# Patient Record
Sex: FEMALE | Race: WHITE
[De-identification: names, ages, dates, MRNs, and addresses within clinical notes are randomized per-mention and may not be internally consistent; named-entity substitution may affect disease eponyms.]

---

## 2017-09-15 ENCOUNTER — HOSPITAL ENCOUNTER (INPATIENT)
Dept: HOSPITAL 47 - EC | Age: 82
LOS: 6 days | Discharge: NURSING, REHAB OR LONG-TERM CARE | DRG: 689 | End: 2017-09-21
Payer: MEDICARE

## 2017-09-15 VITALS — BODY MASS INDEX: 28 KG/M2

## 2017-09-15 DIAGNOSIS — H91.90: ICD-10-CM

## 2017-09-15 DIAGNOSIS — I50.32: ICD-10-CM

## 2017-09-15 DIAGNOSIS — N18.4: ICD-10-CM

## 2017-09-15 DIAGNOSIS — E83.52: ICD-10-CM

## 2017-09-15 DIAGNOSIS — F03.90: ICD-10-CM

## 2017-09-15 DIAGNOSIS — B37.2: ICD-10-CM

## 2017-09-15 DIAGNOSIS — N17.0: ICD-10-CM

## 2017-09-15 DIAGNOSIS — I48.4: ICD-10-CM
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
## 2017-09-15 DIAGNOSIS — J30.9: ICD-10-CM
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
## 2017-09-15 DIAGNOSIS — E86.0: ICD-10-CM

## 2017-09-15 DIAGNOSIS — I13.0: ICD-10-CM

## 2017-09-15 DIAGNOSIS — N39.0: Primary | ICD-10-CM

## 2017-09-15 DIAGNOSIS — E87.6: ICD-10-CM

## 2017-09-15 LAB
ALP SERPL-CCNC: 82 U/L (ref 38–126)
ALT SERPL-CCNC: 27 U/L (ref 9–52)
ANION GAP SERPL CALC-SCNC: 14 MMOL/L
APTT BLD: 21.7 SEC (ref 22–30)
AST SERPL-CCNC: 58 U/L (ref 14–36)
BASOPHILS # BLD AUTO: 0 K/UL (ref 0–0.2)
BASOPHILS NFR BLD AUTO: 0 %
BUN SERPL-SCNC: 91 MG/DL (ref 7–17)
CALCIUM SPEC-MCNC: 10.7 MG/DL (ref 8.4–10.2)
CH: 28.4
CHCM: 32.6
CHLORIDE SERPL-SCNC: 103 MMOL/L (ref 98–107)
CK SERPL-CCNC: 368 U/L (ref 30–135)
CK SERPL-CCNC: 389 U/L (ref 30–135)
CO2 SERPL-SCNC: 29 MMOL/L (ref 22–30)
EOSINOPHIL # BLD AUTO: 0 K/UL (ref 0–0.7)
EOSINOPHIL NFR BLD AUTO: 0 %
ERYTHROCYTE [DISTWIDTH] IN BLOOD BY AUTOMATED COUNT: 5.27 M/UL (ref 3.8–5.4)
ERYTHROCYTE [DISTWIDTH] IN BLOOD: 16.7 % (ref 11.5–15.5)
GLUCOSE SERPL-MCNC: 120 MG/DL (ref 74–99)
HCT VFR BLD AUTO: 46.2 % (ref 34–46)
HDW: 2.64
HGB BLD-MCNC: 14.6 GM/DL (ref 11.4–16)
INR PPP: 1.3 (ref ?–1.2)
LUC NFR BLD AUTO: 2 %
LYMPHOCYTES # SPEC AUTO: 1 K/UL (ref 1–4.8)
LYMPHOCYTES NFR SPEC AUTO: 6 %
MAGNESIUM SPEC-SCNC: 2.3 MG/DL (ref 1.6–2.3)
MCH RBC QN AUTO: 27.6 PG (ref 25–35)
MCHC RBC AUTO-ENTMCNC: 31.5 G/DL (ref 31–37)
MCV RBC AUTO: 87.7 FL (ref 80–100)
MONOCYTES # BLD AUTO: 1 K/UL (ref 0–1)
MONOCYTES NFR BLD AUTO: 6 %
NEUTROPHILS # BLD AUTO: 14.2 K/UL (ref 1.3–7.7)
NEUTROPHILS NFR BLD AUTO: 86 %
NON-AFRICAN AMERICAN GFR(MDRD): 16
PARTICLE COUNT: (no result)
PH UR: 5.5 [PH] (ref 5–8)
POTASSIUM SERPL-SCNC: 3.7 MMOL/L (ref 3.5–5.1)
PROT SERPL-MCNC: 6.6 G/DL (ref 6.3–8.2)
PROT UR QL: (no result)
PT BLD: 12.4 SEC (ref 9–12)
RBC UR QL: 3 /HPF (ref 0–5)
SODIUM SERPL-SCNC: 146 MMOL/L (ref 137–145)
SP GR UR: 1.01 (ref 1–1.03)
SQUAMOUS UR QL AUTO: 2 /HPF (ref 0–4)
TROPONIN I SERPL-MCNC: 0.07 NG/ML (ref 0–0.03)
UA BILLING (MACRO VS. MICRO): (no result)
UROBILINOGEN UR QL STRIP: <2 MG/DL (ref ?–2)
WBC # BLD AUTO: 0.3 10*3/UL
WBC # BLD AUTO: 16.5 K/UL (ref 3.8–10.6)
WBC #/AREA URNS HPF: >182 /HPF (ref 0–5)
WBC (PEROX): 15.99

## 2017-09-15 PROCEDURE — 99285 EMERGENCY DEPT VISIT HI MDM: CPT

## 2017-09-15 PROCEDURE — 83735 ASSAY OF MAGNESIUM: CPT

## 2017-09-15 PROCEDURE — 84484 ASSAY OF TROPONIN QUANT: CPT

## 2017-09-15 PROCEDURE — 82550 ASSAY OF CK (CPK): CPT

## 2017-09-15 PROCEDURE — 87086 URINE CULTURE/COLONY COUNT: CPT

## 2017-09-15 PROCEDURE — 81001 URINALYSIS AUTO W/SCOPE: CPT

## 2017-09-15 PROCEDURE — 71020: CPT

## 2017-09-15 PROCEDURE — 93005 ELECTROCARDIOGRAM TRACING: CPT

## 2017-09-15 PROCEDURE — 82553 CREATINE MB FRACTION: CPT

## 2017-09-15 PROCEDURE — 85610 PROTHROMBIN TIME: CPT

## 2017-09-15 PROCEDURE — 80053 COMPREHEN METABOLIC PANEL: CPT

## 2017-09-15 PROCEDURE — 70450 CT HEAD/BRAIN W/O DYE: CPT

## 2017-09-15 PROCEDURE — 96361 HYDRATE IV INFUSION ADD-ON: CPT

## 2017-09-15 PROCEDURE — 36415 COLL VENOUS BLD VENIPUNCTURE: CPT

## 2017-09-15 PROCEDURE — 72125 CT NECK SPINE W/O DYE: CPT

## 2017-09-15 PROCEDURE — 85027 COMPLETE CBC AUTOMATED: CPT

## 2017-09-15 PROCEDURE — 83605 ASSAY OF LACTIC ACID: CPT

## 2017-09-15 PROCEDURE — 80048 BASIC METABOLIC PNL TOTAL CA: CPT

## 2017-09-15 PROCEDURE — 85025 COMPLETE CBC W/AUTO DIFF WBC: CPT

## 2017-09-15 PROCEDURE — 93306 TTE W/DOPPLER COMPLETE: CPT

## 2017-09-15 PROCEDURE — 87040 BLOOD CULTURE FOR BACTERIA: CPT

## 2017-09-15 PROCEDURE — 80061 LIPID PANEL: CPT

## 2017-09-15 PROCEDURE — 96365 THER/PROPH/DIAG IV INF INIT: CPT

## 2017-09-15 PROCEDURE — 84443 ASSAY THYROID STIM HORMONE: CPT

## 2017-09-15 PROCEDURE — 96367 TX/PROPH/DG ADDL SEQ IV INF: CPT

## 2017-09-15 PROCEDURE — 85730 THROMBOPLASTIN TIME PARTIAL: CPT

## 2017-09-15 PROCEDURE — 72170 X-RAY EXAM OF PELVIS: CPT

## 2017-09-15 NOTE — ED
Fall HPI





- General


Chief Complaint: Fall


Stated Complaint: Fall


Time Seen by Provider: 09/15/17 19:20


Source: EMS, RN notes reviewed, old records reviewed


Mode of arrival: EMS





- History of Present Illness


Initial Comments: 





This is an 82-year-old female presenting to emergency Department after being 

found by her daughter after laying in her living room for the past 2 days.  

Apparently patient fell.  She was found with her head resting on the back of 

her recliner.  Patient started reports that she laid down her and did laying in 

her feces and urine.  Patient's daughter reports she is extremely dehydrated 

and thirsty when they arrived.  Patient denies any pain.  She is extremely hard 

of hearing.  Known history of kidney disease.  She states that she's had no 

cardiac abnormalities.  She denies any chest pain shortness of breath, denies 

any leg or hip pain or headache. 





- Related Data


 Home Medications











 Medication  Instructions  Recorded  Confirmed


 


Loratadine [Claritin] 10 mg PO DAILY 09/15/17 09/15/17


 


ALPRAZolam [Xanax] 0.5 mg PO TID 09/16/17 09/16/17


 


Albuterol Inhaler [Ventolin Hfa 2 puff INHALATION RT-Q4H PRN 09/16/17 09/16/17





Inhaler]   


 


Allopurinol [Zyloprim] 300 mg PO DAILY 09/16/17 09/16/17


 


Dicyclomine HCl 10 mg PO QID 09/16/17 09/16/17


 


Fluticasone Nasal Spray [Flonase 1 spray EA NOSTRIL DAILY 09/16/17 09/16/17





Nasal Spray]   


 


Hydrocodone/Acetaminophen [Norco 1 tab PO Q6HR PRN 09/16/17 09/16/17





5-325]   


 


Levothyroxine Sodium [Synthroid] 25 mcg PO DAILY 09/16/17 09/16/17


 


Potassium Chloride [K-Tab ER] 10 meq PO DAILY 09/16/17 09/16/17


 


Ranitidine HCl [Zantac] 150 mg PO BID 09/16/17 09/16/17











 Allergies











Allergy/AdvReac Type Severity Reaction Status Date / Time


 


No Known Allergies Allergy   Verified 09/15/17 20:23














Review of Systems


ROS Statement: 


Those systems with pertinent positive or pertinent negative responses have been 

documented in the HPI.





ROS Other: All systems not noted in ROS Statement are negative.





Past Medical History


Past Medical History: Dementia


History of Any Multi-Drug Resistant Organisms: None Reported


Past Surgical History: No Surgical Hx Reported


Past Psychological History: No Psychological Hx Reported


Smoking Status: Never smoker


Past Alcohol Use History: None Reported


Past Drug Use History: None Reported





- Past Family History


  ** Father


Family Medical History: No Reported History





  ** Mother


Family Medical History: No Reported History, Osteoarthritis (OA), Rheumatoid 

Arthritis (RA)


Additional Family Medical History / Comment(s): kidney problems, specific type 

unknown





General Exam





- General Exam Comments


Initial Comments: 





Pleasant 82-year-old female.  Patient is extremely hard of hearing.


Limitations: no limitations


General appearance: alert, in no apparent distress


Head exam: Present: atraumatic, normocephalic, normal inspection


Eye exam: Present: normal appearance, PERRL, EOMI.  Absent: scleral icterus, 

conjunctival injection, periorbital swelling


ENT exam: Present: normal exam, mucous membranes moist


Neck exam: Present: normal inspection.  Absent: tenderness, meningismus, 

lymphadenopathy


Respiratory exam: Present: normal lung sounds bilaterally.  Absent: respiratory 

distress, wheezes, rales, rhonchi, stridor





Course


 Vital Signs











  09/15/17 09/15/17





  19:14 23:07


 


Temperature 97.8 F 97.8 F


 


Pulse Rate 78 78


 


Respiratory 18 16





Rate  


 


Blood Pressure 101/58 125/79


 


O2 Sat by Pulse 98 99





Oximetry  














Medical Decision Making





- Medical Decision Making





10-year-old female presents or urgency department on her living room laying on 

the floor for 2 days.  She is lingering her own feces and stool.  Patient has a 

known history of kidney disease.  Patient labwork was reviewed, evidence of 

urinary tract infection, pelvis of leukocytosis.  Patient's chest x-ray KUB 

completed CT brain and C-spine were negative for any acute process.  Patient 

also had elevated lactic acid.  Patient does appear to be very dehydrated 

elevated BUN/creatinine.  Patient was given 2 L fluids, started on Rocephin for 

her urinary tract infection, as well as noted elevated troponin.  We'll repeat 

the troponin care.  Patient be admitted at this time.





- Lab Data


Result diagrams: 


 09/17/17 06:02





 09/17/17 06:02


 Lab Results











  09/15/17 09/15/17 09/15/17 Range/Units





  20:05 20:05 20:05 


 


WBC  16.5 H    (3.8-10.6)  k/uL


 


RBC  5.27    (3.80-5.40)  m/uL


 


Hgb  14.6    (11.4-16.0)  gm/dL


 


Hct  46.2 H    (34.0-46.0)  %


 


MCV  87.7    (80.0-100.0)  fL


 


MCH  27.6    (25.0-35.0)  pg


 


MCHC  31.5    (31.0-37.0)  g/dL


 


RDW  16.7 H    (11.5-15.5)  %


 


Plt Count  359    (150-450)  k/uL


 


Neutrophils %  86    %


 


Lymphocytes %  6    %


 


Monocytes %  6    %


 


Eosinophils %  0    %


 


Basophils %  0    %


 


Neutrophils #  14.2 H    (1.3-7.7)  k/uL


 


Lymphocytes #  1.0    (1.0-4.8)  k/uL


 


Monocytes #  1.0    (0-1.0)  k/uL


 


Eosinophils #  0.0    (0-0.7)  k/uL


 


Basophils #  0.0    (0-0.2)  k/uL


 


Anisocytosis  Slight    


 


PT    12.4 H  (9.0-12.0)  sec


 


INR    1.3 H  (<1.2)  


 


APTT    21.7 L  (22.0-30.0)  sec


 


Sodium   146 H   (137-145)  mmol/L


 


Potassium   3.7   (3.5-5.1)  mmol/L


 


Chloride   103   ()  mmol/L


 


Carbon Dioxide   29   (22-30)  mmol/L


 


Anion Gap   14   mmol/L


 


BUN   91 H*   (7-17)  mg/dL


 


Creatinine   2.80 H   (0.52-1.04)  mg/dL


 


Est GFR (MDRD) Af Amer   20   (>60 ml/min/1.73 sqM)  


 


Est GFR (MDRD) Non-Af   16   (>60 ml/min/1.73 sqM)  


 


Glucose   120 H   (74-99)  mg/dL


 


Lactic Ac Sepsis Rflx     


 


Plasma Lactic Acid Sumit     (0.7-2.0)  mmol/L


 


Calcium   10.7 H   (8.4-10.2)  mg/dL


 


Magnesium   2.3   (1.6-2.3)  mg/dL


 


Total Bilirubin   1.1   (0.2-1.3)  mg/dL


 


AST   58 H   (14-36)  U/L


 


ALT   27   (9-52)  U/L


 


Alkaline Phosphatase   82   ()  U/L


 


Creatine Kinase   389 H   ()  U/L


 


Total Creatine Kinase     ()  U/L


 


CK-MB (CK-2)     (0.0-2.4)  ng/mL


 


CK-MB (CK-2) Rel Index     


 


Troponin I     (0.000-0.034)  ng/mL


 


Total Protein   6.6   (6.3-8.2)  g/dL


 


Albumin   3.6   (3.5-5.0)  g/dL


 


Urine Color     


 


Urine Appearance     (Clear)  


 


Urine pH     (5.0-8.0)  


 


Ur Specific Gravity     (1.001-1.035)  


 


Urine Protein     (Negative)  


 


Urine Glucose (UA)     (Negative)  


 


Urine Ketones     (Negative)  


 


Urine Blood     (Negative)  


 


Urine Nitrite     (Negative)  


 


Urine Bilirubin     (Negative)  


 


Urine Urobilinogen     (<2.0)  mg/dL


 


Ur Leukocyte Esterase     (Negative)  


 


Urine RBC     (0-5)  /hpf


 


Urine WBC     (0-5)  /hpf


 


Urine WBC Clumps     (None)  /hpf


 


Ur Squamous Epith Cells     (0-4)  /hpf


 


Urine Bacteria     (None)  /hpf


 


Hyaline Casts     (0-2)  /lpf


 


Urine Yeast (Budding)     (None)  /hpf














  09/15/17 09/15/17 09/15/17 Range/Units





  20:05 20:05 20:47 


 


WBC     (3.8-10.6)  k/uL


 


RBC     (3.80-5.40)  m/uL


 


Hgb     (11.4-16.0)  gm/dL


 


Hct     (34.0-46.0)  %


 


MCV     (80.0-100.0)  fL


 


MCH     (25.0-35.0)  pg


 


MCHC     (31.0-37.0)  g/dL


 


RDW     (11.5-15.5)  %


 


Plt Count     (150-450)  k/uL


 


Neutrophils %     %


 


Lymphocytes %     %


 


Monocytes %     %


 


Eosinophils %     %


 


Basophils %     %


 


Neutrophils #     (1.3-7.7)  k/uL


 


Lymphocytes #     (1.0-4.8)  k/uL


 


Monocytes #     (0-1.0)  k/uL


 


Eosinophils #     (0-0.7)  k/uL


 


Basophils #     (0-0.2)  k/uL


 


Anisocytosis     


 


PT     (9.0-12.0)  sec


 


INR     (<1.2)  


 


APTT     (22.0-30.0)  sec


 


Sodium     (137-145)  mmol/L


 


Potassium     (3.5-5.1)  mmol/L


 


Chloride     ()  mmol/L


 


Carbon Dioxide     (22-30)  mmol/L


 


Anion Gap     mmol/L


 


BUN     (7-17)  mg/dL


 


Creatinine     (0.52-1.04)  mg/dL


 


Est GFR (MDRD) Af Amer     (>60 ml/min/1.73 sqM)  


 


Est GFR (MDRD) Non-Af     (>60 ml/min/1.73 sqM)  


 


Glucose     (74-99)  mg/dL


 


Lactic Ac Sepsis Rflx    Y  


 


Plasma Lactic Acid Sumit   3.0 H*   (0.7-2.0)  mmol/L


 


Calcium     (8.4-10.2)  mg/dL


 


Magnesium     (1.6-2.3)  mg/dL


 


Total Bilirubin     (0.2-1.3)  mg/dL


 


AST     (14-36)  U/L


 


ALT     (9-52)  U/L


 


Alkaline Phosphatase     ()  U/L


 


Creatine Kinase     ()  U/L


 


Total Creatine Kinase  368 H    ()  U/L


 


CK-MB (CK-2)  2.9 H*    (0.0-2.4)  ng/mL


 


CK-MB (CK-2) Rel Index  0.8    


 


Troponin I  0.071 H*    (0.000-0.034)  ng/mL


 


Total Protein     (6.3-8.2)  g/dL


 


Albumin     (3.5-5.0)  g/dL


 


Urine Color     


 


Urine Appearance     (Clear)  


 


Urine pH     (5.0-8.0)  


 


Ur Specific Gravity     (1.001-1.035)  


 


Urine Protein     (Negative)  


 


Urine Glucose (UA)     (Negative)  


 


Urine Ketones     (Negative)  


 


Urine Blood     (Negative)  


 


Urine Nitrite     (Negative)  


 


Urine Bilirubin     (Negative)  


 


Urine Urobilinogen     (<2.0)  mg/dL


 


Ur Leukocyte Esterase     (Negative)  


 


Urine RBC     (0-5)  /hpf


 


Urine WBC     (0-5)  /hpf


 


Urine WBC Clumps     (None)  /hpf


 


Ur Squamous Epith Cells     (0-4)  /hpf


 


Urine Bacteria     (None)  /hpf


 


Hyaline Casts     (0-2)  /lpf


 


Urine Yeast (Budding)     (None)  /hpf














  09/15/17 Range/Units





  20:50 


 


WBC   (3.8-10.6)  k/uL


 


RBC   (3.80-5.40)  m/uL


 


Hgb   (11.4-16.0)  gm/dL


 


Hct   (34.0-46.0)  %


 


MCV   (80.0-100.0)  fL


 


MCH   (25.0-35.0)  pg


 


MCHC   (31.0-37.0)  g/dL


 


RDW   (11.5-15.5)  %


 


Plt Count   (150-450)  k/uL


 


Neutrophils %   %


 


Lymphocytes %   %


 


Monocytes %   %


 


Eosinophils %   %


 


Basophils %   %


 


Neutrophils #   (1.3-7.7)  k/uL


 


Lymphocytes #   (1.0-4.8)  k/uL


 


Monocytes #   (0-1.0)  k/uL


 


Eosinophils #   (0-0.7)  k/uL


 


Basophils #   (0-0.2)  k/uL


 


Anisocytosis   


 


PT   (9.0-12.0)  sec


 


INR   (<1.2)  


 


APTT   (22.0-30.0)  sec


 


Sodium   (137-145)  mmol/L


 


Potassium   (3.5-5.1)  mmol/L


 


Chloride   ()  mmol/L


 


Carbon Dioxide   (22-30)  mmol/L


 


Anion Gap   mmol/L


 


BUN   (7-17)  mg/dL


 


Creatinine   (0.52-1.04)  mg/dL


 


Est GFR (MDRD) Af Amer   (>60 ml/min/1.73 sqM)  


 


Est GFR (MDRD) Non-Af   (>60 ml/min/1.73 sqM)  


 


Glucose   (74-99)  mg/dL


 


Lactic Ac Sepsis Rflx   


 


Plasma Lactic Acid Sumit   (0.7-2.0)  mmol/L


 


Calcium   (8.4-10.2)  mg/dL


 


Magnesium   (1.6-2.3)  mg/dL


 


Total Bilirubin   (0.2-1.3)  mg/dL


 


AST   (14-36)  U/L


 


ALT   (9-52)  U/L


 


Alkaline Phosphatase   ()  U/L


 


Creatine Kinase   ()  U/L


 


Total Creatine Kinase   ()  U/L


 


CK-MB (CK-2)   (0.0-2.4)  ng/mL


 


CK-MB (CK-2) Rel Index   


 


Troponin I   (0.000-0.034)  ng/mL


 


Total Protein   (6.3-8.2)  g/dL


 


Albumin   (3.5-5.0)  g/dL


 


Urine Color  Yellow  


 


Urine Appearance  Turbid H  (Clear)  


 


Urine pH  5.5  (5.0-8.0)  


 


Ur Specific Gravity  1.012  (1.001-1.035)  


 


Urine Protein  2+ H  (Negative)  


 


Urine Glucose (UA)  Negative  (Negative)  


 


Urine Ketones  Negative  (Negative)  


 


Urine Blood  Small H  (Negative)  


 


Urine Nitrite  Negative  (Negative)  


 


Urine Bilirubin  Negative  (Negative)  


 


Urine Urobilinogen  <2.0  (<2.0)  mg/dL


 


Ur Leukocyte Esterase  Large H  (Negative)  


 


Urine RBC  3  (0-5)  /hpf


 


Urine WBC  >182 H  (0-5)  /hpf


 


Urine WBC Clumps  Many H  (None)  /hpf


 


Ur Squamous Epith Cells  2  (0-4)  /hpf


 


Urine Bacteria  Many H  (None)  /hpf


 


Hyaline Casts  15 H  (0-2)  /lpf


 


Urine Yeast (Budding)  Few H  (None)  /hpf














09/15/17 22:31


EKG performed at 1924 shows normal sinus rhythm.  Evidence of inferior infarct.

  Ventricular rate 78 beats were noted.  WY interval 1:30.  To ascertain 6 

segs.  QT QTc is 41 seconds.





- Radiology Data


Radiology results: report reviewed


Chest x-ray was reviewed and negative for any acute process, pelvis x-ray was 

negative for any fracture.  CT brainand showed no acute abnormality's, evidence 

of spinal changes.





Disposition


Clinical Impression: 


 Fall, Renal insufficiency, Elevated troponin, [place] (hard of hearing), 

Dehydration, UTI (urinary tract infection)





Disposition: ADMITTED AS IP TO THIS [facility]


Time of Disposition: 22:31

## 2017-09-15 NOTE — XR
EXAMINATION TYPE: XR pelvis AP view

 

DATE OF EXAM: 9/15/2017

 

COMPARISON: NONE

 

HISTORY: Pain

 

TECHNIQUE: Single view

 

FINDINGS: Pelvic ring is intact. Proximal femurs are intact. I see no fracture. Sacroiliac joints are
 intact.

 

IMPRESSION: No fracture.

## 2017-09-15 NOTE — CT
EXAMINATION TYPE: CT brain cspine wo con

 

DATE OF EXAM: 9/15/2017

 

COMPARISON: NONE

 

HISTORY: Fall

 

CT DLP: 1633 mGycm

Automated exposure control for dose reduction was used.

 

TECHNIQUE: CT scan of the head and cervical spine are performed without contrast.

 

FINDINGS:   There is cerebral cortical atrophy. There is no mass effect nor midline shift. There is n
o sign of intracranial hemorrhage. The calvarium is intact. There is mucosal thickening in the maxill
riley sinuses. There is deformity of the right lobe.

 

There is a few millimeter anterior subluxation of C3 in relation to C4. There is moderate narrowing o
f C3-4 C4-5 disc spaces with spurring of the endplates. There is mild hypertrophic facet arthropathy.
 The skull base is intact. Posterior elements are intact.

 

IMPRESSION:

Cerebral atrophy. No acute intracranial abnormality. Mild chronic small vessel ischemia. Sinusitis.

 

Spondylotic changes in the cervical spine. No fracture.

## 2017-09-15 NOTE — XR
EXAMINATION TYPE: XR chest 2V

 

DATE OF EXAM: 9/15/2017

 

COMPARISON: NONE

 

HISTORY: Fall. Chest pain.

 

TECHNIQUE:  Frontal and lateral views of the chest are obtained.

 

FINDINGS:  There is hiatal hernia. There is no heart failure. Heart size is normal. Costophrenic angl
es are clear. There is no sign of pleural effusion or pneumothorax. 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
int subacromial joint space narrowing.

 

IMPRESSION:  Hiatal hernia. No active cardiopulmonary disease.

## 2017-09-16 LAB
ALP SERPL-CCNC: 78 U/L (ref 38–126)
ALT SERPL-CCNC: 33 U/L (ref 9–52)
ANION GAP SERPL CALC-SCNC: 11 MMOL/L
AST SERPL-CCNC: 41 U/L (ref 14–36)
BUN SERPL-SCNC: 68 MG/DL (ref 7–17)
CALCIUM SPEC-MCNC: 9.4 MG/DL (ref 8.4–10.2)
CH: 27.3
CHCM: 30.7
CHLORIDE SERPL-SCNC: 106 MMOL/L (ref 98–107)
CHOLEST SERPL-MCNC: 106 MG/DL (ref ?–200)
CK SERPL-CCNC: 337 U/L (ref 30–135)
CK SERPL-CCNC: 342 U/L (ref 30–135)
CO2 SERPL-SCNC: 25 MMOL/L (ref 22–30)
ERYTHROCYTE [DISTWIDTH] IN BLOOD BY AUTOMATED COUNT: 4.59 M/UL (ref 3.8–5.4)
ERYTHROCYTE [DISTWIDTH] IN BLOOD: 15.5 % (ref 11.5–15.5)
GLUCOSE SERPL-MCNC: 90 MG/DL (ref 74–99)
HCT VFR BLD AUTO: 41.1 % (ref 34–46)
HDLC SERPL-MCNC: 52 MG/DL (ref 40–60)
HDW: 2.5
HGB BLD-MCNC: 13 GM/DL (ref 11.4–16)
MAGNESIUM SPEC-SCNC: 1.8 MG/DL (ref 1.6–2.3)
MCH RBC QN AUTO: 28.3 PG (ref 25–35)
MCHC RBC AUTO-ENTMCNC: 31.6 G/DL (ref 31–37)
MCV RBC AUTO: 89.5 FL (ref 80–100)
NON-AFRICAN AMERICAN GFR(MDRD): 20
POTASSIUM SERPL-SCNC: 3 MMOL/L (ref 3.5–5.1)
PROT SERPL-MCNC: 5.5 G/DL (ref 6.3–8.2)
SODIUM SERPL-SCNC: 142 MMOL/L (ref 137–145)
TROPONIN I SERPL-MCNC: 0.05 NG/ML (ref 0–0.03)
TROPONIN I SERPL-MCNC: 0.06 NG/ML (ref 0–0.03)
WBC # BLD AUTO: 12.7 K/UL (ref 3.8–10.6)

## 2017-09-16 RX ADMIN — SODIUM CHLORIDE SCH MLS/HR: 9 INJECTION, SOLUTION INTRAVENOUS at 10:31

## 2017-09-16 RX ADMIN — NYSTATIN SCH APPLIC: 100000 POWDER TOPICAL at 21:04

## 2017-09-16 RX ADMIN — HEPARIN SODIUM SCH UNIT: 5000 INJECTION, SOLUTION INTRAVENOUS; SUBCUTANEOUS at 12:51

## 2017-09-16 RX ADMIN — LORATADINE SCH MG: 10 TABLET ORAL at 17:44

## 2017-09-16 RX ADMIN — DICYCLOMINE HYDROCHLORIDE SCH MG: 10 CAPSULE ORAL at 21:04

## 2017-09-16 RX ADMIN — SODIUM CHLORIDE SCH MLS/HR: 9 INJECTION, SOLUTION INTRAVENOUS at 12:51

## 2017-09-16 RX ADMIN — DICYCLOMINE HYDROCHLORIDE SCH MG: 10 CAPSULE ORAL at 17:44

## 2017-09-16 RX ADMIN — HEPARIN SODIUM SCH UNIT: 5000 INJECTION, SOLUTION INTRAVENOUS; SUBCUTANEOUS at 21:05

## 2017-09-16 NOTE — P.HPIM
History of Present Illness


H&P Date: 09/16/17


Chief Complaint: Fall





This is a 10-year-old female with history of dementia no home medications 

[place] and her home.  Patient is very hard of hearing so is being quite a few 

difficulties obtaining history from her.


Apparently patient was brought in by family will follow patient at her house on 

the floor.  Last time they contacted the patient most 2 days prior to this 

admission and apparently she was in her usual state of health.  Today when they 

come over to her home to take her out she was sitting down on the floor but 

urine all over and not able to get up but nothing apparent distress and awake.  

Patient states that she fell down on the floor may be even slowly lower herself 

and she cannot describe any circumstances or with her happened.  Patient is 

currently in no any apparent distress.  She denies any headache, shortness of 

breath or cough, chest pain, abdominal pain, diarrhea, dysuria, fever or 

chills.  In the emergency department blood work was significant for 

leukocytosis elevated creatinine and BUNs and more than 100 of 80 WBCs in her 

urine with large leukocyte esterase.  Patient vital signs were stable she was 

afebrile on admission but she did have elevated lactic acid up to 3 and mild 

elevation in her troponin.  Patient states that she lives alone in her house 

with her family occasionally visiting.





Review of Systems


Constitutional: Denies chills, Denies fever


Ears: bilateral: decreased hearing


Cardiovascular: Denies chest pain, Denies shortness of breath


Respiratory: Denies cough


Gastrointestinal: Denies abdominal pain, Denies diarrhea, Denies nausea, Denies 

vomiting


Genitourinary: Denies dysuria, Denies flank pain, Denies hematuria, Denies 

urgency, Denies urinary frequency


Integumentary: Denies rash


Neurological: Reports hearing difficulties


Psychiatric: Reports memory loss, Denies anxiety


Endocrine: Denies cold intolerance, Denies heat intolerance, Denies polyuria





Past Medical History


Past Medical History: Dementia


History of Any Multi-Drug Resistant Organisms: None Reported


Past Surgical History: No Surgical Hx Reported


Past Psychological History: No Psychological Hx Reported


Smoking Status: Never smoker


Past Alcohol Use History: None Reported


Past Drug Use History: None Reported





Medications and Allergies


 Home Medications











 Medication  Instructions  Recorded  Confirmed  Type


 


Loratadine [Claritin] 10 mg PO DAILY 09/15/17 09/15/17 History











 Allergies











Allergy/AdvReac Type Severity Reaction Status Date / Time


 


No Known Allergies Allergy   Verified 09/15/17 20:23














Physical Exam


Vitals: 


 Vital Signs











  Temp Pulse Resp BP Pulse Ox


 


 09/15/17 23:07  97.8 F  78  16  125/79  99


 


 09/15/17 19:14  97.8 F  78  18  101/58  98








 Intake and Output











 09/15/17 09/15/17 09/16/17





 14:59 22:59 06:59


 


Other:   


 


  Weight  72.575 kg 








 Patient Weight











 09/16/17





 06:59


 


Weight 72.575 kg














- Constitutional


General appearance: cooperative, no acute distress





- EENT





Right eye cataract


Eyes: no scleral icterus


ENT: hard of hearing, normal oropharynx





- Neck


Neck: no lymphadenopathy, normal ROM, no thyromegaly





- Respiratory


Respiratory: bilateral: CTA





- Cardiovascular


Rhythm: regular


Heart sounds: normal: S1, S2





- Gastrointestinal





Right costal vertebral angle tenderness present


General gastrointestinal: normal bowel sounds, no organomegaly, soft, no 

tenderness





- Integumentary


Integumentary: no cellulitis, no rash





- Neurologic


Neurologic: CNII-XII intact





- Musculoskeletal


Musculoskeletal: no generalized weakness, strength equal bilaterally





Neurological examination cranial nerves seem to be intact with limitation of 

patient level of cooperation


Motor in upper and lower extremities 5 out of 5 proximally and distally


DTRs are 1+ and symmetrical in patellar


No clonus


Plantar is flexor





Results


CBC & Chem 7: 


 09/15/17 20:05





 09/15/17 20:05


Labs: 


 Abnormal Lab Results - Last 24 Hours (Table)











  09/15/17 09/15/17 09/15/17 Range/Units





  20:05 20:05 20:05 


 


WBC  16.5 H    (3.8-10.6)  k/uL


 


Hct  46.2 H    (34.0-46.0)  %


 


RDW  16.7 H    (11.5-15.5)  %


 


Neutrophils #  14.2 H    (1.3-7.7)  k/uL


 


PT    12.4 H  (9.0-12.0)  sec


 


INR    1.3 H  (<1.2)  


 


APTT    21.7 L  (22.0-30.0)  sec


 


Sodium   146 H   (137-145)  mmol/L


 


BUN   91 H*   (7-17)  mg/dL


 


Creatinine   2.80 H   (0.52-1.04)  mg/dL


 


Glucose   120 H   (74-99)  mg/dL


 


Plasma Lactic Acid Sumit     (0.7-2.0)  mmol/L


 


Calcium   10.7 H   (8.4-10.2)  mg/dL


 


AST   58 H   (14-36)  U/L


 


Creatine Kinase   389 H   ()  U/L


 


Total Creatine Kinase     ()  U/L


 


CK-MB (CK-2)     (0.0-2.4)  ng/mL


 


Troponin I     (0.000-0.034)  ng/mL


 


Urine Appearance     (Clear)  


 


Urine Protein     (Negative)  


 


Urine Blood     (Negative)  


 


Ur Leukocyte Esterase     (Negative)  


 


Urine WBC     (0-5)  /hpf


 


Urine WBC Clumps     (None)  /hpf


 


Urine Bacteria     (None)  /hpf


 


Hyaline Casts     (0-2)  /lpf


 


Urine Yeast (Budding)     (None)  /hpf














  09/15/17 09/15/17 09/15/17 Range/Units





  20:05 20:05 20:50 


 


WBC     (3.8-10.6)  k/uL


 


Hct     (34.0-46.0)  %


 


RDW     (11.5-15.5)  %


 


Neutrophils #     (1.3-7.7)  k/uL


 


PT     (9.0-12.0)  sec


 


INR     (<1.2)  


 


APTT     (22.0-30.0)  sec


 


Sodium     (137-145)  mmol/L


 


BUN     (7-17)  mg/dL


 


Creatinine     (0.52-1.04)  mg/dL


 


Glucose     (74-99)  mg/dL


 


Plasma Lactic Acid Sumit   3.0 H*   (0.7-2.0)  mmol/L


 


Calcium     (8.4-10.2)  mg/dL


 


AST     (14-36)  U/L


 


Creatine Kinase     ()  U/L


 


Total Creatine Kinase  368 H    ()  U/L


 


CK-MB (CK-2)  2.9 H*    (0.0-2.4)  ng/mL


 


Troponin I  0.071 H*    (0.000-0.034)  ng/mL


 


Urine Appearance    Turbid H  (Clear)  


 


Urine Protein    2+ H  (Negative)  


 


Urine Blood    Small H  (Negative)  


 


Ur Leukocyte Esterase    Large H  (Negative)  


 


Urine WBC    >182 H  (0-5)  /hpf


 


Urine WBC Clumps    Many H  (None)  /hpf


 


Urine Bacteria    Many H  (None)  /hpf


 


Hyaline Casts    15 H  (0-2)  /lpf


 


Urine Yeast (Budding)    Few H  (None)  /hpf














Thrombosis Risk Factor Assmnt





- DVT/VTE Prophylaxis


DVT/VTE Prophylaxis: Pharmacologic Prophylaxis ordered





Assessment and Plan


(1) UTI (urinary tract infection)


Narrative/Plan: 


Patient exhibits evidence of urinary tract infection with 30 for leukocytosis 

and elevated lactic acid consistent with sepsis


She has a right CVA tenderness and I'm worried about acute pyelonephritis


She was given levofloxacin in the emergency department


Plan he is to start her on ceftriaxone


I will also obtain urine and blood cultures


Repeat lactic acid


Continue IV fluids


Check postvoiding residuals to rule out urinary retention as etiology of 

urinary tract infection


Status: Acute   





(2) JACK (acute kidney injury)


Narrative/Plan: 


Favor pre-renal nonoliguric


C PK  mildly elevated and does not explain


Check post voiding residuals to rule out urinary retention


Monitor urine output and recheck BUNs/creatinine the morning


Status: Acute   





(3) Elevated troponin


Narrative/Plan: 


Suspect due to acute kidney injury


No complaint of chest pain or shortness of breath


Repeat troponin has been ordered and pending


Status: Acute   





(4) Fall


Narrative/Plan: 


Fall with dementia and home situation


Patient definitely needs more support and likely will need nursing home 

placement at the time of discharge


Consults social work


Consult physical and occupational therapy


Status: Acute   


Time with Patient: Greater than 30

## 2017-09-16 NOTE — P.PN
Subjective


Principal diagnosis: 


Fall, weakness





Patient is an 82-year-old  female with history of dementia and 

ALLERGIC rhinitis who was brought in by family after being found covered in 

urine and feces on the floor for house.  Her last known normal had been 2 days 

prior.  In the emergency department she underwent an extensive evaluation.  She 

was found to have a urinary tract infection with elevated lactic acid and was 

started on IV fluids and antibiotics.  Her troponin was found to be slightly 

elevated.  Arrangements were made for her to get admitted to the selective care 

unit.  Upon admission her antibiotics were maintained.  She was found to have a 

candidal infection of the left abdominal fold and nystatin was ordered.








Patient seen and examined at bedside.  She is extremely hard of hearing 

conversation is difficult.  She states she has had diarrhea for a few days.  

She is also recently been treated for an urinary tract infection with 

antibiotics.  She thought her urinary tract infection may not have been 

resolved and called her PCP but he was out of the office for one week.  She 

denies any chest pain, shortness of breath, or nausea.














Objective





- Vital Signs


Vital signs: 


 Vital Signs











Temp  98.6 F   09/16/17 04:00


 


Pulse  82   09/16/17 04:00


 


Resp  18   09/16/17 04:00


 


BP  120/59   09/16/17 04:00


 


Pulse Ox  97   09/16/17 04:00








 Intake & Output











 09/15/17 09/16/17 09/16/17





 18:59 06:59 18:59


 


Intake Total   300


 


Balance   300


 


Weight  71.5 kg 


 


Intake:   


 


  Oral   300


 


Other:   


 


  Voiding Method  Bedpan 














- Exam


General: non toxic, no distress, appears at stated age


Derm: no rashes, no lesions


Head: atraumatic, normocephalic, symmetric


Eyes: EOMI, no lid lag, anicteric sclera


ENT: no post nasal drip, no thrush 


Mouth: no lip lesion, dry membranes moist


Cardiovascular: S1S2 reg, no murmur, positive posterior tibial pulse bilateral, 


Lungs: CTA bilateral, no rhonchi, no rales , no accessory muscle use


Abdominal: soft,  nontender to palpation, no guarding, no appreciable 

organomegaly


Ext: no gross muscle atrophy, no edema, no contractures


Neuro:  CN II-XI grossly intact, no focal neuro deficits


Psych: Alert, oriented, appropriate affect 








- Labs


CBC & Chem 7: 


 09/16/17 07:54





 09/16/17 07:54


Labs: 


 Abnormal Lab Results - Last 24 Hours (Table)











  09/15/17 09/15/17 09/15/17 Range/Units





  20:05 20:05 20:05 


 


WBC  16.5 H    (3.8-10.6)  k/uL


 


Hct  46.2 H    (34.0-46.0)  %


 


RDW  16.7 H    (11.5-15.5)  %


 


Neutrophils #  14.2 H    (1.3-7.7)  k/uL


 


PT    12.4 H  (9.0-12.0)  sec


 


INR    1.3 H  (<1.2)  


 


APTT    21.7 L  (22.0-30.0)  sec


 


Sodium   146 H   (137-145)  mmol/L


 


Potassium     (3.5-5.1)  mmol/L


 


BUN   91 H*   (7-17)  mg/dL


 


Creatinine   2.80 H   (0.52-1.04)  mg/dL


 


Glucose   120 H   (74-99)  mg/dL


 


Plasma Lactic Acid Sumit     (0.7-2.0)  mmol/L


 


Calcium   10.7 H   (8.4-10.2)  mg/dL


 


AST   58 H   (14-36)  U/L


 


Creatine Kinase   389 H   ()  U/L


 


Total Creatine Kinase     ()  U/L


 


CK-MB (CK-2)     (0.0-2.4)  ng/mL


 


Troponin I     (0.000-0.034)  ng/mL


 


Total Protein     (6.3-8.2)  g/dL


 


Albumin     (3.5-5.0)  g/dL


 


Urine Appearance     (Clear)  


 


Urine Protein     (Negative)  


 


Urine Blood     (Negative)  


 


Ur Leukocyte Esterase     (Negative)  


 


Urine WBC     (0-5)  /hpf


 


Urine WBC Clumps     (None)  /hpf


 


Urine Bacteria     (None)  /hpf


 


Hyaline Casts     (0-2)  /lpf


 


Urine Yeast (Budding)     (None)  /hpf














  09/15/17 09/15/17 09/15/17 Range/Units





  20:05 20:05 20:50 


 


WBC     (3.8-10.6)  k/uL


 


Hct     (34.0-46.0)  %


 


RDW     (11.5-15.5)  %


 


Neutrophils #     (1.3-7.7)  k/uL


 


PT     (9.0-12.0)  sec


 


INR     (<1.2)  


 


APTT     (22.0-30.0)  sec


 


Sodium     (137-145)  mmol/L


 


Potassium     (3.5-5.1)  mmol/L


 


BUN     (7-17)  mg/dL


 


Creatinine     (0.52-1.04)  mg/dL


 


Glucose     (74-99)  mg/dL


 


Plasma Lactic Acid Sumit   3.0 H*   (0.7-2.0)  mmol/L


 


Calcium     (8.4-10.2)  mg/dL


 


AST     (14-36)  U/L


 


Creatine Kinase     ()  U/L


 


Total Creatine Kinase  368 H    ()  U/L


 


CK-MB (CK-2)  2.9 H*    (0.0-2.4)  ng/mL


 


Troponin I  0.071 H*    (0.000-0.034)  ng/mL


 


Total Protein     (6.3-8.2)  g/dL


 


Albumin     (3.5-5.0)  g/dL


 


Urine Appearance    Turbid H  (Clear)  


 


Urine Protein    2+ H  (Negative)  


 


Urine Blood    Small H  (Negative)  


 


Ur Leukocyte Esterase    Large H  (Negative)  


 


Urine WBC    >182 H  (0-5)  /hpf


 


Urine WBC Clumps    Many H  (None)  /hpf


 


Urine Bacteria    Many H  (None)  /hpf


 


Hyaline Casts    15 H  (0-2)  /lpf


 


Urine Yeast (Budding)    Few H  (None)  /hpf














  09/16/17 09/16/17 09/16/17 Range/Units





  01:09 07:33 07:54 


 


WBC     (3.8-10.6)  k/uL


 


Hct     (34.0-46.0)  %


 


RDW     (11.5-15.5)  %


 


Neutrophils #     (1.3-7.7)  k/uL


 


PT     (9.0-12.0)  sec


 


INR     (<1.2)  


 


APTT     (22.0-30.0)  sec


 


Sodium     (137-145)  mmol/L


 


Potassium    3.0 L*  (3.5-5.1)  mmol/L


 


BUN    68 H  (7-17)  mg/dL


 


Creatinine    2.30 H  (0.52-1.04)  mg/dL


 


Glucose     (74-99)  mg/dL


 


Plasma Lactic Acid Sumit     (0.7-2.0)  mmol/L


 


Calcium     (8.4-10.2)  mg/dL


 


AST    41 H  (14-36)  U/L


 


Creatine Kinase     ()  U/L


 


Total Creatine Kinase  342 H  337 H   ()  U/L


 


CK-MB (CK-2)  3.2 H*  4.5 H*   (0.0-2.4)  ng/mL


 


Troponin I  0.054 H*  0.064 H*   (0.000-0.034)  ng/mL


 


Total Protein    5.5 L  (6.3-8.2)  g/dL


 


Albumin    2.9 L  (3.5-5.0)  g/dL


 


Urine Appearance     (Clear)  


 


Urine Protein     (Negative)  


 


Urine Blood     (Negative)  


 


Ur Leukocyte Esterase     (Negative)  


 


Urine WBC     (0-5)  /hpf


 


Urine WBC Clumps     (None)  /hpf


 


Urine Bacteria     (None)  /hpf


 


Hyaline Casts     (0-2)  /lpf


 


Urine Yeast (Budding)     (None)  /hpf














  09/16/17 Range/Units





  07:54 


 


WBC  12.7 H  (3.8-10.6)  k/uL


 


Hct   (34.0-46.0)  %


 


RDW   (11.5-15.5)  %


 


Neutrophils #   (1.3-7.7)  k/uL


 


PT   (9.0-12.0)  sec


 


INR   (<1.2)  


 


APTT   (22.0-30.0)  sec


 


Sodium   (137-145)  mmol/L


 


Potassium   (3.5-5.1)  mmol/L


 


BUN   (7-17)  mg/dL


 


Creatinine   (0.52-1.04)  mg/dL


 


Glucose   (74-99)  mg/dL


 


Plasma Lactic Acid Sumit   (0.7-2.0)  mmol/L


 


Calcium   (8.4-10.2)  mg/dL


 


AST   (14-36)  U/L


 


Creatine Kinase   ()  U/L


 


Total Creatine Kinase   ()  U/L


 


CK-MB (CK-2)   (0.0-2.4)  ng/mL


 


Troponin I   (0.000-0.034)  ng/mL


 


Total Protein   (6.3-8.2)  g/dL


 


Albumin   (3.5-5.0)  g/dL


 


Urine Appearance   (Clear)  


 


Urine Protein   (Negative)  


 


Urine Blood   (Negative)  


 


Ur Leukocyte Esterase   (Negative)  


 


Urine WBC   (0-5)  /hpf


 


Urine WBC Clumps   (None)  /hpf


 


Urine Bacteria   (None)  /hpf


 


Hyaline Casts   (0-2)  /lpf


 


Urine Yeast (Budding)   (None)  /hpf














Assessment and Plan


Plan: 


#UTI, present on admission, not related to Black catheter


-Continue with Rocephin


-Await blood and urine cultures


-IV fluids





#Elevated lactic acid, improved


-Continue with IV fluids





#Acute kidney injury with probable baseline chronic kidney disease


-Avoid nephrotoxic agents


-Related to dehydration


-IV fluids


-Follow basic metabolic profile





#Elevated troponin


-Secondary to acute kidney injury, flat, no EKG changes


-Check echocardiogram, if normal no additional investigation needed





#Hypercalcemia, likely secondary to dehydration


-Improved after IV fluid resuscitation





#Hypokalemia


-Replace and recheck





#Fall


-PT/OT evaluation, may need placement on discharge





#Possible dementia


-Safe and supportive environment





DVT prophylaxis: Heparin


Discussed with: Patient, nursing, awaiting for family to arrive


Anticipated discharge: 48-72 hours


Anticipated discharge place: Skilled nursing facility


A total of 45 minutes was spent on the care of this complex patient more than 50

% of the time was spent in counseling and care coordination.

## 2017-09-17 LAB
ALP SERPL-CCNC: 70 U/L (ref 38–126)
ALT SERPL-CCNC: 32 U/L (ref 9–52)
ANION GAP SERPL CALC-SCNC: 7 MMOL/L
AST SERPL-CCNC: 29 U/L (ref 14–36)
BUN SERPL-SCNC: 52 MG/DL (ref 7–17)
CALCIUM SPEC-MCNC: 9.4 MG/DL (ref 8.4–10.2)
CH: 28.3
CHCM: 32.1
CHLORIDE SERPL-SCNC: 108 MMOL/L (ref 98–107)
CO2 SERPL-SCNC: 25 MMOL/L (ref 22–30)
ERYTHROCYTE [DISTWIDTH] IN BLOOD BY AUTOMATED COUNT: 4.18 M/UL (ref 3.8–5.4)
ERYTHROCYTE [DISTWIDTH] IN BLOOD: 16.6 % (ref 11.5–15.5)
GLUCOSE SERPL-MCNC: 88 MG/DL (ref 74–99)
HCT VFR BLD AUTO: 37 % (ref 34–46)
HDW: 2.61
HGB BLD-MCNC: 11.4 GM/DL (ref 11.4–16)
MAGNESIUM SPEC-SCNC: 1.7 MG/DL (ref 1.6–2.3)
MCH RBC QN AUTO: 27.2 PG (ref 25–35)
MCHC RBC AUTO-ENTMCNC: 30.7 G/DL (ref 31–37)
MCV RBC AUTO: 88.7 FL (ref 80–100)
NON-AFRICAN AMERICAN GFR(MDRD): 24
POTASSIUM SERPL-SCNC: 3.3 MMOL/L (ref 3.5–5.1)
PROT SERPL-MCNC: 4.9 G/DL (ref 6.3–8.2)
SODIUM SERPL-SCNC: 140 MMOL/L (ref 137–145)
WBC # BLD AUTO: 9.2 K/UL (ref 3.8–10.6)

## 2017-09-17 RX ADMIN — ALLOPURINOL SCH MG: 300 TABLET ORAL at 10:42

## 2017-09-17 RX ADMIN — DICYCLOMINE HYDROCHLORIDE SCH MG: 10 CAPSULE ORAL at 17:42

## 2017-09-17 RX ADMIN — POTASSIUM CHLORIDE SCH MEQ: 750 TABLET, EXTENDED RELEASE ORAL at 10:43

## 2017-09-17 RX ADMIN — LEVOTHYROXINE SODIUM SCH MCG: 25 TABLET ORAL at 06:17

## 2017-09-17 RX ADMIN — NYSTATIN SCH APPLIC: 100000 POWDER TOPICAL at 21:56

## 2017-09-17 RX ADMIN — HEPARIN SODIUM SCH UNIT: 5000 INJECTION, SOLUTION INTRAVENOUS; SUBCUTANEOUS at 21:56

## 2017-09-17 RX ADMIN — DICYCLOMINE HYDROCHLORIDE SCH MG: 10 CAPSULE ORAL at 21:56

## 2017-09-17 RX ADMIN — LORATADINE SCH MG: 10 TABLET ORAL at 10:42

## 2017-09-17 RX ADMIN — FLUTICASONE PROPIONATE SCH SPRAY: 50 SPRAY, METERED NASAL at 10:44

## 2017-09-17 RX ADMIN — HEPARIN SODIUM SCH UNIT: 5000 INJECTION, SOLUTION INTRAVENOUS; SUBCUTANEOUS at 10:42

## 2017-09-17 RX ADMIN — DICYCLOMINE HYDROCHLORIDE SCH MG: 10 CAPSULE ORAL at 14:35

## 2017-09-17 RX ADMIN — FAMOTIDINE SCH MG: 20 TABLET, FILM COATED ORAL at 10:42

## 2017-09-17 RX ADMIN — DICYCLOMINE HYDROCHLORIDE SCH MG: 10 CAPSULE ORAL at 10:42

## 2017-09-17 RX ADMIN — NYSTATIN SCH APPLIC: 100000 POWDER TOPICAL at 10:45

## 2017-09-17 NOTE — P.PN
Subjective


Principal diagnosis: 


Fall, weakness





Patient is an 82-year-old  female with history of dementia, HTN, CHF, 

and allergic rhinitis who was brought in by family after being found covered in 

urine and feces on the floor of her house.  Her last known normal had been 2 

days prior.  In the emergency department she underwent an extensive evaluation.

  She was found to have a urinary tract infection with elevated lactic acid and 

was started on IV fluids and antibiotics.  Her troponin was found to be 

slightly elevated.  She was admitted to the selective care unit.  Upon 

admission her antibiotics were maintained.  She was found to have a candidal 

infection of the left abdominal fold and nystatin was ordered. She had a rapid 

improvement in her WBC count. She only wanted to be seen for 1 thing and did 

not want a more thorough investigation, she did not want to discuss code 

status. 








Patient seen and examined at bedside.  She is extremely hard of hearing 

conversation is difficult, everything is communicated in writing. No nausea, no 

vomiting, diarrhea is getting better, no sob. 














Objective





- Vital Signs


Vital signs: 


 Vital Signs











Temp  98.3 F   09/17/17 04:00


 


Pulse  103 H  09/17/17 08:00


 


Resp  18   09/17/17 08:00


 


BP  137/78   09/17/17 08:00


 


Pulse Ox  99   09/17/17 08:00








 Intake & Output











 09/16/17 09/17/17 09/17/17





 18:59 06:59 18:59


 


Intake Total 772 1200 


 


Output Total  500 


 


Balance 772 700 


 


Weight 71.5 kg 72.5 kg 72.5 kg


 


Intake:   


 


  IV  1200 


 


    Sodium Chloride 0.45% 1,  1200 





    000 ml @ 75 mls/hr IV .   





    [id number] [facility] Rx#:[id number]   


 


  Oral 772  


 


Output:   


 


  Urine  500 


 


Other:   


 


  Voiding Method Bedpan Bedpan Bedpan





  Incontinent Incontinent


 


  # Voids 3 1 














- Exam


General: non toxic, no distress, appears at stated age


Derm: rash left groin with erythema, excoriation and cream discharge. 


Head: atraumatic, normocephalic, symmetric


Eyes: EOMI, no lid lag, anicteric sclera


ENT: Cheesh-Na, no post nasal drip, no thrush 


Mouth: no lip lesion, dry membranes moist


Cardiovascular: S1S2 reg, no murmur, positive posterior tibial pulse bilateral, 


Lungs: CTA bilateral, no rhonchi, no rales , no accessory muscle use


Abdominal: soft,  nontender to palpation, no guarding, no appreciable 

organomegaly


Ext: no gross muscle atrophy, no edema, no contractures


Neuro:  CN II-XI grossly intact, no focal neuro deficits


Psych: Alert, oriented, appropriate affect 








- Labs


CBC & Chem 7: 


 09/17/17 06:02





 09/17/17 06:02


Labs: 


 Abnormal Lab Results - Last 24 Hours (Table)











  09/17/17 09/17/17 Range/Units





  06:02 06:02 


 


MCHC  30.7 L   (31.0-37.0)  g/dL


 


RDW  16.6 H   (11.5-15.5)  %


 


Potassium   3.3 L  (3.5-5.1)  mmol/L


 


Chloride   108 H  ()  mmol/L


 


BUN   52 H  (7-17)  mg/dL


 


Creatinine   2.00 H  (0.52-1.04)  mg/dL


 


Total Protein   4.9 L  (6.3-8.2)  g/dL


 


Albumin   2.5 L  (3.5-5.0)  g/dL








 Microbiology - Last 24 Hours (Table)











 09/16/17 13:00 Urine Culture - Preliminary





 Urine,Voided 














Assessment and Plan


Plan: 


#UTI, present on admission, not related to Black catheter


-Continue with Rocephin


-Await blood and urine cultures


-IV fluids





#Candida intertrigo 


- Nystatin powder 





#Acute kidney injury with probable baseline chronic kidney disease


-Avoid nephrotoxic agents


-Related to dehydration


-IV fluids


-Follow basic metabolic profile, following with Dr. Segura has CKD Stage IV per 

daughter 





#Elevated troponin


-Secondary to acute kidney injury, flat, no EKG changes


-Check echocardiogram 9/18, if normal no additional investigation needed





#Hypokalemia


-Replace and recheck





#Fall


-PT/OT evaluation, may need placement on discharge and social work is consulted 





#Possible dementia


-Safe and supportive environment





Resolved: 


Elevated lactic acid


Hypercalcemia





Transfer to medical floor





DVT prophylaxis: Heparin


Discussed with: Patient, nursing, 


Anticipated discharge: 48-72 hours


Anticipated discharge place: Skilled nursing facility


A total of 45 minutes was spent on the care of this complex patient more than 50

% of the time was spent in counseling and care coordination.

## 2017-09-18 LAB
ALP SERPL-CCNC: 73 U/L (ref 38–126)
ALT SERPL-CCNC: 25 U/L (ref 9–52)
ANION GAP SERPL CALC-SCNC: 8 MMOL/L
AST SERPL-CCNC: 25 U/L (ref 14–36)
BUN SERPL-SCNC: 42 MG/DL (ref 7–17)
CALCIUM SPEC-MCNC: 9.8 MG/DL (ref 8.4–10.2)
CH: 27
CHCM: 30.3
CHLORIDE SERPL-SCNC: 111 MMOL/L (ref 98–107)
CO2 SERPL-SCNC: 22 MMOL/L (ref 22–30)
ERYTHROCYTE [DISTWIDTH] IN BLOOD BY AUTOMATED COUNT: 3.94 M/UL (ref 3.8–5.4)
ERYTHROCYTE [DISTWIDTH] IN BLOOD: 15.8 % (ref 11.5–15.5)
GLUCOSE SERPL-MCNC: 89 MG/DL (ref 74–99)
HCT VFR BLD AUTO: 35.4 % (ref 34–46)
HDW: 2.52
HGB BLD-MCNC: 11.3 GM/DL (ref 11.4–16)
MAGNESIUM SPEC-SCNC: 1.6 MG/DL (ref 1.6–2.3)
MCH RBC QN AUTO: 28.6 PG (ref 25–35)
MCHC RBC AUTO-ENTMCNC: 31.8 G/DL (ref 31–37)
MCV RBC AUTO: 89.8 FL (ref 80–100)
NON-AFRICAN AMERICAN GFR(MDRD): 24
POTASSIUM SERPL-SCNC: 4.6 MMOL/L (ref 3.5–5.1)
PROT SERPL-MCNC: 5.2 G/DL (ref 6.3–8.2)
SODIUM SERPL-SCNC: 141 MMOL/L (ref 137–145)
WBC # BLD AUTO: 8.2 K/UL (ref 3.8–10.6)

## 2017-09-18 RX ADMIN — NYSTATIN SCH APPLIC: 100000 POWDER TOPICAL at 08:49

## 2017-09-18 RX ADMIN — ALLOPURINOL SCH MG: 300 TABLET ORAL at 08:49

## 2017-09-18 RX ADMIN — DICYCLOMINE HYDROCHLORIDE SCH MG: 10 CAPSULE ORAL at 17:53

## 2017-09-18 RX ADMIN — LEVOTHYROXINE SODIUM SCH MCG: 25 TABLET ORAL at 06:58

## 2017-09-18 RX ADMIN — HEPARIN SODIUM SCH UNIT: 5000 INJECTION, SOLUTION INTRAVENOUS; SUBCUTANEOUS at 21:05

## 2017-09-18 RX ADMIN — DICYCLOMINE HYDROCHLORIDE SCH MG: 10 CAPSULE ORAL at 08:49

## 2017-09-18 RX ADMIN — NYSTATIN SCH APPLIC: 100000 POWDER TOPICAL at 21:06

## 2017-09-18 RX ADMIN — FLUTICASONE PROPIONATE SCH SPRAY: 50 SPRAY, METERED NASAL at 08:49

## 2017-09-18 RX ADMIN — POTASSIUM CHLORIDE SCH MEQ: 750 TABLET, EXTENDED RELEASE ORAL at 08:49

## 2017-09-18 RX ADMIN — DICYCLOMINE HYDROCHLORIDE SCH MG: 10 CAPSULE ORAL at 13:36

## 2017-09-18 RX ADMIN — DICYCLOMINE HYDROCHLORIDE SCH MG: 10 CAPSULE ORAL at 21:06

## 2017-09-18 RX ADMIN — HEPARIN SODIUM SCH UNIT: 5000 INJECTION, SOLUTION INTRAVENOUS; SUBCUTANEOUS at 08:49

## 2017-09-18 RX ADMIN — FAMOTIDINE SCH MG: 20 TABLET, FILM COATED ORAL at 08:49

## 2017-09-18 RX ADMIN — LORATADINE SCH MG: 10 TABLET ORAL at 08:49

## 2017-09-18 NOTE — P.PN
Subjective


Principal diagnosis: 





patient is seen and examined , in follow up for UTI





82 year old female with history of dementia, CHF, HTN presented after being 

found at home covered in urine and feces on the floor. She was found to have 

urinary tract infection and started on empiric antibiotics. 





patient is hard of hearing and communication carried on with writing. she 

denies any new complaint, doing well, denies any chest pain or trouble 

breathing. patient requests to be sent home. .





Objective





- Vital Signs


Vital signs: 


 Vital Signs











Temp  97.4 F L  09/18/17 15:00


 


Pulse  101 H  09/18/17 16:00


 


Resp  16   09/18/17 16:00


 


BP  154/94   09/18/17 15:00


 


Pulse Ox  97   09/18/17 15:00








 Intake & Output











 09/17/17 09/18/17 09/18/17





 18:59 06:59 18:59


 


Intake Total 240 840 710


 


Output Total   500


 


Balance 240 840 210


 


Weight 72.5 kg 74.5 kg 74.5 kg


 


Intake:   


 


  Intake, IV Titration  50 100





  Amount   


 


    cefTRIAXone 1,000 mg In  50 100





    Sodium Chloride 0.9% 50   





    ml @ 100 mls/hr IVPB Q24H   





    [facility] Rx#:[id number]   


 


  Oral 240 790 610


 


Output:   


 


  Urine   500


 


Other:   


 


  Voiding Method Bedpan Bedpan Incontinent





 Incontinent Incontinent 


 


  # Voids 1 2 3














- Exam


Constitutional:   vital signs stable, Not in acute distress, pleasant, 

conversant 


Lungs: Clear to auscultation bilaterally, clear to percussion, normal 

respiratory effort no use of accessory muscles


Cardiovascular: Regular rate and rhythm, no murmurs, no gallops, no rubs, no 

peripheral edema


Gastrointestinal: Soft, no tenderness to palpation, no palpable 

hepatosplenomegally, bowel sounds positive  


Extremities: No digital cyanosis or clubbing, peripheral pulses palpable and 

equal over bilateral radial arteries and dorsalis pedis artery,  no calf muscle 

tenderness


Psych: Alert , deaf











- Labs


CBC & Chem 7: 


 09/18/17 06:28





 09/18/17 06:28


Labs: 


 Abnormal Lab Results - Last 24 Hours (Table)











  09/18/17 09/18/17 Range/Units





  06:28 06:28 


 


Hgb  11.3 L   (11.4-16.0)  gm/dL


 


RDW  15.8 H   (11.5-15.5)  %


 


Chloride   111 H  ()  mmol/L


 


BUN   42 H  (7-17)  mg/dL


 


Creatinine   2.01 H  (0.52-1.04)  mg/dL


 


Total Protein   5.2 L  (6.3-8.2)  g/dL


 


Albumin   2.7 L  (3.5-5.0)  g/dL








 Microbiology - Last 24 Hours (Table)











 09/16/17 12:34 Blood Culture - Preliminary





 Blood    No Growth after 48 hours


 


 09/16/17 13:00 Urine Culture - Final





 Urine,Voided 














Assessment and Plan


(1) UTI (urinary tract infection)


Status: Acute   





(2) Dementia


Status: Acute   





(3) JACK (acute kidney injury)


Status: Acute   





(4) Elevated troponin


Status: Acute   





(5) Fall


Status: Acute   


Plan: 





patient with UTI present with admission , no bingham catheter


cultures negative to date


on rocephine , will discontinue, patient received 3 days 








elevated trops most likely 2/2 CKD and JACK 


denies any chest pain or trouble breathing





JACK on CKD


avoid nephrotoxic meds





patient lives alone and was found on the floor covered with feces and urine, 

dehydrated 


dementia, frailty and advanced age


patient evaluated by PT/OT


I recommend placement,  assisting in that matter, await placement 

for discharge





DVT PPX on heparin sc





continue with supportive care

## 2017-09-19 LAB
ALP SERPL-CCNC: 68 U/L (ref 38–126)
ALT SERPL-CCNC: 24 U/L (ref 9–52)
ANION GAP SERPL CALC-SCNC: 11 MMOL/L
AST SERPL-CCNC: 29 U/L (ref 14–36)
BASOPHILS # BLD AUTO: 0 K/UL (ref 0–0.2)
BASOPHILS NFR BLD AUTO: 0 %
BUN SERPL-SCNC: 34 MG/DL (ref 7–17)
CALCIUM SPEC-MCNC: 9.6 MG/DL (ref 8.4–10.2)
CH: 26.8
CHCM: 30.6
CHLORIDE SERPL-SCNC: 107 MMOL/L (ref 98–107)
CO2 SERPL-SCNC: 19 MMOL/L (ref 22–30)
EOSINOPHIL # BLD AUTO: 0.3 K/UL (ref 0–0.7)
EOSINOPHIL NFR BLD AUTO: 4 %
ERYTHROCYTE [DISTWIDTH] IN BLOOD BY AUTOMATED COUNT: 4.32 M/UL (ref 3.8–5.4)
ERYTHROCYTE [DISTWIDTH] IN BLOOD: 15.9 % (ref 11.5–15.5)
GLUCOSE SERPL-MCNC: 79 MG/DL (ref 74–99)
HCT VFR BLD AUTO: 38.1 % (ref 34–46)
HDW: 2.58
HGB BLD-MCNC: 12.1 GM/DL (ref 11.4–16)
LUC NFR BLD AUTO: 2 %
LYMPHOCYTES # SPEC AUTO: 1 K/UL (ref 1–4.8)
LYMPHOCYTES NFR SPEC AUTO: 12 %
MAGNESIUM SPEC-SCNC: 1.8 MG/DL (ref 1.6–2.3)
MCH RBC QN AUTO: 28.1 PG (ref 25–35)
MCHC RBC AUTO-ENTMCNC: 31.9 G/DL (ref 31–37)
MCV RBC AUTO: 88.2 FL (ref 80–100)
MONOCYTES # BLD AUTO: 0.4 K/UL (ref 0–1)
MONOCYTES NFR BLD AUTO: 4 %
NEUTROPHILS # BLD AUTO: 6.3 K/UL (ref 1.3–7.7)
NEUTROPHILS NFR BLD AUTO: 78 %
NON-AFRICAN AMERICAN GFR(MDRD): 28
POTASSIUM SERPL-SCNC: 4.9 MMOL/L (ref 3.5–5.1)
PROT SERPL-MCNC: 5.7 G/DL (ref 6.3–8.2)
SODIUM SERPL-SCNC: 137 MMOL/L (ref 137–145)
WBC # BLD AUTO: 0.15 10*3/UL
WBC # BLD AUTO: 8.1 K/UL (ref 3.8–10.6)
WBC (PEROX): 8.58

## 2017-09-19 RX ADMIN — DICYCLOMINE HYDROCHLORIDE SCH MG: 10 CAPSULE ORAL at 23:32

## 2017-09-19 RX ADMIN — HEPARIN SODIUM SCH UNIT: 5000 INJECTION, SOLUTION INTRAVENOUS; SUBCUTANEOUS at 23:32

## 2017-09-19 RX ADMIN — FLUTICASONE PROPIONATE SCH SPRAY: 50 SPRAY, METERED NASAL at 08:29

## 2017-09-19 RX ADMIN — FAMOTIDINE SCH MG: 20 TABLET, FILM COATED ORAL at 08:27

## 2017-09-19 RX ADMIN — DICYCLOMINE HYDROCHLORIDE SCH MG: 10 CAPSULE ORAL at 14:03

## 2017-09-19 RX ADMIN — NYSTATIN SCH APPLIC: 100000 POWDER TOPICAL at 08:31

## 2017-09-19 RX ADMIN — METOPROLOL TARTRATE SCH MG: 50 TABLET, FILM COATED ORAL at 23:35

## 2017-09-19 RX ADMIN — POTASSIUM CHLORIDE SCH MEQ: 750 TABLET, EXTENDED RELEASE ORAL at 08:27

## 2017-09-19 RX ADMIN — HEPARIN SODIUM SCH UNIT: 5000 INJECTION, SOLUTION INTRAVENOUS; SUBCUTANEOUS at 08:32

## 2017-09-19 RX ADMIN — NYSTATIN SCH APPLIC: 100000 POWDER TOPICAL at 23:34

## 2017-09-19 RX ADMIN — DICYCLOMINE HYDROCHLORIDE SCH MG: 10 CAPSULE ORAL at 08:27

## 2017-09-19 RX ADMIN — ALLOPURINOL SCH MG: 300 TABLET ORAL at 08:27

## 2017-09-19 RX ADMIN — METOPROLOL TARTRATE SCH MG: 50 TABLET, FILM COATED ORAL at 10:49

## 2017-09-19 RX ADMIN — LORATADINE SCH MG: 10 TABLET ORAL at 08:29

## 2017-09-19 RX ADMIN — LEVOTHYROXINE SODIUM SCH MCG: 25 TABLET ORAL at 07:04

## 2017-09-19 NOTE — P.PN
Subjective


Principal diagnosis: 





patient is seen and examined in follow up for UTI, new onset atrial flutter





82 year old female with history of dementia, CHF, HTN presented after being 

found at home covered in urine and feces on the floor. Patient lives alone in 

her small apartment and has two daughters that visit couple times a week. She 

was last seen normal about 2 days prior to being found down at home soiled with 

her own urine and feces. Patient does remember the event, and reports that she 

remained down as no one was visiting her. She was found to have urinary tract 

infection and started on empiric antibiotics. 





This morning she was found to be in atrial flutter with rapid ventricular 

response, she reported some associated chest heaviness but no trouble breathing 

at that time, at the time of my interview , she denies any chest pain or 

trouble breathing at this time. 





patient is hard of hearing and communication carried on with writing. 





Despite my counseling,  patient requests to be sent home and reports that she 

will hire aids to help her out.  discussed with the daughter our 

recommendations to be placed at rehab and that possibly patient would benefit 

from permanent placement, she seems to be in agreement  but will discuss with 

the rest of the family 





Objective





- Vital Signs


Vital signs: 


 Vital Signs











Temp  97.8 F   09/19/17 07:00


 


Pulse  114 H  09/19/17 08:00


 


Resp  20   09/19/17 07:00


 


BP  146/72   09/19/17 07:00


 


Pulse Ox  92 L  09/19/17 07:00








 Intake & Output











 09/18/17 09/19/17 09/19/17





 18:59 06:59 18:59


 


Intake Total 710 50 


 


Output Total 500  


 


Balance 210 50 


 


Weight 74.5 kg 76 kg 


 


Intake:   


 


  Intake, IV Titration 100 50 





  Amount   


 


    cefTRIAXone 1,000 mg In 100 50 





    Sodium Chloride 0.9% 50   





    ml @ 100 mls/hr IVPB Q24H   





    [facility] Rx#:[id number]   


 


  Oral 610  


 


Output:   


 


  Urine 500  


 


Other:   


 


  Voiding Method Incontinent Incontinent Diaper





   Incontinent


 


  # Voids 3 1 














- Exam


Constitutional:    Not in acute distress, pleasant, conversant 


Lungs: Clear to auscultation bilaterally, clear to percussion, normal 

respiratory effort no use of accessory muscles


Cardiovascular: Regular rate and rhythm, no murmurs, no gallops, no rubs, no 

peripheral edema


Gastrointestinal: Soft, no tenderness to palpation, no palpable 

hepatosplenomegally, bowel sounds positive  


Extremities: No digital cyanosis or clubbing, peripheral pulses palpable and 

equal over bilateral radial arteries and dorsalis pedis artery,  no calf muscle 

tenderness


Psych: Alert , deaf











- Labs


CBC & Chem 7: 


 09/19/17 07:35





 09/19/17 07:35


Labs: 


 Abnormal Lab Results - Last 24 Hours (Table)











  09/19/17 09/19/17 Range/Units





  07:35 07:35 


 


RDW  15.9 H   (11.5-15.5)  %


 


Carbon Dioxide   19 L  (22-30)  mmol/L


 


BUN   34 H  (7-17)  mg/dL


 


Creatinine   1.75 H  (0.52-1.04)  mg/dL


 


Total Protein   5.7 L  (6.3-8.2)  g/dL


 


Albumin   2.8 L  (3.5-5.0)  g/dL








 Microbiology - Last 24 Hours (Table)











 09/16/17 12:34 Blood Culture - Preliminary





 Blood    No Growth after 48 hours














Assessment and Plan


(1) Atrial flutter with rapid ventricular response


Narrative/Plan: 


new onset


check TSH


check 2 D echo 


denies any chest pain or trouble breathing


cardiology consulted


started on metoprolol 50 mg BID


not a good candidate for oral anticoagulation due to fall risk and advanced age


check electrolytes


Status: Acute   





(2) UTI (urinary tract infection)


Narrative/Plan: 


currently afebrile , urine cultures negative , will discontinue antibiotics





Status: Acute   





(3) Dementia


Status: Chronic   





(4) JACK (acute kidney injury)


Narrative/Plan: 


with history of CKD


prerenal ATN 2/2 dehydration 


improving 


avoid nephrotoxic meds


monitor urine output


Status: Acute   





(5) Elevated troponin


Narrative/Plan: 


stable , no chest pain , no EKG changes


cardiology following 


this was thougth to be most likely 2/2 CKD


Status: Acute   





(6) Fall


Narrative/Plan: 


PT/OT


recommendation for placement at rehab , and possibly to consider permanent 

placement 


Status: Acute   


Plan: 





 


 ocial worker assisting with  placement for discharge





DVT PPX on heparin sc





continue with supportive care





patient transferred to [facility] care, for close cardiac monitoring 





I anticipate patient to remain hospitalized for another 48 hours to get all 

workup done and heart rate under control

## 2017-09-19 NOTE — P.CRDCN
History of Present Illness


History of present illness: 





Patient interviewed.  She is deaf.  I had to write out my questions.  She 

denied chest discomfort shortness of breath dizziness.  She remains in atrial 

flutter with RVR vitals stable high risk for anticoagulation she had recent 

falls did discussed with the hospitalist.  Suggest oral beta blockers, 

transferred to 6 E. for rate control final decision regarding anticoagulation 

per admitting physicians, TSH level check.  Please see full dictation by nurse 

practitioner





Past Medical History


Past Medical History: Dementia


History of Any Multi-Drug Resistant Organisms: None Reported


Past Surgical History: No Surgical Hx Reported


Additional Past Surgical History / Comment(s): pt states hse has a lot of 

problems with her ears, nose and throat.


Past Anesthesia/Blood Transfusion Reactions: No Reported Reaction


Past Psychological History: No Psychological Hx Reported


Smoking Status: Never smoker


Past Alcohol Use History: None Reported


Past Drug Use History: None Reported





- Past Family History


  ** Father


Family Medical History: No Reported History





  ** Mother


Family Medical History: No Reported History, Osteoarthritis (OA), Rheumatoid 

Arthritis (RA)


Additional Family Medical History / Comment(s): kidney problems, specific type 

unknown





Medications and Allergies


 Home Medications











 Medication  Instructions  Recorded  Confirmed  Type


 


Loratadine [Claritin] 10 mg PO DAILY 09/15/17 09/15/17 History


 


ALPRAZolam [Xanax] 0.5 mg PO TID 09/16/17 09/16/17 History


 


Albuterol Inhaler [Ventolin Hfa 2 puff INHALATION RT-Q4H PRN 09/16/17 09/16/17 

History





Inhaler]    


 


Allopurinol [Zyloprim] 300 mg PO DAILY 09/16/17 09/16/17 History


 


Dicyclomine HCl 10 mg PO QID 09/16/17 09/16/17 History


 


Fluticasone Nasal Spray [Flonase 1 spray EA NOSTRIL DAILY 09/16/17 09/16/17 

History





Nasal Spray]    


 


Hydrocodone/Acetaminophen [Norco 1 tab PO Q6HR PRN 09/16/17 09/16/17 History





5-325]    


 


Levothyroxine Sodium [Synthroid] 25 mcg PO DAILY 09/16/17 09/16/17 History


 


Potassium Chloride [K-Tab ER] 10 meq PO DAILY 09/16/17 09/16/17 History


 


Ranitidine HCl [Zantac] 150 mg PO BID 09/16/17 09/16/17 History











 Allergies











Allergy/AdvReac Type Severity Reaction Status Date / Time


 


No Known Allergies Allergy   Verified 09/15/17 20:23














Physical Exam


Vitals: 


 Vital Signs











  Temp Pulse Resp BP Pulse Ox


 


 09/19/17 08:00   114 H   


 


 09/19/17 07:00  97.8 F  114 H  20  146/72  92 L


 


 09/18/17 21:52  97.2 F L  109 H  20  156/76  96


 


 09/18/17 16:00   101 H  16  


 


 09/18/17 15:00  97.4 F L  101 H  16  154/94  97








 Intake and Output











 09/18/17 09/19/17 09/19/17





 22:59 06:59 14:59


 


Intake Total  50 


 


Output Total 500  


 


Balance -500 50 


 


Intake:   


 


  Intake, IV Titration  50 





  Amount   


 


    cefTRIAXone 1,000 mg In  50 





    Sodium Chloride 0.9% 50   





    ml @ 100 mls/hr IVPB Q24H   





    [facility] Rx#:[id number]   


 


Output:   


 


  Urine 500  


 


Other:   


 


  Voiding Method Incontinent Incontinent Diaper





   Incontinent


 


  # Voids 1 1 


 


  Weight 74.5 kg 76 kg 














Results





 09/19/17 07:35





 09/19/17 07:35


 Cardiac Enzymes











  09/19/17 Range/Units





  07:35 


 


AST  29  (14-36)  U/L








 CBC











  09/19/17 Range/Units





  07:35 


 


WBC  8.1  (3.8-10.6)  k/uL


 


RBC  4.32  (3.80-5.40)  m/uL


 


Hgb  12.1  (11.4-16.0)  gm/dL


 


Hct  38.1  (34.0-46.0)  %


 


Plt Count  209  (150-450)  k/uL








 Comprehensive Metabolic Panel











  09/19/17 Range/Units





  07:35 


 


Sodium  137  (137-145)  mmol/L


 


Potassium  4.9  (3.5-5.1)  mmol/L


 


Chloride  107  ()  mmol/L


 


Carbon Dioxide  19 L  (22-30)  mmol/L


 


BUN  34 H  (7-17)  mg/dL


 


Creatinine  1.75 H  (0.52-1.04)  mg/dL


 


Glucose  79  (74-99)  mg/dL


 


Calcium  9.6  (8.4-10.2)  mg/dL


 


AST  29  (14-36)  U/L


 


ALT  24  (9-52)  U/L


 


Alkaline Phosphatase  68  ()  U/L


 


Total Protein  5.7 L  (6.3-8.2)  g/dL


 


Albumin  2.8 L  (3.5-5.0)  g/dL








 Current Medications











Generic Name Dose Route Start Last Admin





  Trade Name Freq  PRN Reason Stop Dose Admin


 


Acetaminophen  650 mg  09/16/17 00:02  09/17/17 17:45





  Tylenol Tab  PO   650 mg





  Q6HR PRN   Administration





  Mild Pain or Fever > 100.5   


 


Hydrocodone Bitart/Acetaminophen  1 each  09/16/17 13:31  09/17/17 14:36





  [place] 5-325  PO   1 each





  Q6HR PRN   Administration





  Moderate Pain   


 


Albuterol Sulfate  2.5 mg  09/16/17 13:31  





  Ventolin Nebulized  INHALATION   





  RT-Q4H PRN   





  Shortness Of Breath   


 


Allopurinol  300 mg  09/17/17 09:00  09/19/17 08:27





  Zyloprim  PO   300 mg





  DAILY PATRICK   Administration


 


Alprazolam  0.5 mg  09/16/17 16:00  09/19/17 08:26





  Xanax  PO   0.5 mg





  TID PATRICK   Administration


 


Dicyclomine HCl  10 mg  09/16/17 18:00  09/19/17 08:27





  Bentyl  PO   10 mg





  QID PATRICK   Administration


 


Famotidine  20 mg  09/17/17 09:00  09/19/17 08:27





  Pepcid  PO   20 mg





  DAILY PATRICK   Administration


 


Fluticasone Propionate  1 spray  09/17/17 09:00  09/19/17 08:29





  Flonase Nasal [place]  EA NOSTRIL   1 spray





  DAILY PATRICK   Administration


 


Heparin Sodium (Porcine)  5,000 unit  09/16/17 09:00  09/19/17 08:32





  Heparin  SQ   5,000 unit





  Q12HR PATRICK   Administration


 


Ceftriaxone Sodium 1,000 mg/  50 mls @ 100 mls/hr  09/16/17 22:00  09/18/17 21:

06





  Sodium Chloride  IVPB   100 mls/hr





  Q24H PATRICK   Administration


 


Levothyroxine Sodium  25 mcg  09/17/17 06:30  09/19/17 07:04





  Synthroid  PO   25 mcg





  DAILY@0630 PATRICK   Administration


 


Loratadine  10 mg  09/16/17 13:45  09/19/17 08:29





  Claritin  PO   10 mg





  DAILY PATRICK   Administration


 


Melatonin  3 mg  09/16/17 00:02  





  Melatonin  PO   





  HS PRN   





  Insomnia   


 


Metoprolol Tartrate  50 mg  09/19/17 10:15  





  Lopressor  PO   





  BID PATRICK   


 


Naloxone HCl  0.2 mg  09/15/17 22:34  





  Narcan  IV   





  Q2M PRN   





  Opioid Reversal   


 


Nystatin  1 applic  09/16/17 21:00  09/19/17 08:31





  Mycostatin Powder  TOPICAL   1 applic





  BID PATRICK   Administration


 


Ondansetron HCl  4 mg  09/16/17 11:34  





  Zofran  IVP   





  Q6HR PRN   





  Nausea And Vomiting   


 


Potassium Chloride  10 meq  09/17/17 09:00  09/19/17 08:27





  K-Dur 10  PO   10 meq





  DAILY PATRICK   Administration








 Intake and Output











 09/18/17 09/19/17 09/19/17





 22:59 06:59 14:59


 


Intake Total  50 


 


Output Total 500  


 


Balance -500 50 


 


Intake:   


 


  Intake, IV Titration  50 





  Amount   


 


    cefTRIAXone 1,000 mg In  50 





    Sodium Chloride 0.9% 50   





    ml @ 100 mls/hr IVPB Q24H   





    [facility] Rx#:[id number]   


 


Output:   


 


  Urine 500  


 


Other:   


 


  Voiding Method Incontinent Incontinent Diaper





   Incontinent


 


  # Voids 1 1 


 


  Weight 74.5 kg 76 kg 








 





 09/19/17 07:35 





 09/19/17 07:35

## 2017-09-19 NOTE — ECHOF
Referral Reason:new arrhythmia



MEASUREMENTS

--------

HEIGHT: 160.0 cm

WEIGHT: 75.8 kg

BP: 136/74

RVIDd:   2.7 cm     (< 3.3)

IVSd:   1.2 cm     (0.6 - 1.1)

LVIDd:   3.4 cm     (3.9 - 5.3)

LVPWd:   1.3 cm     (0.6 - 1.1)

IVSs:   1.6 cm

LVIDs:   2.2 cm

LVPWs:   1.4 cm

LA Diam:   2.9 cm     (2.7 - 3.8)

LAESV Index (A-L):   33.69 ml/m

Ao Diam:   3.0 cm     (2.0 - 3.7)

AV Cusp:   1.8 cm     (1.5 - 2.6)

MV EXCURSION:   13.970 mm     (> 18.000)

MV EF SLOPE:   25 mm/s     (70 - 150)

EPSS:   0.4 cm

MV E Gilbert:   1.00 m/s

MV DecT:   246 ms

MV A Gilbert:   1.20 m/s

MV E/A Ratio:   0.84 

RAP:   5.00 mmHg

RVSP:   21.56 mmHg







FINDINGS

--------

Sinus rhythm.

This was a technically adequate study.

The left ventricular size is normal.   There is mild 

concentric left ventricular hypertrophy.   Overall left 

ventricular systolic function is normal with, an EF 

between 60 - 65 %.

The right ventricle is normal in size.

LA is midly dilated 29-33ml/m2.

The right atrium is normal in size.

There is mild aortic valve sclerosis.

Moderate mitral annular calcification present.

Mild tricuspid regurgitation present.   Right 

ventricular systolic pressure is normal at < 35 mmHg.

Trace/mild (physiologic)  pulmonic regurgitation.

The aortic root size is normal.

The inferior vena cava is mildly dilated.

There is no pericardial effusion.



CONCLUSIONS

--------

1. Sinus rhythm.

2. Moderate mitral annular calcification present.

3. Mild tricuspid regurgitation present.

4. Right ventricular systolic pressure is normal at < 35 

mmHg.

5. Trace/mild (physiologic)  pulmonic regurgitation.

6. The aortic root size is normal.

7. The inferior vena cava is mildly dilated.

8. There is no pericardial effusion.

9. This was a technically adequate study.

10. The left ventricular size is normal.

11. There is mild concentric left ventricular hypertrophy.

12. Overall left ventricular systolic function is normal 

with, an EF between 60 - 65 %.

13. The right ventricle is normal in size.

14. LA is midly dilated 29-33ml/m2.

15. The right atrium is normal in size.

16. There is mild aortic valve sclerosis.





SONOGRAPHER: Khushbu Cruz RDCS

## 2017-09-19 NOTE — P.CRDCN
History of Present Illness


Consult date: 09/19/17


History of present illness: 





This is a 82-year-old pleasant  female. Patient presented to the 

hospital on the 15th after her daughter found her laying in the living room.  

They last saw her 2 days prior.  She was found saturated in urine and feces and 

unable to get up.  We have been asked to see this patient in consultation 

because of atrial flutter with a rapid ventricular response noticed this 

morning on the telemetry monitor.  Patient is an extremely poor historian and 

deaf.  Communication is done through writing.  Review of the previous notes 

indicate she has a past medical history significant for kidney disease and 

dementia.  She is currently being treated for urinary tract infection leading 

to sepsis and was found to have mildly elevated troponins.  His troponin values 

have remained flat with no acute ST or T wave abnormalities noted on admission 

EKG.  This is most likely secondary to chronic kidney disease not indicative of 

an acute coronary event.  There are no previous records within the last 

Hospital system to evaluate nor is this patient ever been seen at our office. 

She denies chest pain, shortness of breath, palpitations or dizziness. She is 

seen sitting up in bed reading a magazine in no acute distress. 


EKG done this morning shows atrial flutter with rapid ventricular rate.


 





Review of Systems





Extensive review of systems performed, negative except mentioned in HPI. 








Past Medical History


Past Medical History: Dementia


History of Any Multi-Drug Resistant Organisms: None Reported


Past Surgical History: No Surgical Hx Reported


Additional Past Surgical History / Comment(s): pt states hse has a lot of 

problems with her ears, nose and throat.


Past Anesthesia/Blood Transfusion Reactions: No Reported Reaction


Past Psychological History: No Psychological Hx Reported


Smoking Status: Never smoker


Past Alcohol Use History: None Reported


Past Drug Use History: None Reported





- Past Family History


  ** Father


Family Medical History: No Reported History





  ** Mother


Family Medical History: No Reported History, Osteoarthritis (OA), Rheumatoid 

Arthritis (RA)


Additional Family Medical History / Comment(s): kidney problems, specific type 

unknown





Medications and Allergies


 Home Medications











 Medication  Instructions  Recorded  Confirmed  Type


 


Loratadine [Claritin] 10 mg PO DAILY 09/15/17 09/15/17 History


 


ALPRAZolam [Xanax] 0.5 mg PO TID 09/16/17 09/16/17 History


 


Albuterol Inhaler [Ventolin Hfa 2 puff INHALATION RT-Q4H PRN 09/16/17 09/16/17 

History





Inhaler]    


 


Allopurinol [Zyloprim] 300 mg PO DAILY 09/16/17 09/16/17 History


 


Dicyclomine HCl 10 mg PO QID 09/16/17 09/16/17 History


 


Fluticasone Nasal Spray [Flonase 1 spray EA NOSTRIL DAILY 09/16/17 09/16/17 

History





Nasal Spray]    


 


Hydrocodone/Acetaminophen [Norco 1 tab PO Q6HR PRN 09/16/17 09/16/17 History





5-325]    


 


Levothyroxine Sodium [Synthroid] 25 mcg PO DAILY 09/16/17 09/16/17 History


 


Potassium Chloride [K-Tab ER] 10 meq PO DAILY 09/16/17 09/16/17 History


 


Ranitidine HCl [Zantac] 150 mg PO BID 09/16/17 09/16/17 History











 Allergies











Allergy/AdvReac Type Severity Reaction Status Date / Time


 


No Known Allergies Allergy   Verified 09/15/17 20:23














Physical Exam


Vitals: 


 Vital Signs











  Temp Pulse Resp BP Pulse Ox


 


 09/19/17 08:00   114 H   


 


 09/19/17 07:00  97.8 F  114 H  20  146/72  92 L


 


 09/18/17 21:52  97.2 F L  109 H  20  156/76  96


 


 09/18/17 16:00   101 H  16  


 


 09/18/17 15:00  97.4 F L  101 H  16  154/94  97








 Intake and Output











 09/18/17 09/19/17 09/19/17





 22:59 06:59 14:59


 


Intake Total  50 


 


Output Total 500  


 


Balance -500 50 


 


Intake:   


 


  Intake, IV Titration  50 





  Amount   


 


    cefTRIAXone 1,000 mg In  50 





    Sodium Chloride 0.9% 50   





    ml @ 100 mls/hr IVPB Q24H   





    [facility] Rx#:[id number]   


 


Output:   


 


  Urine 500  


 


Other:   


 


  Voiding Method Incontinent Incontinent Diaper





   Incontinent


 


  # Voids 1 1 


 


  Weight 74.5 kg 76 kg 














GENERAL: This is a 82-year-old  female in no apparent distress at the 

time of my examination.


HEENT: Head is atraumatic, normocephalic.  Right eye cloudy. Sclerae anicteric.

  Mucous membranes of the mouth are moist. Neck is supple. There is no jugular 

venous distention. No carotid bruit is heard.


LUNGS: Clear to auscultation no wheezes, rales or rhonchi. No chest wall 

tenderness is noted on palpation or with deep breathing.


HEART: Regular rate.  Tachy rhythm without murmurs, rubs or gallops. S1 and S2 

heard.


ABDOMEN: Soft, nontender. Bowel sounds are heard. No organomegaly noted.


EXTREMITIES: 2+ peripheral pulses with no evidence of peripheral edema and no 

calf tenderness noted.


NEUROLOGIC: Patient is awake, alert and oriented x3.


 








Results





 09/19/17 07:35





 09/19/17 07:35


 Cardiac Enzymes











  09/19/17 Range/Units





  07:35 


 


AST  29  (14-36)  U/L








 CBC











  09/19/17 Range/Units





  07:35 


 


WBC  8.1  (3.8-10.6)  k/uL


 


RBC  4.32  (3.80-5.40)  m/uL


 


Hgb  12.1  (11.4-16.0)  gm/dL


 


Hct  38.1  (34.0-46.0)  %


 


Plt Count  209  (150-450)  k/uL








 Comprehensive Metabolic Panel











  09/19/17 Range/Units





  07:35 


 


Sodium  137  (137-145)  mmol/L


 


Potassium  4.9  (3.5-5.1)  mmol/L


 


Chloride  107  ()  mmol/L


 


Carbon Dioxide  19 L  (22-30)  mmol/L


 


BUN  34 H  (7-17)  mg/dL


 


Creatinine  1.75 H  (0.52-1.04)  mg/dL


 


Glucose  79  (74-99)  mg/dL


 


Calcium  9.6  (8.4-10.2)  mg/dL


 


AST  29  (14-36)  U/L


 


ALT  24  (9-52)  U/L


 


Alkaline Phosphatase  68  ()  U/L


 


Total Protein  5.7 L  (6.3-8.2)  g/dL


 


Albumin  2.8 L  (3.5-5.0)  g/dL








 Current Medications











Generic Name Dose Route Start Last Admin





  Trade Name Freq  PRN Reason Stop Dose Admin


 


Acetaminophen  650 mg  09/16/17 00:02  09/17/17 17:45





  Tylenol Tab  PO   650 mg





  Q6HR PRN   Administration





  Mild Pain or Fever > 100.5   


 


Hydrocodone Bitart/Acetaminophen  1 each  09/16/17 13:31  09/17/17 14:36





  [place] 5-325  PO   1 each





  Q6HR PRN   Administration





  Moderate Pain   


 


Albuterol Sulfate  2.5 mg  09/16/17 13:31  





  Ventolin Nebulized  INHALATION   





  RT-Q4H PRN   





  Shortness Of Breath   


 


Allopurinol  300 mg  09/17/17 09:00  09/19/17 08:27





  Zyloprim  PO   300 mg





  DAILY PATRICK   Administration


 


Alprazolam  0.5 mg  09/16/17 16:00  09/19/17 08:26





  Xanax  PO   0.5 mg





  TID PATRICK   Administration


 


Dicyclomine HCl  10 mg  09/16/17 18:00  09/19/17 08:27





  Bentyl  PO   10 mg





  QID PATRICK   Administration


 


Famotidine  20 mg  09/17/17 09:00  09/19/17 08:27





  Pepcid  PO   20 mg





  DAILY PATRICK   Administration


 


Fluticasone Propionate  1 spray  09/17/17 09:00  09/19/17 08:29





  Flonase Nasal [place]  EA NOSTRIL   1 spray





  DAILY PATRICK   Administration


 


Heparin Sodium (Porcine)  5,000 unit  09/16/17 09:00  09/19/17 08:32





  Heparin  SQ   5,000 unit





  Q12HR PATRICK   Administration


 


Ceftriaxone Sodium 1,000 mg/  50 mls @ 100 mls/hr  09/16/17 22:00  09/18/17 21:

06





  Sodium Chloride  IVPB   100 mls/hr





  Q24H PATRICK   Administration


 


Levothyroxine Sodium  25 mcg  09/17/17 06:30  09/19/17 07:04





  Synthroid  PO   25 mcg





  DAILY@0630 PATRICK   Administration


 


Loratadine  10 mg  09/16/17 13:45  09/19/17 08:29





  Claritin  PO   10 mg





  DAILY PATRICK   Administration


 


Melatonin  3 mg  09/16/17 00:02  





  Melatonin  PO   





  HS PRN   





  Insomnia   


 


Metoprolol Tartrate  50 mg  09/19/17 10:15  09/19/17 10:49





  Lopressor  PO   50 mg





  BID PATRICK   Administration


 


Naloxone HCl  0.2 mg  09/15/17 22:34  





  Narcan  IV   





  Q2M PRN   





  Opioid Reversal   


 


Nystatin  1 applic  09/16/17 21:00  09/19/17 08:31





  Mycostatin Powder  TOPICAL   1 applic





  BID PATRICK   Administration


 


Ondansetron HCl  4 mg  09/16/17 11:34  





  Zofran  IVP   





  Q6HR PRN   





  Nausea And Vomiting   


 


Potassium Chloride  10 meq  09/17/17 09:00  09/19/17 08:27





  K-Dur 10  PO   10 meq





  DAILY PATRICK   Administration








 Intake and Output











 09/18/17 09/19/17 09/19/17





 22:59 06:59 14:59


 


Intake Total  50 


 


Output Total 500  


 


Balance -500 50 


 


Intake:   


 


  Intake, IV Titration  50 





  Amount   


 


    cefTRIAXone 1,000 mg In  50 





    Sodium Chloride 0.9% 50   





    ml @ 100 mls/hr IVPB Q24H   





    [facility] Rx#:[id number]   


 


Output:   


 


  Urine 500  


 


Other:   


 


  Voiding Method Incontinent Incontinent Diaper





   Incontinent


 


  # Voids 1 1 


 


  Weight 74.5 kg 76 kg 








 





 09/19/17 07:35 





 09/19/17 07:35 











EKG Interpretations (text)





EKG indicates atrial flutter with a rapid ventricular response.





Assessment and Plan


Plan: 





ASSESSMENT


1.  New-onset atrial flutter with a rapid ventricular response


2.  Sepsis secondary to urinary tract infection


3.  Elevated troponin


4.  Chronic kidney disease





PLAN


Obtain 2-D echocardiogram to assess left ventricular function.  TSH level.  

Begin metoprolol 50 mg by mouth twice a day.  Long-term anticoagulation will 

not be initiated at this time due to patient's history of falls.  She should be 

transferred to [facility] care for closer cardiac monitoring. Thank you kindly 

for this consultation. We will follow closely with this patient and further 

recommendations will be based upon clinical course. 





Nurse Practitioner note has been reviewed, I agree with a documented findings 

and plan of care.  Patient was seen and examined.

## 2017-09-20 LAB
ANION GAP SERPL CALC-SCNC: 6 MMOL/L
BUN SERPL-SCNC: 35 MG/DL (ref 7–17)
CALCIUM SPEC-MCNC: 9.9 MG/DL (ref 8.4–10.2)
CHLORIDE SERPL-SCNC: 111 MMOL/L (ref 98–107)
CO2 SERPL-SCNC: 23 MMOL/L (ref 22–30)
GLUCOSE SERPL-MCNC: 82 MG/DL (ref 74–99)
MAGNESIUM SPEC-SCNC: 1.9 MG/DL (ref 1.6–2.3)
NON-AFRICAN AMERICAN GFR(MDRD): 25
POTASSIUM SERPL-SCNC: 4.9 MMOL/L (ref 3.5–5.1)
SODIUM SERPL-SCNC: 140 MMOL/L (ref 137–145)

## 2017-09-20 RX ADMIN — NYSTATIN SCH APPLIC: 100000 POWDER TOPICAL at 21:01

## 2017-09-20 RX ADMIN — DICYCLOMINE HYDROCHLORIDE SCH MG: 10 CAPSULE ORAL at 12:27

## 2017-09-20 RX ADMIN — FAMOTIDINE SCH MG: 20 TABLET, FILM COATED ORAL at 08:38

## 2017-09-20 RX ADMIN — DICYCLOMINE HYDROCHLORIDE SCH: 10 CAPSULE ORAL at 06:40

## 2017-09-20 RX ADMIN — HEPARIN SODIUM SCH UNIT: 5000 INJECTION, SOLUTION INTRAVENOUS; SUBCUTANEOUS at 08:38

## 2017-09-20 RX ADMIN — NYSTATIN SCH APPLIC: 100000 POWDER TOPICAL at 08:41

## 2017-09-20 RX ADMIN — DICYCLOMINE HYDROCHLORIDE SCH MG: 10 CAPSULE ORAL at 21:00

## 2017-09-20 RX ADMIN — POTASSIUM CHLORIDE SCH MEQ: 750 TABLET, EXTENDED RELEASE ORAL at 08:38

## 2017-09-20 RX ADMIN — ALLOPURINOL SCH MG: 300 TABLET ORAL at 08:38

## 2017-09-20 RX ADMIN — FLUTICASONE PROPIONATE SCH SPRAY: 50 SPRAY, METERED NASAL at 08:38

## 2017-09-20 RX ADMIN — LEVOTHYROXINE SODIUM SCH MCG: 25 TABLET ORAL at 06:41

## 2017-09-20 RX ADMIN — DICYCLOMINE HYDROCHLORIDE SCH MG: 10 CAPSULE ORAL at 08:38

## 2017-09-20 RX ADMIN — DICYCLOMINE HYDROCHLORIDE SCH MG: 10 CAPSULE ORAL at 17:17

## 2017-09-20 RX ADMIN — LORATADINE SCH MG: 10 TABLET ORAL at 08:38

## 2017-09-20 RX ADMIN — HEPARIN SODIUM SCH UNIT: 5000 INJECTION, SOLUTION INTRAVENOUS; SUBCUTANEOUS at 21:01

## 2017-09-20 RX ADMIN — METOPROLOL TARTRATE SCH MG: 50 TABLET, FILM COATED ORAL at 08:38

## 2017-09-20 RX ADMIN — METOPROLOL TARTRATE SCH MG: 50 TABLET, FILM COATED ORAL at 21:01

## 2017-09-20 NOTE — P.DS
Providers


Date of admission: 


09/15/17 23:07





Expected date of discharge: 09/20/17


Attending physician: 


Diony Beard MD





Consults: 





 





09/19/17 07:44


Consult Physician Urgent 


   Consulting Provider: Al Fontaine


   Consult Reason/Comments: new onset AFIB


   Do you want consulting provider notified?: Yes











Primary care physician: 


Antwan Edmondson








- Discharge Diagnosis(es)


(1) Atrial flutter with rapid ventricular response


Current Visit: Yes   Status: Acute   





(2) UTI (urinary tract infection)


Current Visit: Yes   Status: Suspected   Priority: High   





(3) Dementia


Current Visit: Yes   Status: Chronic   





(4) JACK (acute kidney injury)


Current Visit: Yes   Status: Acute   Priority: High   





(5) Elevated troponin


Current Visit: Yes   Status: Acute   Priority: High   





(6) Fall


Current Visit: Yes   Status: Acute   Priority: High   


Hospital Course: 





82 year old female with PMHx of dementia, Hypertension and diastolic CHF. 


patient presented to the hospital after being found down at home covered with 

urine and stool. She lives alone and her family checks on her every 2-3 days. 

she was last seen normal 2 days prior to this incident. Upon presentation to 

the hospital she was suspected to have a urinary tract infection and was 

started on ABx however, urine cultures turned out to be negative. She was also 

found to have acute kidney injury due to dehydration which has improved with 

rehydration. Elevated troponins which thought to be due to CKD, and was 

trending down, without any chest pain or trouble breathing. patient also 

developed new onset atrial flutter with rapid ventricular response cardiology 

evaluated the patient and she was transferred to critical care unit for close 

monitoring , electrolytes and TSH were unremarkable. 2 D echocardiogram was 

unremarkable with LVEF 60-65%. patient heart rate was controlled with 

metoprolol. Patient is not a good candidate for blood thinners and 

anticoagulation for stroke prophylaxis due to fall risk. 





patient was seen and examined, she is doing well, no new complaints, denies any 

dizziness, ,chest pain, or trouble breathing





Constitutional:   vital signs stable, Not in acute distress, pleasant, 

conversant


Lungs: Clear to auscultation bilaterally, clear to percussion, normal 

respiratory effort  


Cardiovascular: Regular rate and rhythm, no murmurs, no gallops, no rubs, no 

peripheral edema 


Extremities: No digital cyanosis or clubbing, peripheral pulses palpable and 

equal over bilateral radial arteries  ,  no calf muscle tenderness


Psych: Alert, oriented to place, person and time 








patient would benefit from short term rehab, our recommendations is to consider 

24/7 supervision with permanent placement 





More than 35 minutes were spent discharging this patient, and more than 50% of 

the time was spent in counseling the patient and family and in coordinating 

care.


The














Plan - Discharge Summary


New Discharge Prescriptions: 


New


   Acetaminophen Tab [Tylenol] 650 mg PO Q6HR PRN  tab


     PRN Reason: Mild Pain Or Fever > 100.5


   Famotidine [Pepcid] 20 mg PO DAILY  tab


   Heparin Sodium,Porcine [Heparin Sodium] 5,000 unit SQ Q12HR  vial


   Metoprolol Tartrate [Lopressor] 50 mg PO BID  tab


   Nystatin 100,000 Unit/gm Powd [Mycostatin Powder] 1 applic TOPICAL BID  dose





Continue


   Potassium Chloride [K-Tab ER] 10 meq PO DAILY


   Levothyroxine Sodium [Synthroid] 25 mcg PO DAILY


   ALPRAZolam [Xanax] 0.5 mg PO TID


   Dicyclomine HCl 10 mg PO QID


   Allopurinol [Zyloprim] 300 mg PO DAILY


   Fluticasone Nasal Spray [Flonase Nasal Spray] 1 spray EA NOSTRIL DAILY


   Albuterol Inhaler [Ventolin Hfa Inhaler] 2 puff INHALATION RT-Q4H PRN


     PRN Reason: Shortness Of Breath





Discontinued


   Loratadine [Claritin] 10 mg PO DAILY


   Hydrocodone/Acetaminophen [Norco 5-325] 1 tab PO Q6HR PRN


     PRN Reason: Pain


   Ranitidine HCl [Zantac] 150 mg PO BID


Discharge Medication List





ALPRAZolam [Xanax] 0.5 mg PO TID 09/16/17 [History]


Albuterol Inhaler [Ventolin Hfa Inhaler] 2 puff INHALATION RT-Q4H PRN 09/16/17 [

History]


Allopurinol [Zyloprim] 300 mg PO DAILY 09/16/17 [History]


Dicyclomine HCl 10 mg PO QID 09/16/17 [History]


Fluticasone Nasal Spray [Flonase Nasal Spray] 1 spray EA NOSTRIL DAILY 09/16/17 

[History]


Levothyroxine Sodium [Synthroid] 25 mcg PO DAILY 09/16/17 [History]


Potassium Chloride [K-Tab ER] 10 meq PO DAILY 09/16/17 [History]


Acetaminophen Tab [Tylenol] 650 mg PO Q6HR PRN  tab 09/20/17 [Rx]


Famotidine [Pepcid] 20 mg PO DAILY  tab 09/20/17 [Rx]


Heparin Sodium,Porcine [Heparin Sodium] 5,000 unit SQ Q12HR  vial 09/20/17 [Rx]


Metoprolol Tartrate [Lopressor] 50 mg PO BID  tab 09/20/17 [Rx]


Nystatin 100,000 Unit/gm Powd [Mycostatin Powder] 1 applic TOPICAL BID  dose 09/ 20/17 [Rx]








Follow up Appointment(s)/Referral(s): 


Al Fontaine MD [STAFF PHYSICIAN] - 1 Week


Antwan Edmondson DO [Primary Care Provider] - 1-2 days


Patient Instructions/Handouts:  Atrial Flutter (GEN), Urinary Tract Infection 

in Women (GEN), Fall Prevention for Older Adults (GEN), Fall Prevention (GEN)


Activity/Diet/Wound Care/Special Instructions: 


resume pre admission diet


activity as tolerated


patient would benefit from permanent placement, as she is a fall risk and would 

be unsafe for her to live alone, she requires 24/7 supervision


Discharge Disposition: TRANSFER TO SNF/ECF

## 2017-09-20 NOTE — P.PN
Subjective


Principal diagnosis: 





Atrial flutter


This is an 82-year-old  female who initially was brought to the 

hospital after being found on the floor by her daughter.  Cardiology 

consultation was requested because of atrial flutter with rapid ventricular 

response.  She was transferred to the telemetry unit, currently in normal sinus 

rhythm this morning.  Patient does have history of dementia as well as the fact 

that she is deaf.  We are currently treating her for urinary tract infection, 

she was also noted to have mildly abnormal troponins not consistent with acute 

coronary syndrome.  Patient was recommended by the primary care doctor to go to 

extended care facility for a period of time however she requested to be sent 

home.  Plan is for her to be discharged home today.  Echocardiogram with 

Doppler study was performed which revealed normal left ventricular systolic 

function.








Objective





- Vital Signs


Vital signs: 


 Vital Signs











Temp  96.7 F L  09/20/17 08:38


 


Pulse  102 H  09/20/17 08:38


 


Resp  18   09/20/17 08:38


 


BP  142/72   09/20/17 09:52


 


Pulse Ox  97   09/20/17 08:38








 Intake & Output











 09/19/17 09/20/17 09/20/17





 18:59 06:59 18:59


 


Intake Total 354 200 118


 


Balance 354 200 118


 


Intake:   


 


  Oral 354 200 118


 


Other:   


 


  Voiding Method Diaper Diaper Diaper





 Incontinent Incontinent Incontinent


 


  # Voids 1 4 


 


  # Bowel Movements  1 














- Exam





PHYSICAL EXAMINATION: 





HEENT: Head is atraumatic, normocephalic.  Right eye cloudy.  Sclerae intact.  

  Neck is supple.  There is no elevated jugular venous pressure.





HEART EXAMINATION: Heart S1, S2 normal.  No murmur or gallop heard.





CHEST EXAMINATION: Lungs are clear to auscultation and precussion. No chest 

wall tenderness is noted on palpation or with deep breathing.





ABDOMEN:  Soft, nontender. Bowel sounds are heard. No organomegaly noted.


 


EXTREMITIES: 2+ peripheral pulses with no evidence of peripheral edema and no 

calf tenderness noted.





NEUROLOGIC patient is awake, alert and oriented -3.


 


.


 








- Labs


CBC & Chem 7: 


 09/19/17 07:35





 09/20/17 05:49


Labs: 


 Abnormal Lab Results - Last 24 Hours (Table)











  09/20/17 Range/Units





  05:49 


 


Chloride  111 H  ()  mmol/L


 


BUN  35 H  (7-17)  mg/dL


 


Creatinine  1.90 H  (0.52-1.04)  mg/dL








 Microbiology - Last 24 Hours (Table)











 09/16/17 12:34 Blood Culture - Preliminary





 Blood    No Growth after 72 hours














Assessment and Plan


(1) Atrial flutter, paroxysmal


Status: Acute   





(2) Atypical atrial flutter


Status: Acute   





(3) Chronic kidney disease


Status: Acute   





(4) Elevated troponin


Status: Acute   





(5) Fall


Status: Acute   





(6) Renal insufficiency


Status: Acute   





(7) Dementia


Status: Chronic   





(8) UTI (urinary tract infection)


Status: Suspected   


Plan: 


Arrangements are being made for the patient to be discharged home today.  SHe'

ll follow-up with her primary care doctor post discharge.  We will make her a 

follow-up appointment in the office post discharge as well.  Is not a candidate 

for anticoagulation because of frequent falls.





DNP note has been reviewed, I agree with a documented findings and plan of 

care.  Patient was seen and examined.

## 2017-09-21 VITALS
HEART RATE: 74 BPM | SYSTOLIC BLOOD PRESSURE: 128 MMHG | RESPIRATION RATE: 16 BRPM | DIASTOLIC BLOOD PRESSURE: 67 MMHG | TEMPERATURE: 97.6 F

## 2017-09-21 RX ADMIN — DICYCLOMINE HYDROCHLORIDE SCH MG: 10 CAPSULE ORAL at 08:06

## 2017-09-21 RX ADMIN — FAMOTIDINE SCH MG: 20 TABLET, FILM COATED ORAL at 08:06

## 2017-09-21 RX ADMIN — HEPARIN SODIUM SCH UNIT: 5000 INJECTION, SOLUTION INTRAVENOUS; SUBCUTANEOUS at 08:06

## 2017-09-21 RX ADMIN — LEVOTHYROXINE SODIUM SCH MCG: 25 TABLET ORAL at 06:36

## 2017-09-21 RX ADMIN — NYSTATIN SCH APPLIC: 100000 POWDER TOPICAL at 08:07

## 2017-09-21 RX ADMIN — FLUTICASONE PROPIONATE SCH SPRAY: 50 SPRAY, METERED NASAL at 08:06

## 2017-09-21 RX ADMIN — ALLOPURINOL SCH MG: 300 TABLET ORAL at 08:06

## 2017-09-21 RX ADMIN — METOPROLOL TARTRATE SCH MG: 50 TABLET, FILM COATED ORAL at 08:06

## 2017-09-21 RX ADMIN — LORATADINE SCH MG: 10 TABLET ORAL at 08:06

## 2017-09-21 RX ADMIN — DICYCLOMINE HYDROCHLORIDE SCH MG: 10 CAPSULE ORAL at 12:34

## 2017-09-21 RX ADMIN — POTASSIUM CHLORIDE SCH MEQ: 750 TABLET, EXTENDED RELEASE ORAL at 08:06

## 2017-09-25 NOTE — CDI
In responding to this query, please exercise your independent professional 
judgment. The [facility] Coding Staff and Clinical Documentation Specialists 
appreciate your assistance in clarifying documentation, maintaining compliance 
with coding guidelines, accurately documenting patients condition and 
capturing severity of illness. The fact that a question is asked does not imply 
that any particular answer is desired or expected. Communication forms are a 
method of clarifying documentation and are not made part of the Legal Health 
Record. Thank you in advance for your clarification.

 Last Revision, November 2015



[facility] [place]

[address]

[place] [address]



Documentation Clarification Form



Date: 9/25/2017 8:36:00 AM

From: Kat Urban

Phone: [phone]

MRN: [id number]

Admit Date: 9/15/2017 11:07:00 PM

Patient Name: Rony Scanlon 

Visit Number: [id number]

Discharge Date: 9/25/17





Dr. Ann Marie Phipps,



Conflicting documentation has been found in the medical record.



In H&P by Dr Morelos and 9/19 consult by SAMANTHA Lopes  - all document that patient 
had sepsis. 



Clinical Indicators:  lactic acid Rflx Y - 3.0 (H), WBC 16.2 (H), neutrophils 
14.2 (H), /58 on 9/15



Treatment:  IV Rocephin, IV Levaquin



Please clarify if the patient had sepsis?  

      Sepsis on admission

      Sepsis ruled out

      OTHER explanation of clinical findings 

      Unable to determine (no explanation for clinical findings)



Please document addendum in your discharge summary in order to capture severity 
of illness and risk of mortality. Include clinical findings that support your 
diagnosis.



FYI: Press F11 to launch patient chart.



If you have a question about this query, please contact Jennifer Chapa, Coding 
Manager, [facility] [place] at [phone] between 8am and 5pm.



JEM

## 2017-10-05 NOTE — CDI
In responding to this query, please exercise your independent professional 
judgment. The [facility] Coding Staff and Clinical Documentation Specialists 
appreciate your assistance in clarifying documentation, maintaining compliance 
with coding guidelines, accurately documenting patients condition and 
capturing severity of illness. The fact that a question is asked does not imply 
that any particular answer is desired or expected. Communication forms are a 
method of clarifying documentation and are not made part of the Legal Health 
Record. Thank you in advance for your clarification.

 Last Revision, November 2015



[facility] [place]

[address]

[place] [address]



Documentation Clarification Form



Date: 9/25/2017 8:36:00 AM

From: Kat Urban

Phone: [phone]

MRN: [id number]

Admit Date: 9/15/2017 11:07:00 PM

Patient Name: Rony Scanlon 

Visit Number: [id number]

Discharge Date: 9/21/17



Dr. Ann Marie Phipps



Conflicting documentation has been found in the medical record.



In H&P by Dr Morelos and 9/19 consult by SAMANTHA Lopes  - all document that patient 
had sepsis. 



Clinical Indicators:  lactic acid Rflx Y - 3.0 (H), WBC 16.2 (H), neutrophils 
14.2 (H), /58 on 9/15



Treatment:  IV Rocephin, IV Levaquin



Please clarify if the patient had sepsis?  

      Sepsis on admission

      Sepsis ruled out

      OTHER explanation of clinical findings 

      Unable to determine (no explanation for clinical findings)



Please document addendum in your discharge summary in order to capture severity 
of illness and risk of mortality. Include clinical findings that support your 
diagnosis.



FYI: Press F11 to launch patient chart.



If you have a question about this query, please contact Jennifer Chapa, Coding 
Manager, [facility] at [phone] between 8am and 5pm.



JEM

## 2017-11-10 ENCOUNTER — HOSPITAL ENCOUNTER (INPATIENT)
Dept: HOSPITAL 47 - EC | Age: 82
LOS: 4 days | Discharge: NURSING, REHAB OR LONG-TERM CARE | DRG: 470 | End: 2017-11-14
Payer: MEDICARE

## 2017-11-10 VITALS — BODY MASS INDEX: 32.4 KG/M2

## 2017-11-10 DIAGNOSIS — Z79.899: ICD-10-CM

## 2017-11-10 DIAGNOSIS — K21.9: ICD-10-CM

## 2017-11-10 DIAGNOSIS — M25.00: ICD-10-CM

## 2017-11-10 DIAGNOSIS — I48.91: ICD-10-CM

## 2017-11-10 DIAGNOSIS — G30.1: ICD-10-CM

## 2017-11-10 DIAGNOSIS — I50.9: ICD-10-CM

## 2017-11-10 DIAGNOSIS — S00.83XA: ICD-10-CM

## 2017-11-10 DIAGNOSIS — E83.42: ICD-10-CM

## 2017-11-10 DIAGNOSIS — Z66: ICD-10-CM

## 2017-11-10 DIAGNOSIS — Z80.0: ICD-10-CM

## 2017-11-10 DIAGNOSIS — J96.11: ICD-10-CM

## 2017-11-10 DIAGNOSIS — E03.9: ICD-10-CM

## 2017-11-10 DIAGNOSIS — N39.0: ICD-10-CM

## 2017-11-10 DIAGNOSIS — F06.30: ICD-10-CM

## 2017-11-10 DIAGNOSIS — Z87.730: ICD-10-CM

## 2017-11-10 DIAGNOSIS — I25.2: ICD-10-CM

## 2017-11-10 DIAGNOSIS — I13.0: ICD-10-CM

## 2017-11-10 DIAGNOSIS — H54.61: ICD-10-CM

## 2017-11-10 DIAGNOSIS — F02.80: ICD-10-CM

## 2017-11-10 DIAGNOSIS — Z16.12: ICD-10-CM

## 2017-11-10 DIAGNOSIS — H40.9: ICD-10-CM
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
## 2017-11-10 DIAGNOSIS — Z87.891: ICD-10-CM

## 2017-11-10 DIAGNOSIS — S72.002A: Primary | ICD-10-CM

## 2017-11-10 DIAGNOSIS — J44.9: ICD-10-CM

## 2017-11-10 DIAGNOSIS — H91.91: ICD-10-CM

## 2017-11-10 DIAGNOSIS — B96.20: ICD-10-CM

## 2017-11-10 DIAGNOSIS — Z91.81: ICD-10-CM

## 2017-11-10 DIAGNOSIS — I48.92: ICD-10-CM

## 2017-11-10 DIAGNOSIS — N18.4: ICD-10-CM

## 2017-11-10 DIAGNOSIS — Z99.81: ICD-10-CM

## 2017-11-10 DIAGNOSIS — E78.5: ICD-10-CM

## 2017-11-10 LAB
ALP SERPL-CCNC: 107 U/L (ref 38–126)
ALT SERPL-CCNC: 33 U/L (ref 9–52)
ANION GAP SERPL CALC-SCNC: 11 MMOL/L
APTT BLD: 20.8 SEC (ref 22–30)
AST SERPL-CCNC: 26 U/L (ref 14–36)
BASOPHILS # BLD AUTO: 0 K/UL (ref 0–0.2)
BASOPHILS NFR BLD AUTO: 0 %
BUN SERPL-SCNC: 60 MG/DL (ref 7–17)
CALCIUM SPEC-MCNC: 11 MG/DL (ref 8.4–10.2)
CH: 27
CHCM: 30.8
CHLORIDE SERPL-SCNC: 89 MMOL/L (ref 98–107)
CK SERPL-CCNC: 22 U/L (ref 30–135)
CO2 SERPL-SCNC: 35 MMOL/L (ref 22–30)
EOSINOPHIL # BLD AUTO: 0.2 K/UL (ref 0–0.7)
EOSINOPHIL NFR BLD AUTO: 3 %
ERYTHROCYTE [DISTWIDTH] IN BLOOD BY AUTOMATED COUNT: 4.25 M/UL (ref 3.8–5.4)
ERYTHROCYTE [DISTWIDTH] IN BLOOD: 15.5 % (ref 11.5–15.5)
GLUCOSE SERPL-MCNC: 97 MG/DL (ref 74–99)
HCT VFR BLD AUTO: 37.2 % (ref 34–46)
HDW: 2.64
HGB BLD-MCNC: 11.4 GM/DL (ref 11.4–16)
INR PPP: 1 (ref ?–1.2)
LUC NFR BLD AUTO: 2 %
LYMPHOCYTES # SPEC AUTO: 0.7 K/UL (ref 1–4.8)
LYMPHOCYTES NFR SPEC AUTO: 9 %
MAGNESIUM SPEC-SCNC: 1.4 MG/DL (ref 1.6–2.3)
MCH RBC QN AUTO: 26.8 PG (ref 25–35)
MCHC RBC AUTO-ENTMCNC: 30.6 G/DL (ref 31–37)
MCV RBC AUTO: 87.7 FL (ref 80–100)
MONOCYTES # BLD AUTO: 0.5 K/UL (ref 0–1)
MONOCYTES NFR BLD AUTO: 7 %
NEUTROPHILS # BLD AUTO: 5.7 K/UL (ref 1.3–7.7)
NEUTROPHILS NFR BLD AUTO: 78 %
NON-AFRICAN AMERICAN GFR(MDRD): 22
PARTICLE COUNT: (no result)
PARTICLE COUNT: 4161
PH UR: 7 [PH] (ref 5–8)
PH UR: 7 [PH] (ref 5–8)
POTASSIUM SERPL-SCNC: 3.7 MMOL/L (ref 3.5–5.1)
PROT SERPL-MCNC: 6.5 G/DL (ref 6.3–8.2)
PROT UR QL: (no result)
PT BLD: 9.8 SEC (ref 9–12)
RBC UR QL: 23 /HPF (ref 0–5)
SODIUM SERPL-SCNC: 135 MMOL/L (ref 137–145)
SP GR UR: 1.01 (ref 1–1.03)
SP GR UR: 1.01 (ref 1–1.03)
UA BILLING (MACRO VS. MICRO): (no result)
UA BILLING (MACRO VS. MICRO): (no result)
UROBILINOGEN UR QL STRIP: <2 MG/DL (ref ?–2)
UROBILINOGEN UR QL STRIP: <2 MG/DL (ref ?–2)
WBC # BLD AUTO: 0.18 10*3/UL
WBC # BLD AUTO: 7.3 K/UL (ref 3.8–10.6)
WBC #/AREA URNS HPF: >182 /HPF (ref 0–5)
WBC #/AREA URNS HPF: >182 /HPF (ref 0–5)
WBC (PEROX): 7.54

## 2017-11-10 PROCEDURE — 93005 ELECTROCARDIOGRAM TRACING: CPT

## 2017-11-10 PROCEDURE — 87077 CULTURE AEROBIC IDENTIFY: CPT

## 2017-11-10 PROCEDURE — 85730 THROMBOPLASTIN TIME PARTIAL: CPT

## 2017-11-10 PROCEDURE — 73501 X-RAY EXAM HIP UNI 1 VIEW: CPT

## 2017-11-10 PROCEDURE — 88311 DECALCIFY TISSUE: CPT

## 2017-11-10 PROCEDURE — 0SRS019 REPLACEMENT OF LEFT HIP JOINT, FEMORAL SURFACE WITH METAL SYNTHETIC SUBSTITUTE, CEMENTED, OPEN APPROACH: ICD-10-PCS

## 2017-11-10 PROCEDURE — 99285 EMERGENCY DEPT VISIT HI MDM: CPT

## 2017-11-10 PROCEDURE — 83735 ASSAY OF MAGNESIUM: CPT

## 2017-11-10 PROCEDURE — 82553 CREATINE MB FRACTION: CPT

## 2017-11-10 PROCEDURE — 96375 TX/PRO/DX INJ NEW DRUG ADDON: CPT

## 2017-11-10 PROCEDURE — 80048 BASIC METABOLIC PNL TOTAL CA: CPT

## 2017-11-10 PROCEDURE — 80053 COMPREHEN METABOLIC PANEL: CPT

## 2017-11-10 PROCEDURE — 85610 PROTHROMBIN TIME: CPT

## 2017-11-10 PROCEDURE — 87186 SC STD MICRODIL/AGAR DIL: CPT

## 2017-11-10 PROCEDURE — 84132 ASSAY OF SERUM POTASSIUM: CPT

## 2017-11-10 PROCEDURE — 88305 TISSUE EXAM BY PATHOLOGIST: CPT

## 2017-11-10 PROCEDURE — 96365 THER/PROPH/DIAG IV INF INIT: CPT

## 2017-11-10 PROCEDURE — 71010: CPT

## 2017-11-10 PROCEDURE — 72192 CT PELVIS W/O DYE: CPT

## 2017-11-10 PROCEDURE — 82550 ASSAY OF CK (CPK): CPT

## 2017-11-10 PROCEDURE — 96360 HYDRATION IV INFUSION INIT: CPT

## 2017-11-10 PROCEDURE — 36415 COLL VENOUS BLD VENIPUNCTURE: CPT

## 2017-11-10 PROCEDURE — 94760 N-INVAS EAR/PLS OXIMETRY 1: CPT

## 2017-11-10 PROCEDURE — 81001 URINALYSIS AUTO W/SCOPE: CPT

## 2017-11-10 PROCEDURE — 87086 URINE CULTURE/COLONY COUNT: CPT

## 2017-11-10 PROCEDURE — 85025 COMPLETE CBC W/AUTO DIFF WBC: CPT

## 2017-11-10 RX ADMIN — ATORVASTATIN CALCIUM SCH MG: 10 TABLET, FILM COATED ORAL at 20:28

## 2017-11-10 RX ADMIN — HYDROMORPHONE HYDROCHLORIDE STA MG: 1 INJECTION, SOLUTION INTRAMUSCULAR; INTRAVENOUS; SUBCUTANEOUS at 10:04

## 2017-11-10 RX ADMIN — HYDROMORPHONE HYDROCHLORIDE STA MG: 1 INJECTION, SOLUTION INTRAMUSCULAR; INTRAVENOUS; SUBCUTANEOUS at 08:15

## 2017-11-10 NOTE — CONS
CONSULTATION



DATE OF CONSULTATION:

11/10/2017



REASON FOR CONSULTATION:

Medical management requested by Dr. Valencia.



CONSULTATION:

This is a 82-year-old patient followed by Dr. Antwan Reno.  Chronic stable medical

conditions include atrial fibrillation, dementia, COPD, hyperlipidemia, hypertension.

The patient is deaf in the right ear and also got chronic kidney disease.  The patient

is on home oxygen 3 L.  The patient's daughter, Heather Pete is at the bedside is also

patient's POA.  States the patient fell about 2 weeks ago, is complaining of pain in

the left hip and it was discovered that she has a left femoral neck fracture and is

going to proceed for surgery.  The patient has known atrial fibrillation and patient

bruises very easily.  Hence, because of falls, it was decided not to anticoagulate the

patient.  The patient normally uses a wheelchair to get about. Her appetite has not

been very good.  The patient is able to answer simple questions.  The patient has had

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

abnormalities and has a nasal tone to her speech.  Patient also got bruising on the

left side of the face from the fall 10 days ago.



REVIEW OF SYSTEMS:

Difficult to obtain as the patient does not speak too well.



PAST MEDICAL HISTORY:

Atrial fibrillation, not for anticoagulation because of heavy bruising, dementia, COPD,

hyperlipidemia, hypertension, deaf in the right ear, chronic kidney disease, left arm

fistula, home oxygen 3 L, hypothyroid, gait dysfunction.



PAST SURGICAL HISTORY:

Bladder surgery, joint replacement, multiple surgery for birth defect affecting the

mouth, nose and ears, total left knee arthroplasty, bladder sling, fistula in the left

arm.



SOCIAL HISTORY:

Patient is a resident of [facility].  Uses a wheelchair and oxygen 3 L.  The

patient is on a thin consistency diet, Ensure Plus twice a day.



FAMILY HISTORY:

Father had esophageal cancer,  age of 60.



HOME MEDICATIONS:

1. Prednisone Forte 1% 2 drops left eye daily.

2. Hydralazine 50 mg p.o. t.i.d.

3. Guaifenesin 200 mg p.o. q.8h p.r.n.

4. Zantac 150 mg p.o. b.i.d.

5. Potassium 20 mEq p.o. b.i.d.

6. Lopressor 50 mg p.o. daily.

7. Zaroxolyn 5 mg p.o.  and .

8. Claritin 10 mg p.o. daily p.r.n.

9. Synthroid 25 mcg p.o. daily.

10.Ensure Plus 1 can p.o. b.i.d.

11.Norco 5 one tab q.4 p.r.n.

12.Lasix 40 mg p.o. b.i.d.

13.Flonase 1 spray each nostril daily.

14.Vitamin D2 50,000 units p.o. q.90 days.

15.Dicyclomine 10 mg p.o. q.6.

16.Lotrisone cream topical b.i.d.

17.Butrans 10 mcg an hour 1 patch on .

18.Lipitor 10 mg p.o. q.h.s.

19.Allopurinol 10 mg p.o. daily.

20.Ventolin 2.5 nebulizer q.4h p.r.n.

21.Tylenol 650 mg q.6h p.r.n.

22.Xanax 0.25 p.o. b.i.d. p.r.n.



ALLERGIES:

None.



PHYSICAL EXAMINATION:

Temperature 97.6, pulse 115, respirations 16, blood pressure 150/74, pulse ox 97% on 2

L.

GENERAL APPEARANCE: Average built, BMI 32.4, lying in bed, tired appearing.

EYES:  Pupils equal, conjunctivae normal.

HEENT: Extensive bruising on the left side of the face.  Patient also wearing

eyeglasses.  Oral cavity missing dentition.  Decreased hearing.  NECK: JVD unable to

assess.  Mass not palpable.

RESPIRATORY: Effort normal.  Lungs, diminished breath sounds.

CARDIOVASCULAR: Heart sounds irregular, no edema.

ABDOMEN:  Soft, nontender.  Liver and spleen not palpable.

LYMPHATIC: No lymph nodes palpable in neck or axillae.

PSYCHIATRY: Patient able answer simple questions. Mood and affect slightly low.

NEUROLOGICAL:  Pupils equal. Cranial nerves grossly intact.  Limited range of motion of

the left hip at the fracture site.

MUSCULOSKELETAL: Again limited range of motion of the left hip. Osteoarthritis

especially in the hands.



INVESTIGATIONS:

White count 7.3, hemoglobin 11.4, potassium 1, BUN 60, creatinine 2.12.  UA positive

for leuko esterase, WBC.  Pelvic x-ray shows minimally impacted cephalad distracted

noncommunicated left transcervical femoral neck fracture. Chest x-ray, no specific

findings.



ASSESSMENT:

1. Acute femoral neck fracture from a fall about 10 days ago.

2. Excessive bruising on the left side of the face from a fall recently.

3. Atrial flutter fibrillation, chronic, persistent, not a candidate for

    anticoagulation.  Patient bruises very easily.

4. Alzheimer's dementia with psychosis.

5. Chronic obstructive pulmonary disease.

6. Hyperlipidemia.

7. Essential hypertension.

8. 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

9. 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

10.Chronic hypoxic respiratory failure on 3 L of oxygen.

11.Hypothyroid.

12.Acute urinary tract infection.

13.Chronic hypoxic respiratory failure, probably from underlying chronic obstructive

    pulmonary disease.

14.Patient's code status DNR as discussed with the daughter.



PLAN:

Spoke to the patient's daughter at the bedside.  She does understand the patient is a

high risk for surgery given her multiple comorbidities, but she understands the same.

I also conveyed this to Dr. Valencia's PA. Patient meantime is to continue with the

medications.  We will give the patient IV ceftriaxone for the UTI.





MMODL / IJN: [id number] / Job#: [id number]

## 2017-11-10 NOTE — CT
EXAMINATION TYPE: CT pelvis wo con

 

DATE OF EXAM: 11/10/2017

 

COMPARISON: Left hip radiographs dated 11/10/2017

 

HISTORY: Fall

 

CT DLP: 388.3 mGycm

Automated exposure control for dose reduction was used.

 

FINDINGS: 

There is a transcervical left femoral hip fracture with cephalad displacement of the distal fracture 
fragment of approximately 2.4 cm and Vertex anterior angulation. There is also approximately 4 mm imp
action without comminution. Bony productive changes seen around the left hip joint from osteoarthropa
thy. There is also joint space loss and subchondral cartilaginous loss in the posterior cephalad posi
tion of both hips without subchondral cysts compatible with mild arthropathy. Surrounding the left hi
p fracture there is a small joint effusion and surrounding inflammatory change. Muscle volume appears
 relatively symmetric to the right with no evidence of intramuscular hematoma although myositis is gallo
spected as there is slight difference in attenuation.

 

Sigmoid diverticula are noted although the bowel is nonenlarged. Urinary bladder and prostate gland a
re unremarkable. No adenopathy is appreciated within the visualized pelvis. Moderate calcific changes
 are seen of the abdominal aorta and its branches. Degenerative changes of the lumbosacral junction a
re present. There is no evidence of dislocation of the left hip. Acetabulum appears intact. Sacroilia
c joints are symmetric. There is mild osseous demineralization throughout.

 

IMPRESSION: 

MINIMALLY IMPACTED, CEPHALAD DISTRACTED, NONCOMMINUTED LEFT TRANSCERVICAL ACUTE FEMORAL HIP FRACTURE 
WITH SMALL SURROUNDING HEMARTHROSIS AND MYOSITIS OF THE SURROUNDING MUSCULATURE.

 

Findings were communicating to the ordering physician Dr. Hunter at 9:58 AM on 11/10/2017 by Dr. Thomas.

## 2017-11-10 NOTE — XR
EXAMINATION TYPE: XR chest 1V portable

 

DATE OF EXAM: 11/10/2017

 

COMPARISON: Prior chest x-ray 10/26/2017

 

HISTORY: Pain, trauma, shortness of breath

 

TECHNIQUE: Single frontal view of the chest is obtained.

 

FINDINGS:  Patient is rotated. High riding right shoulder persists. No evident pneumothorax or sizabl
e effusion. The heart is enlarged. Interstitium is increased. Pulmonary artery is prominent, central 
vascularity increased.

 

IMPRESSION:  Correlate for pulmonary venous hypertension and interstitial edema. There may be underly
ing pulmonary artery hypertension.

## 2017-11-10 NOTE — P.HPOR
History of Present Illness


H&P Date: 11/10/17


Chief Complaint: Left hip fracture





This is a 82 year old white female who presents with left hip fracture. The 

patient lives in an extended care facility and fell a few weeks ago. At this 

time, x-rays were negative for a fracture. The patient has been ambulating with 

pain. 





Past Medical History


Past Medical History: Heart Failure, COPD, Dementia, GERD/Reflux, Hyperlipidemia

, Hypertension, Renal Disease, Respiratory Disorder, Thyroid Disorder


Additional Past Medical History / Comment(s): Pt was born with cleft palate and 

other physical problems with facial structures including nasal passages and ear 

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  She can 

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 

in the R eye and can see/read with left eye-it also has glaucoma, chronic renal 

disease stage IV-has L arm fistula but no hemodialysis, frequent UTIs, wears O2 

at 3L/NC ATC, falls, hypothyroid.


Last Myocardial Infarction Date:: unknown


History of Any Multi-Drug Resistant Organisms: None Reported


Past Surgical History: Bladder Surgery, Joint Replacement, Orthopedic Surgery


Additional Past Surgical History / Comment(s): Pt has had multiple surgeries 

for birth defects affecting mouth, nose and ears, total L knee arthroplasty, 

bladder sling, fistula L arm.


Past Anesthesia/Blood Transfusion Reactions: No Reported Reaction


Past Psychological History: No Psychological Hx Reported


Additional Psychological History / Comment(s): Pt now resides at [facility] on [facility].  She was getting around in a wheelchair.  She wears O2 at 3L/NC.  She 

gets updraft treatments 4 times a day.  She is on a regular thin consistency 

diet and ensure plus bwice a day.


Smoking Status: Former smoker


Past Alcohol Use History: None Reported


Additional Past Alcohol Use History / Comment(s): Pt quit smoking in .


Past Drug Use History: None Reported





- Past Family History


  ** Father


Family Medical History: Cancer


Additional Family Medical History / Comment(s): Father  prior to age 60 yrs 

with esophageal cancer.  He was a smoker and a drinker.





  ** Mother


Family Medical History: Osteoarthritis (OA), Renal Disease, Rheumatoid 

Arthritis (RA)


Additional Family Medical History / Comment(s): kidney problems, specific type 

unknown





Medications and Allergies


 Home Medications











 Medication  Instructions  Recorded  Confirmed  Type


 


Allopurinol [Zyloprim] 300 mg PO DAILY 09/16/17 11/10/17 History


 


Dicyclomine HCl 10 mg PO Q6H 09/16/17 11/10/17 History


 


Fluticasone Nasal Spray [Flonase 1 spray EA NOSTRIL DAILY 09/16/17 11/10/17 

History





Nasal Spray]    


 


Levothyroxine Sodium [Synthroid] 25 mcg PO DAILY 09/16/17 11/10/17 History


 


Acetaminophen Tab [Tylenol] 650 mg PO Q6HR PRN  tab 09/20/17 11/10/17 Rx


 


ALPRAZolam [Xanax] 0.25 mg PO BID PRN 11/10/17 11/10/17 History


 


Albuterol Nebulized [Ventolin 2.5 mg INHALATION RT-Q4H PRN 11/10/17 11/10/17 

History





Nebulized]    


 


Atorvastatin [Lipitor] 10 mg PO HS 11/10/17 11/10/17 History


 


Buprenorphine [Butrans 10 MCG/HOUR] 1 patch TRANSDERM TU 11/10/17 11/10/17 

History


 


Clotrimazole/Betamethasone Dip 1 applic TOPICAL BID 11/10/17 11/10/17 History





[Lotrisone Cream]    


 


Ergocalciferol (Vitamin D2) 50,000 unit PO Q90D 11/10/17 11/10/17 History





[Vitamin D2]    


 


Furosemide [Lasix] 40 mg PO BID 11/10/17 11/10/17 History


 


HYDROcodone/APAP 5-325MG [Norco 1 tab PO Q4HR PRN 11/10/17 11/10/17 History





5-325]    


 


Lactose-Reduced Food [Ensure Plus] 1 can PO BID 11/10/17 11/10/17 History


 


Loratadine [Claritin] 10 mg PO DAILY PRN 11/10/17 11/10/17 History


 


Metolazone [Zaroxolyn] 5 mg PO MOTH 11/10/17 11/10/17 History


 


Metoprolol Tartrate [Lopressor] 50 mg PO DAILY 11/10/17 11/10/17 History


 


Potassium Chloride [Klor-Con 20] 20 meq PO BID 11/10/17 11/10/17 History


 


Ranitidine HCl [Zantac] 150 mg PO BID 11/10/17 11/10/17 History


 


guaiFENesin [guaiFENesin Oral 200 mg PO Q4HR PRN 11/10/17 11/10/17 History





Solution]    


 


hydrALAZINE HCL [Apresoline] 50 mg PO TID 11/10/17 11/10/17 History


 


prednisoLONE ACETATE [Pred Forte 2 drop LEFT EYE DAILY 11/10/17 11/10/17 History





1%]    











 Allergies











Allergy/AdvReac Type Severity Reaction Status Date / Time


 


No Known Allergies Allergy   Verified 11/10/17 07:55














Physical Examination





This is an 82 year old female in no acute distress. She is alert and oriented 

but significantly hearing impaired.


Skin: Periorbital ecchymosis noted bilaterally and worse surrounding left eye. 

There is swelling on the medial forehead.


HEENT: The patient is unable to hear without hearing aids.


Musculoskeletal: Neck, bilateral wrists and arms have full ROM. The left leg is 

shorter than the right. Pain at hip with movement of left leg. No lower 

extremity edema or other extremity deformities noted.


Vascular: Grossly intact. Dorsalis pedis pulses equal and 2+ bilaterally.


Neurologic: Grossly intact.





Results





CT scan of pelvis and left hip reveal a displaced subcapital fracture of left 

hip. No other acute findings noted.





- Labs


Labs: 


 Abnormal Lab Results - Last 24 Hours (Table)











  11/10/17 11/10/17 11/10/17 Range/Units





  08:09 08:09 08:09 


 


MCHC    30.6 L  (31.0-37.0)  g/dL


 


Lymphocytes #    0.7 L  (1.0-4.8)  k/uL


 


APTT  20.8 L    (22.0-30.0)  sec


 


Sodium     (137-145)  mmol/L


 


Chloride     ()  mmol/L


 


Carbon Dioxide     (22-30)  mmol/L


 


BUN     (7-17)  mg/dL


 


Creatinine     (0.52-1.04)  mg/dL


 


Calcium     (8.4-10.2)  mg/dL


 


Magnesium     (1.6-2.3)  mg/dL


 


Total Creatine Kinase   22 L   ()  U/L


 


Albumin     (3.5-5.0)  g/dL


 


Urine Appearance     (Clear)  


 


Urine Protein     (Negative)  


 


Urine Blood     (Negative)  


 


Ur Leukocyte Esterase     (Negative)  


 


Urine RBC     (0-5)  /hpf


 


Urine WBC     (0-5)  /hpf


 


Urine WBC Clumps     (None)  /hpf


 


Urine Bacteria     (None)  /hpf














  11/10/17 11/10/17 11/10/17 Range/Units





  08:09 10:10 11:30 


 


MCHC     (31.0-37.0)  g/dL


 


Lymphocytes #     (1.0-4.8)  k/uL


 


APTT     (22.0-30.0)  sec


 


Sodium  135 L    (137-145)  mmol/L


 


Chloride  89 L    ()  mmol/L


 


Carbon Dioxide  35 H    (22-30)  mmol/L


 


BUN  60 H    (7-17)  mg/dL


 


Creatinine  2.12 H    (0.52-1.04)  mg/dL


 


Calcium  11.0 H    (8.4-10.2)  mg/dL


 


Magnesium  1.4 L    (1.6-2.3)  mg/dL


 


Total Creatine Kinase     ()  U/L


 


Albumin  3.3 L    (3.5-5.0)  g/dL


 


Urine Appearance   Cloudy H  Turbid H  (Clear)  


 


Urine Protein   Trace H  2+ H  (Negative)  


 


Urine Blood    Moderate H  (Negative)  


 


Ur Leukocyte Esterase   Large H  Large H  (Negative)  


 


Urine RBC    23 H  (0-5)  /hpf


 


Urine WBC   >182 H  >182 H  (0-5)  /hpf


 


Urine WBC Clumps   Many H  Many H  (None)  /hpf


 


Urine Bacteria   Rare H  Rare H  (None)  /hpf








 H & H











  11/10/17 Range/Units





  08:09 


 


Hgb  11.4  (11.4-16.0)  gm/dL


 


Hct  37.2  (34.0-46.0)  %








 Coagulation











  11/10/17 Range/Units





  08:09 


 


INR  1.0  (<1.2)  











Result Diagrams: 


 11/10/17 08:09





 11/10/17 08:09





Assessment and Plan


(1) Fracture of femoral neck, left


Current Visit: Yes   Status: Acute   Code(s): S72.002A - FRACTURE OF UNSP PART 

OF NECK OF LEFT FEMUR, INIT   SNOMED Code(s): 0465832


   





(2) Fracture of hip


Current Visit: Yes   Status: Acute   Code(s): S72.009A - FRACTURE OF UNSP PART 

OF NECK OF UNSP FEMUR, INIT   SNOMED Code(s): 737002033


   


Plan: 





The clinical and x-ray findings are discussed with the patient and her 

daughters. It is recommended that she undergo hemiarthroplasty of the left hip. 

The surgical procedure was discussed in detail including the possible risks 

associated with the procedure and anesthesia. After discussion and consideration

, they elect to proceed with surgery. Pre-surgical clearance is pending.

## 2017-11-10 NOTE — ED
General Adult HPI





- General


Stated complaint: FALL, POSS LEFT HIP Fx


Time Seen by Provider: 11/10/17 07:50


Source: patient, RN notes reviewed


Mode of arrival: EMS


Limitations: no limitations





- History of Present Illness


Initial comments: 





This is a 82-year-old female with a history of multiple medical problems was 

seen in this emergency department 2 weeks ago after a fall who is sent back 

today because of a reported left hip fracture was found on x-rays done in a 

nursing home.  Patient is complaining of left-sided hip pain she denies any 

fevers chills or sweats she is hard of hearing can only hear out of her left 

ear.  Workup at the time of the initial emergency department visit including 

CAT scans of the head neck and x-rays of the pelvis.





- Related Data


 Home Medications











 Medication  Instructions  Recorded  Confirmed


 


Allopurinol [Zyloprim] 300 mg PO DAILY 09/16/17 11/10/17


 


Dicyclomine HCl 10 mg PO Q6H 09/16/17 11/10/17


 


Fluticasone Nasal Spray [Flonase 1 spray EA NOSTRIL DAILY 09/16/17 11/10/17





Nasal Spray]   


 


Levothyroxine Sodium [Synthroid] 25 mcg PO DAILY 09/16/17 11/10/17


 


ALPRAZolam [Xanax] 0.25 mg PO BID PRN 11/10/17 11/10/17


 


Albuterol Nebulized [Ventolin 2.5 mg INHALATION RT-Q4H PRN 11/10/17 11/10/17





Nebulized]   


 


Atorvastatin [Lipitor] 10 mg PO HS 11/10/17 11/10/17


 


Buprenorphine [Butrans 10 MCG/HOUR] 1 patch TRANSDERM TU 11/10/17 11/10/17


 


Clotrimazole/Betamethasone Dip 1 applic TOPICAL BID 11/10/17 11/10/17





[Lotrisone Cream]   


 


Ergocalciferol (Vitamin D2) 50,000 unit PO Q90D 11/10/17 11/10/17





[Vitamin D2]   


 


Furosemide [Lasix] 40 mg PO BID 11/10/17 11/10/17


 


HYDROcodone/APAP 5-325MG [Norco 1 tab PO Q4HR PRN 11/10/17 11/10/17





5-325]   


 


Lactose-Reduced Food [Ensure Plus] 1 can PO BID 11/10/17 11/10/17


 


Loratadine [Claritin] 10 mg PO DAILY PRN 11/10/17 11/10/17


 


Metolazone [Zaroxolyn] 5 mg PO MOTH 11/10/17 11/10/17


 


Metoprolol Tartrate [Lopressor] 50 mg PO DAILY 11/10/17 11/10/17


 


Potassium Chloride [Klor-Con 20] 20 meq PO BID 11/10/17 11/10/17


 


Ranitidine HCl [Zantac] 150 mg PO BID 11/10/17 11/10/17


 


guaiFENesin [guaiFENesin Oral 200 mg PO Q4HR PRN 11/10/17 11/10/17





Solution]   


 


hydrALAZINE HCL [Apresoline] 50 mg PO TID 11/10/17 11/10/17


 


prednisoLONE ACETATE [Pred Forte 2 drop LEFT EYE DAILY 11/10/17 11/10/17





1%]   








 Previous Rx's











 Medication  Instructions  Recorded


 


Acetaminophen Tab [Tylenol] 650 mg PO Q6HR PRN  tab 09/20/17











 Allergies











Allergy/AdvReac Type Severity Reaction Status Date / Time


 


No Known Allergies Allergy   Verified 11/10/17 07:55














Review of Systems


ROS Statement: 


Those systems with pertinent positive or pertinent negative responses have been 

documented in the HPI.





ROS Other: All systems not noted in ROS Statement are negative.





Past Medical History


Past Medical History: Dementia, Myocardial Infarction (MI)


Additional Past Medical History / Comment(s): blind in the right eye, hard of 

hearing.


History of Any Multi-Drug Resistant Organisms: None Reported


Past Surgical History: No Surgical Hx Reported


Additional Past Surgical History / Comment(s): pt states hse has a lot of 

problems with her ears, nose and throat.


Past Anesthesia/Blood Transfusion Reactions: No Reported Reaction


Past Psychological History: No Psychological Hx Reported


Smoking Status: Never smoker


Past Alcohol Use History: None Reported


Past Drug Use History: None Reported





- Past Family History


  ** Father


Family Medical History: No Reported History





  ** Mother


Family Medical History: No Reported History, Osteoarthritis (OA), Rheumatoid 

Arthritis (RA)


Additional Family Medical History / Comment(s): kidney problems, specific type 

unknown





General Exam





- General Exam Comments


Initial Comments: 





This is a well-developed well-nourished awake alert female she can only hear 

out of her left ear.  She does demonstrate ecchymosis over the entire face 

especially inferior orbits she also demonstrates some drainage from the right 

eye which is somewhat.  In nature yellow in color.


Limitations: no limitations


General appearance: alert, in no apparent distress


Head exam: Present: normocephalic (Multiple bruises noted over the face as 

described the air in the very states of healing.)


ENT exam: Present: normal oropharynx, mucous membranes moist, other (The 

patient is blind in the right eye there is exudate mentioned above.)


Neck exam: Present: normal inspection.  Absent: tenderness, meningismus, 

lymphadenopathy


Respiratory exam: Present: normal lung sounds bilaterally.  Absent: respiratory 

distress, wheezes, rales, rhonchi, stridor


Cardiovascular Exam: Present: regular rate, normal rhythm, normal heart sounds.

  Absent: systolic murmur, diastolic murmur, rubs, gallop, clicks


GI/Abdominal exam: Present: soft, normal bowel sounds.  Absent: distended, 

tenderness, guarding, rebound, rigid


Extremities exam: Present: tenderness, normal capillary refill, other (

Tenderness over the left hip.  Is evidence of shortening and lateral rotation 

left lower extremity patient does have wraps on both lower extremities).  Absent

: full ROM


Neurological exam: Present: alert, oriented X3, other (Right eye blindness and 

inability here of the right ear otherwise unremarkable)


Psychiatric exam: Present: normal affect, normal mood


Skin exam: Present: warm, dry, other (Patient does demonstrate pressure sores 

to the coccyx and left buttock erythema to the coccyx region likely stage II 

the left buttock.)





Course


 Vital Signs











  11/10/17 11/10/17 11/10/17





  08:03 08:54 10:07


 


Temperature 98.0 F  


 


Pulse Rate 91 104 H 101 H


 


Respiratory 18 20 18





Rate   


 


Blood Pressure 168/77  166/69


 


O2 Sat by Pulse 94 L 95 98





Oximetry   














  11/10/17





  10:24


 


Temperature 


 


Pulse Rate 103 H


 


Respiratory 18





Rate 


 


Blood Pressure 170/70


 


O2 Sat by Pulse 98





Oximetry 














Medical Decision Making





- Medical Decision Making





I did discuss findings with the patient and with a family member was present.  

Patient will be admitted I did discuss the case with Lorrie Hayes who is 

covering for Dr. Cordova I also did discuss the case with Dora who is 

covering Dr. Izquierdo who normally sees Dr. Reno's patients.  Patient will be 

admitted with consultation for clearance for surgery.





- Lab Data


Result diagrams: 


 11/10/17 08:09





 11/10/17 08:09


 Lab Results











  11/10/17 11/10/17 11/10/17 Range/Units





  08:09 08:09 08:09 


 


WBC    7.3  (3.8-10.6)  k/uL


 


RBC    4.25  (3.80-5.40)  m/uL


 


Hgb    11.4  (11.4-16.0)  gm/dL


 


Hct    37.2  (34.0-46.0)  %


 


MCV    87.7  (80.0-100.0)  fL


 


MCH    26.8  (25.0-35.0)  pg


 


MCHC    30.6 L  (31.0-37.0)  g/dL


 


RDW    15.5  (11.5-15.5)  %


 


Plt Count    292  (150-450)  k/uL


 


Neutrophils %    78  %


 


Lymphocytes %    9  %


 


Monocytes %    7  %


 


Eosinophils %    3  %


 


Basophils %    0  %


 


Neutrophils #    5.7  (1.3-7.7)  k/uL


 


Lymphocytes #    0.7 L  (1.0-4.8)  k/uL


 


Monocytes #    0.5  (0-1.0)  k/uL


 


Eosinophils #    0.2  (0-0.7)  k/uL


 


Basophils #    0.0  (0-0.2)  k/uL


 


Hypochromasia    Moderate  


 


PT  9.8    (9.0-12.0)  sec


 


INR  1.0    (<1.2)  


 


APTT  20.8 L    (22.0-30.0)  sec


 


Sodium     (137-145)  mmol/L


 


Potassium     (3.5-5.1)  mmol/L


 


Chloride     ()  mmol/L


 


Carbon Dioxide     (22-30)  mmol/L


 


Anion Gap     mmol/L


 


BUN     (7-17)  mg/dL


 


Creatinine     (0.52-1.04)  mg/dL


 


Est GFR (MDRD) Af Amer     (>60 ml/min/1.73 sqM)  


 


Est GFR (MDRD) Non-Af     (>60 ml/min/1.73 sqM)  


 


Glucose     (74-99)  mg/dL


 


Calcium     (8.4-10.2)  mg/dL


 


Magnesium     (1.6-2.3)  mg/dL


 


Total Bilirubin     (0.2-1.3)  mg/dL


 


AST     (14-36)  U/L


 


ALT     (9-52)  U/L


 


Alkaline Phosphatase     ()  U/L


 


Total Creatine Kinase   22 L   ()  U/L


 


CK-MB (CK-2)   0.5   (0.0-2.4)  ng/mL


 


CK-MB (CK-2) Rel Index   2.3   


 


Total Protein     (6.3-8.2)  g/dL


 


Albumin     (3.5-5.0)  g/dL














  11/10/17 Range/Units





  08:09 


 


WBC   (3.8-10.6)  k/uL


 


RBC   (3.80-5.40)  m/uL


 


Hgb   (11.4-16.0)  gm/dL


 


Hct   (34.0-46.0)  %


 


MCV   (80.0-100.0)  fL


 


MCH   (25.0-35.0)  pg


 


MCHC   (31.0-37.0)  g/dL


 


RDW   (11.5-15.5)  %


 


Plt Count   (150-450)  k/uL


 


Neutrophils %   %


 


Lymphocytes %   %


 


Monocytes %   %


 


Eosinophils %   %


 


Basophils %   %


 


Neutrophils #   (1.3-7.7)  k/uL


 


Lymphocytes #   (1.0-4.8)  k/uL


 


Monocytes #   (0-1.0)  k/uL


 


Eosinophils #   (0-0.7)  k/uL


 


Basophils #   (0-0.2)  k/uL


 


Hypochromasia   


 


PT   (9.0-12.0)  sec


 


INR   (<1.2)  


 


APTT   (22.0-30.0)  sec


 


Sodium  135 L  (137-145)  mmol/L


 


Potassium  3.7  (3.5-5.1)  mmol/L


 


Chloride  89 L  ()  mmol/L


 


Carbon Dioxide  35 H  (22-30)  mmol/L


 


Anion Gap  11  mmol/L


 


BUN  60 H  (7-17)  mg/dL


 


Creatinine  2.12 H  (0.52-1.04)  mg/dL


 


Est GFR (MDRD) Af Amer  27  (>60 ml/min/1.73 sqM)  


 


Est GFR (MDRD) Non-Af  22  (>60 ml/min/1.73 sqM)  


 


Glucose  97  (74-99)  mg/dL


 


Calcium  11.0 H  (8.4-10.2)  mg/dL


 


Magnesium  1.4 L  (1.6-2.3)  mg/dL


 


Total Bilirubin  0.6  (0.2-1.3)  mg/dL


 


AST  26  (14-36)  U/L


 


ALT  33  (9-52)  U/L


 


Alkaline Phosphatase  107  ()  U/L


 


Total Creatine Kinase   ()  U/L


 


CK-MB (CK-2)   (0.0-2.4)  ng/mL


 


CK-MB (CK-2) Rel Index   


 


Total Protein  6.5  (6.3-8.2)  g/dL


 


Albumin  3.3 L  (3.5-5.0)  g/dL














- Radiology Data


Radiology results: report reviewed (I did review the imaging and report and 

discussed this with radiologist patient does demonstrate a left hip fracture.), 

image reviewed





Disposition


Clinical Impression: 


 Fracture of hip, Renal insufficiency syndrome, Hypomagnesemia





Disposition: ADMITTED AS IP TO [facility] [place]


Condition: Stable


Referrals: 


Antwan Edmondson DO [Primary Care Provider] - 1-2 days

## 2017-11-11 LAB
BASOPHILS # BLD AUTO: 0 K/UL (ref 0–0.2)
BASOPHILS NFR BLD AUTO: 0 %
CH: 26.9
CHCM: 30.8
EOSINOPHIL # BLD AUTO: 0.2 K/UL (ref 0–0.7)
EOSINOPHIL NFR BLD AUTO: 2 %
ERYTHROCYTE [DISTWIDTH] IN BLOOD BY AUTOMATED COUNT: 4.45 M/UL (ref 3.8–5.4)
ERYTHROCYTE [DISTWIDTH] IN BLOOD: 15.4 % (ref 11.5–15.5)
HCT VFR BLD AUTO: 39 % (ref 34–46)
HDW: 2.65
HGB BLD-MCNC: 11.7 GM/DL (ref 11.4–16)
LUC NFR BLD AUTO: 2 %
LYMPHOCYTES # SPEC AUTO: 0.7 K/UL (ref 1–4.8)
LYMPHOCYTES NFR SPEC AUTO: 5 %
MCH RBC QN AUTO: 26.3 PG (ref 25–35)
MCHC RBC AUTO-ENTMCNC: 30.1 G/DL (ref 31–37)
MCV RBC AUTO: 87.6 FL (ref 80–100)
MONOCYTES # BLD AUTO: 1.1 K/UL (ref 0–1)
MONOCYTES NFR BLD AUTO: 9 %
NEUTROPHILS # BLD AUTO: 9.9 K/UL (ref 1.3–7.7)
NEUTROPHILS NFR BLD AUTO: 82 %
WBC # BLD AUTO: 0.18 10*3/UL
WBC # BLD AUTO: 12.1 K/UL (ref 3.8–10.6)
WBC (PEROX): 12.41

## 2017-11-11 RX ADMIN — HYDROMORPHONE HYDROCHLORIDE PRN MG: 1 INJECTION, SOLUTION INTRAMUSCULAR; INTRAVENOUS; SUBCUTANEOUS at 10:30

## 2017-11-11 RX ADMIN — TRANEXAMIC ACID SCH: 100 INJECTION, SOLUTION INTRAVENOUS at 08:48

## 2017-11-11 RX ADMIN — HYDROMORPHONE HYDROCHLORIDE PRN MG: 1 INJECTION, SOLUTION INTRAMUSCULAR; INTRAVENOUS; SUBCUTANEOUS at 19:31

## 2017-11-11 RX ADMIN — CEFTRIAXONE SODIUM SCH MG: 1 INJECTION, POWDER, FOR SOLUTION INTRAMUSCULAR; INTRAVENOUS at 20:53

## 2017-11-11 RX ADMIN — ATORVASTATIN CALCIUM SCH MG: 10 TABLET, FILM COATED ORAL at 20:22

## 2017-11-11 RX ADMIN — CEFAZOLIN SCH GM: 10 INJECTION, POWDER, FOR SOLUTION INTRAVENOUS at 16:15

## 2017-11-11 RX ADMIN — HYDROMORPHONE HYDROCHLORIDE PRN MG: 1 INJECTION, SOLUTION INTRAMUSCULAR; INTRAVENOUS; SUBCUTANEOUS at 22:40

## 2017-11-11 RX ADMIN — DOCUSATE SODIUM AND SENNOSIDES SCH EACH: 50; 8.6 TABLET ORAL at 20:23

## 2017-11-11 RX ADMIN — HYDROCODONE BITARTRATE AND ACETAMINOPHEN PRN EACH: 5; 325 TABLET ORAL at 18:23

## 2017-11-11 RX ADMIN — FLUTICASONE PROPIONATE SCH SPRAY: 50 SPRAY, METERED NASAL at 09:56

## 2017-11-11 RX ADMIN — HYDROMORPHONE HYDROCHLORIDE PRN MG: 1 INJECTION, SOLUTION INTRAMUSCULAR; INTRAVENOUS; SUBCUTANEOUS at 02:18

## 2017-11-11 RX ADMIN — POTASSIUM CHLORIDE SCH MLS/HR: 14.9 INJECTION, SOLUTION INTRAVENOUS at 18:18

## 2017-11-11 RX ADMIN — LEVOTHYROXINE SODIUM SCH: 25 TABLET ORAL at 05:37

## 2017-11-11 RX ADMIN — CEFAZOLIN SCH GM: 10 INJECTION, POWDER, FOR SOLUTION INTRAVENOUS at 23:07

## 2017-11-11 RX ADMIN — METOPROLOL TARTRATE SCH MG: 50 TABLET, FILM COATED ORAL at 20:22

## 2017-11-11 RX ADMIN — POTASSIUM CHLORIDE SCH: 14.9 INJECTION, SOLUTION INTRAVENOUS at 22:21

## 2017-11-11 NOTE — CONS
CONSULTATION



This is an 82-year-old lady with a history of atrial fibrillation and uncontrolled

ventricular rate, dementia, COPD, who is also hard of hearing.  She was here in

September, seen by Dr. Fontaine.  At that time, she was found to be in atrial flutter

fibrillation, rate control was advised but because she was a fall risk,  she was not

anticoagulated.  They were trying to find a placement for her, and again in October,

she had a episode of fall with her oxygen tube and she got tangled with it and she fell

down on her face and she hurt herself and also fell on her left side as well.  At that

time, she had a femoral neck fracture, which was discovered somewhat late and she was

sent to the ER yesterday.



She has a normal systolic function by echo in September.  We do not know of any details

about her a stress test in the recent or remote past.  Patient is quite sedentary, but

with her limited activity.  She does not have any symptoms of chest pain.  This patient

is very hard of hearing and history was obtained through her daughter with written

messages.  She is comfortable, hemodynamically stable.  Denies any chest discomfort at

the time of my evaluation.  She remains in atrial fibrillation.  The rate is about 120

per minute.



PAST MEDICAL HISTORY:

1. History of atrial fibrillation, rapid ventricular rate, without anticoagulation

    because of fall risk.

2. Chronic kidney disease.

3. Dementia.

4. COPD.

5. History of gait dysfunction.

6. Patient is hard of hearing.



MEDICATIONS AT HOME:

Include prednisone drops, hydralazine, potassium supplements, Lopressor, Zaroxolyn,

Lasix, Ensure, Flonase, Lipitor, Tylenol.



ALLERGIES:

None.



PHYSICAL EXAMINATION:

On examination, blood pressure is 140/80, pulse rate is about 120, irregular.

HEENT reveals multiple bruises on the face.  There is extensive bruising noted and

there is also some tenderness.

NECK:  Supple.  I cannot appreciate JVD.  I cannot assess JVD.  There is no carotid

bruit. Heart exam reveals S1, S2 with tachycardia.  No significant murmurs. Lungs

revealed decent air entry.  Abdomen is soft, nontender.  Lower extremities reveal

palpable pulses.  No significant edema.  Detailed exam was not performed.



EKG revealed atrial fib, moderately rapid ventricular rate, nonspecific ST-T changes.



IMPRESSION:

1. History of fall and fracture about 2 weeks ago of the left femoral neck, going for

    surgery today.

2. Extensive bruising on the face from a recent fall.

3. Atrial fib with rapid rate, not a candidate for anticoagulation.

4. Patient is hard of hearing.

5. Hypertension.

6. History of chronic hypoxemia.

7. Chronic kidney disease.



RECOMMENDATION:

I am recommending that we a continue the beta blocker, Lopressor 50 mg b.i.d.  I also

advised that we should use intravenous Cardizem if necessary preoperatively.  Her LV

function was normal in September.  The overall risk is high given her multiple comorbid

conditions.  Advised cautious fluid administration and optimal blood pressure control

perioperatively and rate control with IV Cardizem.  Prognosis remains guarded.  Risk is

high and patient and daughter are fully aware of this.



Thank you very much for the consult.





RUSTY / OLIMPIAN: [id number] / Job#: [id number]

## 2017-11-11 NOTE — P.PN
Progress Note - Text


Progress Note Date: 11/11/17





DATE OF SERVICE: 


11/11/2017





PRESENTING COMPLAINT:


Hip fracture





HISTORY OF PRESENT ILLNESS:


82-year-old female who fell about 2 weeks ago complaining of pain in her left 

hip discovered she had a left femoral neck fracture and is now status post left 

unipolar hemiarthroplasty.





INTERVAL HISTORY:


11/11/2017:


Patient was seen in the PACU holding area, appears comfortable lying flat not 

very communicative as she doesn't have her hearing aids in.  Vital signs are 

stable, 





REVIEW OF SYSTEMS:


Unable to assess due to patient's current condition





CURRENT MEDICATIONS


Norco, Lipitor,, hydralazine 50 mg by mouth 3 times a day, Dilaudid, Synthroid 

25 g by mouth daily, milk of magnesia, Lopressor 50 by mouth twice a day, 

ondansetron 4 mg IV push every 8 hours, Senokot S2 tablets by mouth at bedtime.





PHYSICAL EXAM


VITAL SIGNS: 


Temperature 98.8, pulse 88, respiratory rate 14, blood pressure 124/66, oxygen 

saturation 96% on 3 L area





GENERAL APPEARANCE:  Lying in bed, not in distress. 


EYES: Pupils equal. Conjunctiva normal. 


NECK: JVD not raised. Mass not palpable. 


RESPIRATORY: Respiratory effort normal. Lungs diminished to auscultation. 


CARDIOVASCULAR: First and second sounds normal. No edema. 


ABDOMEN: Soft. Liver and spleen not palpable. No tenderness. No mass palpable. 


PSYCHIATRY: Unable to assess due to patient's current condition


INTEGUMENT: Multiple ecchymoses noted to the patient's face left eye, right eye 

forehead severely ecchymotic.  Left hip incision covered with a dry surgical 

dressing no drainage noted.





INVESTIGATIONS:


White blood cell count 12.1,





ASSESSMENT:


-Acute femoral neck fracture from a fall about 10 days ago.


-Excessive bruising on the left side of the face from fall recently.


-Atrial flutter fibrillation chronic persistent not a candidate for 

anticoagulation.  Patient bruises very easily.


-Alzheimer's dementia with psychoses.


-Chronic obstructive pulmonary disease.


-Hyperlipidemia.


-Essential hypertension.


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 

aids.


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


-Chronic hypoxic respiratory failure on 3 L of oxygen.


-Hypothyroidism.


-Acute urinary tract infection


-Chronic hypoxic respiratory failure probably from underlying chronic 

obstructive pulmonary disease.


-Patient CODE STATUS DO NOT RESUSCITATE as discussed with the daughter.





PLAN:


Continue IV ceftriaxone for patient's UTI.  Continue current medication and 

treatment plan,  Continue supportive care in the postoperative period.  We will 

continue to monitor closely.





NP statement: Patient was seen and examined by nurse practitioner Dora Hairston and all elements of the case discussed with attending  Dr. Izquierdo

## 2017-11-11 NOTE — P.OP
Date of Procedure: 11/11/17


Procedure(s) Performed: 


PREOPERATIVE DIAGNOSIS: Left hip femoral neck fracture





POSTOPERATIVE DIAGNOSIS: Left hip femoral neck fracture





OPERATION: Left hip cemented unipolar hemiarthroplasty, metallic femoral head.


 


ANESTHESIA: Spinal





ESTIMATED BLOOD LOSS: 150 ml.





ASSISTANT:   none   





COMPLICATIONS: None apparent. 





COMPONENTS IMPLANTED: Henri LDFx cemented femoral stem; unipolar femoral head; 

neck extension augments as needed.





INDICATIONS: Mrs. Scanlon is an 82 year old female with a history of dementia 

and kidney disease with a history of falling and sustaining a femoral neck 

fracture. This fracture may have actually started in September but was not 

detected until recently.  I have recommended surgical treatment with a cemented 

unipolar hemiarthroplasty.  I have discussed this procedure in detail and 

explained the potential risks and complications as being inclusive of, but not 

limited to: Bleeding, infection, scarring, discomfort, blood vessel and/or 

nerve damage, limb length inequality, gait disturbance, blood clot, pulmonary 

embolism, death, and other risks.  The consent form has been signed.





PROCEDURE: After appropriate consent was obtained, the patient was taken to the 

operating room and placed in supine position. Spinal anesthetic was 

administered and after confirmation of adequate anesthesia, the patient was 

placed into the lateral decubitus position with the affected side up. Care was 

taken to make sure that all pressure points were adequately padded and a 

Maury hip positioner was utilized for positioning. The hip was prepped and 

draped in the usual aseptic fashion using a combination of Chloraprep and 

alcohol. Ioban drape was used for the case and the patient received intravenous 

antibiotics prior to the incision. 





The incision was created directly over the greater trochanter and carried 

slightly posteriorly for a posterior approach to the hip. The incision was then 

deepened down to subcutaneous tissue and fascia emily. Fascia emily was split in 

line with the incision and split proximally along the fibers of the gluteus 

kailash. The underlying fibers of the muscle were teased apart using finger 

dissection and bleeding vessels were picked up and coagulated. Retractor was 

then placed posteriorly consisting of a blunt Leana. The short external 

rotators and capsule were exposed and good visualization of the attachment of 

the external rotators to the femur was established. The short external rotators 

and capsule were released using electrocautery from their femoral attachments. 

A hockey stick shaped incision was created in the capsule. Joint fluid and 

hemarthrosis was evacuated and the patient's hip was internally rotated to 

expose the fracture site. 





The femoral neck cut was created approximately 1 cm superior to the lesser 

trochanter using a reciprocating saw. The femoral head and neck fragment was 

removed and visualization and palpation of the acetabular vault showed intact 

hyaline cartilage with no bone exposure or significant degeneration.





Attention was then directed back to the proximal femur. Retractors were placed 

around the proximal femur and box osteotome was used followed by canal finder 

and trochanteric reamer. Cylindrical reaming was performed. Progressive 

broaching was then performed starting with a #10 broach and progressing final 

size, in a position of 10-15 degrees anteversion. Native anteversion was within 

5 degrees of stem position. The final size broach had excellent fit and fill of 

the patient's metaphysis and diaphysis.  Calcar planing was performed. Trial 

reduction was then performed starting with appropriately sized femoral head and 

various neck extensions to evaluate stability, limb length equality, and soft 

tissue tension.  Once these parameters were satisfactory, the corresponding 

final components were then called for.





Trial components were removed. The femoral canal was sized for the centralizer 

and cement plug.  Once the cement plug had been inserted distal to the planned 

length of the femoral component, the canal was pulse lavaged and brushed to 

remove any unstable bone. It was then dried with a lap sponge. Cement was mixed 

under vacuum conditions to decrease porosity and inserted into a cement gun.   

Distal centralizer was placed onto the femoral component with a bit of cement. 





The cement was allowed to reach a slightly doughy consistency and then the 

canal was filled retrograde with the cement gun.  Thumb pressurization was 

performed three times. The femoral component was then inserted with the 

previously determined  degree of anteversion.  Excess cement was removed before 

it hardened completely.  The femoral head was then impacted onto the Cabrera 

taper.  Blood and debris were removed from the acetabular socket and the hip 

was then reduced and checked for stability, limb length and soft tissue 

tension. These parameters were found to be satisfactory; the wound was then 

thoroughly irrigated with normal saline. Final hemostasis was obtained using 

electrocautery and IV tranexamic acid, 1 g given at the time of prepping and 

draping, and another 1 g given at the time of closure.





Closure of the capsule was performed meticulously using #3 Vicryl suture. Four 

figure-of-eight sutures were placed in the posterior capsule along with repair 

of the external rotators. The fascia emily was then repaired using combination 

of #3 Vicryl suture in interrupted fashion and Quill and running fashion. 2-0 

Vicryl suture was used for the subcutaneous tissues and 3-0 strata fix for the 

skin. Dermabond tape was then applied. 





The patient tolerated the procedure well. There were no complications and the 

wound bed was dry and there was no need for drain placement. Sterile dressing 

was then applied and the patient was carefully removed from the operating room 

table, placed on the stretcher and was taken to the recovery room in stable 

condition. Sponge and needle counts were correct.

## 2017-11-11 NOTE — PN
PROGRESS NOTE



DATE OF SERVICE:

11/11/17



ATTENDING NOTE:

The patient was seen and examined by me.  I discussed with nurse practitioner Ms. Hairston.  The patient is status post left hip hemiarthroplasty.  Tired.  Lying in bed.



EXAM:

Lungs decreased breath sounds.  Cardiovascular heart sounds irregular.

ABDOMEN:  Soft.



White count 12.1.  Urine culture growing gram-negative bacilli.



ASSESSMENT:

1. Status post left hip surgery.

2. _____.



PLAN:

Continue current medication and treatment plan including antibiotics.





MMODL / IJN: [id number] / Job#: [id number]

## 2017-11-12 LAB
INR PPP: 1.4 (ref ?–1.2)
PT BLD: 13.9 SEC (ref 9–12)

## 2017-11-12 RX ADMIN — ATORVASTATIN CALCIUM SCH MG: 10 TABLET, FILM COATED ORAL at 21:16

## 2017-11-12 RX ADMIN — HYDROCODONE BITARTRATE AND ACETAMINOPHEN PRN EACH: 5; 325 TABLET ORAL at 11:29

## 2017-11-12 RX ADMIN — METOPROLOL TARTRATE SCH MG: 50 TABLET, FILM COATED ORAL at 08:51

## 2017-11-12 RX ADMIN — CEFAZOLIN SCH MLS/HR: 330 INJECTION, POWDER, FOR SOLUTION INTRAMUSCULAR; INTRAVENOUS at 17:25

## 2017-11-12 RX ADMIN — HYDROCODONE BITARTRATE AND ACETAMINOPHEN PRN EACH: 5; 325 TABLET ORAL at 17:25

## 2017-11-12 RX ADMIN — DOCUSATE SODIUM AND SENNOSIDES SCH EACH: 50; 8.6 TABLET ORAL at 21:17

## 2017-11-12 RX ADMIN — METOPROLOL TARTRATE SCH MG: 50 TABLET, FILM COATED ORAL at 21:16

## 2017-11-12 RX ADMIN — CEFAZOLIN SCH MLS/HR: 330 INJECTION, POWDER, FOR SOLUTION INTRAMUSCULAR; INTRAVENOUS at 22:56

## 2017-11-12 RX ADMIN — CEFTRIAXONE SODIUM SCH MG: 1 INJECTION, POWDER, FOR SOLUTION INTRAMUSCULAR; INTRAVENOUS at 21:16

## 2017-11-12 RX ADMIN — HYDROCODONE BITARTRATE AND ACETAMINOPHEN PRN EACH: 5; 325 TABLET ORAL at 04:17

## 2017-11-12 RX ADMIN — POTASSIUM CHLORIDE SCH: 14.9 INJECTION, SOLUTION INTRAVENOUS at 10:35

## 2017-11-12 RX ADMIN — FLUTICASONE PROPIONATE SCH SPRAY: 50 SPRAY, METERED NASAL at 08:50

## 2017-11-12 RX ADMIN — LEVOTHYROXINE SODIUM SCH MCG: 25 TABLET ORAL at 04:27

## 2017-11-12 NOTE — P.PN
Progress Note - Text


Progress Note Date: 11/12/17


DATE OF SERVICE: 


11/12/2017





PRESENTING COMPLAINT:


Hip fracture





HISTORY OF PRESENT ILLNESS:


82-year-old female who fell about 2 weeks ago complaining of pain in her left 

hip discovered she had a left femoral neck fracture and is now status post left 

hip hemiarthroplasty.





INTERVAL HISTORY:


11/12/2017:


Patient seen in follow-up, has some difficulty communicating and she's 

continues to not be able to hear hearing aid is missing.  Left leg hurts her, 

pain medications do seem to work but causing nausea.  Is tolerating a little 

bit of her diet, has not been out of bed yet.  Family at the bedside.





11/11/2017:


Patient was seen in the PACU holding area, appears comfortable lying flat not 

very communicative as she doesn't have her hearing aids in.  Vital signs are 

stable,


Vomited once, has some pain at the operative site. 





REVIEW OF SYSTEMS:


Unable to assess patient is very hard of hearing and hearing answer not 

currently in 





CURRENT MEDICATIONS


Norco, Lipitor,, hydralazine 50 mg by mouth 3 times a day, Dilaudid, Synthroid 

25 g by mouth daily, milk of magnesia, Lopressor 50 by mouth twice a day, 

ondansetron 4 mg IV push every 8 hours, Senokot S2 tablets by mouth at bedtime.





PHYSICAL EXAM


VITAL SIGNS: 


Temperature 98.1, pulse 97, respirations 16, blood pressure 114/63, oxygen 

saturation 92% on 2 L.





GENERAL APPEARANCE:  Lying in bed, not in distress. 


EYES: Pupils equal. Conjunctiva normal. 


NECK: JVD not raised. Mass not palpable. 


RESPIRATORY: Respiratory effort normal. Lungs diminished to auscultation. 


CARDIOVASCULAR: First and second sounds normal. No edema. 


ABDOMEN: Soft. Liver and spleen not palpable. No tenderness. No mass palpable. 


PSYCHIATRY: Unable to assess due to patient's current condition


INTEGUMENT: Multiple ecchymoses noted to the patient's face left eye, right eye 

forehead severely ecchymotic.  Left hip incision covered with a dry surgical 

dressing no drainage noted.





INVESTIGATIONS:


INR 1.4





ASSESSMENT:


-Acute femoral neck fracture from a fall about 10 days ago.


-Acute urinary tract infection from E. coli


-Excessive bruising on the left side of the face from fall recently.


-Atrial flutter fibrillation chronic persistent not a candidate for 

anticoagulation.  Patient bruises very easily.


-Alzheimer's dementia with psychoses.


-Chronic obstructive pulmonary disease.


-Hyperlipidemia.


-Essential hypertension.


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 

aids.


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


-Chronic hypoxic respiratory failure on 3 L of oxygen.


-Hypothyroidism.


-Acute urinary tract infection


-Chronic hypoxic respiratory failure probably from underlying chronic 

obstructive pulmonary disease.


-Patient CODE STATUS DO NOT RESUSCITATE as discussed with the daughter.





PLAN:


Continue IV ceftriaxone for patient's UTI.  Continue current medication and 

treatment plan,  Continue supportive care in the postoperative period.  We will 

continue to monitor closely.





NP statement: Patient was seen and examined by nurse practitioner Dora Hairston and all elements of the case discussed with attending  Dr. Izquierdo

## 2017-11-12 NOTE — PN
PROGRESS NOTE



Mrs. Scanlon has her hip surgery uneventfully.  She is resting comfortably without

symptoms.  Blood pressure today is 118/70, pulse rate is about 90, irregular.  No JVD.

S1-S2 heard normally.  Short systolic murmur noted.  Regular rate and rhythm noted.

Lungs reveal improved air entry.  Abdomen is soft.  Lower extremity exam was deferred.



Plan is decrease IV fluids to 75 mL/hour.  Continue all other medications including

beta blockers.  I will continue to see her during her hospitalization as needed.





MMODL / IJN: [id number] / Job#: [id number]

## 2017-11-12 NOTE — PN
PROGRESS NOTE



DATE OF SERVICE:

11/12/17





The patient is seen and examined by me.



I discussed with my nurse practitioner, Ms. Hairston.  The patient is status post left

hip hemiarthroplasty, awake, talking.  The patient did throw up once.  Some pain at the

operative site noted at the bedside.



PHYSICAL EXAMINATION:

Temperature 98.1, pulse 92, respiration 17, blood pressure 95/56.  Lying in bed, awake.

LUNGS:  Decreased breath sounds.  Cardiovascular first and second sounds irregular.

Facial bruising present on admission.



INVESTIGATIONS:

INR 1.4.  Urine culture, gram-negative bacilli.



ASSESSMENT:

1. Status post left hip surgery.

2. Acute urinary tract infection from E coli.

Care was discussed with the daughter at the bedside.  The patient is already on IV

ceftriaxone.  Follow.





MMODL / IJN: [id number] / Job#: [id number]

## 2017-11-12 NOTE — P.PN
Subjective


Progress Note Date: 11/12/17


Principal diagnosis: 





Femoral neck fracture left hip.


Status post hemiarthroplasty left hip.





This is an 82-year-old female who is status post hemiarthroplasty of the left 

hip for femoral neck fracture.  She is doing well from an orthopedic 

standpoint.  She has no new complaints or concerns today.  Vital signs are 

stable.





Objective





- Vital Signs


Vital signs: 


 Vital Signs











Temp  98.1 F   11/12/17 08:47


 


Pulse  97   11/12/17 08:47


 


Resp  16   11/12/17 08:47


 


BP  114/63   11/12/17 08:47


 


Pulse Ox  92 L  11/12/17 08:47








 Intake & Output











 11/11/17 11/12/17 11/12/17





 18:59 06:59 18:59


 


Intake Total 551 1600 720


 


Output Total 675 325 


 


Balance -124 1275 720


 


Weight 75.296 kg  


 


Intake:   


 


    


 


  Intake, IV Titration  1600 





  Amount   


 


    Lactated Ringers 1,000 ml  1600 





    @ 100 mls/hr IV .Q10H   





    PATRICK Rx#:[id number]   


 


  Oral   720


 


Output:   


 


  Urine 525 325 


 


  Estimated Blood Loss 150  


 


Other:   


 


  Voiding Method Indwelling Catheter Indwelling Catheter Indwelling Catheter














- Exam





This is a pleasant 82-year-old female in no acute distress.  She is alert and 

oriented at this time.  She is significantly hearing-impaired with no hearing 

in place.  Her daughter is present at bedside.


Exam of the left hip reveals that the dressing is clean, dry and intact.  She 

has full foot and ankle motion without difficulty or pain.  Neurovascular 

status to the lower extremity is intact.





- Labs


CBC & Chem 7: 


 11/11/17 16:02





 11/10/17 08:09


Labs: 


 Abnormal Lab Results - Last 24 Hours (Table)











  11/11/17 11/12/17 Range/Units





  16:02 06:55 


 


WBC  12.1 H   (3.8-10.6)  k/uL


 


MCHC  30.1 L   (31.0-37.0)  g/dL


 


Neutrophils #  9.9 H   (1.3-7.7)  k/uL


 


Lymphocytes #  0.7 L   (1.0-4.8)  k/uL


 


Monocytes #  1.1 H   (0-1.0)  k/uL


 


PT   13.9 H  (9.0-12.0)  sec


 


INR   1.4 H  (<1.2)  








 Microbiology - Last 24 Hours (Table)











 11/10/17 11:30 Urine Culture - Preliminary





 Urine,Catheterized    Gram Neg Bacilli














Assessment and Plan


(1) Fracture of femoral neck, left


Current Visit: Yes   Status: Acute   Code(s): S72.002A - FRACTURE OF UNSP PART 

OF NECK OF LEFT FEMUR, INIT   SNOMED Code(s): 8789069


   





(2) Fracture of hip


Current Visit: Yes   Status: Acute   Code(s): S72.009A - FRACTURE OF UNSP PART 

OF NECK OF UNSP FEMUR, INIT   SNOMED Code(s): 221951881


   


Plan: 





The clinical and x-ray findings are discussed with the patient and her 

daughter.  We will continue physical therapy.  We're planning discharge to 

inpatient rehab Monday versus Tuesday if cleared medically.

## 2017-11-13 LAB
ANION GAP SERPL CALC-SCNC: 6 MMOL/L
BASOPHILS # BLD AUTO: 0 K/UL (ref 0–0.2)
BASOPHILS NFR BLD AUTO: 0 %
BUN SERPL-SCNC: 41 MG/DL (ref 7–17)
CALCIUM SPEC-MCNC: 10.2 MG/DL (ref 8.4–10.2)
CH: 26.8
CHCM: 30
CHLORIDE SERPL-SCNC: 94 MMOL/L (ref 98–107)
CO2 SERPL-SCNC: 33 MMOL/L (ref 22–30)
EOSINOPHIL # BLD AUTO: 0.1 K/UL (ref 0–0.7)
EOSINOPHIL NFR BLD AUTO: 1 %
ERYTHROCYTE [DISTWIDTH] IN BLOOD BY AUTOMATED COUNT: 3.56 M/UL (ref 3.8–5.4)
ERYTHROCYTE [DISTWIDTH] IN BLOOD: 15.4 % (ref 11.5–15.5)
GLUCOSE SERPL-MCNC: 96 MG/DL (ref 74–99)
HCT VFR BLD AUTO: 31.9 % (ref 34–46)
HDW: 2.52
HGB BLD-MCNC: 9.3 GM/DL (ref 11.4–16)
LUC NFR BLD AUTO: 2 %
LYMPHOCYTES # SPEC AUTO: 0.5 K/UL (ref 1–4.8)
LYMPHOCYTES NFR SPEC AUTO: 7 %
MCH RBC QN AUTO: 26.3 PG (ref 25–35)
MCHC RBC AUTO-ENTMCNC: 29.3 G/DL (ref 31–37)
MCV RBC AUTO: 89.6 FL (ref 80–100)
MONOCYTES # BLD AUTO: 0.4 K/UL (ref 0–1)
MONOCYTES NFR BLD AUTO: 5 %
NEUTROPHILS # BLD AUTO: 6 K/UL (ref 1.3–7.7)
NEUTROPHILS NFR BLD AUTO: 84 %
NON-AFRICAN AMERICAN GFR(MDRD): 31
POTASSIUM SERPL-SCNC: 3.1 MMOL/L (ref 3.5–5.1)
SODIUM SERPL-SCNC: 133 MMOL/L (ref 137–145)
WBC # BLD AUTO: 0.15 10*3/UL
WBC # BLD AUTO: 7.2 K/UL (ref 3.8–10.6)
WBC (PEROX): 7.14

## 2017-11-13 RX ADMIN — METOPROLOL TARTRATE SCH MG: 50 TABLET, FILM COATED ORAL at 09:21

## 2017-11-13 RX ADMIN — FLUTICASONE PROPIONATE SCH SPRAY: 50 SPRAY, METERED NASAL at 09:22

## 2017-11-13 RX ADMIN — CEFAZOLIN SCH MLS/HR: 330 INJECTION, POWDER, FOR SOLUTION INTRAMUSCULAR; INTRAVENOUS at 15:50

## 2017-11-13 RX ADMIN — METOPROLOL TARTRATE SCH MG: 50 TABLET, FILM COATED ORAL at 22:11

## 2017-11-13 RX ADMIN — CEFTRIAXONE SODIUM SCH MG: 1 INJECTION, POWDER, FOR SOLUTION INTRAMUSCULAR; INTRAVENOUS at 21:57

## 2017-11-13 RX ADMIN — POTASSIUM CHLORIDE SCH MEQ: 20 TABLET, EXTENDED RELEASE ORAL at 13:19

## 2017-11-13 RX ADMIN — DOCUSATE SODIUM AND SENNOSIDES SCH EACH: 50; 8.6 TABLET ORAL at 22:04

## 2017-11-13 RX ADMIN — HYDROCODONE BITARTRATE AND ACETAMINOPHEN PRN EACH: 5; 325 TABLET ORAL at 17:51

## 2017-11-13 RX ADMIN — LEVOTHYROXINE SODIUM SCH MCG: 25 TABLET ORAL at 05:31

## 2017-11-13 RX ADMIN — POTASSIUM CHLORIDE SCH MEQ: 20 TABLET, EXTENDED RELEASE ORAL at 11:35

## 2017-11-13 RX ADMIN — ATORVASTATIN CALCIUM SCH MG: 10 TABLET, FILM COATED ORAL at 22:05

## 2017-11-13 NOTE — P.PN
Progress Note - Text


Progress Note Date: 11/13/17


DATE OF SERVICE: 


11/13/2017 





PRESENTING COMPLAINT:


Hip fracture





HISTORY OF PRESENT ILLNESS:


82-year-old female who fell about 2 weeks ago complaining of pain in her left 

hip discovered she had a left femoral neck fracture and is now status post left 

hip hemiarthroplasty.





INTERVAL HISTORY:


11/13/2017:


Patient seen in follow-up continues to have difficulty Indicating due to her 

missing hearing aid.  Left leg continues to pain her pain medication does help.

  No further nausea.  Tolerating about 20-30% of her diet work with physical 

therapy today.  Family at the bedside.  Tentative plan for discharge today.





11/12/2017:


Patient seen in follow-up, has some difficulty communicating and she's 

continues to not be able to hear hearing aid is missing.  Left leg hurts her, 

pain medications do seem to work but causing nausea.  Is tolerating a little 

bit of her diet, has not been out of bed yet.  Family at the bedside.





11/11/2017:


Patient was seen in the PACU holding area, appears comfortable lying flat not 

very communicative as she doesn't have her hearing aids in.  Vital signs are 

stable,


Vomited once, has some pain at the operative site. 





REVIEW OF SYSTEMS:


Unable to assess patient is very hard of hearing and hearing answer not 

currently in 





CURRENT MEDICATIONS


Norco, Lipitor,, hydralazine 50 mg by mouth 3 times a day, Dilaudid, Synthroid 

25 g by mouth daily, milk of magnesia, Lopressor 50 by mouth twice a day, 

ondansetron 4 mg IV push every 8 hours, Senokot S2 tablets by mouth at bedtime.





PHYSICAL EXAM


VITAL SIGNS: 


Temperature 98.3, pulse 94, respiratory rate 16, blood pressure 117/64, oxygen 

saturation 97% on 3 L.





GENERAL APPEARANCE:  Lying in bed, not in distress. 


EYES: Pupils equal. Conjunctiva normal. 


NECK: JVD not raised. Mass not palpable. 


RESPIRATORY: Respiratory effort normal. Lungs diminished to auscultation. 


CARDIOVASCULAR: First and second sounds normal. No edema. 


ABDOMEN: Soft. Liver and spleen not palpable. No tenderness. No mass palpable. 


PSYCHIATRY: Awake and alert, can answer some questions that are written down.


INTEGUMENT: Multiple ecchymoses noted to the patient's face left eye, right eye 

forehead severely ecchymotic.  Left hip incision covered with a dry surgical 

dressing no drainage noted.





INVESTIGATIONS:


Hemoglobin 9.3, sodium 133, potassium 3.1, BUN 41, creatinine 1.60,





ASSESSMENT:


-Acute femoral neck fracture from a fall about 10 days ago.


-Acute urinary tract infection from E. coli


-Excessive bruising on the left side of the face from fall recently.


-Atrial flutter fibrillation chronic persistent not a candidate for 

anticoagulation.  Patient bruises very easily.


-Alzheimer's dementia with psychoses.


-Chronic obstructive pulmonary disease.


-Hyperlipidemia.


-Essential hypertension.


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 

aids.


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


-Chronic hypoxic respiratory failure on 3 L of oxygen.


-Hypothyroidism.


-Acute urinary tract infection


-Chronic hypoxic respiratory failure probably from underlying chronic 

obstructive pulmonary disease.


-Patient CODE STATUS DO NOT RESUSCITATE as discussed with the daughter.





PLAN:


Switched IV ceftriaxone to Ceftin 500 mg daily for 10 days for patient's UTI, 

continue to await sensitivities.  Tentative discharge plan for tomorrow to 

[facility].





NP statement: Patient was seen and examined by nurse practitioner Dora Hairston and all elements of the case discussed with attending  Dr. Izquierdo

## 2017-11-13 NOTE — P.DS
Providers


Date of admission: 


11/10/17 10:50





Expected date of discharge: 11/13/17


Attending physician: 


Crow Valencia





Consults: 





 





11/10/17 10:49


Consult Physician Urgent 


   Consulting Provider: Darrel Izquierdo


   Consult Reason/Comments: Medical clearance for surgery


   Do you want consulting provider notified?: Already Contacted











Primary care physician: 


Antwan Edmondson








- Discharge Diagnosis(es)


(1) Fracture of femoral neck, left


Current Visit: Yes   Status: Acute   





(2) Fracture of hip


Current Visit: Yes   Status: Acute   


Hospital Course: 


This is an 82-year-old female admitted on 11/10/17 with a femoral neck fracture 

of the left hip. There is suspicion that her fracture was old. She has had a 

couple of falls within the last month. She was admitted to our service for 

surgical intervention and care.


The patient was taken to surgery on 11/11/17 for hemiarthroplasty of the left 

hip. The procedure was performed without complication or sequela.


She is doing well on post op day #2 and is stable for discharge.


The patient is discharged to [facility] in stable condition. Please see med rec for 

accurate list of home meds.





Patient Condition at Discharge: Stable





Plan - Discharge Summary


Discharge Rx Participant: No


New Discharge Prescriptions: 


New


   HYDROcodone/APAP 5-325MG [Norco 5-325] 1 - 2 each PO Q4-6H PRN #90 tab


     PRN Reason: Pain


   Sennosides-Docusate Sodium [Senokot-S] 1 tab PO BID #60 tablet


   Warfarin [Coumadin] 2.5 mg PO DAILY #30 tab


   Magnesium Hydroxide [Milk of Magnesia Concentrate] 2,400 mg PO DAILY PRN  ml


     PRN Reason: Constipation


   Cefuroxime Axetil [Ceftin] 500 mg PO DAILY #10 tab





Continue


   Levothyroxine Sodium [Synthroid] 25 mcg PO DAILY


   Dicyclomine HCl 10 mg PO Q6H


   Allopurinol [Zyloprim] 300 mg PO DAILY


   Fluticasone Nasal Spray [Flonase Nasal Spray] 1 spray EA NOSTRIL DAILY


   Acetaminophen Tab [Tylenol] 650 mg PO Q6HR PRN  tab


     PRN Reason: Mild Pain Or Fever > 100.5


   ALPRAZolam [Xanax] 0.25 mg PO BID PRN


     PRN Reason: Anxiety


   guaiFENesin [guaiFENesin Oral Solution] 200 mg PO Q4HR PRN


     PRN Reason: Cough


   Loratadine [Claritin] 10 mg PO DAILY PRN


     PRN Reason: Allergy Symptoms


   Albuterol Nebulized [Ventolin Nebulized] 2.5 mg INHALATION RT-Q4H PRN


     PRN Reason: COPD


   hydrALAZINE HCL [Apresoline] 50 mg PO TID


   Ranitidine HCl [Zantac] 150 mg PO BID


   Potassium Chloride [Klor-Con 20] 20 meq PO BID


   Furosemide [Lasix] 40 mg PO BID


   prednisoLONE ACETATE [Pred Forte 1%] 2 drop LEFT EYE DAILY


   Lactose-Reduced Food [Ensure Plus] 1 can PO BID


   Metoprolol Tartrate [Lopressor] 50 mg PO DAILY


   Metolazone [Zaroxolyn] 5 mg PO MOTH


   Buprenorphine [Butrans 10 MCG/HOUR] 1 patch TRANSDERM TU


   Atorvastatin [Lipitor] 10 mg PO HS


   Clotrimazole/Betamethasone Dip [Lotrisone Cream] 1 applic TOPICAL BID





Discontinued


   HYDROcodone/APAP 5-325MG [Norco 5-325] 1 tab PO Q4HR PRN


     PRN Reason: Pain





No Action


   Ergocalciferol (Vitamin D2) [Vitamin D2] 50,000 unit PO Q90D


Discharge Medication List





Allopurinol [Zyloprim] 300 mg PO DAILY 09/16/17 [History]


Dicyclomine HCl 10 mg PO Q6H 09/16/17 [History]


Fluticasone Nasal Spray [Flonase Nasal Spray] 1 spray EA NOSTRIL DAILY 09/16/17 

[History]


Levothyroxine Sodium [Synthroid] 25 mcg PO DAILY 09/16/17 [History]


Acetaminophen Tab [Tylenol] 650 mg PO Q6HR PRN  tab 09/20/17 [Rx]


ALPRAZolam [Xanax] 0.25 mg PO BID PRN 11/10/17 [History]


Albuterol Nebulized [Ventolin Nebulized] 2.5 mg INHALATION RT-Q4H PRN 11/10/17 [

History]


Atorvastatin [Lipitor] 10 mg PO HS 11/10/17 [History]


Buprenorphine [Butrans 10 MCG/HOUR] 1 patch TRANSDERM TU 11/10/17 [History]


Clotrimazole/Betamethasone Dip [Lotrisone Cream] 1 applic TOPICAL BID 11/10/17 [

History]


Ergocalciferol (Vitamin D2) [Vitamin D2] 50,000 unit PO Q90D 11/10/17 [History]


Furosemide [Lasix] 40 mg PO BID 11/10/17 [History]


Lactose-Reduced Food [Ensure Plus] 1 can PO BID 11/10/17 [History]


Loratadine [Claritin] 10 mg PO DAILY PRN 11/10/17 [History]


Metolazone [Zaroxolyn] 5 mg PO MOTH 11/10/17 [History]


Metoprolol Tartrate [Lopressor] 50 mg PO DAILY 11/10/17 [History]


Potassium Chloride [Klor-Con 20] 20 meq PO BID 11/10/17 [History]


Ranitidine HCl [Zantac] 150 mg PO BID 11/10/17 [History]


guaiFENesin [guaiFENesin Oral Solution] 200 mg PO Q4HR PRN 11/10/17 [History]


hydrALAZINE HCL [Apresoline] 50 mg PO TID 11/10/17 [History]


prednisoLONE ACETATE [Pred Forte 1%] 2 drop LEFT EYE DAILY 11/10/17 [History]


Cefuroxime Axetil [Ceftin] 500 mg PO DAILY #10 tab 11/13/17 [Rx]


HYDROcodone/APAP 5-325MG [Norco 5-325] 1 - 2 each PO Q4-6H PRN #90 tab 11/13/17 

[Rx]


Magnesium Hydroxide [Milk of Magnesia Concentrate] 2,400 mg PO DAILY PRN  ml 11/ 13/17 [Rx]


Sennosides-Docusate Sodium [Senokot-S] 1 tab PO BID #60 tablet 11/13/17 [Rx]


Warfarin [Coumadin] 2.5 mg PO DAILY #30 tab 11/13/17 [Rx]








Follow up Appointment(s)/Referral(s): 


Antwan Edmondson DO [Primary Care Provider] - As Needed


[facility], [NON-STAFF] - As Needed


Crow Valencia MD [STAFF PHYSICIAN] - 4 Weeks


Ambulatory/Diagnostic Orders: 


Prothrombin Time INR [LAB.AMB] Location: Determined By Patient


Activity/Diet/Wound Care/Special Instructions: 


May bear weight as tolerated with walker. 


May shower if no drainage from incision.


Maintain hip precautions.


Discharge Disposition: TRANSFER TO SNF/ECF

## 2017-11-13 NOTE — P.PN
Subjective


Progress Note Date: 11/13/17


Principal diagnosis: 





Femoral neck fracture left hip.


Status post hemiarthroplasty left hip.





This is an 82-year-old female who is status post hemiarthroplasty of the left 

hip for femoral neck fracture.  She is doing well from an orthopedic 

standpoint.  She has no new complaints or concerns today.  Vital signs are 

stable.





Objective





- Vital Signs


Vital signs: 


 Vital Signs











Temp  97.7 F   11/13/17 01:51


 


Pulse  93   11/13/17 01:51


 


Resp  16   11/13/17 01:51


 


BP  129/73   11/13/17 01:51


 


Pulse Ox  98   11/13/17 01:51








 Intake & Output











 11/12/17 11/13/17 11/13/17





 18:59 06:59 18:59


 


Intake Total 1360  


 


Output Total 325  


 


Balance 1035  


 


Weight 75.296 kg  


 


Intake:   


 


  Intake, IV Titration 400  





  Amount   


 


    Sodium Chloride 0.9% 1, 400  





    000 ml @ 75 mls/hr IV .   





    [id number] PATRICK Rx#:[id number]   


 


  Oral 960  


 


Output:   


 


  Urine 325  


 


Other:   


 


  Voiding Method Indwelling Catheter Incontinent 


 


  # Voids  1 














- Exam





This is a pleasant 82-year-old female in no acute distress.  She is alert and 

oriented at this time.  She is significantly hearing-impaired with no hearing 

in place.  


Exam of the left hip reveals that the dressing is clean, dry and intact.  She 

has full foot and ankle motion without difficulty or pain.  Neurovascular 

status to the lower extremity is intact.





- Labs


CBC & Chem 7: 


 11/13/17 06:33





 11/13/17 06:33


Labs: 


 Abnormal Lab Results - Last 24 Hours (Table)











  11/13/17 11/13/17 Range/Units





  06:33 06:33 


 


RBC  3.56 L   (3.80-5.40)  m/uL


 


Hgb  9.3 L D   (11.4-16.0)  gm/dL


 


Hct  31.9 L   (34.0-46.0)  %


 


MCHC  29.3 L   (31.0-37.0)  g/dL


 


Lymphocytes #  0.5 L   (1.0-4.8)  k/uL


 


Sodium   133 L  (137-145)  mmol/L


 


Potassium   3.1 L  (3.5-5.1)  mmol/L


 


Chloride   94 L  ()  mmol/L


 


Carbon Dioxide   33 H  (22-30)  mmol/L


 


BUN   41 H  (7-17)  mg/dL


 


Creatinine   1.60 H  (0.52-1.04)  mg/dL














Assessment and Plan


(1) Fracture of femoral neck, left


Current Visit: Yes   Status: Acute   Code(s): S72.002A - FRACTURE OF UNSP PART 

OF NECK OF LEFT FEMUR, INIT   SNOMED Code(s): 1037561


   





(2) Fracture of hip


Current Visit: Yes   Status: Acute   Code(s): S72.009A - FRACTURE OF UNSP PART 

OF NECK OF UNSP FEMUR, INIT   SNOMED Code(s): 803520993

## 2017-11-13 NOTE — PN
PROGRESS NOTE



DATE OF SERVICE:

November 13, 2017.



ATTENDING NOTE:

This patient was seen and examined by me.  I discussed with nurse practitioner, Ms. Hairston.  The patient is lying in bed, did tolerate some diet.  Pain is controlled.



PHYSICAL EXAMINATION:

Temperature 98.3.  Pulse 94, respiration 16, blood pressure 117/54.  Pulse ox 97% on 3

L.  Lying in bed, awake, appears comfortable.  Residual bruising on the left side of

the face present.  Lungs decreased breath sounds.  Cardiovascular first and second

sounds normal.

ABDOMEN:  Soft.  The patient is answering following commands.



INVESTIGATIONS:

Hemoglobin 9.3, white count 7.2, potassium 3.1, repeat 3.7, creatinine 1.60.  Urine

culture is growing E coli that is ESBL.  Cultures are pending.  Care was discussed with

the daughter at the bedside.  The patient is doing rather good  for what she has gone

through.  Will DC the IV fluids.





MMODL / IJN: [id number] / Job#: [id number]

## 2017-11-14 VITALS — DIASTOLIC BLOOD PRESSURE: 69 MMHG | SYSTOLIC BLOOD PRESSURE: 138 MMHG | HEART RATE: 104 BPM

## 2017-11-14 VITALS — RESPIRATION RATE: 16 BRPM | TEMPERATURE: 98.7 F

## 2017-11-14 LAB
ANION GAP SERPL CALC-SCNC: 6 MMOL/L
BUN SERPL-SCNC: 49 MG/DL (ref 7–17)
CALCIUM SPEC-MCNC: 10.5 MG/DL (ref 8.4–10.2)
CHLORIDE SERPL-SCNC: 97 MMOL/L (ref 98–107)
CO2 SERPL-SCNC: 30 MMOL/L (ref 22–30)
GLUCOSE SERPL-MCNC: 89 MG/DL (ref 74–99)
NON-AFRICAN AMERICAN GFR(MDRD): 28
POTASSIUM SERPL-SCNC: 4 MMOL/L (ref 3.5–5.1)
SODIUM SERPL-SCNC: 133 MMOL/L (ref 137–145)

## 2017-11-14 RX ADMIN — FLUTICASONE PROPIONATE SCH SPRAY: 50 SPRAY, METERED NASAL at 08:30

## 2017-11-14 RX ADMIN — HYDROCODONE BITARTRATE AND ACETAMINOPHEN PRN EACH: 5; 325 TABLET ORAL at 15:32

## 2017-11-14 RX ADMIN — HYDROCODONE BITARTRATE AND ACETAMINOPHEN PRN EACH: 5; 325 TABLET ORAL at 10:02

## 2017-11-14 RX ADMIN — HYDROCODONE BITARTRATE AND ACETAMINOPHEN PRN EACH: 5; 325 TABLET ORAL at 01:23

## 2017-11-14 RX ADMIN — METOPROLOL TARTRATE SCH MG: 50 TABLET, FILM COATED ORAL at 08:30

## 2017-11-14 RX ADMIN — LEVOTHYROXINE SODIUM SCH MCG: 25 TABLET ORAL at 06:17

## 2017-11-14 NOTE — PN
PROGRESS NOTE



DATE OF SERVICE:

11/14/2017



PRESENTING COMPLAINT:

Hip surgery.



INTERVAL HISTORY:

Patient is status post hip surgery, is lying in bed.  Daughter is at the bedside.

Patient tolerated most of her breakfast.  Pain is reasonably controlled.  No new

issues.



REVIEW OF SYSTEMS:

Review of systems done for constitutional, cardiovascular, GI, pulmonary; relevant

findings as above.



CURRENT MEDICATIONS:

Current medications are reviewed that include IV ceftriaxone.



PHYSICAL EXAMINATION:

On examination, temperature 98.3, pulse 98, respiration 14, blood pressure 100/68,

pulse ox 95% on 3 L.

GENERAL APPEARANCE:  Lying in bed, awake.

EYES: Pupils equal. Conjunctivae normal.

FACE: Bruising on the left side of the face.

RESPIRATOR:  Effort normal.

LUNGS: Decreased breath sounds.

CARDIOVASCULAR: Heart sounds irregular.  No edema.

ABDOMEN:  Soft, nontender.  Liver and spleen not palpable.

PSYCHIATRY: Awake, answering questions.

NEUROLOGICAL:  Patient has got a nasal tone to her voice at the baseline.



INVESTIGATIONS:

Potassium 4. BUN 49, creatinine 1.73.  Urine cultures growing ESBL.



ASSESSMENT:

1. Acute left femoral neck fracture followed by hemiarthroplasty.

2. Acute urinary tract infection from ESBL/Escherichia coli.

3. Extensive bruising on the left side of the face, slowly improving from a fall

    recently.

4. Persistent atrial flutter, fibrillation.

5. Alzheimer's dementia, late onset type.

6. Chronic obstructive pulmonary disease.

7. Hyperlipidemia.

8. Essential hypertension.

9. 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

    aids.

10.Chronic kidney disease stage 4 probably from hypertensive nephrosclerosis.

11.Chronic hypoxic respiratory failure on 3 L of oxygen.

12.Hypothyroidism.

13.CODE STATUS:  DNR.



The patient will be treated for the ESBL with nitrofurantoin 100 mg p.o. b.i.d. for

next seven days.





MMODL / IJN: [id number] / Job#: [id number]

## 2018-04-25 ENCOUNTER — HOSPITAL ENCOUNTER (OUTPATIENT)
Dept: HOSPITAL 47 - RADFLMAIN | Age: 83
Discharge: HOME | End: 2018-04-25
Payer: MEDICARE

## 2018-04-25 DIAGNOSIS — R93.8: Primary | ICD-10-CM

## 2018-04-25 PROCEDURE — 74230 X-RAY XM SWLNG FUNCJ C+: CPT

## 2018-04-25 NOTE — FL
EXAMINATION TYPE: FL barium swallow w video

 

DATE OF EXAM: 4/25/2018

 

MODIFIED SWALLOW / DEGLUTITION STUDY

 

CLINICAL HISTORY: Dysphagia with wet cough and choking sensation. Abnormal weight loss.

 

TECHNIQUE:  Deglutition study is performed utilizing cracker, nectar, honey, and thin barium. 1 minut
e and 54 seconds of fluoroscopy time was utilized with 0 images saved as the exam was video recorded.


 

COMPARISON: None.

 

FINDINGS: The oral and pharyngeal phases show satisfactory initiation and propagation with all modali
ties tested.  Normal mastication is seen with solid modalities tested within the limitations of this 
edentulous patient.  Persistent large or penetration is seen with the thin barium consistency, deep a
nd the second trial. This resolves with the chin tuck maneuver. There is no evidence of aspiration wi
th any modality tested.  Vallecular retention with solid consistency and vallecular/piriform retentio
n with remaining administered consistencies was then applied. No significant pharyngeal residue was a
ppreciated.

 

IMPRESSION:

1. Persistent laryngeal penetration with the thin barium consistency resolved with the chin tuck francis
uver.  

2. Vallecular and piriform retention throughout the examination. Please refer to speech therapist not
es for further details if necessary.

## 2018-04-29 ENCOUNTER — HOSPITAL ENCOUNTER (INPATIENT)
Dept: HOSPITAL 47 - EC | Age: 83
LOS: 9 days | Discharge: NURSING, REHAB OR LONG-TERM CARE | DRG: 871 | End: 2018-05-08
Payer: MEDICARE

## 2018-04-29 VITALS — BODY MASS INDEX: 32.4 KG/M2

## 2018-04-29 DIAGNOSIS — Z99.81: ICD-10-CM

## 2018-04-29 DIAGNOSIS — Z96.652: ICD-10-CM

## 2018-04-29 DIAGNOSIS — I50.33: ICD-10-CM

## 2018-04-29 DIAGNOSIS — R79.1: ICD-10-CM

## 2018-04-29 DIAGNOSIS — Z79.51: ICD-10-CM

## 2018-04-29 DIAGNOSIS — E78.5: ICD-10-CM

## 2018-04-29 DIAGNOSIS — Z82.61: ICD-10-CM

## 2018-04-29 DIAGNOSIS — R74.8: ICD-10-CM

## 2018-04-29 DIAGNOSIS — Z87.891: ICD-10-CM

## 2018-04-29 DIAGNOSIS — E87.6: ICD-10-CM

## 2018-04-29 DIAGNOSIS — N17.9: ICD-10-CM

## 2018-04-29 DIAGNOSIS — Z66: ICD-10-CM

## 2018-04-29 DIAGNOSIS — Z97.4: ICD-10-CM
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
## 2018-04-29 DIAGNOSIS — Z80.0: ICD-10-CM

## 2018-04-29 DIAGNOSIS — N39.0: ICD-10-CM

## 2018-04-29 DIAGNOSIS — A41.51: Primary | ICD-10-CM

## 2018-04-29 DIAGNOSIS — Z79.01: ICD-10-CM

## 2018-04-29 DIAGNOSIS — H40.9: ICD-10-CM

## 2018-04-29 DIAGNOSIS — Z87.730: ICD-10-CM

## 2018-04-29 DIAGNOSIS — T50.2X5A: ICD-10-CM

## 2018-04-29 DIAGNOSIS — J18.9: ICD-10-CM

## 2018-04-29 DIAGNOSIS — K21.9: ICD-10-CM

## 2018-04-29 DIAGNOSIS — H91.91: ICD-10-CM

## 2018-04-29 DIAGNOSIS — Z81.1: ICD-10-CM

## 2018-04-29 DIAGNOSIS — Z81.2: ICD-10-CM

## 2018-04-29 DIAGNOSIS — T45.515A: ICD-10-CM

## 2018-04-29 DIAGNOSIS — I13.0: ICD-10-CM

## 2018-04-29 DIAGNOSIS — I48.0: ICD-10-CM

## 2018-04-29 DIAGNOSIS — E03.9: ICD-10-CM

## 2018-04-29 DIAGNOSIS — Z16.24: ICD-10-CM

## 2018-04-29 DIAGNOSIS — J44.0: ICD-10-CM

## 2018-04-29 DIAGNOSIS — Z79.890: ICD-10-CM

## 2018-04-29 DIAGNOSIS — H54.61: ICD-10-CM

## 2018-04-29 DIAGNOSIS — R65.20: ICD-10-CM

## 2018-04-29 DIAGNOSIS — E44.1: ICD-10-CM

## 2018-04-29 DIAGNOSIS — J96.01: ICD-10-CM

## 2018-04-29 DIAGNOSIS — F03.90: ICD-10-CM

## 2018-04-29 DIAGNOSIS — Z84.1: ICD-10-CM

## 2018-04-29 DIAGNOSIS — Z79.899: ICD-10-CM

## 2018-04-29 DIAGNOSIS — Z16.12: ICD-10-CM

## 2018-04-29 DIAGNOSIS — N18.4: ICD-10-CM

## 2018-04-29 LAB
ALBUMIN SERPL-MCNC: 3.4 G/DL (ref 3.5–5)
ALP SERPL-CCNC: 255 U/L (ref 38–126)
ALT SERPL-CCNC: 158 U/L (ref 9–52)
ANION GAP SERPL CALC-SCNC: 13 MMOL/L
APTT BLD: 30.9 SEC (ref 22–30)
AST SERPL-CCNC: 164 U/L (ref 14–36)
BASOPHILS # BLD AUTO: 0 K/UL (ref 0–0.2)
BASOPHILS NFR BLD AUTO: 0 %
BUN SERPL-SCNC: 41 MG/DL (ref 7–17)
CALCIUM SPEC-MCNC: 10.2 MG/DL (ref 8.4–10.2)
CHLORIDE SERPL-SCNC: 101 MMOL/L (ref 98–107)
CO2 SERPL-SCNC: 24 MMOL/L (ref 22–30)
EOSINOPHIL # BLD AUTO: 0.1 K/UL (ref 0–0.7)
EOSINOPHIL NFR BLD AUTO: 1 %
ERYTHROCYTE [DISTWIDTH] IN BLOOD BY AUTOMATED COUNT: 4.78 M/UL (ref 3.8–5.4)
ERYTHROCYTE [DISTWIDTH] IN BLOOD: 17 % (ref 11.5–15.5)
GLUCOSE SERPL-MCNC: 122 MG/DL (ref 74–99)
HCT VFR BLD AUTO: 41.3 % (ref 34–46)
HGB BLD-MCNC: 13.2 GM/DL (ref 11.4–16)
INR PPP: 5.5 (ref ?–1.2)
LYMPHOCYTES # SPEC AUTO: 0.2 K/UL (ref 1–4.8)
LYMPHOCYTES NFR SPEC AUTO: 1 %
MCH RBC QN AUTO: 27.6 PG (ref 25–35)
MCHC RBC AUTO-ENTMCNC: 31.9 G/DL (ref 31–37)
MCV RBC AUTO: 86.5 FL (ref 80–100)
MONOCYTES # BLD AUTO: 0.4 K/UL (ref 0–1)
MONOCYTES NFR BLD AUTO: 3 %
NEUTROPHILS # BLD AUTO: 12.7 K/UL (ref 1.3–7.7)
NEUTROPHILS NFR BLD AUTO: 95 %
PH UR: 5.5 [PH] (ref 5–8)
PLATELET # BLD AUTO: 219 K/UL (ref 150–450)
POTASSIUM SERPL-SCNC: 4.4 MMOL/L (ref 3.5–5.1)
PROT SERPL-MCNC: 6 G/DL (ref 6.3–8.2)
PROT UR QL: (no result)
PT BLD: 49.9 SEC (ref 9–12)
RBC UR QL: 23 /HPF (ref 0–5)
SODIUM SERPL-SCNC: 138 MMOL/L (ref 137–145)
SP GR UR: 1.02 (ref 1–1.03)
SQUAMOUS UR QL AUTO: 1 /HPF (ref 0–4)
UROBILINOGEN UR QL STRIP: <2 MG/DL (ref ?–2)
WBC # BLD AUTO: 13.4 K/UL (ref 3.8–10.6)
WBC #/AREA URNS HPF: 167 /HPF (ref 0–5)

## 2018-04-29 PROCEDURE — 84484 ASSAY OF TROPONIN QUANT: CPT

## 2018-04-29 PROCEDURE — 76770 US EXAM ABDO BACK WALL COMP: CPT

## 2018-04-29 PROCEDURE — 93306 TTE W/DOPPLER COMPLETE: CPT

## 2018-04-29 PROCEDURE — 77001 FLUOROGUIDE FOR VEIN DEVICE: CPT

## 2018-04-29 PROCEDURE — 80048 BASIC METABOLIC PNL TOTAL CA: CPT

## 2018-04-29 PROCEDURE — 36569 INSJ PICC 5 YR+ W/O IMAGING: CPT

## 2018-04-29 PROCEDURE — 85610 PROTHROMBIN TIME: CPT

## 2018-04-29 PROCEDURE — 94760 N-INVAS EAR/PLS OXIMETRY 1: CPT

## 2018-04-29 PROCEDURE — 80053 COMPREHEN METABOLIC PANEL: CPT

## 2018-04-29 PROCEDURE — 96376 TX/PRO/DX INJ SAME DRUG ADON: CPT

## 2018-04-29 PROCEDURE — 83605 ASSAY OF LACTIC ACID: CPT

## 2018-04-29 PROCEDURE — 99285 EMERGENCY DEPT VISIT HI MDM: CPT

## 2018-04-29 PROCEDURE — 71045 X-RAY EXAM CHEST 1 VIEW: CPT

## 2018-04-29 PROCEDURE — 36415 COLL VENOUS BLD VENIPUNCTURE: CPT

## 2018-04-29 PROCEDURE — 87186 SC STD MICRODIL/AGAR DIL: CPT

## 2018-04-29 PROCEDURE — 87077 CULTURE AEROBIC IDENTIFY: CPT

## 2018-04-29 PROCEDURE — 93005 ELECTROCARDIOGRAM TRACING: CPT

## 2018-04-29 PROCEDURE — 87086 URINE CULTURE/COLONY COUNT: CPT

## 2018-04-29 PROCEDURE — 83880 ASSAY OF NATRIURETIC PEPTIDE: CPT

## 2018-04-29 PROCEDURE — 96367 TX/PROPH/DG ADDL SEQ IV INF: CPT

## 2018-04-29 PROCEDURE — 87040 BLOOD CULTURE FOR BACTERIA: CPT

## 2018-04-29 PROCEDURE — 81001 URINALYSIS AUTO W/SCOPE: CPT

## 2018-04-29 PROCEDURE — 96366 THER/PROPH/DIAG IV INF ADDON: CPT

## 2018-04-29 PROCEDURE — 76937 US GUIDE VASCULAR ACCESS: CPT

## 2018-04-29 PROCEDURE — 85025 COMPLETE CBC W/AUTO DIFF WBC: CPT

## 2018-04-29 PROCEDURE — 84443 ASSAY THYROID STIM HORMONE: CPT

## 2018-04-29 PROCEDURE — 85730 THROMBOPLASTIN TIME PARTIAL: CPT

## 2018-04-29 PROCEDURE — 96365 THER/PROPH/DIAG IV INF INIT: CPT

## 2018-04-29 RX ADMIN — CEFAZOLIN SCH MLS/HR: 330 INJECTION, POWDER, FOR SOLUTION INTRAMUSCULAR; INTRAVENOUS at 23:53

## 2018-04-29 NOTE — XR
EXAMINATION TYPE: XR chest 1V portable

 

DATE OF EXAM: 4/29/2018

 

COMPARISON: November 10, 2017

 

HISTORY: Fever

 

TECHNIQUE: Single frontal view of the chest is obtained.

 

FINDINGS:  The new airspace opacity at the right lung base. There is also mild cephalization of the p
ulmonary vessels. No definite pneumothorax or sizable pleural effusion is identified. The cardiac stevie
houette is within normal limits.

 

IMPRESSION:  Pneumonia suspected in the right lower lobe. There are also findings consistent with at 
least mild CHF.

## 2018-04-29 NOTE — ED
Fever HPI





- General


Stated Complaint: Sepsis


Time Seen by Provider: 18 17:07


Source: patient


Limitations: altered mental status





- History of Present Illness


Initial Comments: 





This patient is an 82-year-old woman sent here from nursing home to be 

evaluated after she had developed fever.  It is reported that the patient also 

was appearing agitated, and that her heart rate was elevated.  Here the patient 

is not able to give much in the way of useful history.  I suspect that there is 

some underlying dementia and that the patient is delirious as well.  She is 

denying pain.  She denies dyspnea.  Otherwise, patient is not really answering 

questions.


MD Complaint: fever


-: unknown





- Related Data


 Home Medications











 Medication  Instructions  Recorded  Confirmed


 


Allopurinol [Zyloprim] 300 mg PO DAILY 09/16/17 11/10/17


 


Dicyclomine HCl 10 mg PO Q6H 09/16/17 11/10/17


 


Fluticasone Nasal Spray [Flonase 1 spray EA NOSTRIL DAILY 09/16/17 11/10/17





Nasal Spray]   


 


Levothyroxine Sodium [Synthroid] 25 mcg PO DAILY 09/16/17 11/10/17


 


ALPRAZolam [Xanax] 0.25 mg PO BID PRN 11/10/17 11/10/17


 


Albuterol Nebulized [Ventolin 2.5 mg INHALATION RT-Q4H PRN 11/10/17 11/10/17





Nebulized]   


 


Atorvastatin [Lipitor] 10 mg PO HS 11/10/17 11/10/17


 


Buprenorphine [Butrans 10 MCG/HOUR] 1 patch TRANSDERM TU 11/10/17 11/10/17


 


Clotrimazole/Betamethasone Dip 1 applic TOPICAL BID 11/10/17 11/10/17





[Lotrisone Cream]   


 


Ergocalciferol (Vitamin D2) 50,000 unit PO Q90D 11/10/17 11/10/17





[Vitamin D2]   


 


Furosemide [Lasix] 40 mg PO BID 11/10/17 11/10/17


 


Lactose-Reduced Food [Ensure Plus] 1 can PO BID 11/10/17 11/10/17


 


Loratadine [Claritin] 10 mg PO DAILY PRN 11/10/17 11/10/17


 


Metolazone [Zaroxolyn] 5 mg PO MOTH 11/10/17 11/10/17


 


Metoprolol Tartrate [Lopressor] 50 mg PO DAILY 11/10/17 11/10/17


 


Potassium Chloride [Klor-Con 20] 20 meq PO BID 11/10/17 11/10/17


 


Ranitidine HCl [Zantac] 150 mg PO BID 11/10/17 11/10/17


 


guaiFENesin [guaiFENesin Oral 200 mg PO Q4HR PRN 11/10/17 11/10/17





Solution]   


 


hydrALAZINE HCL [Apresoline] 50 mg PO TID 11/10/17 11/10/17


 


prednisoLONE ACETATE [Pred Forte 2 drop LEFT EYE DAILY 11/10/17 11/10/17





1%]   








 Previous Rx's











 Medication  Instructions  Recorded


 


Acetaminophen Tab [Tylenol] 650 mg PO Q6HR PRN  tab 17


 


HYDROcodone/APAP 5-325MG [Norco 1 - 2 each PO Q4-6H PRN #90 tab 17





5-325]  


 


Magnesium Hydroxide [Milk of 2,400 mg PO DAILY PRN  ml 17





Magnesia Concentrate]  


 


Sennosides-Docusate Sodium 1 tab PO BID #60 tablet 17





[Senokot-S]  


 


Warfarin [Coumadin] 2.5 mg PO DAILY #30 tab 17


 


Nitrofurantoin Monohyd/M-Cryst 100 mg PO BID #14 cap 17





[Macrobid]  











 Allergies











Allergy/AdvReac Type Severity Reaction Status Date / Time


 


No Known Allergies Allergy   Verified 11/10/17 07:55














Review of Systems


ROS Statement: 


Those systems with pertinent positive or pertinent negative responses have been 

documented in the HPI.





ROS Other: All systems not noted in ROS Statement are negative.


Limitations: ROS unobtainable due to patients medical condition


Constitutional: Reports: as per HPI, fever


Respiratory: Denies: dyspnea


Cardiovascular: Denies: chest pain


Gastrointestinal: Denies: abdominal pain


Neurological: Denies: headache





Past Medical History


Past Medical History: Heart Failure, COPD, Dementia, GERD/Reflux, Hyperlipidemia

, Hypertension, Renal Disease, Respiratory Disorder, Thyroid Disorder


Additional Past Medical History / Comment(s): Pt was born with cleft palate and 

other physical problems with facial structures including nasal passages and ear 

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  She can 

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 

in the R eye and can see/read with left eye-it also has glaucoma, chronic renal 

disease stage IV-has L arm fistula but no hemodialysis, frequent UTIs, wears O2 

at 3L/NC ATC, falls, hypothyroid.


Last Myocardial Infarction Date:: unknown


History of Any Multi-Drug Resistant Organisms: ESBL


Date of last positivie culture/infection: 3/3/18


MDRO Source:: ESBL URINE


Past Surgical History: Bladder Surgery, Joint Replacement, Orthopedic Surgery


Additional Past Surgical History / Comment(s): Pt has had multiple surgeries 

for birth defects affecting mouth, nose and ears, total L knee arthroplasty, 

bladder sling, fistula L arm.


Past Anesthesia/Blood Transfusion Reactions: No Reported Reaction


Past Psychological History: No Psychological Hx Reported


Additional Psychological History / Comment(s): Pt now resides at [facility] on [facility].  She was getting around in a wheelchair.  She wears O2 at 3L/NC.  She 

gets updraft treatments 4 times a day.  She is on a regular thin consistency 

diet and ensure plus bwice a day.


Smoking Status: Former smoker


Past Alcohol Use History: None Reported


Additional Past Alcohol Use History / Comment(s): Pt quit smoking in .


Past Drug Use History: None Reported





- Past Family History


  ** Father


Family Medical History: Cancer


Additional Family Medical History / Comment(s): Father  prior to age 60 yrs 

with esophageal cancer.  He was a smoker and a drinker.





  ** Mother


Family Medical History: Osteoarthritis (OA), Renal Disease, Rheumatoid 

Arthritis (RA)


Additional Family Medical History / Comment(s): kidney problems, specific type 

unknown





General Exam


General appearance: alert, in distress


Head exam: Present: atraumatic, normocephalic


Eye exam: Present: EOMI, other (Right cornea clouded).  Absent: scleral icterus

, conjunctival injection


ENT exam: Present: mucous membranes dry


Neck exam: Present: full ROM.  Absent: tenderness, meningismus


Respiratory exam: Present: respiratory distress (Tachypnea), rhonchi.  Absent: 

wheezes, rales, chest wall tenderness, accessory muscle use, decreased breath 

sounds, prolonged expiratory


Cardiovascular Exam: Present: normal rhythm, tachycardia, normal heart sounds.  

Absent: systolic murmur, diastolic murmur, rubs, gallop


GI/Abdominal exam: Present: soft.  Absent: distended, tenderness, guarding, 

rebound, mass


Extremities exam: Present: normal inspection, normal capillary refill.  Absent: 

pedal edema, calf tenderness


Back exam: Absent: CVA tenderness (R), CVA tenderness (L)


Neurological exam: Present: alert, CN II-XII intact, other (Patient is alert, 

but only oriented to person.  GCS 14 (E=4, V=4, M=6). She is not able to comply 

with the neurologic exam but is moving 4 extremities without focal deficit.).  

Absent: oriented X3, motor sensory deficit


Skin exam: Present: warm, dry, intact, normal color





Course


 Vital Signs











  18





  17:02 17:59 19:11


 


Temperature 97.2 F L 101.8 F H 


 


Pulse Rate 144 H 136 H 128 H


 


Respiratory 28 H 22 18





Rate   


 


Blood Pressure 161/74 181/94 154/68


 


O2 Sat by Pulse 91 L 98 98





Oximetry   














  18





  20:00 20:53


 


Temperature 100.5 F H 96.7 F L


 


Pulse Rate 106 H 110 H


 


Respiratory 18 18





Rate  


 


Blood Pressure 107/53 110/89


 


O2 Sat by Pulse 98 96





Oximetry  














Medical Decision Making





- Lab Data


Result diagrams: 


 18 17:30





 18 17:30


 Lab Results











  18 Range/Units





  17:30 17:30 17:30 


 


WBC  13.4 H    (3.8-10.6)  k/uL


 


RBC  4.78    (3.80-5.40)  m/uL


 


Hgb  13.2    (11.4-16.0)  gm/dL


 


Hct  41.3    (34.0-46.0)  %


 


MCV  86.5    (80.0-100.0)  fL


 


MCH  27.6    (25.0-35.0)  pg


 


MCHC  31.9    (31.0-37.0)  g/dL


 


RDW  17.0 H    (11.5-15.5)  %


 


Plt Count  219    (150-450)  k/uL


 


Neutrophils %  95    %


 


Lymphocytes %  1    %


 


Monocytes %  3    %


 


Eosinophils %  1    %


 


Basophils %  0    %


 


Neutrophils #  12.7 H    (1.3-7.7)  k/uL


 


Lymphocytes #  0.2 L    (1.0-4.8)  k/uL


 


Monocytes #  0.4    (0-1.0)  k/uL


 


Eosinophils #  0.1    (0-0.7)  k/uL


 


Basophils #  0.0    (0-0.2)  k/uL


 


Manual Slide Review  Performed    


 


Anisocytosis  Slight    


 


PT     (9.0-12.0)  sec


 


INR     (<1.2)  


 


APTT     (22.0-30.0)  sec


 


Sodium   138   (137-145)  mmol/L


 


Potassium   4.4   (3.5-5.1)  mmol/L


 


Chloride   101   ()  mmol/L


 


Carbon Dioxide   24   (22-30)  mmol/L


 


Anion Gap   13   mmol/L


 


BUN   41 H   (7-17)  mg/dL


 


Creatinine   1.53 H   (0.52-1.04)  mg/dL


 


Est GFR (CKD-EPI)AfAm   37   (>60 ml/min/1.73 sqM)  


 


Est GFR (CKD-EPI)NonAf   32   (>60 ml/min/1.73 sqM)  


 


Glucose   122 H   (74-99)  mg/dL


 


Plasma Lactic Acid Sumit    1.5  (0.7-2.0)  mmol/L


 


Calcium   10.2   (8.4-10.2)  mg/dL


 


Total Bilirubin   0.9   (0.2-1.3)  mg/dL


 


AST   164 H   (14-36)  U/L


 


ALT   158 H   (9-52)  U/L


 


Alkaline Phosphatase   255 H   ()  U/L


 


Troponin I     (0.000-0.034)  ng/mL


 


NT-Pro-B Natriuret Pep     pg/mL


 


Total Protein   6.0 L   (6.3-8.2)  g/dL


 


Albumin   3.4 L   (3.5-5.0)  g/dL


 


Urine Color     


 


Urine Appearance     (Clear)  


 


Urine pH     (5.0-8.0)  


 


Ur Specific Gravity     (1.001-1.035)  


 


Urine Protein     (Negative)  


 


Urine Glucose (UA)     (Negative)  


 


Urine Ketones     (Negative)  


 


Urine Blood     (Negative)  


 


Urine Nitrite     (Negative)  


 


Urine Bilirubin     (Negative)  


 


Urine Urobilinogen     (<2.0)  mg/dL


 


Ur Leukocyte Esterase     (Negative)  


 


Urine RBC     (0-5)  /hpf


 


Urine WBC     (0-5)  /hpf


 


Urine WBC Clumps     (None)  /hpf


 


Ur Squamous Epith Cells     (0-4)  /hpf


 


Amorphous Sediment     (None)  /hpf


 


Urine Bacteria     (None)  /hpf














  18 Range/Units





  17:30 17:30 17:30 


 


WBC     (3.8-10.6)  k/uL


 


RBC     (3.80-5.40)  m/uL


 


Hgb     (11.4-16.0)  gm/dL


 


Hct     (34.0-46.0)  %


 


MCV     (80.0-100.0)  fL


 


MCH     (25.0-35.0)  pg


 


MCHC     (31.0-37.0)  g/dL


 


RDW     (11.5-15.5)  %


 


Plt Count     (150-450)  k/uL


 


Neutrophils %     %


 


Lymphocytes %     %


 


Monocytes %     %


 


Eosinophils %     %


 


Basophils %     %


 


Neutrophils #     (1.3-7.7)  k/uL


 


Lymphocytes #     (1.0-4.8)  k/uL


 


Monocytes #     (0-1.0)  k/uL


 


Eosinophils #     (0-0.7)  k/uL


 


Basophils #     (0-0.2)  k/uL


 


Manual Slide Review     


 


Anisocytosis     


 


PT  49.9 H    (9.0-12.0)  sec


 


INR  5.5 H*    (<1.2)  


 


APTT  30.9 H    (22.0-30.0)  sec


 


Sodium     (137-145)  mmol/L


 


Potassium     (3.5-5.1)  mmol/L


 


Chloride     ()  mmol/L


 


Carbon Dioxide     (22-30)  mmol/L


 


Anion Gap     mmol/L


 


BUN     (7-17)  mg/dL


 


Creatinine     (0.52-1.04)  mg/dL


 


Est GFR (CKD-EPI)AfAm     (>60 ml/min/1.73 sqM)  


 


Est GFR (CKD-EPI)NonAf     (>60 ml/min/1.73 sqM)  


 


Glucose     (74-99)  mg/dL


 


Plasma Lactic Acid Sumit     (0.7-2.0)  mmol/L


 


Calcium     (8.4-10.2)  mg/dL


 


Total Bilirubin     (0.2-1.3)  mg/dL


 


AST     (14-36)  U/L


 


ALT     (9-52)  U/L


 


Alkaline Phosphatase     ()  U/L


 


Troponin I   0.015   (0.000-0.034)  ng/mL


 


NT-Pro-B Natriuret Pep    6380  pg/mL


 


Total Protein     (6.3-8.2)  g/dL


 


Albumin     (3.5-5.0)  g/dL


 


Urine Color     


 


Urine Appearance     (Clear)  


 


Urine pH     (5.0-8.0)  


 


Ur Specific Gravity     (1.001-1.035)  


 


Urine Protein     (Negative)  


 


Urine Glucose (UA)     (Negative)  


 


Urine Ketones     (Negative)  


 


Urine Blood     (Negative)  


 


Urine Nitrite     (Negative)  


 


Urine Bilirubin     (Negative)  


 


Urine Urobilinogen     (<2.0)  mg/dL


 


Ur Leukocyte Esterase     (Negative)  


 


Urine RBC     (0-5)  /hpf


 


Urine WBC     (0-5)  /hpf


 


Urine WBC Clumps     (None)  /hpf


 


Ur Squamous Epith Cells     (0-4)  /hpf


 


Amorphous Sediment     (None)  /hpf


 


Urine Bacteria     (None)  /hpf














  18 Range/Units





  18:45 


 


WBC   (3.8-10.6)  k/uL


 


RBC   (3.80-5.40)  m/uL


 


Hgb   (11.4-16.0)  gm/dL


 


Hct   (34.0-46.0)  %


 


MCV   (80.0-100.0)  fL


 


MCH   (25.0-35.0)  pg


 


MCHC   (31.0-37.0)  g/dL


 


RDW   (11.5-15.5)  %


 


Plt Count   (150-450)  k/uL


 


Neutrophils %   %


 


Lymphocytes %   %


 


Monocytes %   %


 


Eosinophils %   %


 


Basophils %   %


 


Neutrophils #   (1.3-7.7)  k/uL


 


Lymphocytes #   (1.0-4.8)  k/uL


 


Monocytes #   (0-1.0)  k/uL


 


Eosinophils #   (0-0.7)  k/uL


 


Basophils #   (0-0.2)  k/uL


 


Manual Slide Review   


 


Anisocytosis   


 


PT   (9.0-12.0)  sec


 


INR   (<1.2)  


 


APTT   (22.0-30.0)  sec


 


Sodium   (137-145)  mmol/L


 


Potassium   (3.5-5.1)  mmol/L


 


Chloride   ()  mmol/L


 


Carbon Dioxide   (22-30)  mmol/L


 


Anion Gap   mmol/L


 


BUN   (7-17)  mg/dL


 


Creatinine   (0.52-1.04)  mg/dL


 


Est GFR (CKD-EPI)AfAm   (>60 ml/min/1.73 sqM)  


 


Est GFR (CKD-EPI)NonAf   (>60 ml/min/1.73 sqM)  


 


Glucose   (74-99)  mg/dL


 


Plasma Lactic Acid Sumit   (0.7-2.0)  mmol/L


 


Calcium   (8.4-10.2)  mg/dL


 


Total Bilirubin   (0.2-1.3)  mg/dL


 


AST   (14-36)  U/L


 


ALT   (9-52)  U/L


 


Alkaline Phosphatase   ()  U/L


 


Troponin I   (0.000-0.034)  ng/mL


 


NT-Pro-B Natriuret Pep   pg/mL


 


Total Protein   (6.3-8.2)  g/dL


 


Albumin   (3.5-5.0)  g/dL


 


Urine Color  Yellow  


 


Urine Appearance  Cloudy H  (Clear)  


 


Urine pH  5.5  (5.0-8.0)  


 


Ur Specific Gravity  1.017  (1.001-1.035)  


 


Urine Protein  1+ H  (Negative)  


 


Urine Glucose (UA)  Negative  (Negative)  


 


Urine Ketones  Negative  (Negative)  


 


Urine Blood  Moderate H  (Negative)  


 


Urine Nitrite  Positive H  (Negative)  


 


Urine Bilirubin  Negative  (Negative)  


 


Urine Urobilinogen  <2.0  (<2.0)  mg/dL


 


Ur Leukocyte Esterase  Large H  (Negative)  


 


Urine RBC  23 H  (0-5)  /hpf


 


Urine WBC  167 H  (0-5)  /hpf


 


Urine WBC Clumps  Many H  (None)  /hpf


 


Ur Squamous Epith Cells  1  (0-4)  /hpf


 


Amorphous Sediment  Rare H  (None)  /hpf


 


Urine Bacteria  Many H  (None)  /hpf














- EKG Data


-: EKG Interpreted by Me


EKG shows normal: axis (Normal), intervals (Normal)


Rate: tachycardia (Rate approximately 146 bpm)


Interpretation: nonspecific ST-T wave changes, other (Atrial fibrillation)





Disposition


Clinical Impression: 


 Pneumonia, Urinary tract infection, CHF exacerbation, Atrial fibrillation with 

RVR, Coumadin toxicity





Disposition: ADMITTED AS IP TO THIS HOSP


Condition: Poor


Is patient prescribed a controlled substance at d/c from ED?: No


Referrals: 


Shekhar Bonilla MD [Primary Care Provider] - 1-2 days

## 2018-04-30 LAB
INR PPP: 6.5 (ref ?–1.2)
PT BLD: 59.5 SEC (ref 9–12)

## 2018-04-30 RX ADMIN — DEXTROSE MONOHYDRATE SCH MLS/HR: 5 INJECTION, SOLUTION INTRAVENOUS at 08:32

## 2018-04-30 RX ADMIN — PREDNISOLONE ACETATE SCH DROPS: 10 SUSPENSION/ DROPS OPHTHALMIC at 08:36

## 2018-04-30 RX ADMIN — FUROSEMIDE SCH MG: 40 TABLET ORAL at 08:35

## 2018-04-30 RX ADMIN — ATORVASTATIN CALCIUM SCH MG: 10 TABLET, FILM COATED ORAL at 20:38

## 2018-04-30 RX ADMIN — DOCUSATE SODIUM AND SENNOSIDES SCH EACH: 50; 8.6 TABLET ORAL at 20:39

## 2018-04-30 RX ADMIN — ALLOPURINOL SCH MG: 300 TABLET ORAL at 08:35

## 2018-04-30 RX ADMIN — POTASSIUM CHLORIDE SCH MEQ: 20 TABLET, EXTENDED RELEASE ORAL at 20:39

## 2018-04-30 RX ADMIN — CEFAZOLIN SCH: 330 INJECTION, POWDER, FOR SOLUTION INTRAMUSCULAR; INTRAVENOUS at 20:43

## 2018-04-30 RX ADMIN — NITROFURANTOIN (MONOHYDRATE/MACROCRYSTALS) SCH MG: 75; 25 CAPSULE ORAL at 08:36

## 2018-04-30 RX ADMIN — METOPROLOL TARTRATE SCH MG: 50 TABLET, FILM COATED ORAL at 08:36

## 2018-04-30 RX ADMIN — CEFAZOLIN SCH MLS/HR: 330 INJECTION, POWDER, FOR SOLUTION INTRAMUSCULAR; INTRAVENOUS at 16:58

## 2018-04-30 RX ADMIN — DEXTROSE MONOHYDRATE SCH MLS/HR: 5 INJECTION, SOLUTION INTRAVENOUS at 20:38

## 2018-04-30 RX ADMIN — LEVOTHYROXINE SODIUM SCH MCG: 25 TABLET ORAL at 06:32

## 2018-04-30 RX ADMIN — DOCUSATE SODIUM AND SENNOSIDES SCH EACH: 50; 8.6 TABLET ORAL at 08:37

## 2018-04-30 RX ADMIN — NITROFURANTOIN (MONOHYDRATE/MACROCRYSTALS) SCH MG: 75; 25 CAPSULE ORAL at 20:38

## 2018-04-30 RX ADMIN — POTASSIUM CHLORIDE SCH MEQ: 20 TABLET, EXTENDED RELEASE ORAL at 08:36

## 2018-04-30 RX ADMIN — FUROSEMIDE SCH MG: 40 TABLET ORAL at 20:38

## 2018-04-30 RX ADMIN — METOLAZONE SCH MG: 5 TABLET ORAL at 08:35

## 2018-04-30 NOTE — P.HPIM
History of Present Illness


H&P Date: 18


Chief Complaint: Feeling sick





Mrs. Momin done as an 82-year-old female with a past medical history of COPD, 

Dementia, GERD/Reflux, Hyperlipidemia, Hypertension, Renal Disease, Respiratory 

Disorder, Thyroid Disorder, hard of hearing, right eye blindness sent from her 

nursing home for fever and agitation.


Patient is very hard of hearing and so most of the history is taken from the ED 

note and nursing staff report.  Patient states that she has been feeling sick 

but could not give me the details.  She denies having any chest pain any 

difficulty in breathing or palpitations.  She denies having any abdominal pain 

nausea vomiting or diarrhea.  Denies having any dysuria or hematuria.





She states that she was feeling sick.  





In the ED the patient was found to have atrial fibrillation with rapid 

ventricular rate was started on IV Cardizem drip.  The drip has been 

discontinued this morning and the patient's heart rate is in between 90s to 

100s.





She was also found to have urine positive for nitrates and the chest x-ray 

positive for an infiltrate for which she has been started on Zosyn and Levaquin.








Review of Systems





REVIEW OF SYSTEMS:





PSYCH: Normal psychiatric exam


HEENT: Patient very hard of hearing


NEURO:No c/o weakness of the extremties, No facial droop, No speech 

abnormalities.


VASCULAR: Peripheral nervous system within the normal limits no edema


HEMATOLOGIC: No history of easy bleeding and bruising .  No recent infections .


RESPIRATORY: No cough, No SOB, No chest discomfort. 


INTEGUMENT: no rashes


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


: No dysuria or hematuria


GYN: No bleeding PV


CARDIAC: No chest pain , shortness of breath , paroxysmal nocturnal dyspnea


MUSCULOSKELETAL : No Aches or pains in the joints or muscles. 


GI: No abdominal pain, Nausea or vomiting. No constipation or diarrhea. 








Past Medical History


Past Medical History: Heart Failure, COPD, Dementia, GERD/Reflux, Hyperlipidemia

, Hypertension, Renal Disease, Respiratory Disorder, Thyroid Disorder


Additional Past Medical History / Comment(s): Pt was born with cleft palate and 

other physical problems with facial structures including nasal passages and ear 

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  She can 

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 

in the R eye and can see/read with left eye-it also has glaucoma, chronic renal 

disease stage IV-has L arm fistula but no hemodialysis, frequent UTIs, wears O2 

at 3L/NC ATC, falls, hypothyroid.


Last Myocardial Infarction Date:: unknown


History of Any Multi-Drug Resistant Organisms: ESBL


Date of last positivie culture/infection: 3/3/18


MDRO Source:: ESBL URINE


Past Surgical History: Bladder Surgery, Joint Replacement, Orthopedic Surgery


Additional Past Surgical History / Comment(s): Pt has had multiple surgeries 

for birth defects affecting mouth, nose and ears, total L knee arthroplasty, 

bladder sling, fistula L arm.


Past Anesthesia/Blood Transfusion Reactions: No Reported Reaction


Past Psychological History: No Psychological Hx Reported


Additional Psychological History / Comment(s): [place] [facility] [place]  She was getting around in a wheelchair.  She wears O2 at 3L/NC.  She 

gets updraft treatments 4 times a day.  She is on a regular thin consistency 

diet and ensure plus bwice a day.


Smoking Status: Former smoker


Past Alcohol Use History: None Reported


Additional Past Alcohol Use History / Comment(s): Pt quit smoking in .


Past Drug Use History: None Reported





- Past Family History


  ** Father


Family Medical History: Cancer


Additional Family Medical History / Comment(s): Father  prior to age 60 yrs 

with esophageal cancer.  He was a smoker and a drinker.





  ** Mother


Family Medical History: Osteoarthritis (OA), Renal Disease, Rheumatoid 

Arthritis (RA)


Additional Family Medical History / Comment(s): kidney problems, specific type 

unknown





Medications and Allergies


 Home Medications











 Medication  Instructions  Recorded  Confirmed  Type


 


Allopurinol [Zyloprim] 300 mg PO DAILY 17 History


 


Dicyclomine HCl 10 mg PO Q6H 17 History


 


Fluticasone Nasal Spray [Flonase 1 spray EA NOSTRIL Q12H 17 

History





Nasal Spray]    


 


Levothyroxine Sodium [Synthroid] 25 mcg PO DAILY@0600 17 History


 


Acetaminophen Tab [Tylenol] 650 mg PO Q6HR PRN  tab 17 Rx


 


ALPRAZolam [Xanax] 0.25 mg PO HS 11/10/17 04/30/18 History


 


Albuterol Nebulized [Ventolin 2.5 mg INHALATION RT-Q4H PRN 11/10/17 04/29/18 

History





Nebulized]    


 


Atorvastatin [Lipitor] 10 mg PO HS 11/10/17 04/30/18 History


 


Buprenorphine [Butrans 10 MCG/HOUR] 1 patch TRANSDERM TU 11/10/17 04/30/18 

History


 


Ergocalciferol (Vitamin D2) 50,000 unit PO Q30D 11/10/17 04/30/18 History





[Vitamin D2]    


 


Loratadine [Claritin] 10 mg PO DAILY PRN 11/10/17 04/30/18 History


 


Metolazone [Zaroxolyn] 5 mg PO MOTH 11/10/17 04/30/18 History


 


Metoprolol Tartrate [Lopressor] 50 mg PO BID@0900,1700 11/10/17 04/30/18 History


 


Potassium Chloride [Klor-Con 20] 20 meq PO BID@0900,1700 11/10/17 04/30/18 

History


 


Ranitidine HCl [Zantac] 150 mg PO BID@0600,1700 11/10/17 04/30/18 History


 


guaiFENesin [guaiFENesin Oral 200 mg PO Q4HR PRN 11/10/17 04/30/18 History





Solution]    


 


hydrALAZINE HCL [Apresoline] 50 mg PO TID 11/10/17 04/30/18 History


 


prednisoLONE ACETATE [Pred Forte 2 drop LEFT EYE DAILY 11/10/17 04/30/18 History





1%]    


 


Magnesium Hydroxide [Milk of 2,400 mg PO DAILY PRN  ml 17 Rx





Magnesia Concentrate]    


 


Sennosides-Docusate Sodium 1 tab PO BID #60 tablet 17 Rx





[Senokot-S]    


 


Ferrous Sulfate [Feosol] 325 mg PO BID@0900,1700 18 History


 


Furosemide [Lasix] 20 mg PO DAILY@0600 18 History


 


HYDROcodone/APAP 5-325MG [Norco 1 - 2 tab PO Q4HR PRN 18 History





5-325]    


 


Latanoprost [Xalatan 0.005%] 1 drop LEFT EYE HS 18 History


 


Melatonin 3 mg PO HS PRN 18 History


 


SILVER sulfADIAZINE CREAM 1 applic TOPICAL BID 18 History





[Silvadene Cream]    


 


Triad Hydrophilic Wound Paste 1 applic TOPICAL Q12H 18 History


 


Vit C/E/Zn/Coppr/Lutein/Zeaxan 1 cap PO BID 18 History





[Preservision Areds 2 Softgel]    


 


Warfarin [Coumadin] 2 mg PO MOTUWEFRSA 18 History


 


acetaZOLAMIDE [Diamox] 250 mg PO TU 18 History











 Allergies











Allergy/AdvReac Type Severity Reaction Status Date / Time


 


No Known Allergies Allergy   Verified 18 09:38














Physical Exam


Vitals: 


 Vital Signs











  Temp Pulse Pulse Resp BP BP Pulse Ox


 


 18 11:10  97.0 F L   91  18   126/69  95


 


 18 07:55  97.3 F L   108 H  18   127/68  95


 


 18 07:15        97


 


 18 04:00  97.6 F   118 H  18   158/78  98


 


 18 00:00    112 H  18   


 


 18 23:30  97.0 F L   112 H  18   120/31  96


 


 18 22:36  98.3 F  96   16  124/59   97


 


 18 20:53  96.7 F L  110 H   18  110/89   96


 


 18 20:00  100.5 F H  106 H   18  107/53   98


 


 18 19:11   128 H   18  154/68   98


 


 18 17:59  101.8 F H  136 H   22  181/94   98


 


 18 17:02  97.2 F L  144 H   28 H  161/74   91 L








 Intake and Output











 18





 06:59 14:59 22:59


 


Intake Total 160  


 


Balance 160  


 


Intake:   


 


  Intake, IV Titration 160  





  Amount   


 


    Sodium Chloride 0.9% 1, 160  





    000 ml @ 20 mls/hr IV .   





    Q24H [facility] Rx#:[id number]   


 


Other:   


 


  Voiding Method Toilet Toilet 


 


  Weight 77.7 kg  














GENERAL EXAM








GEN. APPEARANCE: alert, in no apparent distress


HEAD EXAM:  atraumatic, normocephalic, normal inspection


EYE EXAM: right eye is completely white


ENT EXAM:  normal exam, mucous membranes moist


NECK EXAM:  normal inspection.  Absent: tenderness, meningismus, full ROM, 

lymphadenopathy


RESPIRATORY EXAM: decaresed breath sounds in all lung fields.  No crackles at 

the lower lung bases


CARDIOVASCULAR EXAM: Irregularly irregular


GI/ABDOMINAL EXAM: soft, normal bowel sounds.  Absent: distended, tenderness, 

guarding, rebound, rigid


EXTREMITIES EXAM:  mild Bilateral pitting edema


NEUROLOGICAL EXAM:  alert, oriented X3, no focal deficits on gross exam


PSYCHIATRIC EXAM:  normal affect, normal mood


SKIN EXAM:  warm, dry, intact, normal color.  Absent: rash





Results


CBC & Chem 7: 


 18 17:30





 18 17:30


Labs: 


 Abnormal Lab Results - Last 24 Hours (Table)











  18 Range/Units





  17:30 17:30 17:30 


 


WBC  13.4 H    (3.8-10.6)  k/uL


 


RDW  17.0 H    (11.5-15.5)  %


 


Neutrophils #  12.7 H    (1.3-7.7)  k/uL


 


Lymphocytes #  0.2 L    (1.0-4.8)  k/uL


 


PT    49.9 H  (9.0-12.0)  sec


 


INR    5.5 H*  (<1.2)  


 


APTT    30.9 H  (22.0-30.0)  sec


 


BUN   41 H   (7-17)  mg/dL


 


Creatinine   1.53 H   (0.52-1.04)  mg/dL


 


Glucose   122 H   (74-99)  mg/dL


 


AST   164 H   (14-36)  U/L


 


ALT   158 H   (9-52)  U/L


 


Alkaline Phosphatase   255 H   ()  U/L


 


Total Protein   6.0 L   (6.3-8.2)  g/dL


 


Albumin   3.4 L   (3.5-5.0)  g/dL


 


Urine Appearance     (Clear)  


 


Urine Protein     (Negative)  


 


Urine Blood     (Negative)  


 


Urine Nitrite     (Negative)  


 


Ur Leukocyte Esterase     (Negative)  


 


Urine RBC     (0-5)  /hpf


 


Urine WBC     (0-5)  /hpf


 


Urine WBC Clumps     (None)  /hpf


 


Amorphous Sediment     (None)  /hpf


 


Urine Bacteria     (None)  /hpf














  18 Range/Units





  18:45 08:40 


 


WBC    (3.8-10.6)  k/uL


 


RDW    (11.5-15.5)  %


 


Neutrophils #    (1.3-7.7)  k/uL


 


Lymphocytes #    (1.0-4.8)  k/uL


 


PT   59.5 H  (9.0-12.0)  sec


 


INR   6.5 H*  (<1.2)  


 


APTT    (22.0-30.0)  sec


 


BUN    (7-17)  mg/dL


 


Creatinine    (0.52-1.04)  mg/dL


 


Glucose    (74-99)  mg/dL


 


AST    (14-36)  U/L


 


ALT    (9-52)  U/L


 


Alkaline Phosphatase    ()  U/L


 


Total Protein    (6.3-8.2)  g/dL


 


Albumin    (3.5-5.0)  g/dL


 


Urine Appearance  Cloudy H   (Clear)  


 


Urine Protein  1+ H   (Negative)  


 


Urine Blood  Moderate H   (Negative)  


 


Urine Nitrite  Positive H   (Negative)  


 


Ur Leukocyte Esterase  Large H   (Negative)  


 


Urine RBC  23 H   (0-5)  /hpf


 


Urine WBC  167 H   (0-5)  /hpf


 


Urine WBC Clumps  Many H   (None)  /hpf


 


Amorphous Sediment  Rare H   (None)  /hpf


 


Urine Bacteria  Many H   (None)  /hpf








 Microbiology - Last 24 Hours (Table)











 18 17:37 Blood Culture Gram Stain - Preliminary





 Blood 


 


 18 17:30 Blood Culture - Final





 Blood 


 


 18 18:45 Urine Culture - Preliminary





 Urine,Catheterized 














Thrombosis Risk Factor Assmnt





- Choose All That Apply


Any of the Below Risk Factors Present?: Yes


Each Factor Represents 1 point: Abnormal pulmonary function (COPD)


Other Risk Factors: Yes


Each Risk Factor Represents 3 Points: Age 75 years or older


Other congenital or acquired thrombophilia - If yes, enter type in comment: No


Thrombosis Risk Factor Assessment Total Risk Factor Score: 4


Thrombosis Risk Factor Assessment Level: Moderate Risk





Assessment and Plan


Assessment: 





ASSESSMENT





Sepsis secondary to pneumonia and UTI


Right lower lobe pneumonia


Urinary tract infection possibly gram-negative organisms


Atrial fibrillation with rapid ventricular rate


Supra-Therapeutic INR - no signs of active bleeding


Elevated liver function tests - can be secondary to hypotension


Acute kidney injury - prerenal


Mild protein calorie malnutrition





PLAN





Patient has been started on IV Zosyn and Levaquin for her UTI and pneumonia 

which will be continued.  Will start on IV fluids normal saline at 75 mL per 

output for her acute kidney injury.  Vitamin K 10 mg by mouth for 

supratherapeutic INR.  Patient's atrial fibrillation is rate controlled now we'

ll continue with Lopressor and have cardiology on board.  We'll continue with 

the rest of her home medication regimen.  Patient is a DO NOT RESUSCITATE.  

Further recommendations to follow depending on the progress of the patient.

## 2018-05-01 LAB
ANION GAP SERPL CALC-SCNC: 13 MMOL/L
BASOPHILS # BLD AUTO: 0 K/UL (ref 0–0.2)
BASOPHILS NFR BLD AUTO: 0 %
BUN SERPL-SCNC: 49 MG/DL (ref 7–17)
CALCIUM SPEC-MCNC: 9.7 MG/DL (ref 8.4–10.2)
CHLORIDE SERPL-SCNC: 102 MMOL/L (ref 98–107)
CO2 SERPL-SCNC: 22 MMOL/L (ref 22–30)
EOSINOPHIL # BLD AUTO: 0.1 K/UL (ref 0–0.7)
EOSINOPHIL NFR BLD AUTO: 1 %
ERYTHROCYTE [DISTWIDTH] IN BLOOD BY AUTOMATED COUNT: 3.86 M/UL (ref 3.8–5.4)
ERYTHROCYTE [DISTWIDTH] IN BLOOD: 16.8 % (ref 11.5–15.5)
GLUCOSE SERPL-MCNC: 95 MG/DL (ref 74–99)
HCT VFR BLD AUTO: 33.9 % (ref 34–46)
HGB BLD-MCNC: 10.4 GM/DL (ref 11.4–16)
INR PPP: 1.5 (ref ?–1.2)
LYMPHOCYTES # SPEC AUTO: 0.6 K/UL (ref 1–4.8)
LYMPHOCYTES NFR SPEC AUTO: 6 %
MCH RBC QN AUTO: 26.9 PG (ref 25–35)
MCHC RBC AUTO-ENTMCNC: 30.6 G/DL (ref 31–37)
MCV RBC AUTO: 87.9 FL (ref 80–100)
MONOCYTES # BLD AUTO: 0.6 K/UL (ref 0–1)
MONOCYTES NFR BLD AUTO: 7 %
NEUTROPHILS # BLD AUTO: 7.5 K/UL (ref 1.3–7.7)
NEUTROPHILS NFR BLD AUTO: 83 %
PLATELET # BLD AUTO: 179 K/UL (ref 150–450)
POTASSIUM SERPL-SCNC: 3.8 MMOL/L (ref 3.5–5.1)
PT BLD: 13.9 SEC (ref 9–12)
SODIUM SERPL-SCNC: 137 MMOL/L (ref 137–145)
WBC # BLD AUTO: 9.1 K/UL (ref 3.8–10.6)

## 2018-05-01 RX ADMIN — DILTIAZEM HYDROCHLORIDE SCH MLS/HR: 5 INJECTION INTRAVENOUS at 01:12

## 2018-05-01 RX ADMIN — DEXTROSE MONOHYDRATE SCH MLS/HR: 5 INJECTION, SOLUTION INTRAVENOUS at 08:53

## 2018-05-01 RX ADMIN — METOPROLOL TARTRATE SCH MG: 50 TABLET, FILM COATED ORAL at 19:52

## 2018-05-01 RX ADMIN — METOPROLOL TARTRATE SCH MG: 50 TABLET, FILM COATED ORAL at 08:48

## 2018-05-01 RX ADMIN — ATORVASTATIN CALCIUM SCH MG: 10 TABLET, FILM COATED ORAL at 19:51

## 2018-05-01 RX ADMIN — ALLOPURINOL SCH MG: 300 TABLET ORAL at 08:48

## 2018-05-01 RX ADMIN — POTASSIUM CHLORIDE SCH MEQ: 20 TABLET, EXTENDED RELEASE ORAL at 08:47

## 2018-05-01 RX ADMIN — NITROFURANTOIN (MONOHYDRATE/MACROCRYSTALS) SCH MG: 75; 25 CAPSULE ORAL at 19:51

## 2018-05-01 RX ADMIN — PREDNISOLONE ACETATE SCH DROPS: 10 SUSPENSION/ DROPS OPHTHALMIC at 08:48

## 2018-05-01 RX ADMIN — NITROFURANTOIN (MONOHYDRATE/MACROCRYSTALS) SCH MG: 75; 25 CAPSULE ORAL at 08:48

## 2018-05-01 RX ADMIN — FUROSEMIDE SCH MG: 40 TABLET ORAL at 08:47

## 2018-05-01 RX ADMIN — CEFAZOLIN SCH: 330 INJECTION, POWDER, FOR SOLUTION INTRAMUSCULAR; INTRAVENOUS at 22:45

## 2018-05-01 RX ADMIN — DEXTROSE MONOHYDRATE SCH MLS/HR: 5 INJECTION, SOLUTION INTRAVENOUS at 20:40

## 2018-05-01 RX ADMIN — POTASSIUM CHLORIDE SCH MEQ: 20 TABLET, EXTENDED RELEASE ORAL at 19:51

## 2018-05-01 RX ADMIN — FUROSEMIDE SCH MG: 10 INJECTION, SOLUTION INTRAMUSCULAR; INTRAVENOUS at 11:17

## 2018-05-01 RX ADMIN — DOCUSATE SODIUM AND SENNOSIDES SCH EACH: 50; 8.6 TABLET ORAL at 19:51

## 2018-05-01 RX ADMIN — DILTIAZEM HYDROCHLORIDE SCH MLS/HR: 5 INJECTION INTRAVENOUS at 08:54

## 2018-05-01 RX ADMIN — DILTIAZEM HYDROCHLORIDE SCH MG: 60 TABLET, FILM COATED ORAL at 19:53

## 2018-05-01 RX ADMIN — LEVOTHYROXINE SODIUM SCH MCG: 25 TABLET ORAL at 06:00

## 2018-05-01 RX ADMIN — ACETAMINOPHEN PRN MG: 325 TABLET, FILM COATED ORAL at 20:39

## 2018-05-01 RX ADMIN — ASPIRIN SCH: 325 TABLET ORAL at 09:15

## 2018-05-01 RX ADMIN — CEFAZOLIN SCH MLS/HR: 330 INJECTION, POWDER, FOR SOLUTION INTRAMUSCULAR; INTRAVENOUS at 06:01

## 2018-05-01 RX ADMIN — FAMOTIDINE SCH MG: 20 TABLET, FILM COATED ORAL at 08:48

## 2018-05-01 RX ADMIN — FUROSEMIDE SCH MG: 10 INJECTION, SOLUTION INTRAMUSCULAR; INTRAVENOUS at 19:51

## 2018-05-01 RX ADMIN — DOCUSATE SODIUM AND SENNOSIDES SCH EACH: 50; 8.6 TABLET ORAL at 08:49

## 2018-05-01 NOTE — P.CRDCN
History of Present Illness


Consult date: 18


Requesting physician: Navin Valdez


Consult reason: atrial fibrillation


Chief complaint: Fever and agitation


History of present illness: 


Is is a pleasant 82-year-old  female who resides at an extended care 

facility.  She was brought to the hospital because of fever and mild agitation.

  Patient has a known history of paroxysmal atrial fibrillation, dementia mild, 

blindness in the right eye, COPD, GERD, hypertension, hyperlipidemia, renal 

disease, hypothyroidism, she is quite hard of hearing however to speak loudly 

she answers appropriately.  EKG on arrival here showed atrial fibrillation with 

a rapid ventricular response and nonspecific ST-T wave changes.  Chest x-ray 

reveals suspicion of pneumonia in the right lower lobe and findings consistent 

with mild congestive heart failure.  The pressure on arrival 160/70 with a 

heart rate of 144, 91% on room air.  Temperature did go up to 101.8.  Blood 

culture positive for gram-negative bacilli.  Urine culture positive for gram-

negative bacilli.  White blood cell count yesterday 13.4, 9.1 this morning.  

Hemoglobin 13.2 with a hemoglobin of 10.2 this morning.  Platelet count 179.  

INR on admission 5.5, subsequent 6.5, 1.5 this morning.  Sodium 137, potassium 

3.8, BUN 49, creatinine 1.8.  , , alk phos 255.  Troponin 0.015, 

BNP 6380.  Positive UTI.  At the time of my examination this morning, patient 

continues to be in atrial fibrillation with a heart rate of 1:30.  She denies 

palpitations at present but states when she exerts herself she can really feel 

her heart going fast.  Breathing overall according to her has been stable.  

Patient was initiated on IV Cardizem in the emergency room she continues to be 

on at this time.  Echocardiogram with Doppler study performed in 2017 

revealed an ejection fraction of 60-65%.








Past Medical History


Past Medical History: Heart Failure, COPD, Dementia, GERD/Reflux, Hyperlipidemia

, Hypertension, Renal Disease, Respiratory Disorder, Thyroid Disorder


Additional Past Medical History / Comment(s): Pt was born with cleft palate and 

other physical problems with facial structures including nasal passages and ear 

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  She can 

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 

in the R eye and can see/read with left eye-it also has glaucoma, chronic renal 

disease stage IV-has L arm fistula but no hemodialysis, frequent UTIs, wears O2 

at 3L/NC ATC, falls, hypothyroid.


Last Myocardial Infarction Date:: unknown


History of Any Multi-Drug Resistant Organisms: ESBL


Date of last positivie culture/infection: 3/3/18


MDRO Source:: ESBL URINE


Past Surgical History: Bladder Surgery, Joint Replacement, Orthopedic Surgery


Additional Past Surgical History / Comment(s): Pt has had multiple surgeries 

for birth defects affecting mouth, nose and ears, total L knee arthroplasty, 

bladder sling, fistula L arm.


Past Anesthesia/Blood Transfusion Reactions: No Reported Reaction


Past Psychological History: No Psychological Hx Reported


Additional Psychological History / Comment(s): [place] [facility] [place]  She was getting around in a wheelchair.  She wears O2 at 3L/NC.  She 

gets updraft treatments 4 times a day.  She is on a regular thin consistency 

diet and ensure plus bwice a day.


Smoking Status: Former smoker


Past Alcohol Use History: None Reported


Additional Past Alcohol Use History / Comment(s): Pt quit smoking in .


Past Drug Use History: None Reported





- Past Family History


  ** Father


Family Medical History: Cancer


Additional Family Medical History / Comment(s): Father  prior to age 60 yrs 

with esophageal cancer.  He was a smoker and a drinker.





  ** Mother


Family Medical History: Osteoarthritis (OA), Renal Disease, Rheumatoid 

Arthritis (RA)


Additional Family Medical History / Comment(s): kidney problems, specific type 

unknown





Medications and Allergies


 Home Medications











 Medication  Instructions  Recorded  Confirmed  Type


 


Allopurinol [Zyloprim] 300 mg PO DAILY 17 History


 


Dicyclomine HCl 10 mg PO Q6H 17 History


 


Fluticasone Nasal Spray [Flonase 1 spray EA NOSTRIL Q12H 17 

History





Nasal Spray]    


 


Levothyroxine Sodium [Synthroid] 25 mcg PO DAILY@0600 17 History


 


Acetaminophen Tab [Tylenol] 650 mg PO Q6HR PRN  tab 17 Rx


 


ALPRAZolam [Xanax] 0.25 mg PO HS 11/10/17 04/30/18 History


 


Albuterol Nebulized [Ventolin 2.5 mg INHALATION RT-Q4H PRN 11/10/17 04/29/18 

History





Nebulized]    


 


Atorvastatin [Lipitor] 10 mg PO HS 11/10/17 04/30/18 History


 


Buprenorphine [Butrans 10 MCG/HOUR] 1 patch TRANSDERM TU 11/10/17 04/30/18 

History


 


Ergocalciferol (Vitamin D2) 50,000 unit PO Q30D 11/10/17 04/30/18 History





[Vitamin D2]    


 


Loratadine [Claritin] 10 mg PO DAILY PRN 11/10/17 04/30/18 History


 


Metolazone [Zaroxolyn] 5 mg PO MOTH 11/10/17 04/30/18 History


 


Metoprolol Tartrate [Lopressor] 50 mg PO BID@0900,1700 11/10/17 04/30/18 History


 


Potassium Chloride [Klor-Con 20] 20 meq PO BID@0900,1700 11/10/17 04/30/18 

History


 


Ranitidine HCl [Zantac] 150 mg PO BID@0600,1700 11/10/17 04/30/18 History


 


guaiFENesin [guaiFENesin Oral 200 mg PO Q4HR PRN 11/10/17 04/30/18 History





Solution]    


 


hydrALAZINE HCL [Apresoline] 50 mg PO TID 11/10/17 04/30/18 History


 


prednisoLONE ACETATE [Pred Forte 2 drop LEFT EYE DAILY 11/10/17 04/30/18 History





1%]    


 


Magnesium Hydroxide [Milk of 2,400 mg PO DAILY PRN  ml 17 Rx





Magnesia Concentrate]    


 


Sennosides-Docusate Sodium 1 tab PO BID #60 tablet 17 Rx





[Senokot-S]    


 


Ferrous Sulfate [Feosol] 325 mg PO BID@0900,1700 18 History


 


Furosemide [Lasix] 20 mg PO DAILY@0600 18 History


 


HYDROcodone/APAP 5-325MG [Norco 1 - 2 tab PO Q4HR PRN 18 History





5-325]    


 


Latanoprost [Xalatan 0.005%] 1 drop LEFT EYE HS 18 History


 


Melatonin 3 mg PO HS PRN 18 History


 


SILVER sulfADIAZINE CREAM 1 applic TOPICAL BID 18 History





[Silvadene Cream]    


 


Triad Hydrophilic Wound Paste 1 applic TOPICAL Q12H 18 History


 


Vit C/E/Zn/Coppr/Lutein/Zeaxan 1 cap PO BID 18 History





[Preservision Areds 2 Softgel]    


 


Warfarin [Coumadin] 2 mg PO MOTUWEFRSA 18 History


 


acetaZOLAMIDE [Diamox] 250 mg PO TU 18 History











 Allergies











Allergy/AdvReac Type Severity Reaction Status Date / Time


 


No Known Allergies Allergy   Verified 18 09:38














Physical Exam


Vitals: 


 Vital Signs











  Temp Pulse Resp BP Pulse Ox


 


 18 08:42      95


 


 18 04:00  97.0 F L  113 H  18  140/89  95


 


 18 01:30   108 H    100


 


 18 00:00  97.1 F L  151 H  18  146/93  92 L


 


 18 20:00  98.7 F  109 H  20  145/77  100


 


 18 16:25  97.0 F L  98  16  149/89  92 L


 


 18 11:10  97.0 F L  91  18  126/69  95








 Intake and Output











 18





 22:59 06:59 14:59


 


Intake Total  600 


 


Balance  600 


 


Intake:   


 


  Intake, IV Titration  600 





  Amount   


 


    Sodium Chloride 0.9% 1,  600 





    000 ml @ 75 mls/hr IV .   





    [id number] [facility] Rx#:[id number]   


 


Other:   


 


  Voiding Method Toilet Toilet 


 


  # Voids 2 6 


 


  Weight  76.7 kg 











PHYSICAL EXAMINATION: 82-year-old  female in no apparent distress at 

the time of my examination





HEENT: Head is atraumatic, normocephalic.  Right eye partially close, cloudy.  

Neck is supple.  There is no elevated jugular venous pressure.





HEART EXAMINATION: Heart S1 and S2 irregularly irregular





CHEST EXAMINATION: Lungs are clear with mild diminished air entry to the bases 

fine crackles heard to the right posterior base.





ABDOMEN:  Soft, nontender. Bowel sounds are heard. No organomegaly noted.


 


EXTREMITIES: 2+ peripheral pulses with  evidence of peripheral edema and no 

calf tenderness noted.





NEUROLOGIC patient is awake, alert and oriented -2.


 


.


 











Results





 18 05:56





 18 05:56


 Coagulation











  18 Range/Units





  08:40 05:56 


 


PT  59.5 H  13.9 H  (9.0-12.0)  sec








 CBC











  18 Range/Units





  05:56 


 


WBC  9.1  (3.8-10.6)  k/uL


 


RBC  3.86  (3.80-5.40)  m/uL


 


Hgb  10.4 L  (11.4-16.0)  gm/dL


 


Hct  33.9 L  (34.0-46.0)  %


 


Plt Count  179  (150-450)  k/uL








 Comprehensive Metabolic Panel











  18 Range/Units





  05:56 


 


Sodium  137  (137-145)  mmol/L


 


Potassium  3.8  (3.5-5.1)  mmol/L


 


Chloride  102  ()  mmol/L


 


Carbon Dioxide  22  (22-30)  mmol/L


 


BUN  49 H  (7-17)  mg/dL


 


Creatinine  1.88 H  (0.52-1.04)  mg/dL


 


Glucose  95  (74-99)  mg/dL


 


Calcium  9.7  (8.4-10.2)  mg/dL








 Current Medications











Generic Name Dose Route Start Last Admin





  Trade Name Freq  PRN Reason Stop Dose Admin


 


Acetaminophen  650 mg  18 21:54  





  Tylenol Tab  PO   





  Q6HR PRN   





  Mild Pain or Fever > 100.5   


 


Hydrocodone Bitart/Acetaminophen  1 each  18 21:54  





  Erving 5-325  PO   





  Q6H PRN   





  Moderate Pain   


 


Albuterol Sulfate  2.5 mg  18 21:54  





  Ventolin Nebulized  INHALATION   





  RT-Q4H PRN   





  COPD   


 


Allopurinol  300 mg  18 09:00  18 08:35





  Zyloprim  PO   300 mg





  DAILY PATRICK   Administration


 


Alprazolam  0.25 mg  18 21:54  





  Xanax  PO   





  BID PRN   





  Anxiety   


 


Atorvastatin Calcium  10 mg  18 21:00  18 20:38





  Lipitor  PO   10 mg





  HS PATRICK   Administration


 


Famotidine  20 mg  18 09:00  





  Pepcid  PO   





  DAILY PATRICK   


 


Furosemide  40 mg  18 09:00  18 20:38





  Lasix  PO   40 mg





  BID PATRICK   Administration


 


Hydralazine HCl  50 mg  18 22:00  18 20:39





  Apresoline  PO   50 mg





  TID PATRICK   Administration


 


Piperacillin/Tazobactam/  50 mls @ 12.5 mls/hr  18 09:00  18 20:38





  Dextrose 3.375 gm/ IV Solution  IVPB   12.5 mls/hr





  Q12HR PATRICK   Administration


 


Sodium Chloride  1,000 mls @ 20 mls/hr  18 22:00  18 20:43





  Saline 0.9%  IV   Not Given





  .Q24H PATRICK   


 


Levofloxacin 750 mg/ IV  150 mls @ 100 mls/hr  18 19:00  





  Solution  IVPB   





  Q48H PATRICK   


 


Sodium Chloride  1,000 mls @ 75 mls/hr  18 15:00  18 06:01





  Saline 0.9%  IV   75 mls/hr





  .[id number] PATRICK   Administration


 


Diltiazem HCl 50 mg/ Sodium  50 mls @ 5 mls/hr  18 00:45  18 01:12





  Chloride  IV   5 mg/hr





  .Q10H PATRICK   5 mls/hr





  Protocol   Administration





  5 MG/HR   


 


Levothyroxine Sodium  25 mcg  18 06:30  18 06:00





  Synthroid  PO   25 mcg





  0630 PATRICK   Administration


 


Magnesium Hydroxide  2,400 mg  18 21:54  





  Milk Of Magnesia  PO   





  DAILY PRN   





  Constipation   


 


Metolazone  5 mg  18 09:00  18 08:35





  Zaroxolyn  PO   5 mg





  MOTH PATRICK   Administration


 


Metoprolol Tartrate  50 mg  18 09:00  18 08:36





  Lopressor  PO   50 mg





  DAILY PATRICK   Administration


 


Miscellaneous Information  1 each  18 21:51  





  Pneumonia Protocol Utilized  PO   





  ONCE PRN   





  Per Protocol   


 


Nitrofurantoin Macrocrystals  100 mg  18 09:00  18 20:38





  Macrobid  PO   100 mg





  BID PATRICK   Administration


 


Non-Formulary Medication  1 patch  18 09:00  





  Buprenorphine [Butrans 10 Mcg/Hour]  TRANSDERM   





  TU [facility]   


 


Potassium Chloride  20 meq  18 09:00  18 20:39





  K-Dur 20  PO   20 meq





  BID PATRICK   Administration


 


Prednisolone Acetate  2 drops  18 09:00  18 08:36





  Pred Forte 1%  LEFT EYE   2 drops





  DAILY PATRICK   Administration


 


Senna/Docusate Sodium  1 each  18 09:00  18 20:39





  Senokot-S  PO   1 each





  BID PATRICK   Administration








 Intake and Output











 18





 22:59 06:59 14:59


 


Intake Total  600 


 


Balance  600 


 


Intake:   


 


  Intake, IV Titration  600 





  Amount   


 


    Sodium Chloride 0.9% 1,  600 





    000 ml @ 75 mls/hr IV .   





    [id number] PATRICK Rx#:[id number]   


 


Other:   


 


  Voiding Method Toilet Toilet 


 


  # Voids 2 6 


 


  Weight  76.7 kg 








 





 18 05:56 





 18 05:56 











EKG Interpretations (text)


EKG shows atrial fibrillation with a rapid ventricular response








Assessment and Plan


Plan: 


Assessment and plan


#1 fever with evidence of UTI, positive blood cultures positive urine cultures 

early on antibiotics.


#2 atrial fibrillation with rapid ventricular response


#3 history of paroxysmal atrial fibrillation on Coumadin for anticoagulation, 

INR 5.5 on admission, 1.5 morning.


#4 hypertension


Number 5 hyperlipidemia


#6 acute on chronic renal insufficiency


#7  congestive cardiac failure, could be secondary to atrial fibrillation with 

rapid ventricular response, likely diastolic in nature.  LV function normal in 

2017.


#8 abnormal liver enzymes, could be secondary to congestion.








Plan


Will obtain a repeat echocardiogram with Doppler study, initiate some IV Lasix.

  We will also increase the dose of beta blocker for more optimal heart rate 

control.  Obtain free T4 and TSH level.  Further recommendations to follow.








DNP note has been reviewed, I agree with a documented findings and plan of 

care.  Patient was seen and examined.

## 2018-05-01 NOTE — CDI
Last Revision, December 2017





Documentation Clarification Form



Date: 5/1/2018 10:50:00 AM

From: Ne CardenasKEVIN, CCDS

Phone: [phone]

MRN: [id number]

Admit Date: 4/29/2018 9:51:00 PM

Patient Name: Rony Scanlon 

Visit Number: [id number]

Discharge Date:



ATTENTION: The Clinical Documentation Specialists (CDI) and [facility] Coding Staff 
appreciate your assistance in clarifying documentation. Please respond to the 
clarification below the line at the bottom and electronically sign. The CDI & 
[facility] Coding staff will review the response and follow-up if needed. Please note: 
Queries are made part of the Legal Health Record. If you have any questions, 
please contact the author of this message via ITS.



Dr. Laura Bauer:



81 yo female, presented from NH with fever, agitation, elevated heart rate, 
diagnosed with Sepsis with UTI & pneumonia, JACK and atrial fibrillation.



History: Home O2 3Lnc ATC, CHF, COPD, Hypertension, CKD stage IV. Receives 
updates 4 times daily. Tobacco use: Former smoker.



Clinical Indicators: 

Vital signs: T 97.2*, P 144^, R 28, /74, PO 91 RA, 98 2Lnc.

Lung/Breathing assessment: SOB



Treatment: O2 2Lnc, IV Levaquin, Nitropaste, IV Cardizem (dc'd), IV Zosyn, 
Albuterol Neb txs.



In your professional opinion, can you please clarify if these findings signify 
one of the following conditions?  

    Acuity:

      o Acute 

      o Chronic

      o Acute on Chronic



    Specificity:

       Respiratory Failure, further specify (if known):

         o With hypercapnia?

         o With hypoxia?

         o Respiratory Insufficiency

         o Other Diagnosis, please specify

         o Unable to determine



Please continue to document in your progress notes and discharge summary in 
order to capture severity of illness and risk of mortality. Include clinical 
findings that support your diagnosis.

___________________________________________________________________________
MTDD

## 2018-05-01 NOTE — ECHOF
Referral Reason:afib



MEASUREMENTS

--------

HEIGHT: 154.9 cm

WEIGHT: 76.7 kg

BP: 140/89

RVIDd:   3.0 cm     (< 3.3)

IVSd:   1.0 cm     (0.6 - 1.1)

LVIDd:   3.7 cm     (3.9 - 5.3)

LVPWd:   1.1 cm     (0.6 - 1.1)

IVSs:   1.6 cm

LVIDs:   2.5 cm

LVPWs:   1.4 cm

LAESV Index (A-L):   37.49 ml/m

Ao Diam:   2.5 cm     (2.0 - 3.7)

AV Cusp:   1.4 cm     (1.5 - 2.6)

LA Diam:   4.5 cm     (2.7 - 3.8)

EPSS:   0.7 cm

MV E Gilbert:   1.40 m/s

MV DecT:   166 ms

MV A Gilbert:   0.00 m/s

MV E/A Ratio:   [id number] 

RAP:   5.00 mmHg

RVSP:   51.70 mmHg

MV EF SLOPE:   127.39 mm/s     (70 - 150)

MV EXCURSION:   2.08 cm     (> 18.000)







FINDINGS

--------

Atrial fibrillation.

This was a technically adequate study.

The left ventricular size is normal.   Left ventricular wall thickness is normal.   Overall left vent
ricular systolic function is normal with, an EF between 55 - 60 %.

The right ventricle is mildly enlarged.

LA is moderately dilated 34-39 ml/m2

The right atrium is markedly enlarged.

There is mild to moderate aortic valve sclerosis.   There is no evidence of aortic regurgitation.   T
here is no evidence of aortic stenosis.

Mild mitral annular calcification present.   There is trace to mild mitral regurgitation.

Moderate to severe tricuspid regurgitation present.   There is mild to moderate pulmonary hypertensio
n.   The right ventricular systolic pressure, as measured by Doppler, is 51.70mmHg.

The pulmonic valve was not well visualized.

The aortic root size is normal.

Normal inferior vena cava with normal inspiratory collapse consistent with estimated right atrial pre
ssure of  5 mmHg.

There is no pericardial effusion.



CONCLUSIONS

--------

1. Atrial fibrillation.

2. This was a technically adequate study.

3. The left ventricular size is normal.

4. Left ventricular wall thickness is normal.

5. Overall left ventricular systolic function is normal with, an EF between 55 - 60 %.

6. The right ventricle is mildly enlarged.

7. LA is moderately dilated 34-39 ml/m2

8. The right atrium is markedly enlarged.

9. There is mild to moderate aortic valve sclerosis.

10. Mild mitral annular calcification present.

11. There is trace to mild mitral regurgitation.

12. Moderate to severe tricuspid regurgitation present.

13. There is mild to moderate pulmonary hypertension.

14. The right ventricular systolic pressure, as measured by Doppler, is 51.70mmHg.

15. The pulmonic valve was not well visualized.

16. The aortic root size is normal.

17. There is no pericardial effusion.





SONOGRAPHER: Juan Carlos Langston RDCS

## 2018-05-02 LAB
ALBUMIN SERPL-MCNC: 2.7 G/DL (ref 3.5–5)
ALP SERPL-CCNC: 181 U/L (ref 38–126)
ALT SERPL-CCNC: 87 U/L (ref 9–52)
ANION GAP SERPL CALC-SCNC: 13 MMOL/L
AST SERPL-CCNC: 59 U/L (ref 14–36)
BASOPHILS # BLD AUTO: 0 K/UL (ref 0–0.2)
BASOPHILS NFR BLD AUTO: 0 %
BUN SERPL-SCNC: 51 MG/DL (ref 7–17)
CALCIUM SPEC-MCNC: 9.8 MG/DL (ref 8.4–10.2)
CHLORIDE SERPL-SCNC: 96 MMOL/L (ref 98–107)
CO2 SERPL-SCNC: 28 MMOL/L (ref 22–30)
EOSINOPHIL # BLD AUTO: 0.1 K/UL (ref 0–0.7)
EOSINOPHIL NFR BLD AUTO: 2 %
ERYTHROCYTE [DISTWIDTH] IN BLOOD BY AUTOMATED COUNT: 3.81 M/UL (ref 3.8–5.4)
ERYTHROCYTE [DISTWIDTH] IN BLOOD: 16.8 % (ref 11.5–15.5)
GLUCOSE SERPL-MCNC: 92 MG/DL (ref 74–99)
HCT VFR BLD AUTO: 32.7 % (ref 34–46)
HGB BLD-MCNC: 10.3 GM/DL (ref 11.4–16)
LYMPHOCYTES # SPEC AUTO: 0.6 K/UL (ref 1–4.8)
LYMPHOCYTES NFR SPEC AUTO: 9 %
MCH RBC QN AUTO: 26.9 PG (ref 25–35)
MCHC RBC AUTO-ENTMCNC: 31.4 G/DL (ref 31–37)
MCV RBC AUTO: 85.9 FL (ref 80–100)
MONOCYTES # BLD AUTO: 0.9 K/UL (ref 0–1)
MONOCYTES NFR BLD AUTO: 15 %
NEUTROPHILS # BLD AUTO: 4.5 K/UL (ref 1.3–7.7)
NEUTROPHILS NFR BLD AUTO: 70 %
PLATELET # BLD AUTO: 175 K/UL (ref 150–450)
POTASSIUM SERPL-SCNC: 3.4 MMOL/L (ref 3.5–5.1)
PROT SERPL-MCNC: 5.1 G/DL (ref 6.3–8.2)
SODIUM SERPL-SCNC: 137 MMOL/L (ref 137–145)
WBC # BLD AUTO: 6.4 K/UL (ref 3.8–10.6)

## 2018-05-02 RX ADMIN — ACETAMINOPHEN PRN MG: 325 TABLET, FILM COATED ORAL at 03:37

## 2018-05-02 RX ADMIN — DILTIAZEM HYDROCHLORIDE SCH MG: 60 TABLET, FILM COATED ORAL at 09:17

## 2018-05-02 RX ADMIN — CEFAZOLIN SCH MLS/HR: 330 INJECTION, POWDER, FOR SOLUTION INTRAMUSCULAR; INTRAVENOUS at 20:21

## 2018-05-02 RX ADMIN — METOPROLOL TARTRATE SCH MG: 50 TABLET, FILM COATED ORAL at 20:19

## 2018-05-02 RX ADMIN — ALLOPURINOL SCH MG: 300 TABLET ORAL at 09:17

## 2018-05-02 RX ADMIN — LEVOTHYROXINE SODIUM SCH MCG: 25 TABLET ORAL at 06:35

## 2018-05-02 RX ADMIN — FAMOTIDINE SCH MG: 20 TABLET, FILM COATED ORAL at 09:17

## 2018-05-02 RX ADMIN — METOPROLOL TARTRATE SCH MG: 50 TABLET, FILM COATED ORAL at 09:17

## 2018-05-02 RX ADMIN — DOCUSATE SODIUM AND SENNOSIDES SCH EACH: 50; 8.6 TABLET ORAL at 09:17

## 2018-05-02 RX ADMIN — WARFARIN SODIUM SCH MG: 2 TABLET ORAL at 03:37

## 2018-05-02 RX ADMIN — DILTIAZEM HYDROCHLORIDE SCH MG: 60 TABLET, FILM COATED ORAL at 15:40

## 2018-05-02 RX ADMIN — DILTIAZEM HYDROCHLORIDE SCH MG: 60 TABLET, FILM COATED ORAL at 20:20

## 2018-05-02 RX ADMIN — DEXTROSE MONOHYDRATE SCH MLS/HR: 5 INJECTION, SOLUTION INTRAVENOUS at 09:18

## 2018-05-02 RX ADMIN — DEXTROSE MONOHYDRATE SCH MLS/HR: 5 INJECTION, SOLUTION INTRAVENOUS at 20:21

## 2018-05-02 RX ADMIN — DOCUSATE SODIUM AND SENNOSIDES SCH EACH: 50; 8.6 TABLET ORAL at 20:19

## 2018-05-02 RX ADMIN — PREDNISOLONE ACETATE SCH DROPS: 10 SUSPENSION/ DROPS OPHTHALMIC at 09:16

## 2018-05-02 RX ADMIN — POTASSIUM CHLORIDE SCH MEQ: 20 TABLET, EXTENDED RELEASE ORAL at 20:20

## 2018-05-02 RX ADMIN — POTASSIUM CHLORIDE SCH MEQ: 20 TABLET, EXTENDED RELEASE ORAL at 09:17

## 2018-05-02 RX ADMIN — WARFARIN SODIUM SCH: 2 TABLET ORAL at 19:11

## 2018-05-02 RX ADMIN — ATORVASTATIN CALCIUM SCH MG: 10 TABLET, FILM COATED ORAL at 20:20

## 2018-05-02 RX ADMIN — FUROSEMIDE SCH MG: 10 INJECTION, SOLUTION INTRAMUSCULAR; INTRAVENOUS at 09:18

## 2018-05-02 NOTE — P.PN
Subjective


Progress Note Date: 05/02/18





ThIs is a pleasant 82-year-old  female who resides at an extended care 

facility.  She was brought to the hospital because of fever and mild agitation.

  Patient has a known history of paroxysmal atrial fibrillation, dementia mild, 

blindness in the right eye, COPD, GERD, hypertension, hyperlipidemia, renal 

disease, hypothyroidism, she is quite hard of hearing however to speak loudly 

she answers appropriately.  EKG on arrival here showed atrial fibrillation with 

a rapid ventricular response and nonspecific ST-T wave changes.  Chest x-ray 

reveals suspicion of pneumonia in the right lower lobe and findings consistent 

with mild congestive heart failure.  The pressure on arrival 160/70 with a 

heart rate of 144, 91% on room air.  Temperature did go up to 101.8.  Blood 

culture positive for gram-negative bacilli.  Urine culture positive for gram-

negative bacilli.  White blood cell count yesterday 13.4, 9.1 this morning.  

Hemoglobin 13.2 with a hemoglobin of 10.2 this morning.  Platelet count 179.  

INR on admission 5.5, subsequent 6.5, 1.5 this morning.  Sodium 137, potassium 

3.8, BUN 49, creatinine 1.8.  , , alk phos 255.  Troponin 0.015, 

BNP 6380.  Positive UTI.  At the time of my examination this morning, patient 

continues to be in atrial fibrillation with a heart rate of 1:30.  She denies 

palpitations at present but states when she exerts herself she can really feel 

her heart going fast.  Breathing overall according to her has been stable.  

Patient was initiated on IV Cardizem in the emergency room she continues to be 

on at this time.  Echocardiogram with Doppler study performed in September 2017 

revealed an ejection fraction of 60-65%.








05/02/2018


Patient seen and examined this morning, does state that her breathing is 

improving overall.  Diuresing a minimally on Lasix, weight is up a kilogram 

today according to the documentation.  IV Lasix discontinued and patient was 

changed to oral diuretics.  Creatinine 2.1, BUN 51, potassium 3.4.  INR today 

is subtherapeutic at 1.5.  Blood pressure 113/66 today.  Echocardiogram with 

Doppler study shows an ejection fraction of 55-60%.














Objective





- Vital Signs


Vital signs: 


 Vital Signs











Temp  97.8 F   05/02/18 11:22


 


Pulse  64   05/02/18 11:22


 


Resp  18   05/02/18 11:22


 


BP  113/61   05/02/18 11:22


 


Pulse Ox  96   05/02/18 11:22








 Intake & Output











 05/01/18 05/02/18 05/02/18





 18:59 06:59 18:59


 


Intake Total 518.5  480


 


Output Total 400 1 200


 


Balance 118.5 -1 280


 


Weight  77.9 kg 


 


Intake:   


 


  Intake, IV Titration 38.5  





  Amount   


 


    Diltiazem 50 mg In Sodium 38.5  





    Chloride 0.9% 40 ml @ 5   





    MG/HR 5 mls/hr IV .Q10H   





    [facility] Rx#:[id number]   


 


  Oral 480  480


 


Output:   


 


  Urine 400  200


 


  Urine/Stool Mix  1 


 


Other:   


 


  Voiding Method Toilet Toilet 


 


  # Voids 1 1 2


 


  # Bowel Movements 0  0














- Exam





PHYSICAL EXAMINATION: 82-year-old  female in no apparent distress at 

the time of my examination





HEENT: Head is atraumatic, normocephalic.  Right eye partially close, cloudy.  

Neck is supple.  There is no elevated jugular venous pressure.





HEART EXAMINATION: Heart S1 and S2 irregularly irregular





CHEST EXAMINATION: Lungs are clear with mild diminished air entry to the bases 

fine crackles heard to the right posterior base.





ABDOMEN:  Soft, nontender. Bowel sounds are heard. No organomegaly noted.


 


EXTREMITIES: 2+ peripheral pulses with  evidence of peripheral edema and no 

calf tenderness noted.





NEUROLOGIC patient is awake, alert and oriented -2.


 





- Labs


CBC & Chem 7: 


 05/02/18 05:42





 05/02/18 05:42


Labs: 


 Abnormal Lab Results - Last 24 Hours (Table)











  05/02/18 05/02/18 Range/Units





  05:42 05:42 


 


Hgb  10.3 L   (11.4-16.0)  gm/dL


 


Hct  32.7 L   (34.0-46.0)  %


 


RDW  16.8 H   (11.5-15.5)  %


 


Lymphocytes #  0.6 L   (1.0-4.8)  k/uL


 


Potassium   3.4 L  (3.5-5.1)  mmol/L


 


Chloride   96 L  ()  mmol/L


 


BUN   51 H  (7-17)  mg/dL


 


Creatinine   2.11 H  (0.52-1.04)  mg/dL


 


AST   59 H  (14-36)  U/L


 


ALT   87 H  (9-52)  U/L


 


Alkaline Phosphatase   181 H  ()  U/L


 


Total Protein   5.1 L  (6.3-8.2)  g/dL


 


Albumin   2.7 L  (3.5-5.0)  g/dL








 Microbiology - Last 24 Hours (Table)











 04/29/18 17:37 Blood Culture Gram Stain - Final





 Blood Blood Culture - Final





    Escherichia coli


 


 04/29/18 18:45 Urine Culture - Final





 Urine,Catheterized    Escherichia coli














Assessment and Plan


Plan: 


Assessment and plan


#1 fever with evidence of UTI, positive blood cultures positive urine cultures 

early on antibiotics.


#2 atrial fibrillation with rapid ventricular response


#3 history of paroxysmal atrial fibrillation on Coumadin for anticoagulation, 

INR 5.5 on admission, 1.5 morning.


#4 hypertension


Number 5 hyperlipidemia


#6 acute on chronic renal insufficiency


#7  congestive cardiac failure, could be secondary to atrial fibrillation with 

rapid ventricular response, likely diastolic in nature.  LV function normal in 

September 2017.


#8 abnormal liver enzymes, could be secondary to congestion.








Plan


From cardiology's perspective, we will continue current dose of oral diuretics.

  We will follow her along with you now on an as-needed basis only, please don'

t hesitate to call with any questions.





DNP note has been reviewed, I agree with a documented findings and plan of 

care.  Patient was seen and examined.

## 2018-05-03 LAB
ANION GAP SERPL CALC-SCNC: 11 MMOL/L
BUN SERPL-SCNC: 49 MG/DL (ref 7–17)
CALCIUM SPEC-MCNC: 9.9 MG/DL (ref 8.4–10.2)
CELLS COUNTED: 100
CHLORIDE SERPL-SCNC: 95 MMOL/L (ref 98–107)
CO2 SERPL-SCNC: 30 MMOL/L (ref 22–30)
EOSINOPHIL # BLD MANUAL: 0.06 K/UL (ref 0–0.7)
ERYTHROCYTE [DISTWIDTH] IN BLOOD BY AUTOMATED COUNT: 3.93 M/UL (ref 3.8–5.4)
ERYTHROCYTE [DISTWIDTH] IN BLOOD: 16.6 % (ref 11.5–15.5)
GLUCOSE SERPL-MCNC: 87 MG/DL (ref 74–99)
HCT VFR BLD AUTO: 33.1 % (ref 34–46)
HGB BLD-MCNC: 10.4 GM/DL (ref 11.4–16)
INR PPP: 1.1 (ref ?–1.2)
INR PPP: 1.2 (ref ?–1.2)
LYMPHOCYTES # BLD MANUAL: 1.14 K/UL (ref 1–4.8)
MCH RBC QN AUTO: 26.4 PG (ref 25–35)
MCHC RBC AUTO-ENTMCNC: 31.3 G/DL (ref 31–37)
MCV RBC AUTO: 84.2 FL (ref 80–100)
MONOCYTES # BLD MANUAL: 0.63 K/UL (ref 0–1)
NEUTROPHILS NFR BLD MANUAL: 68 %
NEUTS SEG # BLD MANUAL: 3.88 K/UL (ref 1.3–7.7)
PLATELET # BLD AUTO: 188 K/UL (ref 150–450)
POTASSIUM SERPL-SCNC: 3.1 MMOL/L (ref 3.5–5.1)
PT BLD: 10.9 SEC (ref 9–12)
PT BLD: 11.3 SEC (ref 9–12)
SODIUM SERPL-SCNC: 136 MMOL/L (ref 137–145)
WBC # BLD AUTO: 5.7 K/UL (ref 3.8–10.6)

## 2018-05-03 RX ADMIN — POTASSIUM CHLORIDE SCH MEQ: 20 TABLET, EXTENDED RELEASE ORAL at 07:53

## 2018-05-03 RX ADMIN — WARFARIN SODIUM SCH MG: 2 TABLET ORAL at 16:53

## 2018-05-03 RX ADMIN — METOPROLOL TARTRATE SCH MG: 50 TABLET, FILM COATED ORAL at 07:52

## 2018-05-03 RX ADMIN — CEFAZOLIN SCH: 330 INJECTION, POWDER, FOR SOLUTION INTRAMUSCULAR; INTRAVENOUS at 20:11

## 2018-05-03 RX ADMIN — ALLOPURINOL SCH MG: 300 TABLET ORAL at 07:52

## 2018-05-03 RX ADMIN — DEXTROSE MONOHYDRATE SCH MLS/HR: 5 INJECTION, SOLUTION INTRAVENOUS at 07:59

## 2018-05-03 RX ADMIN — PREDNISOLONE ACETATE SCH DROPS: 10 SUSPENSION/ DROPS OPHTHALMIC at 07:53

## 2018-05-03 RX ADMIN — DOCUSATE SODIUM AND SENNOSIDES SCH EACH: 50; 8.6 TABLET ORAL at 20:15

## 2018-05-03 RX ADMIN — METOPROLOL TARTRATE SCH MG: 50 TABLET, FILM COATED ORAL at 20:15

## 2018-05-03 RX ADMIN — ATORVASTATIN CALCIUM SCH MG: 10 TABLET, FILM COATED ORAL at 20:15

## 2018-05-03 RX ADMIN — ERTAPENEM SODIUM SCH MLS/HR: 1 INJECTION, POWDER, LYOPHILIZED, FOR SOLUTION INTRAMUSCULAR; INTRAVENOUS at 22:09

## 2018-05-03 RX ADMIN — FAMOTIDINE SCH MG: 20 TABLET, FILM COATED ORAL at 07:52

## 2018-05-03 RX ADMIN — DILTIAZEM HYDROCHLORIDE SCH MG: 60 TABLET, FILM COATED ORAL at 16:53

## 2018-05-03 RX ADMIN — DILTIAZEM HYDROCHLORIDE SCH MG: 60 TABLET, FILM COATED ORAL at 20:15

## 2018-05-03 RX ADMIN — FUROSEMIDE SCH MG: 40 TABLET ORAL at 07:52

## 2018-05-03 RX ADMIN — DILTIAZEM HYDROCHLORIDE SCH MG: 60 TABLET, FILM COATED ORAL at 07:52

## 2018-05-03 RX ADMIN — LEVOTHYROXINE SODIUM SCH MCG: 25 TABLET ORAL at 06:04

## 2018-05-03 RX ADMIN — DOCUSATE SODIUM AND SENNOSIDES SCH EACH: 50; 8.6 TABLET ORAL at 07:52

## 2018-05-03 RX ADMIN — DEXTROSE MONOHYDRATE SCH MLS/HR: 5 INJECTION, SOLUTION INTRAVENOUS at 20:15

## 2018-05-03 RX ADMIN — METOLAZONE SCH MG: 5 TABLET ORAL at 07:52

## 2018-05-03 RX ADMIN — ACETAMINOPHEN PRN MG: 325 TABLET, FILM COATED ORAL at 22:17

## 2018-05-03 RX ADMIN — POTASSIUM CHLORIDE SCH MEQ: 20 TABLET, EXTENDED RELEASE ORAL at 20:15

## 2018-05-03 NOTE — PN
PROGRESS NOTE



Remains on multiple drugs:  Levaquin, Zosyn.  Cardiology saw the patient, ordered an

echo.  She was found to have atrial fibrillation, rapid ventricular response.  She has

been placed on Cardizem drip.  Ejection fraction was 55-60.  Creatinine is over 2.



CARDIOVASCULAR:  Irregular irregular rhythm.

LUNGS:  Transmitted upper sounds.

HEMATOLOGY:  Negative Homans'.

PSYCH:  Fair mood and affect.



ASSESSMENT:

1. Atrial fibrillation rapid ventricular response.

2. Chronic renal disease, stage 3, worsening renal function overnight.

3. Chronic obstructive pulmonary disease.

4. Dementia.

5. Gastroesophageal reflux disease.

6. Dyslipidemia.

7. Hypertension.

8. Renal disease.

9. Hypothyroidism.

10.Hard of hearing.

11.Diastolic congestive heart failure.

12.Being treated for empiric pneumonia, nursing home acquired on Zosyn and Levaquin.

13.Blood cultures and urine cultures positive for Escherichia coli.  She has p.o.

    antibiotics tomorrow most likely.  Possible discharge home soon to the rehab

    center.

14.Sepsis secondary to pneumonia.

15.Urinary tract infection.

16.Right lower lobe pneumonia.

17.Acute on chronic and.

18.Hypoxemic respiratory insufficiency.

19.Atrial fibrillation.

20.Subtherapeutic INR.

21.Elevated liver enzymes.

22.Acute kidney injury.

23.Mild protein-calorie malnutrition.



PLAN:

Continue with IV Zosyn, Levaquin.  Vitamin K was given for her high INR.  Resume her 2

mg of Coumadin.  Further orders please see in the chart.





MMODL / IJN: [id number] / Job#: [id number]

## 2018-05-03 NOTE — P.PN
Subjective


Progress Note Date: 05/01/18





Ms. Scanlon is  an 82-year-old female with a past medical history of COPD, 

Dementia, GERD/Reflux, Hyperlipidemia, Hypertension, Renal Disease, Respiratory 

Disorder, Thyroid Disorder, hard of hearing, right eye blindness sent from her 

nursing home for fever and agitation.


Patient is very hard of hearing and so most of the history is taken from the ED 

note and nursing staff report.  Patient states that she has been feeling sick 

but could not give the details.   She stated that she was feeling sick.  





In the ED the patient was found to have atrial fibrillation with rapid 

ventricular rate was started on IV Cardizem drip.  The drip has been 

discontinued as patient's heart rate is in between 90s to 100s.





She was also found to have urine positive for nitrates and the chest x-ray 

positive for an infiltrate for which she has been started on Zosyn and Levaquin.





On 5/1/18 - Patient's blood culture and urine cultures have been positive for 

E. coli. She is lying comfortably in the bed.  Overnight no acute events as per 

nursing staff report.  Patient states that she feels much better compared to 

yesterday.








Review of Systems





REVIEW OF SYSTEMS:





HEENT: Patient very hard of hearing


NEURO:No c/o weakness of the extremties, No facial droop, No speech 

abnormalities.


RESPIRATORY: No cough, No SOB, No chest discomfort. 


OPHTHALMOLOGIC: She is blind in the right eye 


: No dysuria or hematuria


CARDIAC: Patient mentions that she has palpitations when she gets up and moves 

around


GI: No abdominal pain, Nausea or vomiting. No constipation or diarrhea. 











Objective





- Vital Signs


Vital signs: 


 Vital Signs











Temp  98.1 F   05/01/18 20:00


 


Pulse  94   05/01/18 20:00


 


Resp  18   05/01/18 20:00


 


BP  160/72   05/01/18 20:00


 


Pulse Ox  94 L  05/01/18 20:00








 Intake & Output











 05/01/18 05/01/18 05/02/18





 06:59 18:59 06:59


 


Intake Total 600 518.5 


 


Output Total  400 1


 


Balance 600 118.5 -1


 


Weight 76.7 kg  


 


Intake:   


 


  Intake, IV Titration 600 38.5 





  Amount   


 


    Diltiazem 50 mg In Sodium  38.5 





    Chloride 0.9% 40 ml @ 5   





    MG/HR 5 mls/hr IV .Q10H   





    [facility] Rx#:[id number]   


 


    Sodium Chloride 0.9% 1, 600  





    000 ml @ 75 mls/hr IV .   





    [id number] [facility] Rx#:[id number]   


 


  Oral  480 


 


Output:   


 


  Urine  400 


 


  Urine/Stool Mix   1


 


Other:   


 


  Voiding Method Toilet Toilet Toilet


 


  # Voids 6 1 1


 


  # Bowel Movements  0 














- Exam





GEN. APPEARANCE: alert, in no apparent distress


HEAD EXAM:  atraumatic, normocephalic, normal inspection


EYE EXAM: right eye is completely white


ENT EXAM:  normal exam, mucous membranes moist


NECK EXAM:  normal inspection.  Absent: tenderness, meningismus, full ROM, 

lymphadenopathy


RESPIRATORY EXAM: decaresed breath sounds in all lung fields.  No crackles at 

the lower lung bases


CARDIOVASCULAR EXAM: Irregularly irregular


GI/ABDOMINAL EXAM: soft, normal bowel sounds.  Absent: distended, tenderness, 

guarding, rebound, rigid


EXTREMITIES EXAM:  mild Bilateral pitting edema


NEUROLOGICAL EXAM:  alert, oriented X3, no focal deficits on gross exam


PSYCHIATRIC EXAM:  normal affect, normal mood


SKIN EXAM:  warm, dry, intact, normal color.  Absent: rash








- Labs


CBC & Chem 7: 


 05/01/18 05:56





 05/01/18 05:56


Labs: 


 Abnormal Lab Results - Last 24 Hours (Table)











  05/01/18 05/01/18 05/01/18 Range/Units





  05:56 05:56 05:56 


 


Hgb  10.4 L    (11.4-16.0)  gm/dL


 


Hct  33.9 L    (34.0-46.0)  %


 


MCHC  30.6 L    (31.0-37.0)  g/dL


 


RDW  16.8 H    (11.5-15.5)  %


 


Lymphocytes #  0.6 L    (1.0-4.8)  k/uL


 


PT    13.9 H  (9.0-12.0)  sec


 


INR    1.5 H  (<1.2)  


 


BUN   49 H   (7-17)  mg/dL


 


Creatinine   1.88 H   (0.52-1.04)  mg/dL








 Microbiology - Last 24 Hours (Table)











 04/29/18 17:37 Blood Culture Gram Stain - Final





 Blood Blood Culture - Final





    Escherichia coli


 


 04/29/18 18:45 Urine Culture - Final





 Urine,Catheterized    Escherichia coli














Assessment and Plan


Assessment: 





ASSESSMENT





Sepsis secondary to pneumonia and UTI


Right lower lobe pneumonia


Acute on chronic hypoxic respiratory insufficiency


Urinary tract infection possibly gram-negative organisms


Atrial fibrillation with rapid ventricular rate


Sub-Therapeutic INR - at 1.5 - resume Coumadin and follow PT/INR 


Elevated liver function tests - can be secondary to hypotension 


Acute kidney injury - prerenal


Mild protein calorie malnutrition





PLAN





Patient has been started on IV Zosyn and Levaquin for her UTI and pneumonia 

which will be continued.  Patient's blood culture and urine cultures have been 

positive for E. coli.  .  Vitamin K 10 mg was given yesterday as her INR was 

6.5 butt today it is 1.5, so will resume her Coumadin .  Patient's atrial 

fibrillation is rate controlled now, Cardiology following .  We'll continue 

with the rest of her home medication regimen.  Patient is a DO NOT RESUSCITATE.

  Further recommendations to follow depending on the progress of the patient.

## 2018-05-03 NOTE — P.PN
Subjective


Progress Note Date: 05/02/18


Principal diagnosis: 





Sepsis





Ms. Scanlon is  an 82-year-old female with a past medical history of COPD, 

Dementia, GERD/Reflux, Hyperlipidemia, Hypertension, Renal Disease, Respiratory 

Disorder, Thyroid Disorder, hard of hearing, right eye blindness sent from her 

nursing home for fever and agitation.


Patient is very hard of hearing and so most of the history is taken from the ED 

note and nursing staff report.  Patient states that she has been feeling sick 

but could not give the details.   She stated that she was feeling sick.  





In the ED the patient was found to have atrial fibrillation with rapid 

ventricular rate was started on IV Cardizem drip.  The drip has been 

discontinued as patient's heart rate is in between 90s to 100s.





She was also found to have urine positive for nitrates and the chest x-ray 

positive for an infiltrate for which she has been started on Zosyn and Levaquin.





On 5/1/18 - Patient's blood culture and urine cultures have been positive for 

E. coli. She is lying comfortably in the bed.  Overnight no acute events as per 

nursing staff report.  Patient states that she feels much better compared to 

yesterday.





On 5/2/18 -  Patient is lying in the bed comfortably.  She does not have any 

active complaints.  Overnight no active issues.She states that her difficulty 

in breathing has improved .


Patient's creatinine has been increased most likely because she has been 

getting high doses of IV Lasix.  chnaged to PO  for tomorrow./ Her heart rate 

is under much better controlled compared to yesterday. PT/OT consult and 

possible discharge in next 24 hrs with renal function improvement.








Review of Systems





REVIEW OF SYSTEMS:





HEENT: Patient very hard of hearing


NEURO:No c/o weakness of the extremties, No facial droop, No speech 

abnormalities.


RESPIRATORY: No cough, No SOB, No chest discomfort. 


OPHTHALMOLOGIC: She is blind in the right eye 


: No dysuria or hematuria


CARDIAC: Patient mentions that she has palpitations when she gets up and moves 

around


GI: No abdominal pain, Nausea or vomiting. No constipation or diarrhea. 











Objective





- Vital Signs


Vital signs: 


 Vital Signs











Temp  97.8 F   05/02/18 11:22


 


Pulse  64   05/02/18 11:22


 


Resp  18   05/02/18 11:22


 


BP  113/61   05/02/18 11:22


 


Pulse Ox  96   05/02/18 11:22








 Intake & Output











 05/01/18 05/02/18 05/02/18





 18:59 06:59 18:59


 


Intake Total 518.5  605


 


Output Total 400 1 200


 


Balance 118.5 -1 405


 


Weight  77.9 kg 


 


Intake:   


 


  Intake, IV Titration 38.5  





  Amount   


 


    Diltiazem 50 mg In Sodium 38.5  





    Chloride 0.9% 40 ml @ 5   





    MG/HR 5 mls/hr IV .Q10H   





    [facility] Rx#:[id number]   


 


  Oral 480  605


 


Output:   


 


  Urine 400  200


 


  Urine/Stool Mix  1 


 


Other:   


 


  Voiding Method Toilet Toilet 


 


  # Voids 1 1 5


 


  # Bowel Movements 0  0














- Exam





GEN. APPEARANCE: alert, in no apparent distress


HEAD EXAM:  atraumatic, normocephalic, normal inspection


EYE EXAM: right eye is completely white


ENT EXAM:  normal exam, mucous membranes moist


NECK EXAM:  normal inspection.  Absent: tenderness, meningismus, full ROM, 

lymphadenopathy


RESPIRATORY EXAM: decaresed breath sounds in all lung fields.  few crackles at 

the lower lung bases


CARDIOVASCULAR EXAM: Irregularly irregular


GI/ABDOMINAL EXAM: soft, normal bowel sounds.  Absent: distended, tenderness, 

guarding, rebound, rigid


EXTREMITIES EXAM:  mild Bilateral pitting edema


NEUROLOGICAL EXAM:  alert, oriented X3, no focal deficits on gross exam


PSYCHIATRIC EXAM:  normal affect, normal mood


SKIN EXAM:  warm, dry, intact, normal color.  Absent: rash








- Labs


CBC & Chem 7: 


 05/03/18 05:31





 05/03/18 05:31


Labs: 


 Abnormal Lab Results - Last 24 Hours (Table)











  05/02/18 05/02/18 Range/Units





  05:42 05:42 


 


Hgb  10.3 L   (11.4-16.0)  gm/dL


 


Hct  32.7 L   (34.0-46.0)  %


 


RDW  16.8 H   (11.5-15.5)  %


 


Lymphocytes #  0.6 L   (1.0-4.8)  k/uL


 


Potassium   3.4 L  (3.5-5.1)  mmol/L


 


Chloride   96 L  ()  mmol/L


 


BUN   51 H  (7-17)  mg/dL


 


Creatinine   2.11 H  (0.52-1.04)  mg/dL


 


AST   59 H  (14-36)  U/L


 


ALT   87 H  (9-52)  U/L


 


Alkaline Phosphatase   181 H  ()  U/L


 


Total Protein   5.1 L  (6.3-8.2)  g/dL


 


Albumin   2.7 L  (3.5-5.0)  g/dL








 Microbiology - Last 24 Hours (Table)











 04/29/18 17:37 Blood Culture Gram Stain - Final





 Blood Blood Culture - Final





    Escherichia coli


 


 04/29/18 18:45 Urine Culture - Final





 Urine,Catheterized    Escherichia coli














Assessment and Plan


Assessment: 





ASSESSMEN


Sepsis secondary to pneumonia and UTI


Right lower lobe pneumonia


Acute on chronic hypoxic respiratory insufficiency


Urinary tract infection possibly gram-negative organisms


Atrial fibrillation with rapid ventricular rate/controlled now.


Sub-Therapeutic INR - at 1.5 - resume Coumadin and follow PT/INR 


Elevated liver function tests - can be secondary to hypotension 


Acute kidney injury - prerenal - worsened due to overt diuresis


Mild protein calorie malnutrition





PLAN





 Will c/w IV Zosyn and Levaquin for her UTI and pneumonia.   Patient's blood 

culture and urine cultures have been positive for E. coli.  . Patient's 

creatinine has been trending up most likely secondary to IV Lasix.  Today her 

dose has been decreased.  Overall patient shows significant improvement in her 

symptoms. Vitamin K 10 mg was given as her INR was 6.5 , yesterday  it is 1.5, 

so resumed her 2 mg Coumadin. Will monitor PT/INR  .  Patient's atrial 

fibrillation is rate controlled now, Cardiology following .  We'll continue 

with the rest of her home medication regimen.  Patient is a DO NOT RESUSCITATE.

  Further recommendations to follow depending on the progress of the patient.





Time with Patient: Greater than 30

## 2018-05-03 NOTE — CDI
Last Revision, December 2017





Documentation Clarification Form



Date: 5/3/2018 8:59:00 AM

From: Ne CardenasKEVIN, CCDS

Phone: [phone]

MRN: [id number]

Admit Date: 4/29/2018 9:51:00 PM

Patient Name: Rony Scanlon 

Visit Number: [id number]

Discharge Date: 5/8/2018



ATTENTION: The Clinical Documentation Specialists (CDI) and [facility] Coding Staff 
appreciate your assistance in clarifying documentation. Please respond to the 
clarification below the line at the bottom and electronically sign. The CDI & 
[facility] Coding staff will review the response and follow-up if needed. Please note: 
Queries are made part of the Legal Health Record. If you have any questions, 
please contact the author of this message via ITS.



Dr. Yamilet Dougherty:



Per your 5/2 progress note: "Congestive cardiac failure, could be secondary to 
atrial fibrillation with rapid ventricular response, likely diastolic in 
nature. LV function normal in September 2017."



History/Risk Factors: Paroxysmal Atrial Fibrillation, COPD, GERD, Hypertension, 
Hyperlipidemia..

Clinical Indicators:  

VS: T 97.2, P 144, R 28, /74, PO 91 RA

BNP: 6380

Echocardiogram Results: Left ventricular systolic function is normal with EF 
between 55-60%.

Chest X Ray: Pneumonia RLL, also findings consistent with mild CHF



Treatment:  po Lasix, IV Lasix 40 mg x1, IV Levaquin, Nitropaste, IV Cardizem, 
IV Zosyn, Albuterol INH, O2 2Lnc



In your professional opinion, can you please clarify the acuity and type of CHF 
if known?



    Diastolic Heart Failure:

      o Acute 

      o Chronic

      o Acute on Chronic



      o Unable to Determine

      o Other, please specify



Please continue to document in your progress notes and discharge summary in 
order to capture severity of illness and risk of mortality. Include clinical 
findings that support your diagnosis.

___________________________________________________________________
MTDD

## 2018-05-04 LAB
ALBUMIN SERPL-MCNC: 2.7 G/DL (ref 3.5–5)
ALP SERPL-CCNC: 143 U/L (ref 38–126)
ALT SERPL-CCNC: 49 U/L (ref 9–52)
ANION GAP SERPL CALC-SCNC: 13 MMOL/L
AST SERPL-CCNC: 30 U/L (ref 14–36)
BASOPHILS # BLD AUTO: 0 K/UL (ref 0–0.2)
BASOPHILS NFR BLD AUTO: 0 %
BUN SERPL-SCNC: 45 MG/DL (ref 7–17)
CALCIUM SPEC-MCNC: 10 MG/DL (ref 8.4–10.2)
CHLORIDE SERPL-SCNC: 95 MMOL/L (ref 98–107)
CO2 SERPL-SCNC: 28 MMOL/L (ref 22–30)
EOSINOPHIL # BLD AUTO: 0.2 K/UL (ref 0–0.7)
EOSINOPHIL NFR BLD AUTO: 4 %
ERYTHROCYTE [DISTWIDTH] IN BLOOD BY AUTOMATED COUNT: 3.95 M/UL (ref 3.8–5.4)
ERYTHROCYTE [DISTWIDTH] IN BLOOD: 16.6 % (ref 11.5–15.5)
GLUCOSE SERPL-MCNC: 86 MG/DL (ref 74–99)
HCT VFR BLD AUTO: 33.8 % (ref 34–46)
HGB BLD-MCNC: 10.3 GM/DL (ref 11.4–16)
LYMPHOCYTES # SPEC AUTO: 1 K/UL (ref 1–4.8)
LYMPHOCYTES NFR SPEC AUTO: 20 %
MCH RBC QN AUTO: 26 PG (ref 25–35)
MCHC RBC AUTO-ENTMCNC: 30.4 G/DL (ref 31–37)
MCV RBC AUTO: 85.7 FL (ref 80–100)
MONOCYTES # BLD AUTO: 0.5 K/UL (ref 0–1)
MONOCYTES NFR BLD AUTO: 10 %
NEUTROPHILS # BLD AUTO: 3.1 K/UL (ref 1.3–7.7)
NEUTROPHILS NFR BLD AUTO: 62 %
PLATELET # BLD AUTO: 197 K/UL (ref 150–450)
POTASSIUM SERPL-SCNC: 3.4 MMOL/L (ref 3.5–5.1)
PROT SERPL-MCNC: 5 G/DL (ref 6.3–8.2)
SODIUM SERPL-SCNC: 136 MMOL/L (ref 137–145)
WBC # BLD AUTO: 4.9 K/UL (ref 3.8–10.6)

## 2018-05-04 RX ADMIN — METOPROLOL TARTRATE SCH MG: 50 TABLET, FILM COATED ORAL at 08:42

## 2018-05-04 RX ADMIN — FUROSEMIDE SCH MG: 40 TABLET ORAL at 08:42

## 2018-05-04 RX ADMIN — POTASSIUM CHLORIDE SCH MEQ: 20 TABLET, EXTENDED RELEASE ORAL at 20:39

## 2018-05-04 RX ADMIN — DOCUSATE SODIUM AND SENNOSIDES SCH EACH: 50; 8.6 TABLET ORAL at 08:43

## 2018-05-04 RX ADMIN — METOPROLOL TARTRATE SCH MG: 50 TABLET, FILM COATED ORAL at 20:39

## 2018-05-04 RX ADMIN — PREDNISOLONE ACETATE SCH DROPS: 10 SUSPENSION/ DROPS OPHTHALMIC at 08:43

## 2018-05-04 RX ADMIN — ATORVASTATIN CALCIUM SCH MG: 10 TABLET, FILM COATED ORAL at 20:39

## 2018-05-04 RX ADMIN — FAMOTIDINE SCH MG: 20 TABLET, FILM COATED ORAL at 08:42

## 2018-05-04 RX ADMIN — DOCUSATE SODIUM AND SENNOSIDES SCH EACH: 50; 8.6 TABLET ORAL at 20:39

## 2018-05-04 RX ADMIN — CEFAZOLIN SCH: 330 INJECTION, POWDER, FOR SOLUTION INTRAMUSCULAR; INTRAVENOUS at 20:40

## 2018-05-04 RX ADMIN — WARFARIN SODIUM SCH MG: 2 TABLET ORAL at 16:24

## 2018-05-04 RX ADMIN — ERTAPENEM SODIUM SCH MLS/HR: 1 INJECTION, POWDER, LYOPHILIZED, FOR SOLUTION INTRAMUSCULAR; INTRAVENOUS at 09:59

## 2018-05-04 RX ADMIN — DILTIAZEM HYDROCHLORIDE SCH MG: 60 TABLET, FILM COATED ORAL at 20:39

## 2018-05-04 RX ADMIN — ALLOPURINOL SCH MG: 100 TABLET ORAL at 08:42

## 2018-05-04 RX ADMIN — DILTIAZEM HYDROCHLORIDE SCH MG: 60 TABLET, FILM COATED ORAL at 08:42

## 2018-05-04 RX ADMIN — POTASSIUM CHLORIDE SCH MEQ: 20 TABLET, EXTENDED RELEASE ORAL at 08:42

## 2018-05-04 RX ADMIN — LEVOTHYROXINE SODIUM SCH MCG: 25 TABLET ORAL at 06:39

## 2018-05-04 RX ADMIN — DILTIAZEM HYDROCHLORIDE SCH MG: 60 TABLET, FILM COATED ORAL at 16:24

## 2018-05-04 NOTE — CONS
CONSULTATION



DATE OF SERVICE:

05/04/2018.



REASON FOR CONSULTATION:

ESBL E coli bacteremia and urinary tract infection infection.



HISTORY OF PRESENT ILLNESS:

The patient is an 82-year-old  female who was sent to the hospital from the

nursing home for evaluation of a new fever with mental status changes.  Apparently the

patient apparently appeared agitated and noticed to have elevated heart rate.  Patient

on arrival to the ER did have a fever of 101.8 degrees Fahrenheit.  The patient was

tachycardic with heart rate in the 140s to 110s and the patient did have an elevated

white count 13.4, with features of sepsis.  The patient has been admitted to hospital

with significantly positive UA _____ the patient has been treated with Zosyn and

Levaquin.  Infectious Disease was not consulted until last evening when the culture

came back positive for ESBL E coli both in the blood and in the urine.  The patient was

seen on rounds this morning.  The patient is very hard of hearing and told me that she

does not have batteries for hearing aid and was not able to hear me. It  was very hard

to communicate with her.  Most of the information has been obtained from thorough

review of the chart.



REVIEW OF SYSTEMS:

Could not be reliably obtained.  The positive points have been mentioned in HPI.



PAST MEDICAL HISTORY:

Significant for heart failure, COPD, dementia, gastroesophageal reflux disease,

hyperlipidemia, hypertension, renal insufficiency, hypothyroidism, previous history of

ESBL UTI.



PAST SURGICAL HISTORY:

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

fistula.



SOCIAL HISTORY:

Remote history of smoking.  Quit back in _____.  No drinking or drug use.



FAMILY HISTORY:

Father with a history of esophageal cancer.  Mother with history of osteoarthritis

_____and rheumatoid arthritis.



ALLERGIES:

No known drug allergies.



MEDICATION:

Medications include the patient is currently on Tylenol, Norco, Ventolin, Zyloprim,

Xanax, Lipitor, Cardizem, ertapenem, Pepcid, Lasix, Synthroid, Zaroxolyn, Lopressor, K-

Dur, Senokot, Coumadin.



EXAMINATION:

Blood pressure is 156/70 with a pulse of 72, temperature 98.2, she is 98% on room air.

General description is an elderly female, lying in bed in no distress.  No tachypnea or

accessory muscles of respiration use.

HEENT:  Shows slight pallor.  No scleral icterus.  Oral mucosa membranes are dry.  No

pharyngeal erythema or thrush.  Neck trachea central.  No thyromegaly.  Lungs unlabored

breathing, clear to auscultation anteriorly.  No wheeze or crackles.  Heart S1, S2.

Irregular rhythm.

ABDOMEN:  Soft, no tenderness.  No guarding or rigidity.

EXTREMITIES:  No edema of feet.  Skin examination:  No rash or mass palpable.

Neurologic:  Very hard to communicate hence orientation could not be determined.



LABS:

Hemoglobin 10.3, white count 4.7, admission white count 13.4 with a BUN of 45,

creatinine 2.10.  Potassium 3.4.  Liver enzymes are normal.  Urine was positive in a

patient with moderate _____ with many bacteria.  Cultures with an ESBL E coli.  Blood

culture positive for ESBL E coli.



DIAGNOSTIC IMPRESSION AND PLAN:

Patient admitted to the hospital with sepsis, source is ESBL E coli urinary tract

infection in a patient who did have a previous history of the same.  We did order an

ultrasound last night _____ infectious abnormality.  Ultrasound did show some medical

renal disease, but no evidence of any hydronephrosis or stones.  The patient is

currently afebrile and white count has normalized.



PLAN:

1. We will ordered a Medline for her as the patient needs to be on IV Invanz for at

    least 2 weeks from negative blood culture.

2. Blood culture has been ordered last night to document clearance of her bacteremia.

3. Once the patient gets the Medline as the patient at nursing home resident, she can

    go back to finish her antibiotic therapy at the nursing home.

Thank you for this consultation. We will follow this patient along with you.





MMODL / IJN: [id number] / Job#: [id number]

## 2018-05-04 NOTE — P.NPCON
History of Present Illness





- Reason for Consult


chronic renal failure





- History of Present Illness





Patient is a 82-year-old white female who has history of chronic kidney disease 

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 

atrophic right kidney.  Baseline creatinine about 1.6-1.7 mg/dL.  Patient is 

very hard of hearing and does not have her hearing aid batteries therefore it 

is difficult to communicate with her.


Patient was admitted to the hospital as she was feeling sick.  She was found to 

be in A. fib with RVR and was maintained on IV Cardizem.  It is now oral.  

Serum creatinine has been about 2-2.1 mg/dL.  Patient's blood pressure is not 

low.  She is not maintained on any nephrotoxic medications.  Patient is 

currently on oral Lasix and Zaroxolyn.





Review of Systems





As per HPI other systems negative





Past Medical History


Past Medical History: Heart Failure, COPD, Dementia, GERD/Reflux, Hyperlipidemia

, Hypertension, Renal Disease, Respiratory Disorder, Thyroid Disorder


Additional Past Medical History / Comment(s): Pt was born with cleft palate and 

other physical problems with facial structures including nasal passages and ear 

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  She can 

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 

in the R eye and can see/read with left eye-it also has glaucoma, chronic renal 

disease stage IV-has L arm fistula but no hemodialysis, frequent UTIs, wears O2 

at 3L/NC ATC, falls, hypothyroid.


Last Myocardial Infarction Date:: unknown


History of Any Multi-Drug Resistant Organisms: ESBL


Date of last positivie culture/infection: 18


MDRO Source:: ESBL URINE


Past Surgical History: Bladder Surgery, Joint Replacement, Orthopedic Surgery


Additional Past Surgical History / Comment(s): Pt has had multiple surgeries 

for birth defects affecting mouth, nose and ears, total L knee arthroplasty, 

bladder sling, fistula L arm.


Past Anesthesia/Blood Transfusion Reactions: No Reported Reaction


Past Psychological History: No Psychological Hx Reported


Additional Psychological History / Comment(s): [place] [facility] [place]  She was getting around in a wheelchair.  She wears O2 at 3L/NC.  She 

gets updraft treatments 4 times a day.  She is on a regular thin consistency 

diet and ensure plus bwice a day.


Smoking Status: Former smoker


Past Alcohol Use History: None Reported


Additional Past Alcohol Use History / Comment(s): Pt quit smoking in .


Past Drug Use History: None Reported





- Past Family History


  ** Father


Family Medical History: Cancer


Additional Family Medical History / Comment(s): Father  prior to age 60 yrs 

with esophageal cancer.  He was a smoker and a drinker.





  ** Mother


Family Medical History: Osteoarthritis (OA), Renal Disease, Rheumatoid 

Arthritis (RA)


Additional Family Medical History / Comment(s): kidney problems, specific type 

unknown





Medications and Allergies


 Home Medications











 Medication  Instructions  Recorded  Confirmed  Type


 


Allopurinol [Zyloprim] 300 mg PO DAILY 17 History


 


Dicyclomine HCl 10 mg PO Q6H 17 History


 


Fluticasone Nasal Spray [Flonase 1 spray EA NOSTRIL Q12H 17 

History





Nasal Spray]    


 


Levothyroxine Sodium [Synthroid] 25 mcg PO DAILY@0600 17 History


 


Acetaminophen Tab [Tylenol] 650 mg PO Q6HR PRN  tab 17 Rx


 


ALPRAZolam [Xanax] 0.25 mg PO HS 11/10/17 04/30/18 History


 


Albuterol Nebulized [Ventolin 2.5 mg INHALATION RT-Q4H PRN 11/10/17 04/29/18 

History





Nebulized]    


 


Atorvastatin [Lipitor] 10 mg PO HS 11/10/17 04/30/18 History


 


Buprenorphine [Butrans 10 MCG/HOUR] 1 patch TRANSDERM TU 11/10/17 04/30/18 

History


 


Ergocalciferol (Vitamin D2) 50,000 unit PO Q30D 11/10/17 04/30/18 History





[Vitamin D2]    


 


Loratadine [Claritin] 10 mg PO DAILY PRN 11/10/17 04/30/18 History


 


Metolazone [Zaroxolyn] 5 mg PO MOTH 11/10/17 04/30/18 History


 


Metoprolol Tartrate [Lopressor] 50 mg PO BID@0900,1700 11/10/17 04/30/18 History


 


Potassium Chloride [Klor-Con 20] 20 meq PO BID@0900,1700 11/10/17 04/30/18 

History


 


Ranitidine HCl [Zantac] 150 mg PO BID@0600,1700 11/10/17 04/30/18 History


 


guaiFENesin [guaiFENesin Oral 200 mg PO Q4HR PRN 11/10/17 04/30/18 History





Solution]    


 


hydrALAZINE HCL [Apresoline] 50 mg PO TID 11/10/17 04/30/18 History


 


prednisoLONE ACETATE [Pred Forte 2 drop LEFT EYE DAILY 11/10/17 04/30/18 History





1%]    


 


Magnesium Hydroxide [Milk of 2,400 mg PO DAILY PRN  ml 17 Rx





Magnesia Concentrate]    


 


Sennosides-Docusate Sodium 1 tab PO BID #60 tablet 17 Rx





[Senokot-S]    


 


Ferrous Sulfate [Feosol] 325 mg PO BID@0900,1700 18 History


 


Furosemide [Lasix] 20 mg PO DAILY@0600 18 History


 


HYDROcodone/APAP 5-325MG [Norco 1 - 2 tab PO Q4HR PRN 18 History





5-325]    


 


Latanoprost [Xalatan 0.005%] 1 drop LEFT EYE HS 18 History


 


Melatonin 3 mg PO HS PRN 18 History


 


SILVER sulfADIAZINE CREAM 1 applic TOPICAL BID 18 History





[Silvadene Cream]    


 


Triad Hydrophilic Wound Paste 1 applic TOPICAL Q12H 18 History


 


Vit C/E/Zn/Coppr/Lutein/Zeaxan 1 cap PO BID 18 History





[Preservision Areds 2 Softgel]    


 


Warfarin [Coumadin] 2 mg PO MOTUWEFRSA 18 History


 


acetaZOLAMIDE [Diamox] 250 mg PO TU 18 History











 Allergies











Allergy/AdvReac Type Severity Reaction Status Date / Time


 


No Known Allergies Allergy   Verified 18 09:38














Physical Exam


Vitals: 


 Vital Signs











  Temp Pulse Resp BP Pulse Ox


 


 18 08:00  97.3 F L  77  18  152/85 


 


 18 04:00  98.8 F  86  17  150/91  92 L


 


 18 00:00  98.0 F  61  17  146/73  96


 


 18 20:00  97.9 F  67  17  136/80  97


 


 18 16:00  97.8 F  72  16  117/68  99


 


 18 12:12  97.7 F  69  16  122/67  96








 Intake and Output











 18





 22:59 06:59 14:59


 


Intake Total 125 100 118


 


Balance 125 100 118


 


Intake:   


 


  IV  100 


 


    Ertapenem 1 gm In Sodium  50 





    Chloride 0.9% 50 ml @ 100   





    mls/hr IVPB DAILY PATRICK Rx   





    #:[id number]   


 


    Sodium Chloride 0.9% 1,  50 





    000 ml @ 20 mls/hr IV .   





    Q24H PATRICK Rx#:[id number]   


 


  Oral 125  118


 


Other:   


 


  Voiding Method Toilet Toilet 


 


  # Voids 1 1 1


 


  # Bowel Movements 1  


 


  Weight  61 kg 














On examination patient is comfortable awake she is not in any acute distress 

blood pressure 152/85 heart rate 77/m.  She is afebrile


Examination of the heart S1 and S2


Exertion lungs bilateral breath sounds are heard


Abdomen is soft nontender


Examination lower extremity shows no significant edema.


CNS exam is grossly intact





Results





- Lab Results


 Most recent lab results











Calcium  10.0 mg/dL (8.4-10.2)   18  05:19    














 18 05:19





 18 05:19





Assessment and Plan


Plan: 





Assessment





1.  Acute kidney injury currently nonoliguric secondary to hemodynamic 

instability with A. fib with RVR and from sepsis currently with stable renal 

function.  Serum creatinine has been staying at about 2-2.1 mg/dL.  I will 

continue with current doses of diuretics.  Patient will need to continue to 

follow-up as outpatient.


2.  Chronic kidney disease NKF stage IIIB with baseline creatinine about 1.7 mg/

dL etiology is nephrosclerosis and right renal atrophy.


3.  E. coli urinary tract infection and bacteremia and sepsis currently improved


4.  Hypokalemia secondary to diuretics will replace


5.  COPD currently stable


6.  A. fib with RVR status post IV Cardizem currently maintained on oral 

Cardizem and Lopressor with heart rate staying in the 90s





Plan


Replace potassium continue current dose of diuretics.  Continue to maintain 

follow-up as outpatient for CK D.  Check magnesium level.





thank you for the consultation we'll continue to follow the patient with you 

during her hospitalization

## 2018-05-05 LAB
ANION GAP SERPL CALC-SCNC: 10 MMOL/L
BUN SERPL-SCNC: 43 MG/DL (ref 7–17)
CALCIUM SPEC-MCNC: 9.9 MG/DL (ref 8.4–10.2)
CHLORIDE SERPL-SCNC: 96 MMOL/L (ref 98–107)
CO2 SERPL-SCNC: 31 MMOL/L (ref 22–30)
GLUCOSE SERPL-MCNC: 84 MG/DL (ref 74–99)
INR PPP: 1.3 (ref ?–1.2)
POTASSIUM SERPL-SCNC: 3.5 MMOL/L (ref 3.5–5.1)
PT BLD: 12.3 SEC (ref 9–12)
SODIUM SERPL-SCNC: 137 MMOL/L (ref 137–145)

## 2018-05-05 RX ADMIN — DILTIAZEM HYDROCHLORIDE SCH MG: 60 TABLET, FILM COATED ORAL at 17:11

## 2018-05-05 RX ADMIN — ALLOPURINOL SCH MG: 100 TABLET ORAL at 11:59

## 2018-05-05 RX ADMIN — WARFARIN SODIUM SCH MG: 2 TABLET ORAL at 17:11

## 2018-05-05 RX ADMIN — PREDNISOLONE ACETATE SCH DROPS: 10 SUSPENSION/ DROPS OPHTHALMIC at 12:00

## 2018-05-05 RX ADMIN — DOCUSATE SODIUM AND SENNOSIDES SCH EACH: 50; 8.6 TABLET ORAL at 20:51

## 2018-05-05 RX ADMIN — DOCUSATE SODIUM AND SENNOSIDES SCH EACH: 50; 8.6 TABLET ORAL at 12:00

## 2018-05-05 RX ADMIN — METOPROLOL TARTRATE SCH MG: 50 TABLET, FILM COATED ORAL at 20:50

## 2018-05-05 RX ADMIN — POTASSIUM CHLORIDE SCH MEQ: 20 TABLET, EXTENDED RELEASE ORAL at 11:59

## 2018-05-05 RX ADMIN — DILTIAZEM HYDROCHLORIDE SCH MG: 60 TABLET, FILM COATED ORAL at 20:51

## 2018-05-05 RX ADMIN — CEFAZOLIN SCH: 330 INJECTION, POWDER, FOR SOLUTION INTRAMUSCULAR; INTRAVENOUS at 20:52

## 2018-05-05 RX ADMIN — LEVOTHYROXINE SODIUM SCH MCG: 25 TABLET ORAL at 06:17

## 2018-05-05 RX ADMIN — FAMOTIDINE SCH MG: 20 TABLET, FILM COATED ORAL at 12:00

## 2018-05-05 RX ADMIN — ATORVASTATIN CALCIUM SCH MG: 10 TABLET, FILM COATED ORAL at 20:50

## 2018-05-05 RX ADMIN — POTASSIUM CHLORIDE SCH MEQ: 20 TABLET, EXTENDED RELEASE ORAL at 20:50

## 2018-05-05 RX ADMIN — DILTIAZEM HYDROCHLORIDE SCH MG: 60 TABLET, FILM COATED ORAL at 11:59

## 2018-05-05 RX ADMIN — ERTAPENEM SODIUM SCH MLS/HR: 1 INJECTION, POWDER, LYOPHILIZED, FOR SOLUTION INTRAMUSCULAR; INTRAVENOUS at 11:59

## 2018-05-05 RX ADMIN — FUROSEMIDE SCH MG: 40 TABLET ORAL at 12:00

## 2018-05-05 RX ADMIN — METOPROLOL TARTRATE SCH MG: 50 TABLET, FILM COATED ORAL at 11:59

## 2018-05-05 NOTE — PN
PROGRESS NOTE



DATE OF SERVICE:

05/04/18



SUBJECTIVE:

82-year-old white female admitted with pneumonia, urinary tract infection.  Dr. Matthews

consult he is waiting to get final cultures to determine IV antibiotics.  Otherwise,

she may be able to get discharged home soon or to the rehab center.



Cardiovascular S1, S2.  Lungs clear.  GI soft.  Hematology negative Homans.  Hearing is

poor.



ASSESSMENT:

1. Healthcare acquired pneumonia.

2. Acute hypoxemic respiratory failure secondary to above.

3. Diastolic congestive heart failure.

4. Generalized weakness.

Please see further orders.  Possible discharge home next 24-48 hours on IV antibiotics

versus oral pending Dr. Matthews's recommendation.





MMODL / IJN: [id number] / Job#: [id number]

## 2018-05-05 NOTE — PN
PROGRESS NOTE



Patient is seen for followup for acute kidney injury on top of chronic kidney disease.

She has ESBL and E coli a UTI and bacteremia.  The patient is maintained on Ertapenem.

She feels fairly well.  The patient does not have a hearing aid and it is very

difficult hard for her to hear.  Currently, she is maintained on oral Lasix and

Zaroxolyn.



PHYSICAL EXAMINATION:

Blood pressure is 159/87, heart rate 80 per minute.  Patient is afebrile.  Examination

of the heart S1, S2.  Examination lungs bilateral breath sounds are heard.  Abdomen is

soft, nontender.  Examination lower extremity shows no significant edema.  CNS exam is

grossly intact.



LABS:

Not available from today.  Serum creatinine was 2.1 yesterday.



ASSESSMENT:

1. Acute kidney injury secondary to sepsis, currently stable.  The repeat labs today.

2. Chronic kidney disease stage 3 secondary to nephrosclerosis and a solitary kidney

    and at baseline creatinine about 1.5-1.7 mg/dL.

3. Right renal atrophy.

4. E coli, which is ESBL sepsis with the urinary tract infection and bacteremia, being

    followed by ID.

5. Chronic obstructive pulmonary disease, currently stable.

6. Atrial fibrillation with RVR, status post IV Cardizem, maintained on Lopressor and

    oral Cardizem.

7. Hypokalemia secondary to diuretics, status post replacement.



PLAN:

Check labs today.  Continue current dose of diuretics.  Continue antibiotics.





MMODL / IJN: [id number] / Job#: [id number]

## 2018-05-05 NOTE — PN
PROGRESS NOTE



SUBJECTIVE:

An 82-year-old white female with acute kidney injury on top of chronic kidney disease.

She has ESBL, E coli UTI and bacteremia, is on Ertapenem. She has had difficulty

hearing.  She needs a hearing aid.



She is on oral Lasix, Zaroxolyn.  Ultrasound of the renal system did not show any

significant findings.



The lungs are essentially clear.

HEART: S1, S2.

PSYCH:  Fair mood and affect.

GI: Soft.



ASSESSMENT:

1. Acute kidney injury secondary to sepsis, chronic kidney disease stage III. Solitary

    kidney, baseline is 1.5 to 1.7, right renal atrophy, Escherichia coli, ESBL.

2. Chronic obstructive pulmonary disease.

3. Atrial fibrillation with rapid ventricular response.

4. Hypokalemia.



PLAN:

Will check her labs.  Current dose diuretics.  Continue antibiotics.  PICC line will

need to be placed and she will be transferred home in the next 48 hours to nursing home

with a PICC line for the next 2 weeks for IV antibiotics.





MMODL / IJN: [id number] / Job#: [id number]

## 2018-05-06 LAB
ALBUMIN SERPL-MCNC: 2.9 G/DL (ref 3.5–5)
ALP SERPL-CCNC: 149 U/L (ref 38–126)
ALT SERPL-CCNC: 40 U/L (ref 9–52)
ANION GAP SERPL CALC-SCNC: 12 MMOL/L
AST SERPL-CCNC: 27 U/L (ref 14–36)
BASOPHILS # BLD AUTO: 0 K/UL (ref 0–0.2)
BASOPHILS NFR BLD AUTO: 0 %
BUN SERPL-SCNC: 40 MG/DL (ref 7–17)
CALCIUM SPEC-MCNC: 10.3 MG/DL (ref 8.4–10.2)
CHLORIDE SERPL-SCNC: 96 MMOL/L (ref 98–107)
CO2 SERPL-SCNC: 30 MMOL/L (ref 22–30)
EOSINOPHIL # BLD AUTO: 0.4 K/UL (ref 0–0.7)
EOSINOPHIL NFR BLD AUTO: 5 %
ERYTHROCYTE [DISTWIDTH] IN BLOOD BY AUTOMATED COUNT: 4.33 M/UL (ref 3.8–5.4)
ERYTHROCYTE [DISTWIDTH] IN BLOOD: 16.7 % (ref 11.5–15.5)
GLUCOSE SERPL-MCNC: 86 MG/DL (ref 74–99)
HCT VFR BLD AUTO: 37.8 % (ref 34–46)
HGB BLD-MCNC: 11.3 GM/DL (ref 11.4–16)
INR PPP: 1.5 (ref ?–1.2)
LYMPHOCYTES # SPEC AUTO: 1.1 K/UL (ref 1–4.8)
LYMPHOCYTES NFR SPEC AUTO: 15 %
MCH RBC QN AUTO: 26.2 PG (ref 25–35)
MCHC RBC AUTO-ENTMCNC: 30 G/DL (ref 31–37)
MCV RBC AUTO: 87.3 FL (ref 80–100)
MONOCYTES # BLD AUTO: 0.2 K/UL (ref 0–1)
MONOCYTES NFR BLD AUTO: 2 %
NEUTROPHILS # BLD AUTO: 5.5 K/UL (ref 1.3–7.7)
NEUTROPHILS NFR BLD AUTO: 76 %
PLATELET # BLD AUTO: 212 K/UL (ref 150–450)
POTASSIUM SERPL-SCNC: 4 MMOL/L (ref 3.5–5.1)
PROT SERPL-MCNC: 5.4 G/DL (ref 6.3–8.2)
PT BLD: 14.1 SEC (ref 9–12)
SODIUM SERPL-SCNC: 138 MMOL/L (ref 137–145)
WBC # BLD AUTO: 7.3 K/UL (ref 3.8–10.6)

## 2018-05-06 RX ADMIN — ERTAPENEM SODIUM SCH MLS/HR: 1 INJECTION, POWDER, LYOPHILIZED, FOR SOLUTION INTRAMUSCULAR; INTRAVENOUS at 09:13

## 2018-05-06 RX ADMIN — METOPROLOL TARTRATE SCH MG: 50 TABLET, FILM COATED ORAL at 20:25

## 2018-05-06 RX ADMIN — FAMOTIDINE SCH MG: 20 TABLET, FILM COATED ORAL at 09:14

## 2018-05-06 RX ADMIN — FUROSEMIDE SCH MG: 40 TABLET ORAL at 09:14

## 2018-05-06 RX ADMIN — LEVOTHYROXINE SODIUM SCH MCG: 25 TABLET ORAL at 06:11

## 2018-05-06 RX ADMIN — ACETAMINOPHEN PRN MG: 325 TABLET, FILM COATED ORAL at 20:25

## 2018-05-06 RX ADMIN — ALLOPURINOL SCH MG: 100 TABLET ORAL at 09:14

## 2018-05-06 RX ADMIN — DILTIAZEM HYDROCHLORIDE SCH MG: 60 TABLET, FILM COATED ORAL at 16:49

## 2018-05-06 RX ADMIN — DILTIAZEM HYDROCHLORIDE SCH MG: 60 TABLET, FILM COATED ORAL at 20:25

## 2018-05-06 RX ADMIN — WARFARIN SODIUM SCH MG: 2 TABLET ORAL at 16:49

## 2018-05-06 RX ADMIN — PREDNISOLONE ACETATE SCH DROPS: 10 SUSPENSION/ DROPS OPHTHALMIC at 09:14

## 2018-05-06 RX ADMIN — ATORVASTATIN CALCIUM SCH MG: 10 TABLET, FILM COATED ORAL at 20:25

## 2018-05-06 RX ADMIN — DOCUSATE SODIUM AND SENNOSIDES SCH EACH: 50; 8.6 TABLET ORAL at 09:15

## 2018-05-06 RX ADMIN — METOPROLOL TARTRATE SCH MG: 50 TABLET, FILM COATED ORAL at 09:14

## 2018-05-06 RX ADMIN — POTASSIUM CHLORIDE SCH MEQ: 20 TABLET, EXTENDED RELEASE ORAL at 20:25

## 2018-05-06 RX ADMIN — DOCUSATE SODIUM AND SENNOSIDES SCH EACH: 50; 8.6 TABLET ORAL at 20:25

## 2018-05-06 RX ADMIN — DILTIAZEM HYDROCHLORIDE SCH MG: 60 TABLET, FILM COATED ORAL at 09:14

## 2018-05-06 RX ADMIN — CEFAZOLIN SCH: 330 INJECTION, POWDER, FOR SOLUTION INTRAMUSCULAR; INTRAVENOUS at 23:47

## 2018-05-06 RX ADMIN — POTASSIUM CHLORIDE SCH MEQ: 20 TABLET, EXTENDED RELEASE ORAL at 09:14

## 2018-05-06 NOTE — PN
PROGRESS NOTE



The patient is seen for followup for acute kidney injury and chronic kidney disease.

Her renal function has been stable with creatinine staying at about 2 mg/dL.  This

morning when patient was seen, she was sleeping comfortably.  She was easily arousable.

Patient is very hard of hearing.  She denies any significant complaints.



EXAMINATION:

Blood pressure is 144/88, heart rate 76 per minute.  She is afebrile.  Examination of

the heart: S1, S2.  Examination lungs: Bilateral breath sounds are heard.  Abdomen is

soft, nontender.  Examination lower extremity shows no significant edema.  CNS exam

shows patient moving all 4 extremities.



LABS:

Sodium 138, potassium 4.0, chloride 96, BUN 40, serum creatinine 2.1, calcium 10.3,

albumin 2.9.



ASSESSMENT:

1. Chronic kidney disease stage III secondary to nephrosclerosis and solitary

    functioning kidney.  Baseline creatinine about 1.5-1.7 mg/dL.  Patient has an

    atrophic right kidney.

2. Acute kidney injury secondary to sepsis.  Renal function is stable. No nephrotoxic

    agents on board.  Patient is maintained on oral Lasix which we can continue for

    now.  Encourage increased oral intake.

3. Sepsis with ESBL E coli urinary tract infection and bacteremia.

4. Chronic obstructive pulmonary disease, currently stable.

5. Atrial fibrillation with a RVR, currently with controlled ventricular response,

    maintained on Lopressor and Cardizem.

6. Hypokalemia, status post supplementation.



PLAN:

Continue oral Lasix.  Encourage increased oral intake.  The patient will follow up as

outpatient.





MMODL / IJN: [id number] / Job#: [id number]

## 2018-05-06 NOTE — PN
PROGRESS NOTE



SUBJECTIVE:

82-year-old white female who remains with a ESBL and drug-resistant UTI with acute

kidney injury.  The creatinine stayed around 2, blood pressure 140s over 80s, heart

rate 70s, respiratory rate 12 to 14.  Lungs: Clear. GI: Soft. Hematologic: Negative

Homans'.



Sodium 138, potassium 4.0, BUN is 40, creatinine 2.1.



ASSESSMENT:

1. Chronic kidney disease stage III due to solitary functioning kidney.

2. Sepsis making acute kidney injury worse. Continue with Lasix per Renal.

3. Sepsis with ESBL E coli urinary tract infection bacteremia.

4. Chronic obstructive pulmonary disease.

5. Atrial fibrillation with rapid ventricular response.

6. Hyperkalemia.

IV antibiotics will be given.  Potassium replacement will be given.  Fluid rehydration

will be given. A PICC line placement and then back to the rehab center is the plan.





RUSTY / ROCKY: [id number] / Job#: [id number]

## 2018-05-06 NOTE — PN
PROGRESS NOTE



DATE OF SERVICE:

05/06/2018



REASON FOR FOLLOWUP:

ESBL E. coli UTI and bacteremia.



INTERVAL HISTORY:

The patient is afebrile.  She is currently breathing comfortably.  Denies having any

chest pain, cough.  No nausea, vomiting or diarrhea.



EXAMINATION:

Her blood pressure is 156/73 with a pulse of 96, temperature 98.  She is 93% on room

air.  General description is an elderly female lying in bed in no distress.

RESPIRATORY SYSTEM: Unlabored breathing, clear to auscultation anteriorly.

HEART:  S1, S2.  Regular rate and rhythm.

ABDOMEN:  Soft, no tenderness.



LABS:

Hemoglobin 11.2, white count 7.3.  BUN of 40, creatinine 2.10.  Blood culture repeat

from 05/03/2018 has been negative.



DIAGNOSTIC IMPRESSION AND PLAN:

Patient with an ESBL E. coli bacteremia secondary to urinary source with repeat blood

culture has been negative.  The patient will need to continue on Invanz 1 g daily to

finish a total 2 week course of therapy from negative blood culture because of her

bacteremia.  Continue  with supportive care.





MMODL / OLIMPIAN: [id number] / Job#: [id number]

## 2018-05-07 RX ADMIN — ALLOPURINOL SCH MG: 100 TABLET ORAL at 09:27

## 2018-05-07 RX ADMIN — DILTIAZEM HYDROCHLORIDE SCH MG: 60 TABLET, FILM COATED ORAL at 09:27

## 2018-05-07 RX ADMIN — METOPROLOL TARTRATE SCH MG: 50 TABLET, FILM COATED ORAL at 09:27

## 2018-05-07 RX ADMIN — DILTIAZEM HYDROCHLORIDE SCH MG: 60 TABLET, FILM COATED ORAL at 21:22

## 2018-05-07 RX ADMIN — DOCUSATE SODIUM AND SENNOSIDES SCH EACH: 50; 8.6 TABLET ORAL at 21:22

## 2018-05-07 RX ADMIN — FAMOTIDINE SCH MG: 20 TABLET, FILM COATED ORAL at 09:27

## 2018-05-07 RX ADMIN — CEFAZOLIN SCH: 330 INJECTION, POWDER, FOR SOLUTION INTRAMUSCULAR; INTRAVENOUS at 22:39

## 2018-05-07 RX ADMIN — LEVOTHYROXINE SODIUM SCH MCG: 25 TABLET ORAL at 06:24

## 2018-05-07 RX ADMIN — WARFARIN SODIUM SCH: 2 TABLET ORAL at 14:20

## 2018-05-07 RX ADMIN — DOCUSATE SODIUM AND SENNOSIDES SCH EACH: 50; 8.6 TABLET ORAL at 09:27

## 2018-05-07 RX ADMIN — ERTAPENEM SODIUM SCH MLS/HR: 1 INJECTION, POWDER, LYOPHILIZED, FOR SOLUTION INTRAMUSCULAR; INTRAVENOUS at 09:28

## 2018-05-07 RX ADMIN — METOLAZONE SCH MG: 5 TABLET ORAL at 09:27

## 2018-05-07 RX ADMIN — POTASSIUM CHLORIDE SCH MEQ: 20 TABLET, EXTENDED RELEASE ORAL at 09:27

## 2018-05-07 RX ADMIN — METOPROLOL TARTRATE SCH MG: 50 TABLET, FILM COATED ORAL at 21:22

## 2018-05-07 RX ADMIN — FUROSEMIDE SCH MG: 40 TABLET ORAL at 09:27

## 2018-05-07 RX ADMIN — DILTIAZEM HYDROCHLORIDE SCH MG: 60 TABLET, FILM COATED ORAL at 15:54

## 2018-05-07 RX ADMIN — POTASSIUM CHLORIDE SCH MEQ: 20 TABLET, EXTENDED RELEASE ORAL at 21:22

## 2018-05-07 RX ADMIN — PREDNISOLONE ACETATE SCH DROPS: 10 SUSPENSION/ DROPS OPHTHALMIC at 09:28

## 2018-05-07 RX ADMIN — ATORVASTATIN CALCIUM SCH MG: 10 TABLET, FILM COATED ORAL at 21:22

## 2018-05-07 NOTE — PN
PROGRESS NOTE



Patient is sitting up on a bedside chair.  She is currently comfortable.  She denies

any significant complaints.



EXAMINATION:

Blood pressure is 134/68, heart rate 75 per minute. Patient is afebrile.  Examination

of the heart: S1, S2.  Examination lungs: Bilateral breath sounds are heard.  Decreased

breath sounds at bases.  Abdomen is soft, nontender.  Examination lower extremity shows

trace edema bilaterally.  CNS exam is grossly intact.



LABS:

Not available from today.  Serum creatinine was 2.1 yesterday.



ASSESSMENT:

1. Chronic kidney disease stage III secondary to nephrosclerosis and solitary

    functioning kidney with baseline creatinine 1.5 to 1.7.  The patient has an

    atrophic right kidney.

2. Acute kidney injury secondary to sepsis.  Renal function stable with creatinine

    staying at about 2 mg/dL.  No nephrotoxic agents on board.  Patient is maintained

    on oral Lasix.

3. Sepsis with ESBL E coli urinary tract infection and bacteremia, currently improved

    significantly.

4. Chronic obstructive pulmonary disease.

5. Atrial fibrillation with rapid ventricular response on initial admission, currently

    with controlled ventricular response.

6. Hypokalemia, status post replacement.



PLAN:

Continue with the metolazone and current dose of Lasix.  Followup as outpatient.





MMODL / IJN: [id number] / Job#: [id number]

## 2018-05-07 NOTE — DS
DISCHARGE SUMMARY



DISCHARGE MEDICATIONS:

1. Ventolin nebulizer 2.5 mg q.4 hours p.r.n.

2. Zyloprim 200 mg daily.

3. Xanax 0.25 mg q.h.s.

4. Lipitor 10 mg daily.

5. Cardizem 60 mg b.i.d.

6. Ertapenem 0.5 g IV daily, IV piggyback.

7. Pepcid 20 mg daily.

8. Lasix 40 mg daily.

9. Norco 5/325 1 every 6 hours p.r.n.

10.Synthroid 25 mcg daily.

11.Zaroxolyn 5 mg p.o. Monday, Thursday.

12.Lopressor 50 mg b.i.d.

13.K-Dur 20 mEq b.i.d.

14.Pred Forte 2 drops left eye daily.

15.Senna 1 tab p.o. b.i.d.

16.Coumadin 2 mg daily.

17.Silvadene cream b.i.d. to any wounds.

18.Pred Forte 1% 2 drops left eye daily.

19.Melatonin 3 mg q.h.s. p.r.n.

20.Claritin 10 mg daily.

21.Xalatan 1 drop left eye daily.

22.Vitamin D 50,000 units weekly.

23.Butrans patch 10 mcg/hour.  1 patch transdermal once a week.

24.Coumadin 2 mg daily.



CONDITION:

Stable.



PROGNOSIS:

Guarded.



Ambulate as tolerated.



DISCHARGE DIAGNOSES:

1. ESBL urinary tract infection.

2. Atrial fibrillation, rapid ventricular response.

3. Chronic renal disease stage III.

4. Acute kidney injury secondary to sepsis secondary to urinary tract infection.

5. Sepsis with ESBL E coli urinary tract infection.

6. Chronic obstructive pulmonary disease.

7. Hypokalemia.

The patient was treated with IV antibiotics for multiple days, Cardizem drip switched

to oral medicine for atrial fibrillation.  Sepsis is treated acutely. PICC line was

placed.  Ten more days of IV _____ Dr. Dilshad Street's care.  Medications for diuretics

were kept the same.  Potassium was readjusted.  Regular diet.  Condition stable.

Prognosis guarded.





MMODL / IJN: [id number] / Job#: [id number]

## 2018-05-07 NOTE — PN
PROGRESS NOTE



DATE OF SERVICE:

05/07/2018



REASON FOR FOLLOWUP:

ESBL E coli bacteremia and UTI.



INTERVAL HISTORY:

The patient is afebrile.  He is currently waiting for the midline placement.  Denies

having any chest pain.  No cough.  No abdominal pain and no diarrhea has been noticed.



PHYSICAL EXAMINATION:

Blood pressure 134/60 with a pulse of 79, temperature 97, she is 94% on room air.

General description is an elderly female, lying in bed in no distress.

RESPIRATORY SYSTEM:  Unlabored breathing, clear to auscultation anteriorly.

HEART:  S1, S2.  Regular rate and rhythm.

ABDOMEN:  Soft, no tenderness.



LABS:

No new labs been obtained today.  Blood culture repeat 5/03 has been negative.



DIAGNOSTIC IMPRESSION AND PLAN:

Patient ESBL Escherichia coli bacteremia secondary to urinary source.  She is currently

on Invanz 0.5 g daily.  She will continue for another 10 days to finish a 2-week course

of therapy.  Continue supportive care.





MMODL / IJN: [id number] / Job#: [id number]

## 2018-05-07 NOTE — PN
PROGRESS NOTE



SUBJECTIVE:

82-year-old white female with ESBL E coli bacteremia and UTI.  PICC line in place.

Awaiting Invanz 0.5 g daily for another 10 days for 2 week course of the PICC line.

Blood cultures been negative.  She is sitting up in bed.



Blood pressure 134/60, pulse 79, respiratory 17, O2 94% on room air.  Cardiovascular

S1, S2.  Lungs clear.  GI soft.  Hematology negative Homans.



ASSESSMENT:

ESBL E coli bacteremia secondary to urinary source.  Continue on IV Invanz 0.5 g daily

for another 10 days.  PICC line placed today.  Discharge home in the next 24 hours to a

nursing home.





MMODL / IJN: [id number] / Job#: [id number]

## 2018-05-08 VITALS
TEMPERATURE: 97.6 F | DIASTOLIC BLOOD PRESSURE: 71 MMHG | SYSTOLIC BLOOD PRESSURE: 154 MMHG | HEART RATE: 84 BPM | RESPIRATION RATE: 16 BRPM

## 2018-05-08 LAB
INR PPP: 1.8 (ref ?–1.2)
PT BLD: 16.2 SEC (ref 9–12)

## 2018-05-08 PROCEDURE — 02HV33Z INSERTION OF INFUSION DEVICE INTO SUPERIOR VENA CAVA, PERCUTANEOUS APPROACH: ICD-10-PCS

## 2018-05-08 RX ADMIN — ASPIRIN SCH: 325 TABLET ORAL at 09:19

## 2018-05-08 RX ADMIN — METOPROLOL TARTRATE SCH MG: 50 TABLET, FILM COATED ORAL at 09:25

## 2018-05-08 RX ADMIN — LEVOTHYROXINE SODIUM SCH MCG: 25 TABLET ORAL at 06:28

## 2018-05-08 RX ADMIN — DOCUSATE SODIUM AND SENNOSIDES SCH EACH: 50; 8.6 TABLET ORAL at 09:25

## 2018-05-08 RX ADMIN — DILTIAZEM HYDROCHLORIDE SCH MG: 60 TABLET, FILM COATED ORAL at 09:25

## 2018-05-08 RX ADMIN — FAMOTIDINE SCH MG: 20 TABLET, FILM COATED ORAL at 09:24

## 2018-05-08 RX ADMIN — ALLOPURINOL SCH MG: 100 TABLET ORAL at 09:24

## 2018-05-08 RX ADMIN — POTASSIUM CHLORIDE SCH MEQ: 20 TABLET, EXTENDED RELEASE ORAL at 09:25

## 2018-05-08 RX ADMIN — PREDNISOLONE ACETATE SCH DROPS: 10 SUSPENSION/ DROPS OPHTHALMIC at 09:25

## 2018-05-08 RX ADMIN — FUROSEMIDE SCH MG: 40 TABLET ORAL at 09:24

## 2018-05-08 NOTE — IR
PICC LINE PLACEMENT:

 

HISTORY:  Infection requiring long-term antibiotic therapy

 

PROCEDURE:  Ultrasound and fluoroscopic guidance of PICC line placement.

 

COMPLICATIONS:  None

 

ANESTHESIA:  1. 1% Lidocaine locally.

 

FINDINGS/TECHNIQUE:  The procedure was explained to the patient.  The risks, complications, benefits 
and alternatives were discussed and any questions were answered.  Informed consent was obtained.  The
 patient was placed supine on the fluoroscopic table and prepped and draped in the usual sterile [facility]
ion.  Utilizing a  21 gauge needle and sonographic and fluoroscopic guidance, access in the vein was 
achieved and there is placement of a 0.018 guidewire.  The vein is patent.  A 4-F sheath was placed o
anderson the guidewire.  The guidewire and dilator were removed and a 4-F. PICC line was placed through th
e sheath with the tip at the level of the SVC.  The sheath was removed, the catheter was flushed and 
sutured into position.  The patient was stable throughout the procedure and remained stable upon disc
harge from the Department of Radiology. 

 

The vein puncture was patent under ultrasound. A gray scale image was obtained to document patency of
 the vein punctured.

 

All elements of the maximal barrier technique were utilized.

 

FLUOROSCOPY TIME:  0.2 minute

 

IMPRESSION: 

Successful PICC line placement under ultrasound and fluoroscopic guidance.

## 2018-05-08 NOTE — PN
PROGRESS NOTE



Patient is seen for followup for chronic kidney disease.  She did have a kidney injury.

Renal function has been stable.  Last creatinine was 2.1 on 05/06/2018.  The patient's

INR was subtherapeutic and that is now 1.8.  She will be discharged today.



PHYSICAL EXAMINATION:

Blood pressure is 154/71, heart rate 84 per minute.  She is afebrile.  Examination of

the heart S1, S2.  Examination lungs bilateral breath sounds are heard.  Abdomen is

soft, nontender.  Exam of lower extremities shows edema 2+ bilaterally.  CNS exam is

grossly intact.



LAB:

Labs are not available from today.



ASSESSMENT:

1. Acute kidney injury secondary to sepsis.  Urine tract infection.  Renal function

    fairly stable.

2. Chronic kidney disease stage 3 secondary to nephrosclerosis and solitary

    functioning kidney with baseline creatinine 1.5-1.7.  Patient has an atrophic right

    kidney.

3. Sepsis with ESBL and E.  coli urinary tract infection and bacteremia, now improved.

4. Chronic obstructive pulmonary disease.

5. Atrial fibrillation with a rapid ventricular response on initial admission.

    Currently with controlled ventricular response, maintained on Coumadin.  INR is

    therapeutic today.

6. Hypokalemia, status post replacement.



PLAN:

The patient will need follow up as outpatient with her nephrologist.  Continue

antibiotics per ID.





MMODL / IJN: [id number] / Job#: [id number]

## 2018-05-08 NOTE — PN
PROGRESS NOTE



DATE OF SERVICE:

05/08/2018.



REASON FOR FOLLOWUP:

ESBL E coli bacteremia and UTI.



INTERVAL HISTORY:

The patient is afebrile.  The patient did get her PICC line as the IV team was unable

to place midline.  Denies having any chest pain, shortness of breath, cough, no

abdominal pain or diarrhea.



EXAMINATION:

Blood pressure 154/71 with a pulse of 84, temperature 97.6.  She is 94% on room air.

General description is an elderly female lying in bed in no distress.  Respiratory

system:  Unlabored breathing.  Clear to auscultation anteriorly.  Heart S1, S2.

Regular rate and rhythm.  Abdomen soft, no tenderness.



LABS:

White count 7.3.  Blood culture repeat has been negative.



DIAGNOSTIC IMPRESSION AND PLAN:

Patient with a ESBL E coli infection with secondary bacteremia. Ultrasound _____

abnormality.  Plan to finish therapy with IV Invanz 500 mg daily for another 10 days.

Daughter was present at bedside.  All her questions were answered.





MMODL / IJN: [id number] / Job#: [id number]

## 2018-05-14 NOTE — P.PN
Progress Note - Text


Progress Note Date: 05/14/18


This is an addendum to the cardiology progress note dictated.  Patient has 

diastolic congestive heart failure acute on chronic.


DNP note has been reviewed, I agree with a documented findings and plan of 

care.  Patient was seen and examined.

## 2018-09-05 ENCOUNTER — HOSPITAL ENCOUNTER (INPATIENT)
Dept: HOSPITAL 47 - EC | Age: 83
LOS: 5 days | Discharge: NURSING, REHAB OR LONG-TERM CARE | DRG: 190 | End: 2018-09-10
Payer: MEDICARE

## 2018-09-05 DIAGNOSIS — S81.811A: ICD-10-CM

## 2018-09-05 DIAGNOSIS — Z79.51: ICD-10-CM
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
## 2018-09-05 DIAGNOSIS — G93.41: ICD-10-CM

## 2018-09-05 DIAGNOSIS — E78.5: ICD-10-CM

## 2018-09-05 DIAGNOSIS — Z87.01: ICD-10-CM

## 2018-09-05 DIAGNOSIS — I07.1: ICD-10-CM

## 2018-09-05 DIAGNOSIS — H91.93: ICD-10-CM

## 2018-09-05 DIAGNOSIS — Z80.0: ICD-10-CM

## 2018-09-05 DIAGNOSIS — Z87.440: ICD-10-CM
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
## 2018-09-05 DIAGNOSIS — N18.4: ICD-10-CM

## 2018-09-05 DIAGNOSIS — E03.9: ICD-10-CM

## 2018-09-05 DIAGNOSIS — J44.1: Primary | ICD-10-CM

## 2018-09-05 DIAGNOSIS — I48.0: ICD-10-CM

## 2018-09-05 DIAGNOSIS — Z84.1: ICD-10-CM

## 2018-09-05 DIAGNOSIS — Z97.4: ICD-10-CM

## 2018-09-05 DIAGNOSIS — Z66: ICD-10-CM

## 2018-09-05 DIAGNOSIS — E21.0: ICD-10-CM

## 2018-09-05 DIAGNOSIS — B37.2: ICD-10-CM

## 2018-09-05 DIAGNOSIS — E86.0: ICD-10-CM

## 2018-09-05 DIAGNOSIS — H54.61: ICD-10-CM

## 2018-09-05 DIAGNOSIS — Z87.891: ICD-10-CM

## 2018-09-05 DIAGNOSIS — N39.0: ICD-10-CM

## 2018-09-05 DIAGNOSIS — I65.22: ICD-10-CM

## 2018-09-05 DIAGNOSIS — J96.11: ICD-10-CM

## 2018-09-05 DIAGNOSIS — Z99.81: ICD-10-CM

## 2018-09-05 DIAGNOSIS — Z79.899: ICD-10-CM

## 2018-09-05 DIAGNOSIS — Z79.890: ICD-10-CM

## 2018-09-05 DIAGNOSIS — Z82.61: ICD-10-CM

## 2018-09-05 DIAGNOSIS — Z79.891: ICD-10-CM

## 2018-09-05 DIAGNOSIS — Z87.730: ICD-10-CM

## 2018-09-05 DIAGNOSIS — K21.9: ICD-10-CM

## 2018-09-05 DIAGNOSIS — S00.83XA: ICD-10-CM

## 2018-09-05 DIAGNOSIS — Z86.19: ICD-10-CM

## 2018-09-05 DIAGNOSIS — F03.90: ICD-10-CM

## 2018-09-05 DIAGNOSIS — Z90.710: ICD-10-CM

## 2018-09-05 DIAGNOSIS — I50.32: ICD-10-CM

## 2018-09-05 DIAGNOSIS — J84.10: ICD-10-CM

## 2018-09-05 DIAGNOSIS — I13.0: ICD-10-CM

## 2018-09-05 DIAGNOSIS — I27.20: ICD-10-CM

## 2018-09-05 DIAGNOSIS — Z96.652: ICD-10-CM

## 2018-09-05 DIAGNOSIS — Z79.01: ICD-10-CM

## 2018-09-05 DIAGNOSIS — B35.6: ICD-10-CM

## 2018-09-05 DIAGNOSIS — H40.9: ICD-10-CM

## 2018-09-05 DIAGNOSIS — E87.6: ICD-10-CM

## 2018-09-05 DIAGNOSIS — R29.6: ICD-10-CM

## 2018-09-05 DIAGNOSIS — N17.9: ICD-10-CM

## 2018-09-05 LAB
ALBUMIN SERPL-MCNC: 3.5 G/DL (ref 3.5–5)
ALP SERPL-CCNC: 110 U/L (ref 38–126)
ALT SERPL-CCNC: 22 U/L (ref 9–52)
AMMONIA PLAS-SCNC: 24 UMOL/L (ref ?–30)
ANION GAP SERPL CALC-SCNC: 12 MMOL/L
APTT BLD: 23.3 SEC (ref 22–30)
AST SERPL-CCNC: 19 U/L (ref 14–36)
BASOPHILS # BLD AUTO: 0 K/UL (ref 0–0.2)
BASOPHILS NFR BLD AUTO: 0 %
BUN SERPL-SCNC: 76 MG/DL (ref 7–17)
CALCIUM SPEC-MCNC: 10.1 MG/DL (ref 8.4–10.2)
CHLORIDE SERPL-SCNC: 99 MMOL/L (ref 98–107)
CK SERPL-CCNC: 43 U/L (ref 30–135)
CO2 SERPL-SCNC: 28 MMOL/L (ref 22–30)
EOSINOPHIL # BLD AUTO: 0.4 K/UL (ref 0–0.7)
EOSINOPHIL NFR BLD AUTO: 6 %
ERYTHROCYTE [DISTWIDTH] IN BLOOD BY AUTOMATED COUNT: 4.23 M/UL (ref 3.8–5.4)
ERYTHROCYTE [DISTWIDTH] IN BLOOD: 16.3 % (ref 11.5–15.5)
GLUCOSE BLD-MCNC: 129 MG/DL (ref 75–99)
GLUCOSE SERPL-MCNC: 113 MG/DL (ref 74–99)
HCT VFR BLD AUTO: 36.8 % (ref 34–46)
HGB BLD-MCNC: 11.3 GM/DL (ref 11.4–16)
INR PPP: 1.2 (ref ?–1.2)
LACTATE BLDV-SCNC: 1.1 MMOL/L (ref 0.7–2)
LYMPHOCYTES # SPEC AUTO: 0.7 K/UL (ref 1–4.8)
LYMPHOCYTES NFR SPEC AUTO: 12 %
MAGNESIUM SPEC-SCNC: 1.9 MG/DL (ref 1.6–2.3)
MCH RBC QN AUTO: 26.8 PG (ref 25–35)
MCHC RBC AUTO-ENTMCNC: 30.8 G/DL (ref 31–37)
MCV RBC AUTO: 87 FL (ref 80–100)
MONOCYTES # BLD AUTO: 0.5 K/UL (ref 0–1)
MONOCYTES NFR BLD AUTO: 9 %
NEUTROPHILS # BLD AUTO: 4 K/UL (ref 1.3–7.7)
NEUTROPHILS NFR BLD AUTO: 69 %
PH UR: 7 [PH] (ref 5–8)
PLATELET # BLD AUTO: 201 K/UL (ref 150–450)
POTASSIUM SERPL-SCNC: 3.9 MMOL/L (ref 3.5–5.1)
PROT SERPL-MCNC: 6.5 G/DL (ref 6.3–8.2)
PT BLD: 11.1 SEC (ref 9–12)
RBC UR QL: 1 /HPF (ref 0–5)
SODIUM SERPL-SCNC: 139 MMOL/L (ref 137–145)
SP GR UR: 1.01 (ref 1–1.03)
SQUAMOUS UR QL AUTO: 2 /HPF (ref 0–4)
TROPONIN I SERPL-MCNC: <0.012 NG/ML (ref 0–0.03)
UROBILINOGEN UR QL STRIP: <2 MG/DL (ref ?–2)
WBC # BLD AUTO: 5.7 K/UL (ref 3.8–10.6)
WBC #/AREA URNS HPF: 46 /HPF (ref 0–5)

## 2018-09-05 PROCEDURE — 80053 COMPREHEN METABOLIC PANEL: CPT

## 2018-09-05 PROCEDURE — 72125 CT NECK SPINE W/O DYE: CPT

## 2018-09-05 PROCEDURE — 83880 ASSAY OF NATRIURETIC PEPTIDE: CPT

## 2018-09-05 PROCEDURE — 93880 EXTRACRANIAL BILAT STUDY: CPT

## 2018-09-05 PROCEDURE — 83605 ASSAY OF LACTIC ACID: CPT

## 2018-09-05 PROCEDURE — 85025 COMPLETE CBC W/AUTO DIFF WBC: CPT

## 2018-09-05 PROCEDURE — 82553 CREATINE MB FRACTION: CPT

## 2018-09-05 PROCEDURE — 83970 ASSAY OF PARATHORMONE: CPT

## 2018-09-05 PROCEDURE — 94640 AIRWAY INHALATION TREATMENT: CPT

## 2018-09-05 PROCEDURE — 71045 X-RAY EXAM CHEST 1 VIEW: CPT

## 2018-09-05 PROCEDURE — 87086 URINE CULTURE/COLONY COUNT: CPT

## 2018-09-05 PROCEDURE — 99291 CRITICAL CARE FIRST HOUR: CPT

## 2018-09-05 PROCEDURE — 80048 BASIC METABOLIC PNL TOTAL CA: CPT

## 2018-09-05 PROCEDURE — 82550 ASSAY OF CK (CPK): CPT

## 2018-09-05 PROCEDURE — 84484 ASSAY OF TROPONIN QUANT: CPT

## 2018-09-05 PROCEDURE — 83735 ASSAY OF MAGNESIUM: CPT

## 2018-09-05 PROCEDURE — 70450 CT HEAD/BRAIN W/O DYE: CPT

## 2018-09-05 PROCEDURE — 95816 EEG AWAKE AND DROWSY: CPT

## 2018-09-05 PROCEDURE — 82306 VITAMIN D 25 HYDROXY: CPT

## 2018-09-05 PROCEDURE — 85730 THROMBOPLASTIN TIME PARTIAL: CPT

## 2018-09-05 PROCEDURE — 94760 N-INVAS EAR/PLS OXIMETRY 1: CPT

## 2018-09-05 PROCEDURE — 85610 PROTHROMBIN TIME: CPT

## 2018-09-05 PROCEDURE — 82140 ASSAY OF AMMONIA: CPT

## 2018-09-05 PROCEDURE — 36415 COLL VENOUS BLD VENIPUNCTURE: CPT

## 2018-09-05 PROCEDURE — 81001 URINALYSIS AUTO W/SCOPE: CPT

## 2018-09-05 PROCEDURE — 93005 ELECTROCARDIOGRAM TRACING: CPT

## 2018-09-05 PROCEDURE — 96374 THER/PROPH/DIAG INJ IV PUSH: CPT

## 2018-09-05 PROCEDURE — 86335 IMMUNFIX E-PHORSIS/URINE/CSF: CPT

## 2018-09-05 PROCEDURE — 93306 TTE W/DOPPLER COMPLETE: CPT

## 2018-09-05 RX ADMIN — ATORVASTATIN CALCIUM SCH MG: 10 TABLET, FILM COATED ORAL at 21:49

## 2018-09-05 RX ADMIN — IPRATROPIUM BROMIDE AND ALBUTEROL SULFATE SCH ML: .5; 3 SOLUTION RESPIRATORY (INHALATION) at 19:50

## 2018-09-05 RX ADMIN — METHYLPREDNISOLONE SODIUM SUCCINATE SCH MG: 125 INJECTION, POWDER, FOR SOLUTION INTRAMUSCULAR; INTRAVENOUS at 21:48

## 2018-09-05 RX ADMIN — CLOTRIMAZOLE AND BETAMETHASONE DIPROPIONATE SCH APPLIC: 10; .5 CREAM TOPICAL at 21:47

## 2018-09-05 RX ADMIN — FLUTICASONE PROPIONATE SCH SPRAY: 50 SPRAY, METERED NASAL at 21:47

## 2018-09-05 RX ADMIN — Medication SCH APPLIC: at 21:47

## 2018-09-05 RX ADMIN — NEOMYCIN SULFATE, POLYMYXIN B SULFATE, AND DEXAMETHASONE SCH APPLIC: 3.5; 10000; 1 OINTMENT OPHTHALMIC at 21:47

## 2018-09-05 RX ADMIN — DILTIAZEM HYDROCHLORIDE SCH MG: 60 TABLET, FILM COATED ORAL at 21:48

## 2018-09-05 RX ADMIN — I-VITE, TAB 1000-60-2MG (60/BT) SCH EACH: TAB at 21:47

## 2018-09-05 RX ADMIN — DOCUSATE SODIUM AND SENNOSIDES SCH EACH: 50; 8.6 TABLET ORAL at 21:47

## 2018-09-05 RX ADMIN — IPRATROPIUM BROMIDE AND ALBUTEROL SULFATE SCH ML: .5; 3 SOLUTION RESPIRATORY (INHALATION) at 23:33

## 2018-09-05 RX ADMIN — METOPROLOL TARTRATE SCH MG: 50 TABLET, FILM COATED ORAL at 21:49

## 2018-09-05 RX ADMIN — LATANOPROST SCH DROPS: 50 SOLUTION OPHTHALMIC at 21:49

## 2018-09-05 RX ADMIN — FUROSEMIDE SCH MG: 20 TABLET ORAL at 21:47

## 2018-09-05 RX ADMIN — Medication SCH MG: at 21:47

## 2018-09-05 NOTE — CT
EXAMINATION TYPE: CT brain aleksandarine wo con

 

DATE OF EXAM: 9/5/2018

 

COMPARISON: 10/26/2017

 

HISTORY: Fall, AMS

 

CT DLP: 1502 mGycm, Automated exposure control for dose reduction was used.

 

CONTRAST: Patient injected with 0 mL of Isovue 300.

 

CT of the brain is performed utilizing 3 mm thick sections through the posterior fossa and 3 mm thick
 sections through the remaining calvarium.  

 

Study is performed within 24 hours of arrival to the hospital.

 

 No abnormal hyperdensity is present to suggest an acute intracranial hemorrhage.

No mass lesion is evident.

No acute infarcts are evident.  There is mild periventricular white matter hypodensity, likely on the
 basis of chronic white matter ischemic changes.

Ventricles and sulci are appropriate for the patient age.  

 

No acute fractures are evident. Mild soft tissue swelling is over the left orbit. There is motion art
ifact present and nasal bone evaluation is somewhat limited. There are air-fluid levels or retention 
cysts within the maxillary sinuses. Air-fluid levels within the sphenoid sinus.

 

IMPRESSIONS:

1. Age-related atrophy with periventricular white matter ischemic type changes

 

CT cervical spine.

 

COMPARISON: 10/26/2017

 

CT of the cervical spine is performed in the axial plane at 2 mm thick sections.  Reconstructed image
s in the coronal, and sagittal plane are reviewed on the computer. 

 

No acute fractures are evident.

Straightened

There is loss of disc height C3-4 C4-5. Posterior endplate spurring is present from C4 with proximall
y 4 mm posterior impression on the thecal sac. Disc space narrowing is also present C6-6-7 posterior 
spinal lamellar line appears intact.

Vertebral body heights are preserved.

No spinal canal stenosis is evident.

No significant foraminal narrowing is present. 

Exam appears stable from comparison

 

IMPRESSIONS:

1. Degenerative disc changes with some endplate spurring.

2. No acute osseous abnormality

## 2018-09-05 NOTE — XR
EXAMINATION TYPE: XR chest 1V portable

 

DATE OF EXAM: 9/5/2018

 

COMPARISON: 4/29/2018

 

HISTORY: Pain

 

TECHNIQUE: Single frontal view of the chest is obtained.

 

FINDINGS:  Subsegmental consolidation at the right lung base. There is also mild cephalization of the
 pulmonary vessels. No definite pneumothorax or sizable pleural effusion is identified. Mild cardiome
jeison. Subsegmental changes at the left lung base. Diffuse osteopenia and arthropathy of the shoulders
. Atherosclerotic change aorta.

 

IMPRESSION:  

1. Bilateral subsegmental atelectasis or infiltrate.

2. Interstitium remains prominent likely the basis of pulmonary fibrosis. Superimposed pneumonitis or
 venous congestion not excluded.

## 2018-09-05 NOTE — ED
Fall HPI





- General


Stated Complaint: fall, altered mental status


Time Seen by Provider: 18 12:23


Source: patient, EMS, RN notes reviewed, old records reviewed





- History of Present Illness


Initial Comments: 





This is a 83-year-old female who is brought in by EMS from a local nursing home 

after she was found on the floor in the bathroom.  She when asked, stated she 

did not fall but she had evidence of some bruising above her left eye.  

Additionally she's started demonstrating some confusion and garbled speech 

which is apparently not her norm.  She had a skin tear on her right lower 

extremity she was brought here for evaluation.  She is a poor historian.  Later 

found that she his DO NOT RESUSCITATE status.  Last couple scale was 13 this 

does appear to be her baseline


MD Complaint: fall





- Related Data


 Home Medications











 Medication  Instructions  Recorded  Confirmed


 


Fluticasone Nasal Spray [Flonase 1 spray EA NOSTRIL Q12H 17





Nasal Spray]   


 


Levothyroxine Sodium [Synthroid] 25 mcg PO DAILY@0600 17


 


ALPRAZolam [Xanax] 0.25 mg PO HS 11/10/17 09/05/18


 


Albuterol Nebulized [Ventolin 2.5 mg INHALATION RT-Q4H PRN 11/10/17 09/05/18





Nebulized]   


 


Atorvastatin [Lipitor] 10 mg PO HS@2100 11/10/17 09/05/18


 


Buprenorphine [Butrans 10 MCG/HOUR] 1 patch TRANSDERM TU 11/10/17 09/05/18


 


Ergocalciferol (Vitamin D2) 50,000 unit PO Q30D 11/10/17 09/05/18





[Vitamin D2]   


 


Loratadine [Claritin] 10 mg PO DAILY PRN 11/10/17 09/05/18


 


Metolazone [Zaroxolyn] 5 mg PO MOTH 11/10/17 09/05/18


 


Potassium Chloride [Klor-Con 20] 20 meq PO BID@0900,1700 11/10/17 09/05/18


 


Ranitidine HCl [Zantac] 150 mg PO BID@0600,1700 11/10/17 09/05/18


 


guaiFENesin [guaiFENesin Oral 200 mg PO Q4HR PRN 11/10/17 09/05/18





Solution]   


 


prednisoLONE ACETATE [Pred Forte 2 drop BOTH EYES DAILY 11/10/17 09/05/18





1%]   


 


Ferrous Sulfate [Feosol] 325 mg PO BID@0900,1700 18


 


Latanoprost [Xalatan 0.005%] 1 drop LEFT EYE HS 18


 


Melatonin 3 mg PO HS 18


 


Triad Hydrophilic Wound Paste 1 applic TOPICAL Q12H 18


 


Vit C/E/Zn/Coppr/Lutein/Zeaxan 1 cap PO BID 18





[Preservision Areds 2 Softgel]   


 


acetaZOLAMIDE [Diamox] 250 mg PO TU 18


 


Clotrimazole/Betamethasone Dip 1 applic TOPICAL BID 18





[Lotrisone Cream]   


 


Furosemide [Lasix] 20 mg PO BID 18


 


HYDROcodone/APAP 5-325MG [Norco 1 tab PO Q8H PRN 18





5-325]   


 


Losartan [Cozaar] 50 mg PO DAILY 18


 


Neomycin-Polymyxin-Dexameth 1 applic LEFT EYE TID 18





[Maxitrol Ophth Oint]   


 


Warfarin [Coumadin] 2 mg PO SUSA 18








 Previous Rx's











 Medication  Instructions  Recorded


 


Acetaminophen Tab [Tylenol] 650 mg PO Q6HR PRN  tab 17


 


Magnesium Hydroxide [Milk of 2,400 mg PO DAILY PRN  ml 17





Magnesia Concentrate]  


 


Sennosides-Docusate Sodium 1 tab PO BID #60 tablet 17





[Senokot-S]  


 


Diltiazem Oral [Cardizem*] 60 mg PO TID  tab 18


 


Metoprolol Tartrate [Lopressor] 50 mg PO BID  tab 18


 


Allopurinol [Zyloprim] 200 mg PO DAILY  tab 18











 Allergies











Allergy/AdvReac Type Severity Reaction Status Date / Time


 


No Known Allergies Allergy   Verified 18 12:36














Review of Systems


ROS Statement: 


Those systems with pertinent positive or pertinent negative responses have been 

documented in the HPI.





ROS Other: All systems not noted in ROS Statement are negative.


Limitations: ROS unobtainable due to patients medical condition





Past Medical History


Past Medical History: Heart Failure, COPD, Dementia, GERD/Reflux, Hyperlipidemia

, Hypertension, Renal Disease, Respiratory Disorder, Thyroid Disorder


Additional Past Medical History / Comment(s): Pt was born with cleft palate and 

other physical problems with facial structures including nasal passages and ear 

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  She can 

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 

in the R eye and can see/read with left eye-it also has glaucoma, chronic renal 

disease stage IV-has L arm fistula but no hemodialysis, frequent UTIs, wears O2 

at 3L/NC ATC, falls, hypothyroid.


Last Myocardial Infarction Date:: unknown


History of Any Multi-Drug Resistant Organisms: ESBL


Date of last positivie culture/infection: 18


MDRO Source:: ESBL URINE


Past Surgical History: Bladder Surgery, Joint Replacement, Orthopedic Surgery


Additional Past Surgical History / Comment(s): Pt has had multiple surgeries 

for birth defects affecting mouth, nose and ears, total L knee arthroplasty, 

bladder sling, fistula L arm.


Past Anesthesia/Blood Transfusion Reactions: No Reported Reaction


Past Psychological History: No Psychological Hx Reported


Additional Psychological History / Comment(s): [place] [facility] [place]  She was getting around in a wheelchair.  She wears O2 at 3L/NC.  She 

gets updraft treatments 4 times a day.  She is on a regular thin consistency 

diet and ensure plus bwice a day.


Smoking Status: Former smoker


Past Alcohol Use History: None Reported


Additional Past Alcohol Use History / Comment(s): Pt quit smoking in .


Past Drug Use History: None Reported





- Past Family History


  ** Father


Family Medical History: Cancer


Additional Family Medical History / Comment(s): Father  prior to age 60 yrs 

with esophageal cancer.  He was a smoker and a drinker.





  ** Mother


Family Medical History: Osteoarthritis (OA), Renal Disease, Rheumatoid 

Arthritis (RA)


Additional Family Medical History / Comment(s): kidney problems, specific type 

unknown





General Exam





- General Exam Comments


Initial Comments: 





This is a well-developed well-nourished awake alert but confused female


Limitations: altered mental status


General appearance: alert


Head exam: Present: normocephalic, other (Ecchymosis seen over the right 

eyebrow no open wounds.)


Eye exam: Present: EOMI, other (Right cornea is opacified)


ENT exam: Present: mucous membranes dry


Neck exam: Present: normal inspection, full ROM.  Absent: tenderness, 

meningismus, lymphadenopathy


Respiratory exam: Present: wheezes, decreased breath sounds


Cardiovascular Exam: Present: regular rate, normal rhythm, normal heart sounds.

  Absent: systolic murmur, diastolic murmur, rubs, gallop, clicks


GI/Abdominal exam: Present: soft, normal bowel sounds.  Absent: distended, 

tenderness, guarding, rebound, rigid


Rectal exam: Present: deferred


Extremities exam: Present: full ROM, normal capillary refill, other (Anterior 

to the right anterior shin).  Absent: tenderness


Back exam: Present: normal inspection


Neurological exam: Present: altered, motor sensory deficit (Right cornea is 

opacified there is evidence of some garbled speech).  Absent: oriented X3, CN II

-XII intact


Skin exam: Present: warm, normal color.  Absent: intact





Course


 Vital Signs











  18





  12:26 12:51 13:26


 


Temperature 97.2 F L  


 


Pulse Rate 96 97 96


 


Respiratory 20 20 





Rate   


 


Blood Pressure 186/86 175/72 


 


O2 Sat by Pulse 97  





Oximetry   














  18





  13:35 14:35 15:30


 


Temperature   99.1 F


 


Pulse Rate 98 96 99


 


Respiratory  18 20





Rate   


 


Blood Pressure  131/95 143/64


 


O2 Sat by Pulse  96 97





Oximetry   














  18





  17:46


 


Temperature 


 


Pulse Rate 110 H


 


Respiratory 18





Rate 


 


Blood Pressure 177/80


 


O2 Sat by Pulse 98





Oximetry 














Medical Decision Making





- Medical Decision Making





I did discuss the findings with the patient and family members patient will be 

admitted urinary tract infection and COPD exacerbation dehydration renal 

failure syndrome and fall





- Lab Data


Result diagrams: 


 18 12:34





 18 12:34


 Lab Results











  18 Range/Units





  12:33 12:34 12:34 


 


WBC     (3.8-10.6)  k/uL


 


RBC     (3.80-5.40)  m/uL


 


Hgb     (11.4-16.0)  gm/dL


 


Hct     (34.0-46.0)  %


 


MCV     (80.0-100.0)  fL


 


MCH     (25.0-35.0)  pg


 


MCHC     (31.0-37.0)  g/dL


 


RDW     (11.5-15.5)  %


 


Plt Count     (150-450)  k/uL


 


Neutrophils %     %


 


Lymphocytes %     %


 


Monocytes %     %


 


Eosinophils %     %


 


Basophils %     %


 


Neutrophils #     (1.3-7.7)  k/uL


 


Lymphocytes #     (1.0-4.8)  k/uL


 


Monocytes #     (0-1.0)  k/uL


 


Eosinophils #     (0-0.7)  k/uL


 


Basophils #     (0-0.2)  k/uL


 


Hypochromasia     


 


Anisocytosis     


 


PT   11.1   (9.0-12.0)  sec


 


INR   1.2 H   (<1.2)  


 


APTT   23.3   (22.0-30.0)  sec


 


Sodium     (137-145)  mmol/L


 


Potassium     (3.5-5.1)  mmol/L


 


Chloride     ()  mmol/L


 


Carbon Dioxide     (22-30)  mmol/L


 


Anion Gap     mmol/L


 


BUN     (7-17)  mg/dL


 


Creatinine     (0.52-1.04)  mg/dL


 


Est GFR (CKD-EPI)AfAm     (>60 ml/min/1.73 sqM)  


 


Est GFR (CKD-EPI)NonAf     (>60 ml/min/1.73 sqM)  


 


Glucose     (74-99)  mg/dL


 


POC Glucose (mg/dL)  129 H    (75-99)  mg/dL


 


POC Glu Operater Chinedu Alcazar    


 


Plasma Lactic Acid Sumit     (0.7-2.0)  mmol/L


 


Calcium     (8.4-10.2)  mg/dL


 


Magnesium     (1.6-2.3)  mg/dL


 


Total Bilirubin     (0.2-1.3)  mg/dL


 


AST     (14-36)  U/L


 


ALT     (9-52)  U/L


 


Alkaline Phosphatase     ()  U/L


 


Ammonia     (<30)  umol/L


 


Total Creatine Kinase     ()  U/L


 


CK-MB (CK-2)     (0.0-2.4)  ng/mL


 


CK-MB (CK-2) Rel Index     


 


Troponin I     (0.000-0.034)  ng/mL


 


NT-Pro-B Natriuret Pep    6590  pg/mL


 


Total Protein     (6.3-8.2)  g/dL


 


Albumin     (3.5-5.0)  g/dL


 


Urine Color     


 


Urine Appearance     (Clear)  


 


Urine pH     (5.0-8.0)  


 


Ur Specific Gravity     (1.001-1.035)  


 


Urine Protein     (Negative)  


 


Urine Glucose (UA)     (Negative)  


 


Urine Ketones     (Negative)  


 


Urine Blood     (Negative)  


 


Urine Nitrite     (Negative)  


 


Urine Bilirubin     (Negative)  


 


Urine Urobilinogen     (<2.0)  mg/dL


 


Ur Leukocyte Esterase     (Negative)  


 


Urine RBC     (0-5)  /hpf


 


Urine WBC     (0-5)  /hpf


 


Ur Squamous Epith Cells     (0-4)  /hpf


 


Urine Bacteria     (None)  /hpf














  18 Range/Units





  12:34 12:34 12:34 


 


WBC    5.7  (3.8-10.6)  k/uL


 


RBC    4.23  (3.80-5.40)  m/uL


 


Hgb    11.3 L  (11.4-16.0)  gm/dL


 


Hct    36.8  (34.0-46.0)  %


 


MCV    87.0  (80.0-100.0)  fL


 


MCH    26.8  (25.0-35.0)  pg


 


MCHC    30.8 L  (31.0-37.0)  g/dL


 


RDW    16.3 H  (11.5-15.5)  %


 


Plt Count    201  (150-450)  k/uL


 


Neutrophils %    69  %


 


Lymphocytes %    12  %


 


Monocytes %    9  %


 


Eosinophils %    6  %


 


Basophils %    0  %


 


Neutrophils #    4.0  (1.3-7.7)  k/uL


 


Lymphocytes #    0.7 L  (1.0-4.8)  k/uL


 


Monocytes #    0.5  (0-1.0)  k/uL


 


Eosinophils #    0.4  (0-0.7)  k/uL


 


Basophils #    0.0  (0-0.2)  k/uL


 


Hypochromasia    Moderate  


 


Anisocytosis    Slight  


 


PT     (9.0-12.0)  sec


 


INR     (<1.2)  


 


APTT     (22.0-30.0)  sec


 


Sodium     (137-145)  mmol/L


 


Potassium     (3.5-5.1)  mmol/L


 


Chloride     ()  mmol/L


 


Carbon Dioxide     (22-30)  mmol/L


 


Anion Gap     mmol/L


 


BUN     (7-17)  mg/dL


 


Creatinine     (0.52-1.04)  mg/dL


 


Est GFR (CKD-EPI)AfAm     (>60 ml/min/1.73 sqM)  


 


Est GFR (CKD-EPI)NonAf     (>60 ml/min/1.73 sqM)  


 


Glucose     (74-99)  mg/dL


 


POC Glucose (mg/dL)     (75-99)  mg/dL


 


POC Glu Operater ID     


 


Plasma Lactic Acid Sumit  1.1    (0.7-2.0)  mmol/L


 


Calcium     (8.4-10.2)  mg/dL


 


Magnesium     (1.6-2.3)  mg/dL


 


Total Bilirubin     (0.2-1.3)  mg/dL


 


AST     (14-36)  U/L


 


ALT     (9-52)  U/L


 


Alkaline Phosphatase     ()  U/L


 


Ammonia  24    (<30)  umol/L


 


Total Creatine Kinase   43   ()  U/L


 


CK-MB (CK-2)   1.0   (0.0-2.4)  ng/mL


 


CK-MB (CK-2) Rel Index   2.3   


 


Troponin I   <0.012   (0.000-0.034)  ng/mL


 


NT-Pro-B Natriuret Pep     pg/mL


 


Total Protein     (6.3-8.2)  g/dL


 


Albumin     (3.5-5.0)  g/dL


 


Urine Color     


 


Urine Appearance     (Clear)  


 


Urine pH     (5.0-8.0)  


 


Ur Specific Gravity     (1.001-1.035)  


 


Urine Protein     (Negative)  


 


Urine Glucose (UA)     (Negative)  


 


Urine Ketones     (Negative)  


 


Urine Blood     (Negative)  


 


Urine Nitrite     (Negative)  


 


Urine Bilirubin     (Negative)  


 


Urine Urobilinogen     (<2.0)  mg/dL


 


Ur Leukocyte Esterase     (Negative)  


 


Urine RBC     (0-5)  /hpf


 


Urine WBC     (0-5)  /hpf


 


Ur Squamous Epith Cells     (0-4)  /hpf


 


Urine Bacteria     (None)  /hpf














  18 Range/Units





  12:34 12:51 


 


WBC    (3.8-10.6)  k/uL


 


RBC    (3.80-5.40)  m/uL


 


Hgb    (11.4-16.0)  gm/dL


 


Hct    (34.0-46.0)  %


 


MCV    (80.0-100.0)  fL


 


MCH    (25.0-35.0)  pg


 


MCHC    (31.0-37.0)  g/dL


 


RDW    (11.5-15.5)  %


 


Plt Count    (150-450)  k/uL


 


Neutrophils %    %


 


Lymphocytes %    %


 


Monocytes %    %


 


Eosinophils %    %


 


Basophils %    %


 


Neutrophils #    (1.3-7.7)  k/uL


 


Lymphocytes #    (1.0-4.8)  k/uL


 


Monocytes #    (0-1.0)  k/uL


 


Eosinophils #    (0-0.7)  k/uL


 


Basophils #    (0-0.2)  k/uL


 


Hypochromasia    


 


Anisocytosis    


 


PT    (9.0-12.0)  sec


 


INR    (<1.2)  


 


APTT    (22.0-30.0)  sec


 


Sodium  139   (137-145)  mmol/L


 


Potassium  3.9   (3.5-5.1)  mmol/L


 


Chloride  99   ()  mmol/L


 


Carbon Dioxide  28   (22-30)  mmol/L


 


Anion Gap  12   mmol/L


 


BUN  76 H   (7-17)  mg/dL


 


Creatinine  2.99 H   (0.52-1.04)  mg/dL


 


Est GFR (CKD-EPI)AfAm  16   (>60 ml/min/1.73 sqM)  


 


Est GFR (CKD-EPI)NonAf  14   (>60 ml/min/1.73 sqM)  


 


Glucose  113 H   (74-99)  mg/dL


 


POC Glucose (mg/dL)    (75-99)  mg/dL


 


POC Glu Operater ID    


 


Plasma Lactic Acid Sumit    (0.7-2.0)  mmol/L


 


Calcium  10.1   (8.4-10.2)  mg/dL


 


Magnesium  1.9   (1.6-2.3)  mg/dL


 


Total Bilirubin  0.5   (0.2-1.3)  mg/dL


 


AST  19   (14-36)  U/L


 


ALT  22   (9-52)  U/L


 


Alkaline Phosphatase  110   ()  U/L


 


Ammonia    (<30)  umol/L


 


Total Creatine Kinase    ()  U/L


 


CK-MB (CK-2)    (0.0-2.4)  ng/mL


 


CK-MB (CK-2) Rel Index    


 


Troponin I    (0.000-0.034)  ng/mL


 


NT-Pro-B Natriuret Pep    pg/mL


 


Total Protein  6.5   (6.3-8.2)  g/dL


 


Albumin  3.5   (3.5-5.0)  g/dL


 


Urine Color   Light Yellow  


 


Urine Appearance   Clear  (Clear)  


 


Urine pH   7.0  (5.0-8.0)  


 


Ur Specific Gravity   1.009  (1.001-1.035)  


 


Urine Protein   Trace H  (Negative)  


 


Urine Glucose (UA)   Negative  (Negative)  


 


Urine Ketones   Negative  (Negative)  


 


Urine Blood   Negative  (Negative)  


 


Urine Nitrite   Positive H  (Negative)  


 


Urine Bilirubin   Negative  (Negative)  


 


Urine Urobilinogen   <2.0  (<2.0)  mg/dL


 


Ur Leukocyte Esterase   Large H  (Negative)  


 


Urine RBC   1  (0-5)  /hpf


 


Urine WBC   46 H  (0-5)  /hpf


 


Ur Squamous Epith Cells   2  (0-4)  /hpf


 


Urine Bacteria   Moderate H  (None)  /hpf














- EKG Data


-: EKG Interpreted by Me (Atrial fibrillation rate was 92  QT since QTC 

352/435 no acute ST-T )





- Radiology Data


Radiology results: report reviewed (Review the imaging shows no definite acute 

findings.), image reviewed





Critical Care Time


Critical Care Time: Yes


Critical Care Time: 





33 minutes of critical care time which includes monitoring the EMS discussed 

with paramedics history physical labs x-rays several reevaluation the patient 

discussed with the patient family regarding findings discussion with the 

admitting physician admission orders and documentation of the above





Disposition


Clinical Impression: 


 COPD exacerbation, Urinary tract infection, Renal failure syndrome, Dehydration

, Fall, Facial contusion





Disposition: ADMITTED AS IP TO THIS [facility]


Condition: Stable


Referrals: 


Dilshad Street MD [Primary Care Provider] - 1-2 days

## 2018-09-06 LAB
ANION GAP SERPL CALC-SCNC: 13 MMOL/L
BUN SERPL-SCNC: 66 MG/DL (ref 7–17)
CALCIUM SPEC-MCNC: 10.4 MG/DL (ref 8.4–10.2)
CHLORIDE SERPL-SCNC: 102 MMOL/L (ref 98–107)
CO2 SERPL-SCNC: 24 MMOL/L (ref 22–30)
GLUCOSE SERPL-MCNC: 162 MG/DL (ref 74–99)
POTASSIUM SERPL-SCNC: 3.3 MMOL/L (ref 3.5–5.1)
SODIUM SERPL-SCNC: 139 MMOL/L (ref 137–145)

## 2018-09-06 RX ADMIN — CEFAZOLIN SCH: 330 INJECTION, POWDER, FOR SOLUTION INTRAMUSCULAR; INTRAVENOUS at 12:14

## 2018-09-06 RX ADMIN — NEOMYCIN SULFATE, POLYMYXIN B SULFATE, AND DEXAMETHASONE SCH APPLIC: 3.5; 10000; 1 OINTMENT OPHTHALMIC at 17:36

## 2018-09-06 RX ADMIN — CLOTRIMAZOLE AND BETAMETHASONE DIPROPIONATE SCH APPLIC: 10; .5 CREAM TOPICAL at 09:50

## 2018-09-06 RX ADMIN — DILTIAZEM HYDROCHLORIDE SCH MG: 60 TABLET, FILM COATED ORAL at 09:47

## 2018-09-06 RX ADMIN — IPRATROPIUM BROMIDE AND ALBUTEROL SULFATE SCH: .5; 3 SOLUTION RESPIRATORY (INHALATION) at 15:35

## 2018-09-06 RX ADMIN — METHYLPREDNISOLONE SODIUM SUCCINATE SCH MG: 125 INJECTION, POWDER, FOR SOLUTION INTRAMUSCULAR; INTRAVENOUS at 18:46

## 2018-09-06 RX ADMIN — CEFTRIAXONE SODIUM SCH MG: 1 INJECTION, POWDER, FOR SOLUTION INTRAMUSCULAR; INTRAVENOUS at 18:48

## 2018-09-06 RX ADMIN — I-VITE, TAB 1000-60-2MG (60/BT) SCH EACH: TAB at 09:46

## 2018-09-06 RX ADMIN — POTASSIUM CHLORIDE SCH MEQ: 20 TABLET, EXTENDED RELEASE ORAL at 09:47

## 2018-09-06 RX ADMIN — LOSARTAN POTASSIUM SCH MG: 50 TABLET, FILM COATED ORAL at 10:43

## 2018-09-06 RX ADMIN — DOCUSATE SODIUM AND SENNOSIDES SCH EACH: 50; 8.6 TABLET ORAL at 09:47

## 2018-09-06 RX ADMIN — FLUTICASONE PROPIONATE SCH SPRAY: 50 SPRAY, METERED NASAL at 17:36

## 2018-09-06 RX ADMIN — METHYLPREDNISOLONE SODIUM SUCCINATE SCH MG: 125 INJECTION, POWDER, FOR SOLUTION INTRAMUSCULAR; INTRAVENOUS at 12:13

## 2018-09-06 RX ADMIN — Medication SCH: at 18:06

## 2018-09-06 RX ADMIN — CEFAZOLIN SCH MLS/HR: 330 INJECTION, POWDER, FOR SOLUTION INTRAMUSCULAR; INTRAVENOUS at 00:49

## 2018-09-06 RX ADMIN — METHYLPREDNISOLONE SODIUM SUCCINATE SCH MG: 125 INJECTION, POWDER, FOR SOLUTION INTRAMUSCULAR; INTRAVENOUS at 00:44

## 2018-09-06 RX ADMIN — CEFAZOLIN SCH MLS/HR: 330 INJECTION, POWDER, FOR SOLUTION INTRAMUSCULAR; INTRAVENOUS at 06:40

## 2018-09-06 RX ADMIN — METOPROLOL TARTRATE SCH MG: 50 TABLET, FILM COATED ORAL at 21:49

## 2018-09-06 RX ADMIN — METOPROLOL TARTRATE SCH MG: 50 TABLET, FILM COATED ORAL at 09:46

## 2018-09-06 RX ADMIN — I-VITE, TAB 1000-60-2MG (60/BT) SCH EACH: TAB at 17:36

## 2018-09-06 RX ADMIN — IPRATROPIUM BROMIDE AND ALBUTEROL SULFATE SCH: .5; 3 SOLUTION RESPIRATORY (INHALATION) at 03:35

## 2018-09-06 RX ADMIN — DILTIAZEM HYDROCHLORIDE SCH MG: 60 TABLET, FILM COATED ORAL at 21:49

## 2018-09-06 RX ADMIN — FLUTICASONE PROPIONATE SCH SPRAY: 50 SPRAY, METERED NASAL at 06:15

## 2018-09-06 RX ADMIN — PREDNISOLONE ACETATE SCH DROPS: 10 SUSPENSION/ DROPS OPHTHALMIC at 09:49

## 2018-09-06 RX ADMIN — ALLOPURINOL SCH MG: 100 TABLET ORAL at 10:43

## 2018-09-06 RX ADMIN — APIXABAN SCH MG: 2.5 TABLET, FILM COATED ORAL at 21:50

## 2018-09-06 RX ADMIN — Medication SCH APPLIC: at 06:17

## 2018-09-06 RX ADMIN — LATANOPROST SCH DROPS: 50 SOLUTION OPHTHALMIC at 21:50

## 2018-09-06 RX ADMIN — METHYLPREDNISOLONE SODIUM SUCCINATE SCH MG: 125 INJECTION, POWDER, FOR SOLUTION INTRAMUSCULAR; INTRAVENOUS at 06:15

## 2018-09-06 RX ADMIN — DILTIAZEM HYDROCHLORIDE SCH MG: 60 TABLET, FILM COATED ORAL at 17:36

## 2018-09-06 RX ADMIN — CLOTRIMAZOLE AND BETAMETHASONE DIPROPIONATE SCH APPLIC: 10; .5 CREAM TOPICAL at 21:51

## 2018-09-06 RX ADMIN — Medication SCH APPLIC: at 17:36

## 2018-09-06 RX ADMIN — POTASSIUM CHLORIDE SCH MEQ: 20 TABLET, EXTENDED RELEASE ORAL at 18:51

## 2018-09-06 RX ADMIN — LEVOTHYROXINE SODIUM SCH MCG: 25 TABLET ORAL at 06:12

## 2018-09-06 RX ADMIN — IPRATROPIUM BROMIDE AND ALBUTEROL SULFATE SCH ML: .5; 3 SOLUTION RESPIRATORY (INHALATION) at 19:45

## 2018-09-06 RX ADMIN — Medication SCH MG: at 09:47

## 2018-09-06 RX ADMIN — IPRATROPIUM BROMIDE AND ALBUTEROL SULFATE SCH ML: .5; 3 SOLUTION RESPIRATORY (INHALATION) at 11:09

## 2018-09-06 RX ADMIN — ATORVASTATIN CALCIUM SCH MG: 10 TABLET, FILM COATED ORAL at 21:49

## 2018-09-06 RX ADMIN — DEXTROSE MONOHYDRATE AND SODIUM CHLORIDE SCH MLS/HR: 5; .9 INJECTION, SOLUTION INTRAVENOUS at 12:14

## 2018-09-06 RX ADMIN — NEOMYCIN SULFATE, POLYMYXIN B SULFATE, AND DEXAMETHASONE SCH APPLIC: 3.5; 10000; 1 OINTMENT OPHTHALMIC at 21:51

## 2018-09-06 RX ADMIN — Medication SCH MG: at 21:49

## 2018-09-06 RX ADMIN — NEOMYCIN SULFATE, POLYMYXIN B SULFATE, AND DEXAMETHASONE SCH APPLIC: 3.5; 10000; 1 OINTMENT OPHTHALMIC at 09:48

## 2018-09-06 RX ADMIN — DOCUSATE SODIUM AND SENNOSIDES SCH EACH: 50; 8.6 TABLET ORAL at 21:49

## 2018-09-06 RX ADMIN — FUROSEMIDE SCH MG: 20 TABLET ORAL at 06:12

## 2018-09-06 RX ADMIN — IPRATROPIUM BROMIDE AND ALBUTEROL SULFATE SCH ML: .5; 3 SOLUTION RESPIRATORY (INHALATION) at 07:05

## 2018-09-06 NOTE — XR
EXAMINATION TYPE: XR chest 1V portable

 

DATE OF EXAM: 9/6/2018

 

COMPARISON: Prior chest 9/5/2018

 

HISTORY: Pneumonia, cough

 

TECHNIQUE: Single frontal view of the chest is obtained.

 

FINDINGS:  Patient is rotated. Heart is stable and enlarged. Interstitium is increased. No evident pn
eumothorax or sizable effusion. Retrocardiac hiatal hernia suspected. High riding right shoulder comp
atible with chronic rotator cuff tear.

 

IMPRESSION:  Correlate for possible pulmonary venous hypertension and interstitial edema. Rotated exa
m. Follow-up PA and lateral chest x-ray recommended.

## 2018-09-06 NOTE — ECHOF
Referral Reason:chf



MEASUREMENTS

--------

HEIGHT: 160.0 cm

WEIGHT: 73.9 kg

BP: 159/81

RVIDd:   2.8 cm     (< 3.3)

IVSd:   1.2 cm     (0.6 - 1.1)

LVIDd:   3.2 cm     (3.9 - 5.3)

LVPWd:   1.2 cm     (0.6 - 1.1)

IVSs:   1.6 cm

LVIDs:   2.3 cm

LVPWs:   1.5 cm

LA Diam:   3.8 cm     (2.7 - 3.8)

LAESV Index (A-L):   37.03 ml/m

Ao Diam:   2.6 cm     (2.0 - 3.7)

AV Cusp:   1.6 cm     (1.5 - 2.6)

MV EXCURSION:   22.213 mm     (> 18.000)

MV EF SLOPE:   148 mm/s     (70 - 150)

EPSS:   0.8 cm

RAP:   5.00 mmHg

RVSP:   45.94 mmHg







FINDINGS

--------

Atrial fibrillation.

This was a technically adequate study.

The left ventricular size is normal.   There is borderline concentric left ventricular hypertrophy.  
 Overall left ventricular systolic function is low-normal with, an EF between 50 - 55 %.

The right ventricle is normal in size.

LA is moderately dilated 34-39 ml/m2

The right atrium is normal in size.

There is mild aortic valve sclerosis.   Trace to mild aortic regurgitation.

Mild mitral annular calcification present.   There is trace to mild mitral regurgitation.

Mild-to-moderate tricuspid regurgitation present.   There is mild to moderate pulmonary hypertension.
   The right ventricular systolic pressure, as measured by Doppler, is 45.94mmHg.

Trace/mild (physiologic)  pulmonic regurgitation.

The aortic root size is normal.

There is no pericardial effusion.



CONCLUSIONS

--------

1. Atrial fibrillation.

2. This was a technically adequate study.

3. The left ventricular size is normal.

4. There is borderline concentric left ventricular hypertrophy.

5. Overall left ventricular systolic function is low-normal with, an EF between 50 - 55 %.

6. The right ventricle is normal in size.

7. LA is moderately dilated 34-39 ml/m2

8. The right atrium is normal in size.

9. There is mild aortic valve sclerosis.

10. Trace to mild aortic regurgitation.

11. Mild mitral annular calcification present.

12. There is trace to mild mitral regurgitation.

13. Mild-to-moderate tricuspid regurgitation present.

14. There is mild to moderate pulmonary hypertension.

15. The right ventricular systolic pressure, as measured by Doppler, is 45.94mmHg.

16. Trace/mild (physiologic)  pulmonic regurgitation.

17. The aortic root size is normal.

18. There is no pericardial effusion.





SONOGRAPHER: Khushbu Cruz RDCS

## 2018-09-06 NOTE — CDI
Last Revision, December 2017





            Documentation Clarification Form



Date: 9/6/2018 2:59:02 PM

From: Yanci Friend RN, CCDS

Phone: [phone]

MRN: [id number]

Admit Date: 9/5/2018 5:55:00 PM

Patient Name: Rony Scanlon

Visit Number: [id number]



ATTENTION: The Clinical Documentation Specialists (CDI) and [facility] Coding Staff 
appreciate your assistance in clarifying documentation. Please respond to the 
clarification below the line at the bottom and electronically sign. The CDI & 
[facility] Coding staff will review the response and follow-up if needed. Please note: 
Queries are made part of the Legal Health Record. If you have any questions, 
please contact the author of this message via ITS.



Dilshad Lawrence MD



History/Risk Factors:

CHF, COPD, Pneumonia, CRF stage 4

Tobacco use: ex-smoker

Home oxygen: 3L nasal cannula



Clinical Indicators: 

Vital signs: Temp 97.2, HR 96, RR 20, B/P 186/86, Spo2 97% ra

Pulse oximetry: 2l NC

9/6 Nephro Lung/Breathing assessment: "LUNGS: Lungs are clear to auscultation 
and percussion."



Treatment: 

Breathing TX: Duoneb Q 4 hrs INH

Pulse ox: per unit protocol

O2: 2l nasal cannula



In your professional opinion, can you please clarify if these findings signify 
one of the following conditions?  



Chronic hypoxic respiratory failure

Chronic hypercapnic respiratory failure

Other Diagnosis, please specify 

Unable to determine



Please continue to document in your progress notes and discharge summary in 
order to capture severity of illness and risk of mortality. Include clinical 
findings that support your diagnosis.

____________________________________________________________________
MTDD

## 2018-09-06 NOTE — P.NPCON
History of Present Illness





- Reason for Consult


acute renal failure





- History of Present Illness





Reason for consultation: Acute kidney injury on chronic kidney disease





History of present illness:


Patient is a 83-year-old female seen in renal consultation for acute kidney 

injury on chronic kidney disease.  Patient has chronic kidney disease stage 

IIIB with baseline creatinine in the range of 1.62 secondary to nephrosclerosis 

and right renal atrophy.  Patient presented to the hospital after sustaining a 

fall.  She was also noted to be more confused and subsequently sent to the 

hospital for further care.  Patient is very hard of hearing.  She is currently 

awake and alert.  Denies chest pain or shortness of breath.  Oral intake is 

poor.  Diuretics are held and she is maintained on normal saline at 75 mL an 

hour.  She has history of diastolic CHF with moderate to severe tricuspid 

regurgitation and mild to moderate pulmonary hypertension.  No edema at this 

time.  Creatinine was 2.19 on admission.  Labs from today are pending at this 

time.  Hemodynamically she stable.





Vital signs are stable.


General: The patient appeared well nourished and normally developed. 


HEENT: Head exam is unremarkable. Neck is without jugular venous distension.


LUNGS: Lungs are clear to auscultation and percussion. Breath sounds decreased.


HEART: Rate and Rhythm are regular. First and second heart sounds normal. No 

murmurs, rubs or gallops. 


ABDOMEN: Abdominal exam reveals normal bowel sounds. Non-tender and non-

distended. No evidence of peritonitis.


EXTREMITITES: No clubbing, cyanosis, or edema.





Past Medical History


Past Medical History: Heart Failure, COPD, Dementia, GERD/Reflux, Hyperlipidemia

, Hypertension, Pneumonia, Renal Disease, Respiratory Disorder, Thyroid Disorder


Additional Past Medical History / Comment(s): Pt was born with cleft palate and 

other physical problems with facial structures including nasal passages and ear 

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  She wears 

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 

the R eye and can see/read with left eye-it also has glaucoma, chronic renal 

disease stage IV-has L arm fistula(PER DAUGHTER IT'S A FAILED FISTULA) but no 

hemodialysis, frequent UTIs, wears O2 PRN hypothyroid.


Last Myocardial Infarction Date:: unknown


History of Any Multi-Drug Resistant Organisms: ESBL


Date of last positivie culture/infection: 18


MDRO Source:: ESBL URINE


Past Surgical History: Bladder Surgery, Hysterectomy, Joint Replacement, 

Orthopedic Surgery


Additional Past Surgical History / Comment(s): Pt has had multiple surgeries 

for birth defects affecting mouth, nose and ears, total L knee arthroplasty, 

bladder sling, fistula L arm.PARTIAL HYSTERECTOMY, PICC LINE-SINCE REMOVED


Past Anesthesia/Blood Transfusion Reactions: No Reported Reaction


Smoking Status: Former smoker





- Past Family History


  ** Father


Family Medical History: Cancer


Additional Family Medical History / Comment(s): Father  prior to age 60 yrs 

with esophageal cancer.  He was a smoker and a drinker.





  ** Mother


Family Medical History: Osteoarthritis (OA), Renal Disease, Rheumatoid 

Arthritis (RA)


Additional Family Medical History / Comment(s): kidney problems, specific type 

unknown





Medications and Allergies


 Home Medications











 Medication  Instructions  Recorded  Confirmed  Type


 


Fluticasone Nasal Spray [Flonase 1 spray EA NOSTRIL Q12H 17 

History





Nasal Spray]    


 


Levothyroxine Sodium [Synthroid] 25 mcg PO DAILY@0600 17 History


 


Acetaminophen Tab [Tylenol] 650 mg PO Q6HR PRN  tab 17 Rx


 


ALPRAZolam [Xanax] 0.25 mg PO HS 11/10/17 09/05/18 History


 


Albuterol Nebulized [Ventolin 2.5 mg INHALATION RT-Q4H PRN 11/10/17 09/05/18 

History





Nebulized]    


 


Atorvastatin [Lipitor] 10 mg PO HS@2100 11/10/17 09/05/18 History


 


Buprenorphine [Butrans 10 MCG/HOUR] 1 patch TRANSDERM TU 11/10/17 09/05/18 

History


 


Ergocalciferol (Vitamin D2) 50,000 unit PO Q30D 11/10/17 09/05/18 History





[Vitamin D2]    


 


Loratadine [Claritin] 10 mg PO DAILY PRN 11/10/17 09/05/18 History


 


Metolazone [Zaroxolyn] 5 mg PO MOTH 11/10/17 09/05/18 History


 


Potassium Chloride [Klor-Con 20] 20 meq PO BID@0900,1700 11/10/17 09/05/18 

History


 


Ranitidine HCl [Zantac] 150 mg PO BID@0600,1700 11/10/17 09/05/18 History


 


guaiFENesin [guaiFENesin Oral 200 mg PO Q4HR PRN 11/10/17 09/05/18 History





Solution]    


 


prednisoLONE ACETATE [Pred Forte 2 drop BOTH EYES DAILY 11/10/17 09/05/18 

History





1%]    


 


Magnesium Hydroxide [Milk of 2,400 mg PO DAILY PRN  ml 17 Rx





Magnesia Concentrate]    


 


Sennosides-Docusate Sodium 1 tab PO BID #60 tablet 17 Rx





[Senokot-S]    


 


Ferrous Sulfate [Feosol] 325 mg PO BID@0900,1700 18 History


 


Latanoprost [Xalatan 0.005%] 1 drop LEFT EYE HS 18 History


 


Melatonin 3 mg PO HS 18 History


 


Triad Hydrophilic Wound Paste 1 applic TOPICAL Q12H 18 History


 


Vit C/E/Zn/Coppr/Lutein/Zeaxan 1 cap PO BID 18 History





[Preservision Areds 2 Softgel]    


 


acetaZOLAMIDE [Diamox] 250 mg PO TU 18 History


 


Diltiazem Oral [Cardizem*] 60 mg PO TID  tab 18 Rx


 


Metoprolol Tartrate [Lopressor] 50 mg PO BID  tab 18 Rx


 


Allopurinol [Zyloprim] 200 mg PO DAILY  tab 18 Rx


 


Clotrimazole/Betamethasone Dip 1 applic TOPICAL BID 18 History





[Lotrisone Cream]    


 


Furosemide [Lasix] 20 mg PO BID 18 History


 


HYDROcodone/APAP 5-325MG [Norco 1 tab PO Q8H PRN 18 History





5-325]    


 


Losartan [Cozaar] 50 mg PO DAILY 18 History


 


Neomycin-Polymyxin-Dexameth 1 applic LEFT EYE TID 18 History





[Maxitrol Ophth Oint]    


 


Warfarin [Coumadin] 2 mg PO SUSA 18 History











 Allergies











Allergy/AdvReac Type Severity Reaction Status Date / Time


 


No Known Allergies Allergy   Verified 18 12:36














Physical Exam


Vitals: 


 Vital Signs











  Temp Pulse Pulse Resp BP BP Pulse Ox


 


 18 11:19   88     


 


 18 11:10   88     


 


 18 11:06   88     


 


 18 07:15   84     


 


 18 07:05   82      98


 


 18 06:56  96.9 F L   83  20   159/81  96


 


 18 03:04     20   


 


 18 23:44   94     


 


 18 23:36   93     


 


 18 23:00  97.1 F L   98  20   169/73  92 L


 


 18 20:09   92     


 


 18 19:50   94      97


 


 18 18:35  98.5 F  101 H   20  182/89   96


 


 18 17:46   110 H   18  177/80   98


 


 18 15:30  99.1 F  99   20  143/64   97


 


 18 14:35   96   18  131/95   96


 


 18 13:35   98     








 Intake and Output











 18





 22:59 06:59 14:59


 


Other:   


 


  Voiding Method  Diaper 





  Incontinent 


 


  # Voids  4 


 


  # Bowel Movements  0 














Results





- Lab Results


 Most recent lab results











Calcium  10.1 mg/dL (8.4-10.2)   18  12:34    


 


Magnesium  1.9 mg/dL (1.6-2.3)   18  12:34    














 18 12:34





 18 12:34





Assessment and Plan


Plan: 





Assessment:


1.  Nonoliguric acute kidney injury mostly prerenal from diuretics and poor 

oral intake.  Creatinine 2.99 on admission.  Labs from today are pending at 

this time.


2.  Status post fall.


3.  Chronic kidney disease stage IIIB secondary to nephrosclerosis and right 

renal atrophy with baseline creatinine in the range of 1.6-2.


4.  UTI maintained on antibiotics.  Urine culture pending.


5.  Hypertension with chronic kidney disease.  Controlled.





Plan:


Continue normal saline at 75 mL an hour for now - can likely Hep-Lock tomorrow.


Follow-up cultures.


Follow-up echocardiogram and chest x-ray.


Encourage oral intake.


Avoid nephrotoxic agents and hypotensive episodes.


Diuretics currently held.





Thank you for the consultation.  I will continue to follow the patient with you 

during her hospital stay.

## 2018-09-06 NOTE — CDI
Last Revision, December 2017





            Documentation Clarification Form



Date: 9/6/2018 2:41:17 PM

From: Yanci Friend RN, CCDS

Phone: [phone]

MRN: [id number]

Admit Date: 9/5/2018 5:55:00 PM

Patient Name: Rony Scanlon

Visit Number: [id number]



ATTENTION: The Clinical Documentation Specialists (CDI) and [facility] Coding Staff 
appreciate your assistance in clarifying documentation. Please respond to the 
clarification below the line at the bottom and electronically sign. The CDI & 
[facility] Coding staff will review the response and follow-up if needed. Please note: 
Queries are made part of the Legal Health Record. If you have any questions, 
please contact the author of this message via ITS.



Dilshad Lawrence MD



Altered mental status was documented in the 

Patient history/risk factors:

CHF, COPD, Dementia, HTN, Chronic respiratory disorder, CRF stage 4, Blindness, 
ESBL in urine



Clinical Indicators:

9/5 EC HPI: "she's started demonstrating some confusion and garbled speech 
which is apparently not her norm. Stated Complaint: fall, altered mental status

9/6 H&P: "She has some confusion"

Labs: BUN 76/66, Creatinine 2.99/2.19, U/A: +

9/5 CXR: "possible pulmonary venous hypertension and interstitial edema." 

9/5 CT Brain: "Age-related atrophy with periventricular white matter ischemic 
type changes.



Treatment: 

Rocephin 1 gm IVP Q 24 hrs

D5.45 @ 75 cc/hr



In your professional opinion, please clarify the etiology of the altered mental 
status, if known.



Encephalopathy (specify Type- Metabolic, Toxic, Etc. and Underlying Medical 
Illness)

Dementia (if know, specify Type and if with/without Behavioral Disturbance)

Other condition (please specify)

Unable to determine



Please continue to document in your progress notes and discharge summary in 
order to capture severity of illness and risk of mortality. Include clinical 
findings that support your diagnosis.

____________________________________________________________________________

MTDD

## 2018-09-06 NOTE — P.CRDCN
History of Present Illness


History of present illness: 





Mrs. Scanlon is a pleasant 83-year-old  female past medical history 

significant for paroxysmal atrial fibrillation on long term anticoagulation 

with coumadin, diastolic congestive heart failure, hypertension, dyslipidemia, 

dementia, COPD and chronic kidney disease. We have been asked to see her in 

consultation for heart failure. She presented to the hospital yesterday after 

being found on the bathroom floor with increased confusion and garbled speech. 

She was found to have a UTI on admission. She is seen and examined laying 

comfortably in bed in no acute distress and is rather sleepy. She will not wake 

up to answer questions. Information is obtained from the medical record. CT 

obtained on admission reveals chronic atrophic changes with white matter 

changes. EKG reveals a-fib with controlled ventricular response. Chest xray 

with pulmonary fibrosis, no overt heart failure. Laboratory data reviewed, 

hemoglobin 11.3, platelets 201, sodium 139, potassium 3.3, magnesium 1.9, 

creatinine on admission 2.9 and repeat a 2.19, cardiac enzymes negative 1, NT 

proBNP 6590 last admission greater than 6000 as well.  Current daily cardiac 

medications include atorvastatin 10 mg daily, Cardizem 60 mg 3 times a day, 

Lasix 20 mg twice a day, losartan 50 mg daily, Zaroxolyn 5 mg Monday and 

Thursday, Lopressor 50 mg twice a day and Diamox 250 mg Tuesday.  She is also 

been seen in consultation by nephrology.  Diuretics are currently being held.  

She is being hydrated with normal saline at 75 mL an hour. 





She was recently admitted 2018 with pneumonia, a-fib with RVR, positive blood 

cultures and positive urine cultures as well. At that time she was treated with 

IV lasix for diastolic heart failure and  beta blockers were increased. 





Review of Systems


ROS unobtainable: due to mental status





Past Medical History


Past Medical History: Heart Failure, COPD, Dementia, GERD/Reflux, Hyperlipidemia

, Hypertension, Pneumonia, Renal Disease, Respiratory Disorder, Thyroid Disorder


Additional Past Medical History / Comment(s): Pt was born with cleft palate and 

other physical problems with facial structures including nasal passages and ear 

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  She wears 

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 

the R eye and can see/read with left eye-it also has glaucoma, chronic renal 

disease stage IV-has L arm fistula(PER DAUGHTER IT'S A FAILED FISTULA) but no 

hemodialysis, frequent UTIs, wears O2 PRN hypothyroid.


Last Myocardial Infarction Date:: unknown


History of Any Multi-Drug Resistant Organisms: ESBL


Date of last positivie culture/infection: 18


MDRO Source:: ESBL URINE


Past Surgical History: Bladder Surgery, Hysterectomy, Joint Replacement, 

Orthopedic Surgery


Additional Past Surgical History / Comment(s): Pt has had multiple surgeries 

for birth defects affecting mouth, nose and ears, total L knee arthroplasty, 

bladder sling, fistula L arm.PARTIAL HYSTERECTOMY, PICC LINE-SINCE REMOVED


Past Anesthesia/Blood Transfusion Reactions: No Reported Reaction


Smoking Status: Former smoker





- Past Family History


  ** Father


Family Medical History: Cancer


Additional Family Medical History / Comment(s): Father  prior to age 60 yrs 

with esophageal cancer.  He was a smoker and a drinker.





  ** Mother


Family Medical History: Osteoarthritis (OA), Renal Disease, Rheumatoid 

Arthritis (RA)


Additional Family Medical History / Comment(s): kidney problems, specific type 

unknown





Medications and Allergies


 Home Medications











 Medication  Instructions  Recorded  Confirmed  Type


 


Fluticasone Nasal Spray [Flonase 1 spray EA NOSTRIL Q12H 17 

History





Nasal Spray]    


 


Levothyroxine Sodium [Synthroid] 25 mcg PO DAILY@0600 17 History


 


Acetaminophen Tab [Tylenol] 650 mg PO Q6HR PRN  tab 17 Rx


 


ALPRAZolam [Xanax] 0.25 mg PO HS 11/10/17 09/05/18 History


 


Albuterol Nebulized [Ventolin 2.5 mg INHALATION RT-Q4H PRN 11/10/17 09/05/18 

History





Nebulized]    


 


Atorvastatin [Lipitor] 10 mg PO HS@2100 11/10/17 09/05/18 History


 


Buprenorphine [Butrans 10 MCG/HOUR] 1 patch TRANSDERM TU 11/10/17 09/05/18 

History


 


Ergocalciferol (Vitamin D2) 50,000 unit PO Q30D 11/10/17 09/05/18 History





[Vitamin D2]    


 


Loratadine [Claritin] 10 mg PO DAILY PRN 11/10/17 09/05/18 History


 


Metolazone [Zaroxolyn] 5 mg PO MOTH 11/10/17 09/05/18 History


 


Potassium Chloride [Klor-Con 20] 20 meq PO BID@0900,1700 11/10/17 09/05/18 

History


 


Ranitidine HCl [Zantac] 150 mg PO BID@0600,1700 11/10/17 09/05/18 History


 


guaiFENesin [guaiFENesin Oral 200 mg PO Q4HR PRN 11/10/17 09/05/18 History





Solution]    


 


prednisoLONE ACETATE [Pred Forte 2 drop BOTH EYES DAILY 11/10/17 09/05/18 

History





1%]    


 


Magnesium Hydroxide [Milk of 2,400 mg PO DAILY PRN  ml 17 Rx





Magnesia Concentrate]    


 


Sennosides-Docusate Sodium 1 tab PO BID #60 tablet 17 Rx





[Senokot-S]    


 


Ferrous Sulfate [Feosol] 325 mg PO BID@0900,1700 18 History


 


Latanoprost [Xalatan 0.005%] 1 drop LEFT EYE HS 18 History


 


Melatonin 3 mg PO HS 18 History


 


Triad Hydrophilic Wound Paste 1 applic TOPICAL Q12H 18 History


 


Vit C/E/Zn/Coppr/Lutein/Zeaxan 1 cap PO BID 18 History





[Preservision Areds 2 Softgel]    


 


acetaZOLAMIDE [Diamox] 250 mg PO TU 18 History


 


Diltiazem Oral [Cardizem*] 60 mg PO TID  tab 18 Rx


 


Metoprolol Tartrate [Lopressor] 50 mg PO BID  tab 18 Rx


 


Allopurinol [Zyloprim] 200 mg PO DAILY  tab 18 Rx


 


Clotrimazole/Betamethasone Dip 1 applic TOPICAL BID 18 History





[Lotrisone Cream]    


 


Furosemide [Lasix] 20 mg PO BID 18 History


 


HYDROcodone/APAP 5-325MG [Norco 1 tab PO Q8H PRN 18 History





5-325]    


 


Losartan [Cozaar] 50 mg PO DAILY 18 History


 


Neomycin-Polymyxin-Dexameth 1 applic LEFT EYE TID 18 History





[Maxitrol Ophth Oint]    


 


Warfarin [Coumadin] 2 mg PO SUSA 18 History











 Allergies











Allergy/AdvReac Type Severity Reaction Status Date / Time


 


No Known Allergies Allergy   Verified 18 12:36














Physical Exam


Vitals: 


 Vital Signs











  Temp Pulse Pulse Resp BP BP Pulse Ox


 


 18 11:19   88     


 


 18 11:10   88     


 


 18 11:06   88     


 


 18 07:15   84     


 


 18 07:05   82      98


 


 18 06:56  96.9 F L   83  20   159/81  96


 


 18 03:04     20   


 


 18 23:44   94     


 


 18 23:36   93     


 


 18 23:00  97.1 F L   98  20   169/73  92 L


 


 18 20:09   92     


 


 18 19:50   94      97


 


 18 18:35  98.5 F  101 H   20  182/89   96


 


 18 17:46   110 H   18  177/80   98


 


 18 15:30  99.1 F  99   20  143/64   97


 


 18 14:35   96   18  131/95   96








 Intake and Output











 18





 22:59 06:59 14:59


 


Other:   


 


  Voiding Method  Diaper 





  Incontinent 


 


  # Voids  4 


 


  # Bowel Movements  0 














Blood pressure 159/81 heart rate 83 afebrile maintaining oxygen saturation on 

room air


GENERAL: This is a 83-year-old  female in no apparent distress at the 

time of my examination.


HEENT: Head is atraumatic, normocephalic. Pupils are equal, round. Sclerae 

anicteric. Conjunctivae are clear. Mucous membranes of the mouth are moist. 

Neck is supple. There is no jugular venous distention. No carotid bruit is 

heard.


LUNGS: Clear to auscultation no wheezes, rales or rhonchi. No chest wall 

tenderness is noted on palpation or with deep breathing.


HEART: Irregular rate and rhythm with systolic ejection murmur, no rubs or 

gallops. S1 and S2 heard.


ABDOMEN: Soft, nontender. Bowel sounds are heard. No organomegaly noted.


EXTREMITIES: No evidence of peripheral edema and no calf tenderness noted.


VASCULAR: Radial and dorsalis pedis pulses palpated, no evidence of clubbing.  


NEUROLOGIC: Patient is awake, alert and oriented x3.


 








Results





 18 12:34





 18 13:00


 Comprehensive Metabolic Panel











  18 Range/Units





  13:00 


 


Sodium  139  (137-145)  mmol/L


 


Potassium  3.3 L  (3.5-5.1)  mmol/L


 


Chloride  102  ()  mmol/L


 


Carbon Dioxide  24  (22-30)  mmol/L


 


BUN  66 H  (7-17)  mg/dL


 


Creatinine  2.19 H  (0.52-1.04)  mg/dL


 


Glucose  162 H  (74-99)  mg/dL


 


Calcium  10.4 H  (8.4-10.2)  mg/dL








 Current Medications











Generic Name Dose Route Start Last Admin





  Trade Name Freq  PRN Reason Stop Dose Admin


 


Acetaminophen  650 mg  18 17:59  





  Tylenol Tab  PO   





  Q6HR PRN   





  Mild Pain or Fever > 100.5   





     





     





     


 


Hydrocodone Bitart/Acetaminophen  1 each  18 17:59  





  [place] 5-325  PO   





  Q8H PRN   





  Pain   





     





     





     


 


Albuterol/Ipratropium  3 ml  18 20:00  18 11:09





  Duoneb 0.5 Mg-3 Mg/3 Ml Soln  INHALATION   3 ml





  RT-Q4H PATRICK   Administration





     





     





     





     


 


Allopurinol  200 mg  18 09:00  18 10:43





  Zyloprim  PO   200 mg





  DAILY PATRICK   Administration





     





     





     





     


 


Alprazolam  0.25 mg  18 21:00  18 21:52





  Xanax  PO   0.25 mg





  HS PATRICK   Administration





     





     





     





     


 


Apixaban  2.5 mg  18 21:00  





  Eliquis  PO   





  BID PATRICK   





     





     





     





     


 


Atorvastatin Calcium  10 mg  18 21:00  18 21:49





  Lipitor  PO   10 mg





  HS@2100 PATRICK   Administration





     





     





     





     


 


Betamethasone/Clotrimazole  1 applic  18 21:00  18 09:50





  Lotrisone  TOPICAL   1 applic





  BID PATRICK   Administration





     





     





     





     


 


Ceftriaxone Sodium  1,000 mg  18 18:00  





  Rocephin  IVP   





  Q24H PATRICK   





     





     





     





     


 


Diltiazem HCl  60 mg  18 22:00  18 09:47





  Cardizem Oral  PO   60 mg





  TID [facility]   Administration





     





     





     





     


 


Ergocalciferol  50,000 unit  18 09:00  





  Vitamin D2  PO   





  Q30D PATRICK   





     





     





     





     


 


Famotidine  20 mg  18 06:00  18 06:16





  Pepcid  PO   20 mg





  BID@0600,1700 [facility]   Administration





     





     





     





     


 


Ferrous Sulfate  325 mg  18 09:00  18 09:47





  Feosol  PO   325 mg





  BID@0900,1700 [facility]   Administration





     





     





     





     


 


Fluticasone Propionate  1 spray  18 18:00  18 06:15





  Flonase Nasal [place]  EA NOSTRIL   1 spray





  Q12H [facility]   Administration





     





     





     





     


 


Guaifenesin  200 mg  18 17:59  





  Robitussin  PO   





  Q4HR PRN   





  Cough   





     





     





     


 


Sodium Chloride  1,000 mls @ 100 mls/hr  18 18:00  18 12:14





  Saline 0.9%  IV   Not Given





  .Q10H PATRICK   





     





     





     





     


 


Dextrose/Sodium Chloride  1,000 mls @ 75 mls/hr  18 11:15  18 12:14





  Dextrose 5%-Ns Iv Soln  IV   75 mls/hr





  .[id number] [facility]   Administration





     





     





     





     


 


Latanoprost  1 drops  18 21:00  18 21:49





  Xalatan 0.005%  LEFT EYE   1 drops





  HS PATRICK   Administration





     





     





     





     


 


Levothyroxine Sodium  25 mcg  18 06:00  18 06:12





  Synthroid  PO   25 mcg





  DAILY@0600 PATRICK   Administration





     





     





     





     


 


Loratadine  10 mg  18 17:59  





  Claritin  PO   





  DAILY PRN   





  Allergy Symptoms   





     





     





     


 


Losartan Potassium  50 mg  18 09:00  18 10:43





  Cozaar  PO   50 mg





  DAILY PATRICK   Administration





     





     





     





     


 


Magnesium Hydroxide  2,400 mg  18 17:59  





  Milk Of Magnesia  PO   





  DAILY PRN   





  Constipation   





     





     





     


 


Melatonin  3 mg  18 21:00  18 21:47





  Melatonin  PO   3 mg





  HS PATRICK   Administration





     





     





     





     


 


Methylprednisolone Sodium Succinate  60 mg  18 18:30  18 12:13





  Solu-Medrol  IV   60 mg





  Q6HR PATRICK   Administration





     





     





     





     


 


Metoprolol Tartrate  50 mg  18 21:00  18 09:46





  Lopressor  PO   50 mg





  BID PATRICK   Administration





     





     





     





     


 


Multi-Ingred Cream/Lotion/Oil/Oint  1 applic  18 18:00  18 06:17





  Eucerin Cream  TOPICAL   1 applic





  Q12H PATRICK   Administration





     





     





     





     


 


Multivitamins/Minerals  1 each  18 20:00  18 09:46





  Ivite  PO   1 each





  BID@1200,1800 PATR[facility]   Administration





     





     





     





     


 


Neomycin/Polymyxin/Dexamethasone  1 applic  18 22:00  18 09:48





  Maxitrol Ophth Oint  LEFT EYE   1 applic





  TID PATRICK   Administration





     





     





     





     


 


Non-Formulary Medication  1 patch  18 12:00  





  Buprenorphine [Butrans 10 Mcg/Hour]  TRANSDERM   





  TU PATRICK   





     





     





     





     


 


Potassium Chloride  20 meq  18 09:00  18 09:47





  K-Dur 20  PO   20 meq





  BID@0900,1700 PATR[facility]   Administration





     





     





     





     


 


Prednisolone Acetate  2 drops  18 09:00  18 09:49





  Pred Forte 1%  BOTH EYES   2 drops





  DAILY PATRICK   Administration





     





     





     





     


 


Senna/Docusate Sodium  1 each  18 21:00  18 09:47





  Senokot-S  PO   1 each





  BID PATRICK   Administration





     





     





     





     








 Intake and Output











 18





 22:59 06:59 14:59


 


Other:   


 


  Voiding Method  Diaper 





  Incontinent 


 


  # Voids  4 


 


  # Bowel Movements  0 








 





 18 12:34 





 18 13:00 











Assessment and Plan


Assessment: 





ASSESSMENT


Altered mental status status post fall


Urinary tract infection


Chronic diastolic dysfunction currently euvolemic.


Pulmonary fibrosis


Pulmonary hypertension, RVSP 45.94 mmHg


Acute on chronic kidney disease, GFR on admission 14.





PLAN


Echocardiogram obtained reveals preserved left ventricular systolic function 

with ejection fraction 50-55%, moderately dilated left atrium, mild to moderate 

TR, mild to moderate pulmonary hypertension with an RVSP of 45.94 mmHg. 


Hold diuretics.


Gentle hydration.


Change anticoagulation to Eliquis 2.5 mg daily.  We will check for coverage. 


Further recommendations to follow based on clinical course. 


Thank you kindly for this consultation. 





Nurse Practitioner note has been reviewed, I agree with a documented findings 

and plan of care.  Patient was seen and examined.

## 2018-09-06 NOTE — P.CNNES
History of Present Illness


Consult date: 18


History of Present Illness: 





The patient is an 83-year-old female who is a resident of an  on the [facility].  Apparently the patient had a fall in the bathroom.  Benito was 

found to have bruising and wounds on her body and was taken to [facility] emergency room on 2018.  The patient is a poor historian.  He 

denies any pain or headache.  Asian has difficulty with speech due to cleft 

palate and other facial and nasal impairments.  So has some deafness in the 

right ear.  She was admitted to the hospital with urinary tract infection and 

COPD exacerbation as well as dehydration and renal failure syndrome and fall.  

Neurology is requested to see the patient regarding fall.  Patient does not 

recall falling.  Rarely the patient was found on the floor the bathroom and had 

some scrapes on her body.  At a CAT scan of the brain in the emergency room 

which showed age-related atrophy and she had a CAT scan of the cervical spine 

in the emergency room which showed degenerative disc changes.





Review of Systems


ROS unobtainable: due to mental status





Past Medical History


Past Medical History: Heart Failure, COPD, Dementia, GERD/Reflux, Hyperlipidemia

, Hypertension, Pneumonia, Renal Disease, Respiratory Disorder, Thyroid Disorder


Additional Past Medical History / Comment(s): Pt was born with cleft palate and 

other physical problems with facial structures including nasal passages and ear 

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  She wears 

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 

the R eye and can see/read with left eye-it also has glaucoma, chronic renal 

disease stage IV-has L arm fistula(PER DAUGHTER IT'S A FAILED FISTULA) but no 

hemodialysis, frequent UTIs, wears O2 PRN hypothyroid.


Last Myocardial Infarction Date:: unknown


History of Any Multi-Drug Resistant Organisms: ESBL


Date of last positivie culture/infection: 18


MDRO Source:: ESBL URINE


Past Surgical History: Bladder Surgery, Hysterectomy, Joint Replacement, 

Orthopedic Surgery


Additional Past Surgical History / Comment(s): Pt has had multiple surgeries 

for birth defects affecting mouth, nose and ears, total L knee arthroplasty, 

bladder sling, fistula L arm.PARTIAL HYSTERECTOMY, PICC LINE-SINCE REMOVED


Past Anesthesia/Blood Transfusion Reactions: No Reported Reaction


Smoking Status: Former smoker





- Past Family History


  ** Father


Family Medical History: Cancer


Additional Family Medical History / Comment(s): Father  prior to age 60 yrs 

with esophageal cancer.  He was a smoker and a drinker.





  ** Mother


Family Medical History: Osteoarthritis (OA), Renal Disease, Rheumatoid 

Arthritis (RA)


Additional Family Medical History / Comment(s): kidney problems, specific type 

unknown





Medications and Allergies


 Home Medications











 Medication  Instructions  Recorded  Confirmed  Type


 


Fluticasone Nasal Spray [Flonase 1 spray EA NOSTRIL Q12H 17 

History





Nasal Spray]    


 


Levothyroxine Sodium [Synthroid] 25 mcg PO DAILY@0600 17 History


 


Acetaminophen Tab [Tylenol] 650 mg PO Q6HR PRN  tab 17 Rx


 


ALPRAZolam [Xanax] 0.25 mg PO HS 11/10/17 09/05/18 History


 


Albuterol Nebulized [Ventolin 2.5 mg INHALATION RT-Q4H PRN 11/10/17 09/05/18 

History





Nebulized]    


 


Atorvastatin [Lipitor] 10 mg PO HS@2100 11/10/17 09/05/18 History


 


Buprenorphine [Butrans 10 MCG/HOUR] 1 patch TRANSDERM TU 11/10/17 09/05/18 

History


 


Ergocalciferol (Vitamin D2) 50,000 unit PO Q30D 11/10/17 09/05/18 History





[Vitamin D2]    


 


Loratadine [Claritin] 10 mg PO DAILY PRN 11/10/17 09/05/18 History


 


Metolazone [Zaroxolyn] 5 mg PO MOTH 11/10/17 09/05/18 History


 


Potassium Chloride [Klor-Con 20] 20 meq PO BID@0900,1700 11/10/17 09/05/18 

History


 


Ranitidine HCl [Zantac] 150 mg PO BID@0600,1700 11/10/17 09/05/18 History


 


guaiFENesin [guaiFENesin Oral 200 mg PO Q4HR PRN 11/10/17 09/05/18 History





Solution]    


 


prednisoLONE ACETATE [Pred Forte 2 drop BOTH EYES DAILY 11/10/17 09/05/18 

History





1%]    


 


Magnesium Hydroxide [Milk of 2,400 mg PO DAILY PRN  ml 17 Rx





Magnesia Concentrate]    


 


Sennosides-Docusate Sodium 1 tab PO BID #60 tablet 17 Rx





[Senokot-S]    


 


Ferrous Sulfate [Feosol] 325 mg PO BID@0900,1700 18 History


 


Latanoprost [Xalatan 0.005%] 1 drop LEFT EYE HS 18 History


 


Melatonin 3 mg PO HS 18 History


 


Triad Hydrophilic Wound Paste 1 applic TOPICAL Q12H 18 History


 


Vit C/E/Zn/Coppr/Lutein/Zeaxan 1 cap PO BID 18 History





[Preservision Areds 2 Softgel]    


 


acetaZOLAMIDE [Diamox] 250 mg PO TU 18 History


 


Diltiazem Oral [Cardizem*] 60 mg PO TID  tab 18 Rx


 


Metoprolol Tartrate [Lopressor] 50 mg PO BID  tab 18 Rx


 


Allopurinol [Zyloprim] 200 mg PO DAILY  tab 18 Rx


 


Clotrimazole/Betamethasone Dip 1 applic TOPICAL BID 18 History





[Lotrisone Cream]    


 


Furosemide [Lasix] 20 mg PO BID 18 History


 


HYDROcodone/APAP 5-325MG [Norco 1 tab PO Q8H PRN 18 History





5-325]    


 


Losartan [Cozaar] 50 mg PO DAILY 18 History


 


Neomycin-Polymyxin-Dexameth 1 applic LEFT EYE TID 18 History





[Maxitrol Ophth Oint]    


 


Warfarin [Coumadin] 2 mg PO SUSA 18 History











 Allergies











Allergy/AdvReac Type Severity Reaction Status Date / Time


 


No Known Allergies Allergy   Verified 18 12:36














Physical Examination





- Vital Signs


Vital Signs: 


 Vital Signs











  Temp Pulse Pulse Resp BP Pulse Ox


 


 18 19:55   85    


 


 18 19:46    86  20  


 


 18 19:45   84    


 


 18 15:00  98.1 F   89  16  171/84  97


 


 18 11:19   88    


 


 18 11:10   88    


 


 18 11:06   88    


 


 18 07:15   84    


 


 18 07:05   82     98


 


 18 06:56  96.9 F L   83  20  159/81  96


 


 18 03:04     20  


 


 18 23:44   94    


 


 18 23:36   93    


 


 18 23:00  97.1 F L   98  20  169/73  92 L








 Intake and Output











 18





 06:59 14:59 22:59


 


Other:   


 


  Voiding Method Diaper Diaper Diaper





 Incontinent Incontinent Incontinent


 


  # Voids 4 1 


 


  # Bowel Movements 0  














- Constitutional


General appearance: cooperative





- EENT


EENT: hearing diminished





- Respiratory


Respiratory: lungs clear





- Cardiovascular


Cardiovascular: regular rate





- Neurologic





Neurologic examination





Mental status: Patient was partially deaf.  She had difficulty following 

commands but she did follow some commands especially with visual cue.  He had a 

speech impediment and has cleft palate.


Cranial nerve examination: other (The patient has some facial asymmetry on the 

right.  She has cleft palate and some facial nasal passage reconstruction.  She 

has apparently some partial blindness from glaucoma in the right eye.  Right 

cornea is opacified Her hearing is impaired.)


Fundoscopic examination: other


Speech examination: other (She does have a speech impediment likely related to 

cleft palate)


Detailed motor examination: other (She was able to move all 4 extremities well)





- Psychiatric


Psychiatric: cooperative





Results





- Laboratory Findings


CBC and BMP: 


 18 12:34





 18 13:00


Abnormal Lab Findings: 


 Abnormal Labs











  18





  12:33 12:34 12:34


 


Hgb    11.3 L


 


MCHC    30.8 L


 


RDW    16.3 H


 


Lymphocytes #    0.7 L


 


INR   1.2 H 


 


Potassium   


 


BUN   


 


Creatinine   


 


Glucose   


 


POC Glucose (mg/dL)  129 H  


 


Calcium   


 


Urine Protein   


 


Urine Nitrite   


 


Ur Leukocyte Esterase   


 


Urine WBC   


 


Urine Bacteria   














  18





  12:34 12:51 13:00


 


Hgb   


 


MCHC   


 


RDW   


 


Lymphocytes #   


 


INR   


 


Potassium    3.3 L


 


BUN  76 H   66 H


 


Creatinine  2.99 H   2.19 H


 


Glucose  113 H   162 H


 


POC Glucose (mg/dL)   


 


Calcium    10.4 H


 


Urine Protein   Trace H 


 


Urine Nitrite   Positive H 


 


Ur Leukocyte Esterase   Large H 


 


Urine WBC   46 H 


 


Urine Bacteria   Moderate H 














Assessment and Plan


(1) Fall


Current Visit: Yes   Status: Acute   Priority: High   SNOMED Code(s): 1075609


   





(2) Dehydration


Current Visit: Yes   Status: Acute   SNOMED Code(s): 31245150


   





(3) Facial contusion


Current Visit: Yes   Status: Acute   SNOMED Code(s): 815222335


   


Plan: 





The patient is an 83-year-old woman who resides at a nursing home.  Apparently 

she had a fall in the bathroom.  The patient is a poor historian.  She does 

have a history of dementia.  Recommend further evaluation of fall with carotid 

ultrasound and EEG.  Patient did have a CT brain in the emergency room which 

did not show any acute abnormalities.

## 2018-09-07 LAB
ANION GAP SERPL CALC-SCNC: 11 MMOL/L
BUN SERPL-SCNC: 60 MG/DL (ref 7–17)
CALCIUM SPEC-MCNC: 10.4 MG/DL (ref 8.4–10.2)
CHLORIDE SERPL-SCNC: 104 MMOL/L (ref 98–107)
CO2 SERPL-SCNC: 27 MMOL/L (ref 22–30)
GLUCOSE SERPL-MCNC: 144 MG/DL (ref 74–99)
POTASSIUM SERPL-SCNC: 3 MMOL/L (ref 3.5–5.1)
SODIUM SERPL-SCNC: 142 MMOL/L (ref 137–145)

## 2018-09-07 RX ADMIN — DOCUSATE SODIUM AND SENNOSIDES SCH EACH: 50; 8.6 TABLET ORAL at 08:15

## 2018-09-07 RX ADMIN — IPRATROPIUM BROMIDE AND ALBUTEROL SULFATE SCH: .5; 3 SOLUTION RESPIRATORY (INHALATION) at 11:51

## 2018-09-07 RX ADMIN — Medication SCH: at 20:50

## 2018-09-07 RX ADMIN — METOPROLOL TARTRATE SCH MG: 50 TABLET, FILM COATED ORAL at 20:40

## 2018-09-07 RX ADMIN — I-VITE, TAB 1000-60-2MG (60/BT) SCH EACH: TAB at 12:24

## 2018-09-07 RX ADMIN — IPRATROPIUM BROMIDE AND ALBUTEROL SULFATE SCH ML: .5; 3 SOLUTION RESPIRATORY (INHALATION) at 08:40

## 2018-09-07 RX ADMIN — IPRATROPIUM BROMIDE AND ALBUTEROL SULFATE SCH ML: .5; 3 SOLUTION RESPIRATORY (INHALATION) at 15:37

## 2018-09-07 RX ADMIN — METHYLPREDNISOLONE SODIUM SUCCINATE SCH MG: 125 INJECTION, POWDER, FOR SOLUTION INTRAMUSCULAR; INTRAVENOUS at 06:36

## 2018-09-07 RX ADMIN — ATORVASTATIN CALCIUM SCH: 10 TABLET, FILM COATED ORAL at 20:50

## 2018-09-07 RX ADMIN — DILTIAZEM HYDROCHLORIDE SCH MG: 60 TABLET, FILM COATED ORAL at 17:20

## 2018-09-07 RX ADMIN — APIXABAN SCH MG: 2.5 TABLET, FILM COATED ORAL at 20:40

## 2018-09-07 RX ADMIN — IPRATROPIUM BROMIDE AND ALBUTEROL SULFATE SCH: .5; 3 SOLUTION RESPIRATORY (INHALATION) at 00:51

## 2018-09-07 RX ADMIN — DILTIAZEM HYDROCHLORIDE SCH MG: 60 TABLET, FILM COATED ORAL at 08:14

## 2018-09-07 RX ADMIN — I-VITE, TAB 1000-60-2MG (60/BT) SCH EACH: TAB at 17:49

## 2018-09-07 RX ADMIN — LOSARTAN POTASSIUM SCH MG: 50 TABLET, FILM COATED ORAL at 08:14

## 2018-09-07 RX ADMIN — DILTIAZEM HYDROCHLORIDE SCH: 60 TABLET, FILM COATED ORAL at 20:51

## 2018-09-07 RX ADMIN — PREDNISOLONE ACETATE SCH DROPS: 10 SUSPENSION/ DROPS OPHTHALMIC at 08:16

## 2018-09-07 RX ADMIN — FAMOTIDINE SCH MG: 20 TABLET, FILM COATED ORAL at 06:36

## 2018-09-07 RX ADMIN — NEOMYCIN SULFATE, POLYMYXIN B SULFATE, AND DEXAMETHASONE SCH APPLIC: 3.5; 10000; 1 OINTMENT OPHTHALMIC at 20:41

## 2018-09-07 RX ADMIN — Medication SCH MG: at 17:20

## 2018-09-07 RX ADMIN — NEOMYCIN SULFATE, POLYMYXIN B SULFATE, AND DEXAMETHASONE SCH APPLIC: 3.5; 10000; 1 OINTMENT OPHTHALMIC at 08:16

## 2018-09-07 RX ADMIN — POTASSIUM CHLORIDE SCH MEQ: 20 TABLET, EXTENDED RELEASE ORAL at 17:21

## 2018-09-07 RX ADMIN — Medication SCH MG: at 20:40

## 2018-09-07 RX ADMIN — Medication SCH APPLIC: at 17:47

## 2018-09-07 RX ADMIN — POTASSIUM CHLORIDE SCH MEQ: 20 TABLET, EXTENDED RELEASE ORAL at 20:39

## 2018-09-07 RX ADMIN — DOCUSATE SODIUM AND SENNOSIDES SCH EACH: 50; 8.6 TABLET ORAL at 20:40

## 2018-09-07 RX ADMIN — POTASSIUM CHLORIDE SCH MEQ: 20 TABLET, EXTENDED RELEASE ORAL at 08:15

## 2018-09-07 RX ADMIN — APIXABAN SCH: 2.5 TABLET, FILM COATED ORAL at 20:52

## 2018-09-07 RX ADMIN — IPRATROPIUM BROMIDE AND ALBUTEROL SULFATE SCH ML: .5; 3 SOLUTION RESPIRATORY (INHALATION) at 19:56

## 2018-09-07 RX ADMIN — APIXABAN SCH MG: 2.5 TABLET, FILM COATED ORAL at 08:14

## 2018-09-07 RX ADMIN — LEVOTHYROXINE SODIUM SCH MCG: 25 TABLET ORAL at 06:36

## 2018-09-07 RX ADMIN — LATANOPROST SCH DROPS: 50 SOLUTION OPHTHALMIC at 20:40

## 2018-09-07 RX ADMIN — NEOMYCIN SULFATE, POLYMYXIN B SULFATE, AND DEXAMETHASONE SCH APPLIC: 3.5; 10000; 1 OINTMENT OPHTHALMIC at 17:21

## 2018-09-07 RX ADMIN — FLUTICASONE PROPIONATE SCH SPRAY: 50 SPRAY, METERED NASAL at 06:36

## 2018-09-07 RX ADMIN — ATORVASTATIN CALCIUM SCH MG: 10 TABLET, FILM COATED ORAL at 20:40

## 2018-09-07 RX ADMIN — POTASSIUM CHLORIDE SCH MEQ: 20 TABLET, EXTENDED RELEASE ORAL at 17:58

## 2018-09-07 RX ADMIN — CLOTRIMAZOLE AND BETAMETHASONE DIPROPIONATE SCH APPLIC: 10; .5 CREAM TOPICAL at 20:41

## 2018-09-07 RX ADMIN — FLUTICASONE PROPIONATE SCH SPRAY: 50 SPRAY, METERED NASAL at 17:47

## 2018-09-07 RX ADMIN — Medication SCH APPLIC: at 06:37

## 2018-09-07 RX ADMIN — METOPROLOL TARTRATE SCH: 50 TABLET, FILM COATED ORAL at 20:51

## 2018-09-07 RX ADMIN — METHYLPREDNISOLONE SODIUM SUCCINATE SCH MG: 125 INJECTION, POWDER, FOR SOLUTION INTRAMUSCULAR; INTRAVENOUS at 12:23

## 2018-09-07 RX ADMIN — CEFTRIAXONE SODIUM SCH MG: 1 INJECTION, POWDER, FOR SOLUTION INTRAMUSCULAR; INTRAVENOUS at 17:45

## 2018-09-07 RX ADMIN — DEXTROSE MONOHYDRATE AND SODIUM CHLORIDE SCH MLS/HR: 5; .9 INJECTION, SOLUTION INTRAVENOUS at 01:03

## 2018-09-07 RX ADMIN — METHYLPREDNISOLONE SODIUM SUCCINATE SCH MG: 125 INJECTION, POWDER, FOR SOLUTION INTRAMUSCULAR; INTRAVENOUS at 00:51

## 2018-09-07 RX ADMIN — METOPROLOL TARTRATE SCH MG: 50 TABLET, FILM COATED ORAL at 08:14

## 2018-09-07 RX ADMIN — ALLOPURINOL SCH MG: 100 TABLET ORAL at 08:14

## 2018-09-07 RX ADMIN — METHYLPREDNISOLONE SODIUM SUCCINATE SCH MG: 125 INJECTION, POWDER, FOR SOLUTION INTRAMUSCULAR; INTRAVENOUS at 17:47

## 2018-09-07 RX ADMIN — DILTIAZEM HYDROCHLORIDE SCH MG: 60 TABLET, FILM COATED ORAL at 20:40

## 2018-09-07 RX ADMIN — Medication SCH MG: at 08:14

## 2018-09-07 RX ADMIN — DOCUSATE SODIUM AND SENNOSIDES SCH: 50; 8.6 TABLET ORAL at 20:51

## 2018-09-07 RX ADMIN — CLOTRIMAZOLE AND BETAMETHASONE DIPROPIONATE SCH APPLIC: 10; .5 CREAM TOPICAL at 08:16

## 2018-09-07 NOTE — PN
PROGRESS NOTE



Patient is seen for followup for chronic kidney disease and acute kidney injury.  She

is currently maintained on IV fluids.  Renal function has improved with creatinine down

to 2.0 from 2.9 on initial admission.  Patient does have CKD with baseline creatinine

close to 1.8 to 2 mg/dL.  She was admitted to the hospital with mental status changes.

CT did not show any acute changes.  Patient has been evaluated by Neurology as well.

Currently patient is not on any medications to predispose to confusion.  She has Norco

ordered, but I do not believe she has received any recently.



On examination today, blood pressure was 179/76, heart rate 89 per minute. Previous

blood pressure was 129/62.  The patient is comfortable, awake.  She is not in any acute

distress.  Her speech remains garbled.  She is not able to follow instructions for CNS

exam and checking for muscle strength.  Patient, however, is moving all 4 extremities.



Labs show sodium 142, potassium 3.0, BUN 60, serum creatinine 2.0.  Hemoglobin was 11.3

g/dL.  Serum calcium 10.4.



ASSESSMENT:

1. Acute kidney injury, prerenal, currently improved.  Patient is status post IV

    fluids.

2. Hypertension.  Blood pressure was high this morning.  Patient is maintained on

    Cozaar, which I will continue for now.  She is also on Cardizem.

3. Hypokalemia, currently being replaced.

4. Mental status changes, status post evaluation by Neurology.

5. Facial contusion, status post fall.

6. Mild hypercalcemia.  Patient is not on any calcitriol or calcium.  I will check a

    PTH level and vitamin D level.  She is maintained on vitamin D2 once a month.



PLAN:

Check PTH levels.  Check vitamin D level.  Encourage increased oral intake.  Follow up

with Neurology.  Continue with Cozaar for now.  Repeat labs in a.m.





MMODL / IJN: [id number] / Job#: [id number]

## 2018-09-07 NOTE — CONS
CONSULTATION



DATE OF SERVICE:

2018.



REASON FOR CONSULTATION:

Urinary tract infection.



HISTORY OF PRESENT ILLNESS:

The patient is an 83-year-old  female who was brought into the ER by 
the EMS

from a nursing home after the patient was found to be on the floor in the 
bathroom.

The patient apparently did not fall. May have had some bruising on her left 
eye.  The

patient was noticing some confusion and garbled speech. Subsequently the 
patient was

evaluated by the ER physician. She did have x-rays of the head and cervical 
spine with

no acute bleed.  Chest x-ray was negative.  The patient did have mild low-grade 
fever

of 99.1, however, white count was normal.  The patient did have a positive UA 
with

large leukocyte esterases, 46 WBC.  The patient has been admitted hospital for

management of underlying condition and concern for possibly UTI. Infectious 
Disease was

consulted for further recommendation of antibiotic therapy.  The patient also 
has a

rash in bilateral groin area as well as on the leg.  The patient overall is not 
a very

good historian so most of the information has been obtained from prior review 
of the

chart.



REVIEW OF SYSTEMS:

Could not be reliably obtained, though the positive points have been mentioned 
in the

HPI.



PAST MEDICAL HISTORY:

Significant for heart failure, COPD, dementia, gastroesophageal reflux disease,

hypertension, hyperlipidemia, chronic renal insufficiency, hypothyroidism, 
pneumonia,

previous history of ESBL E. coli urinary tract infection.



PAST SURGICAL HISTORY:

Hysterectomy, total knee arthroplasty and multiple surgeries for birth defect.



SOCIAL HISTORY:

Remote history of smoking. No drinking.  No drug use. Currently nursing home 
resident.



FAMILY HISTORY:

Father  from esophageal cancer. Mother with history osteoarthritis and renal

insufficiency, rheumatoid arthritis.



ALLERGIES:

No known drug allergies.



MEDICATION:

Currently include the patient is on Tylenol, Norco, DuoNeb, Zyloprim, Xanax, 
Eliquis,

Lipitor, Lotrimin cream, Rocephin, Cardizem, Pepcid iron sulfate, Flonase, 
Robitussin,

Synthroid, Claritin, Cozaar, melatonin, Solu-Medrol, Senokot.



EXAMINATION:

Blood pressure 129/62 with a pulse of 105, temperature of 98.2. She is 98% on 
room air.

General description is an elderly female, lying in bed in no distress.  No 
tachypnea or

accessory muscle of respiration use.

HEENT:  Shows no pallor or scleral icterus.  Oral mucosa is dry.  No pharyngeal

erythema or thrush.

NECK: Trachea central. No thyromegaly.

LUNGS: Unlabored breathing. Clear to auscultation anteriorly.  No wheeze or 
crackle.

HEART: S1, S2.  Regular rate and rhythm.

ABDOMEN:  Soft, no tenderness, no guarding or rigidity.

Examination of the bilateral groin area in the presence of the RN did show 
evidence of

a erythematous rash suggestive of cutaneous candidiasis, but no cellulitis.

NEUROLOGICAL: Patient is awake, alert, oriented x1. Mood and affect normal.



LABS:

BUN of 66, creatinine is 2.19, hemoglobin is 7.8, white count 5.7.  Urine has 
been

positive with nitrite and leukocyte esterase positive, 46 WBC.



DIAGNOSTIC IMPRESSION AND PLAN:

1. Patient brought to the hospital after the patient did have a fall and was 
noticed

    to be in bathroom, patient who did have evidence of a positive UA with 
underlying

    urinary tract infection not entirely excluded.  The patient is not a very 
good

    historian.

2. The patient did have evidence of bilateral groin rash, could be cutaneous

    candidiasis, but no active cellulitis.



PLAN:

1. Rocephin 1 g IV piggyback daily.

2. Nystatin powder to bilateral groin area twice a day.

3. We will follow up on clinical condition as well as cultures to further adjust

    medication if needed.

Thank you for this consultation.  Will follow the patient with you.





RADHAL / OLIMPIAN: [id number] / Job#: [id number]

MTDD

## 2018-09-07 NOTE — US
EXAMINATION TYPE: US carotid duplex BILAT

 

DATE OF EXAM: 9/7/2018

 

COMPARISON: NONE

 

CLINICAL HISTORY: Fall. patient confused and unable to answer question

 

EXAM MEASUREMENTS: 

 

RIGHT:  Peak Systolic Velocity (PSV) cm/sec

----- Right CCA:  83.1  

----- Right ICA:  129.7     

----- Right ECA:  133.2   

ICA/CCA ratio:  1.6    

 

RIGHT:  End Diastole cm/sec

----- Right CCA:  12.8   

----- Right ICA:  24.7      

----- Right ECA:  0.0     

 

LEFT:  Peak Systolic Velocity (PSV) cm/sec

----- Left CCA:  57.8  

----- Left ICA:  131.9   

----- Left ECA:  106.9  

ICA/CCA ratio:  2.3  

 

LEFT:  End Diastole cm/sec

----- Left CCA: 11.7   

----- Left ICA: 15.6    

----- Left ECA:  5.3 

 

VERTEBRALS (direction of flow):

Right Vertebral: Antegrade

Left Vertebral: Antegrade

 

Rhythm:  Arrhythmia

 

mild heterogenous plaque with increased velocities noted at left proximal ICA 

 

Grayscale, color Doppler, spectral Doppler imaging performed of the carotid arteries. Waveform analys
is does not show significant stenosis of the proximal internal carotid artery on the right however st
enosis of the proximal internal carotid artery on the left suggests  the range of 50-69% diameter red
uction.

 

IMPRESSION: There is a cardiac arrhythmia. Findings suggest possible hemodynamic significant stenosis
 of the proximal internal carotid artery on the left responding to 50-69% diameter reduction by Doppl
er criteria, an indirect measurement of carotid stenosis, CTA or MRA may be of benefit.

## 2018-09-07 NOTE — P.PN
Subjective





Mrs. Scanlon is seen and examined sitting up in bed. Past medical history 

significant for paroxysmal atrial fibrillation on long term anticoagulation 

with coumadin, diastolic congestive heart failure, hypertension, dyslipidemia, 

dementia, COPD and chronic kidney disease.  She has been seen in consultation 

by neurology services and they have recommended obtaining carotid Dopplers as 

well as EEG.  Findings suggest possible hemodynamic significant stenosis of the 

proximal internal carotid artery on the left responding to 50-69% diameter 

reduction by Doppler criteria.  Recommend CTA or MRA.  She denies symptoms of 

chest pain, shortness of breath, dizziness or palpitations.  Blood pressure 

prior to a.m. medications 179/76 heart rate 89 afebrile maintaining oxygen 

saturation on nasal cannula.  Laboratory data reviewed, sodium 142, potassium 

3.0, creatinine 2.07.





Objective





- Vital Signs


Vital signs: 


 Vital Signs











Temp  97.9 F   09/07/18 05:55


 


Pulse  82   09/07/18 08:40


 


Resp  17   09/07/18 05:55


 


BP  179/76   09/07/18 05:55


 


Pulse Ox  98   09/07/18 05:55








 Intake & Output











 09/06/18 09/07/18 09/07/18





 18:59 06:59 18:59


 


Other:   


 


  Voiding Method Diaper Diaper Diaper





 Incontinent Incontinent Incontinent


 


  # Voids 1 2 1


 


  # Bowel Movements  1 














- Exam





GENERAL: Well-appearing, well-nourished and in no acute distress.


NECK: Supple without JVD or thyromegaly.


LUNGS: Breath sounds clear to auscultation bilaterally. Respiration equal and 

unlabored.  No wheezes, rales or rhonchi.


HEART: Irregular rate and rhythm with systolic ejection murmur, no rubs or 

gallops. S1 and S2 heard.


EXTREMITIES: Normal range of motion, no edema.  No clubbing or cyanosis. 

Peripheral pulses intact.





- Labs


CBC & Chem 7: 


 09/05/18 12:34





 09/07/18 07:48


Labs: 


 Abnormal Lab Results - Last 24 Hours (Table)











  09/06/18 09/07/18 Range/Units





  13:00 07:48 


 


Potassium  3.3 L  3.0 L  (3.5-5.1)  mmol/L


 


BUN  66 H  60 H  (7-17)  mg/dL


 


Creatinine  2.19 H  2.07 H  (0.52-1.04)  mg/dL


 


Glucose  162 H  144 H  (74-99)  mg/dL


 


Calcium  10.4 H  10.4 H  (8.4-10.2)  mg/dL








 Microbiology - Last 24 Hours (Table)











 09/06/18 17:00 Urine Culture - Preliminary





 Urine,Catheterized 














Assessment and Plan


Assessment: 





ASSESSMENT


Altered mental status status post fall


Urinary tract infection


Chronic diastolic dysfunction currently euvolemic.


Pulmonary fibrosis


Pulmonary hypertension, RVSP 45.94 mmHg


Acute on chronic kidney disease, GFR on admission 14.





PLAN


Creatinine is improving.


If okay with nephrology we recommend she be discharged home on a small dose of 

Lasix. 


We will continue to follow as needed, please feel free to call with further 

questions or concerns.





Nurse Practitioner note has been reviewed, I agree with a documented findings 

and plan of care.  Patient was seen and examined.

## 2018-09-07 NOTE — PN
PROGRESS NOTE



DATE OF SERVICE:

09/07/2018



REASON FOR FOLLOWUP:

1. Urinary tract infection.

2. Bilateral groin area cutaneous candidiasis.



INTERVAL HISTORY:

The patient is currently afebrile.  She is awake, alert, up in the chair, eating her

lunch.  No significant chest pain, shortness of breath or cough.  She complained of

some pain to the groin area. Not able to clarify any further.



PHYSICAL EXAMINATION:

Blood pressure 179/76, pulse of 89, temperature 97.9.  She is 98% on 2 L nasal cannula.

General description is an elderly female, up in the chair in no distress.

RESPIRATORY SYSTEM:  Unlabored breathing, clear to auscultation anteriorly.

HEART:  S1, S2.  Regular rate and rhythm.

ABDOMEN:  Soft, no tenderness.

EXTREMITIES:  No edema of the feet.



LABS:

BUN of 60, creatinine 2.07.  Urine culture currently pending.



DIAGNOSTIC IMPRESSION AND PLAN:

1. Patient admitted to the hospital with mental status changes and did have a fall

    with concern for possible urinary tract infection in view of positive UA.  She is

    currently covered with Rocephin. Will be continued while waiting for the culture to

    finalize.

2. Patient with bilateral groin area cutaneous candidiasis, currently being treated

    with nystatin powder to continue. Continue supportive care.





MMODL / IJN: [id number] / Job#: [id number]

## 2018-09-08 LAB
ANION GAP SERPL CALC-SCNC: 11 MMOL/L
BUN SERPL-SCNC: 62 MG/DL (ref 7–17)
CALCIUM SPEC-MCNC: 10.8 MG/DL (ref 8.4–10.2)
CHLORIDE SERPL-SCNC: 104 MMOL/L (ref 98–107)
CO2 SERPL-SCNC: 27 MMOL/L (ref 22–30)
GLUCOSE SERPL-MCNC: 147 MG/DL (ref 74–99)
POTASSIUM SERPL-SCNC: 3.3 MMOL/L (ref 3.5–5.1)
SODIUM SERPL-SCNC: 142 MMOL/L (ref 137–145)

## 2018-09-08 RX ADMIN — METHYLPREDNISOLONE SODIUM SUCCINATE SCH: 125 INJECTION, POWDER, FOR SOLUTION INTRAMUSCULAR; INTRAVENOUS at 11:16

## 2018-09-08 RX ADMIN — FLUTICASONE PROPIONATE SCH SPRAY: 50 SPRAY, METERED NASAL at 16:15

## 2018-09-08 RX ADMIN — DOCUSATE SODIUM AND SENNOSIDES SCH: 50; 8.6 TABLET ORAL at 20:39

## 2018-09-08 RX ADMIN — LATANOPROST SCH: 50 SOLUTION OPHTHALMIC at 20:39

## 2018-09-08 RX ADMIN — ATORVASTATIN CALCIUM SCH: 10 TABLET, FILM COATED ORAL at 20:39

## 2018-09-08 RX ADMIN — IPRATROPIUM BROMIDE AND ALBUTEROL SULFATE SCH: .5; 3 SOLUTION RESPIRATORY (INHALATION) at 08:42

## 2018-09-08 RX ADMIN — NEOMYCIN SULFATE, POLYMYXIN B SULFATE, AND DEXAMETHASONE SCH APPLIC: 3.5; 10000; 1 OINTMENT OPHTHALMIC at 16:13

## 2018-09-08 RX ADMIN — CLOTRIMAZOLE AND BETAMETHASONE DIPROPIONATE SCH APPLIC: 10; .5 CREAM TOPICAL at 08:02

## 2018-09-08 RX ADMIN — Medication SCH APPLIC: at 16:15

## 2018-09-08 RX ADMIN — Medication SCH: at 05:12

## 2018-09-08 RX ADMIN — POTASSIUM CHLORIDE SCH MEQ: 20 TABLET, EXTENDED RELEASE ORAL at 07:58

## 2018-09-08 RX ADMIN — LEVOTHYROXINE SODIUM SCH: 25 TABLET ORAL at 05:12

## 2018-09-08 RX ADMIN — DILTIAZEM HYDROCHLORIDE SCH: 60 TABLET, FILM COATED ORAL at 20:39

## 2018-09-08 RX ADMIN — PREDNISOLONE ACETATE SCH DROPS: 10 SUSPENSION/ DROPS OPHTHALMIC at 08:03

## 2018-09-08 RX ADMIN — APIXABAN SCH MG: 2.5 TABLET, FILM COATED ORAL at 07:57

## 2018-09-08 RX ADMIN — METOPROLOL TARTRATE SCH: 50 TABLET, FILM COATED ORAL at 20:39

## 2018-09-08 RX ADMIN — NEOMYCIN SULFATE, POLYMYXIN B SULFATE, AND DEXAMETHASONE SCH APPLIC: 3.5; 10000; 1 OINTMENT OPHTHALMIC at 09:14

## 2018-09-08 RX ADMIN — POTASSIUM CHLORIDE SCH MEQ: 20 TABLET, EXTENDED RELEASE ORAL at 11:15

## 2018-09-08 RX ADMIN — IPRATROPIUM BROMIDE AND ALBUTEROL SULFATE SCH ML: .5; 3 SOLUTION RESPIRATORY (INHALATION) at 08:40

## 2018-09-08 RX ADMIN — CLOTRIMAZOLE AND BETAMETHASONE DIPROPIONATE SCH: 10; .5 CREAM TOPICAL at 20:39

## 2018-09-08 RX ADMIN — METHYLPREDNISOLONE SODIUM SUCCINATE SCH MG: 125 INJECTION, POWDER, FOR SOLUTION INTRAMUSCULAR; INTRAVENOUS at 00:23

## 2018-09-08 RX ADMIN — APIXABAN SCH: 2.5 TABLET, FILM COATED ORAL at 20:38

## 2018-09-08 RX ADMIN — POTASSIUM CHLORIDE SCH MEQ: 20 TABLET, EXTENDED RELEASE ORAL at 12:23

## 2018-09-08 RX ADMIN — METHYLPREDNISOLONE SODIUM SUCCINATE SCH MG: 125 INJECTION, POWDER, FOR SOLUTION INTRAMUSCULAR; INTRAVENOUS at 06:19

## 2018-09-08 RX ADMIN — I-VITE, TAB 1000-60-2MG (60/BT) SCH EACH: TAB at 16:14

## 2018-09-08 RX ADMIN — POTASSIUM CHLORIDE SCH MEQ: 20 TABLET, EXTENDED RELEASE ORAL at 16:13

## 2018-09-08 RX ADMIN — ALLOPURINOL SCH MG: 100 TABLET ORAL at 07:57

## 2018-09-08 RX ADMIN — Medication SCH: at 20:39

## 2018-09-08 RX ADMIN — CEFTRIAXONE SODIUM SCH MG: 1 INJECTION, POWDER, FOR SOLUTION INTRAMUSCULAR; INTRAVENOUS at 16:12

## 2018-09-08 RX ADMIN — DILTIAZEM HYDROCHLORIDE SCH MG: 60 TABLET, FILM COATED ORAL at 16:14

## 2018-09-08 RX ADMIN — METHYLPREDNISOLONE SODIUM SUCCINATE SCH: 40 INJECTION, POWDER, FOR SOLUTION INTRAMUSCULAR; INTRAVENOUS at 23:30

## 2018-09-08 RX ADMIN — METHYLPREDNISOLONE SODIUM SUCCINATE SCH MG: 40 INJECTION, POWDER, FOR SOLUTION INTRAMUSCULAR; INTRAVENOUS at 16:03

## 2018-09-08 RX ADMIN — LOSARTAN POTASSIUM SCH MG: 50 TABLET, FILM COATED ORAL at 07:58

## 2018-09-08 RX ADMIN — FLUTICASONE PROPIONATE SCH: 50 SPRAY, METERED NASAL at 05:12

## 2018-09-08 RX ADMIN — Medication SCH MG: at 16:13

## 2018-09-08 RX ADMIN — DILTIAZEM HYDROCHLORIDE SCH MG: 60 TABLET, FILM COATED ORAL at 07:58

## 2018-09-08 RX ADMIN — Medication SCH MG: at 07:58

## 2018-09-08 RX ADMIN — METOPROLOL TARTRATE SCH MG: 50 TABLET, FILM COATED ORAL at 07:58

## 2018-09-08 RX ADMIN — I-VITE, TAB 1000-60-2MG (60/BT) SCH EACH: TAB at 11:16

## 2018-09-08 RX ADMIN — FAMOTIDINE SCH: 20 TABLET, FILM COATED ORAL at 05:12

## 2018-09-08 RX ADMIN — NEOMYCIN SULFATE, POLYMYXIN B SULFATE, AND DEXAMETHASONE SCH: 3.5; 10000; 1 OINTMENT OPHTHALMIC at 20:39

## 2018-09-08 RX ADMIN — DOCUSATE SODIUM AND SENNOSIDES SCH EACH: 50; 8.6 TABLET ORAL at 07:59

## 2018-09-08 NOTE — PN
PROGRESS NOTE



Patient is seen for followup for acute kidney injury.  Her renal function continues to

improve.  The serum creatinine is 1.9.  She was 2.0 yesterday.  The patient has

received IV fluids.  She also had altered mentation at the time of admission, which is

slightly better.  The patient is being followed by Neurology.  CT scan was negative for

acute changes.



PHYSICAL EXAMINATION:

Blood pressure is 145/88, heart rate 101 per minute.  She is afebrile.  Examination of

the heart S1, S2.  Examination lungs bilateral breath sounds are heard.  Abdomen is

soft, nontender.  Examination lower extremities shows no significant edema.



LAB:

Show sodium 142, potassium 3.3, chloride 104, BUN 62, serum creatinine 1.95.



ASSESSMENT:

1. Acute kidney injury, prerenal, currently improved.

2. Chronic kidney disease and NKF stage IV with baseline creatinine about 1.8-2 mg/dL.

    Renal function currently at baseline.

3. Hypokalemia.  Will replace.

4. Hypercalcemia.  Serum calcium is actually increasing.  Vitamin D was not elevated

    and the PTH is pending.  I will add a urine immunofixation as well, and if the PTH

    is elevated, I will start the patient on Sensipar.





MMODL / IJN: [id number] / Job#: [id number]

## 2018-09-08 NOTE — PN
PROGRESS NOTE



DATE OF SERVICE:

09/07/2018



SUBJECTIVE:

An 83-year-old white female who is more with it today.  She is responding and more

alert today.  She has been started on antifungal cream for some wounds and Infectious

Disease consult is recommended.  She has also had a low potassium level.  Her

creatinine is improved from 2.99 to 1.95.



CARDIOVASCULAR: S1-S2.

LUNGS: Clear.

GI:  Soft.

HEMATOLOGY:  Negative Homans.



ASSESSMENT:

1. Prerenal renal failure, urinary tract infection, tinea cruris, acute kidney injury.

2. Hypertension.

3. Hypokalemia.

4. Facial contusion.

5. Mild hypercalcemia.

Parathyroid level has been checked.  Patient is stabilizing.  Possible discharge home

in the next couple days.





MMODL / IJN: [id number] / Job#: [id number]

## 2018-09-08 NOTE — PN
PROGRESS NOTE



SUBJECTIVE:

83-year-old white female with COPD, UTI, pulmonary fibrosis, dehydration, chronic renal

disease stage III.  Creatinine is now down below 2 to 1.9 something down from 2.99.

She is up more alert, being treated for UTI with IV Rocephin.  Physical therapy will

work with her.



CARDIOVASCULAR: S1, S2.

LUNGS: Clear.

GI: Soft.



Speech is little slurred per daughter and wants neurology to evaluate her.



ASSESSMENT:

1. Chronic obstructive pulmonary disease.

2. Urinary tract infection.

3. Dehydration.

4. Renal insufficiency.

Await neurology and speech therapy evaluation.  Continue with broad-spectrum

antibiotics.  Follow up in next 24 to 48 hours.





MMODL / IJN: [id number] / Job#: [id number]

## 2018-09-09 VITALS — RESPIRATION RATE: 18 BRPM

## 2018-09-09 LAB
ALBUMIN SERPL-MCNC: 3.4 G/DL (ref 3.5–5)
ALP SERPL-CCNC: 93 U/L (ref 38–126)
ALT SERPL-CCNC: 40 U/L (ref 9–52)
ANION GAP SERPL CALC-SCNC: 10 MMOL/L
AST SERPL-CCNC: 36 U/L (ref 14–36)
BASOPHILS # BLD AUTO: 0 K/UL (ref 0–0.2)
BASOPHILS NFR BLD AUTO: 0 %
BUN SERPL-SCNC: 64 MG/DL (ref 7–17)
CALCIUM SPEC-MCNC: 10.9 MG/DL (ref 8.4–10.2)
CHLORIDE SERPL-SCNC: 103 MMOL/L (ref 98–107)
CO2 SERPL-SCNC: 28 MMOL/L (ref 22–30)
EOSINOPHIL # BLD AUTO: 0 K/UL (ref 0–0.7)
EOSINOPHIL NFR BLD AUTO: 0 %
ERYTHROCYTE [DISTWIDTH] IN BLOOD BY AUTOMATED COUNT: 4.87 M/UL (ref 3.8–5.4)
ERYTHROCYTE [DISTWIDTH] IN BLOOD: 16.4 % (ref 11.5–15.5)
GLUCOSE SERPL-MCNC: 120 MG/DL (ref 74–99)
HCT VFR BLD AUTO: 41.5 % (ref 34–46)
HGB BLD-MCNC: 12.6 GM/DL (ref 11.4–16)
LYMPHOCYTES # SPEC AUTO: 0.5 K/UL (ref 1–4.8)
LYMPHOCYTES NFR SPEC AUTO: 5 %
MCH RBC QN AUTO: 25.8 PG (ref 25–35)
MCHC RBC AUTO-ENTMCNC: 30.3 G/DL (ref 31–37)
MCV RBC AUTO: 85.2 FL (ref 80–100)
MONOCYTES # BLD AUTO: 0.6 K/UL (ref 0–1)
MONOCYTES NFR BLD AUTO: 6 %
NEUTROPHILS # BLD AUTO: 8 K/UL (ref 1.3–7.7)
NEUTROPHILS NFR BLD AUTO: 87 %
PLATELET # BLD AUTO: 210 K/UL (ref 150–450)
POTASSIUM SERPL-SCNC: 3.8 MMOL/L (ref 3.5–5.1)
PROT SERPL-MCNC: 6.4 G/DL (ref 6.3–8.2)
SODIUM SERPL-SCNC: 141 MMOL/L (ref 137–145)
WBC # BLD AUTO: 9.2 K/UL (ref 3.8–10.6)

## 2018-09-09 RX ADMIN — APIXABAN SCH MG: 2.5 TABLET, FILM COATED ORAL at 20:34

## 2018-09-09 RX ADMIN — I-VITE, TAB 1000-60-2MG (60/BT) SCH EACH: TAB at 17:29

## 2018-09-09 RX ADMIN — NEOMYCIN SULFATE, POLYMYXIN B SULFATE, AND DEXAMETHASONE SCH APPLIC: 3.5; 10000; 1 OINTMENT OPHTHALMIC at 20:34

## 2018-09-09 RX ADMIN — DOCUSATE SODIUM AND SENNOSIDES SCH EACH: 50; 8.6 TABLET ORAL at 20:34

## 2018-09-09 RX ADMIN — DILTIAZEM HYDROCHLORIDE SCH MG: 60 TABLET, FILM COATED ORAL at 15:28

## 2018-09-09 RX ADMIN — LATANOPROST SCH: 50 SOLUTION OPHTHALMIC at 20:35

## 2018-09-09 RX ADMIN — I-VITE, TAB 1000-60-2MG (60/BT) SCH EACH: TAB at 08:33

## 2018-09-09 RX ADMIN — POTASSIUM CHLORIDE SCH MEQ: 20 TABLET, EXTENDED RELEASE ORAL at 08:31

## 2018-09-09 RX ADMIN — Medication SCH MG: at 20:34

## 2018-09-09 RX ADMIN — DILTIAZEM HYDROCHLORIDE SCH MG: 60 TABLET, FILM COATED ORAL at 20:34

## 2018-09-09 RX ADMIN — DILTIAZEM HYDROCHLORIDE SCH MG: 60 TABLET, FILM COATED ORAL at 08:30

## 2018-09-09 RX ADMIN — Medication SCH APPLIC: at 17:29

## 2018-09-09 RX ADMIN — METHYLPREDNISOLONE SODIUM SUCCINATE SCH MG: 40 INJECTION, POWDER, FOR SOLUTION INTRAMUSCULAR; INTRAVENOUS at 08:29

## 2018-09-09 RX ADMIN — NEOMYCIN SULFATE, POLYMYXIN B SULFATE, AND DEXAMETHASONE SCH APPLIC: 3.5; 10000; 1 OINTMENT OPHTHALMIC at 08:32

## 2018-09-09 RX ADMIN — NEOMYCIN SULFATE, POLYMYXIN B SULFATE, AND DEXAMETHASONE SCH APPLIC: 3.5; 10000; 1 OINTMENT OPHTHALMIC at 15:29

## 2018-09-09 RX ADMIN — METOPROLOL TARTRATE SCH MG: 50 TABLET, FILM COATED ORAL at 20:34

## 2018-09-09 RX ADMIN — FLUTICASONE PROPIONATE SCH SPRAY: 50 SPRAY, METERED NASAL at 17:28

## 2018-09-09 RX ADMIN — APIXABAN SCH MG: 2.5 TABLET, FILM COATED ORAL at 08:31

## 2018-09-09 RX ADMIN — PREDNISOLONE ACETATE SCH DROPS: 10 SUSPENSION/ DROPS OPHTHALMIC at 08:32

## 2018-09-09 RX ADMIN — LEVOTHYROXINE SODIUM SCH: 25 TABLET ORAL at 06:03

## 2018-09-09 RX ADMIN — CINACALCET HYDROCHLORIDE SCH MG: 30 TABLET, COATED ORAL at 15:27

## 2018-09-09 RX ADMIN — LOSARTAN POTASSIUM SCH MG: 50 TABLET, FILM COATED ORAL at 08:32

## 2018-09-09 RX ADMIN — ALLOPURINOL SCH MG: 100 TABLET ORAL at 08:31

## 2018-09-09 RX ADMIN — METHYLPREDNISOLONE SODIUM SUCCINATE SCH MG: 40 INJECTION, POWDER, FOR SOLUTION INTRAMUSCULAR; INTRAVENOUS at 23:53

## 2018-09-09 RX ADMIN — Medication SCH: at 06:03

## 2018-09-09 RX ADMIN — METHYLPREDNISOLONE SODIUM SUCCINATE SCH MG: 40 INJECTION, POWDER, FOR SOLUTION INTRAMUSCULAR; INTRAVENOUS at 15:28

## 2018-09-09 RX ADMIN — CLOTRIMAZOLE AND BETAMETHASONE DIPROPIONATE SCH APPLIC: 10; .5 CREAM TOPICAL at 08:31

## 2018-09-09 RX ADMIN — CEFTRIAXONE SODIUM SCH MG: 1 INJECTION, POWDER, FOR SOLUTION INTRAMUSCULAR; INTRAVENOUS at 17:28

## 2018-09-09 RX ADMIN — Medication SCH MG: at 17:28

## 2018-09-09 RX ADMIN — FLUTICASONE PROPIONATE SCH: 50 SPRAY, METERED NASAL at 06:03

## 2018-09-09 RX ADMIN — METOPROLOL TARTRATE SCH MG: 50 TABLET, FILM COATED ORAL at 08:32

## 2018-09-09 RX ADMIN — CLOTRIMAZOLE AND BETAMETHASONE DIPROPIONATE SCH APPLIC: 10; .5 CREAM TOPICAL at 20:35

## 2018-09-09 RX ADMIN — ATORVASTATIN CALCIUM SCH MG: 10 TABLET, FILM COATED ORAL at 20:34

## 2018-09-09 RX ADMIN — FAMOTIDINE SCH: 20 TABLET, FILM COATED ORAL at 06:03

## 2018-09-09 RX ADMIN — DOCUSATE SODIUM AND SENNOSIDES SCH EACH: 50; 8.6 TABLET ORAL at 08:32

## 2018-09-09 RX ADMIN — POTASSIUM CHLORIDE SCH MEQ: 20 TABLET, EXTENDED RELEASE ORAL at 17:28

## 2018-09-09 RX ADMIN — Medication SCH MG: at 08:30

## 2018-09-09 NOTE — PN
PROGRESS NOTE



Creatinine is down to 1.92.  She is up eating and drinking.  Dr. Onofre has seen her

for possible speech slurring but she sounds like she normally does to me.



CARDIOVASCULAR: S1, S2.

LUNGS: Clear.

GI: Soft.



ASSESSMENT:

1. Tinea cruris.

2. Urinary tract infection.

3. Dehydration.

4. Prerenal renal failure.

5. Chronic obstructive pulmonary disease.

Continue current treatment.  Possible discharge back to nursing home tomorrow.  Lasix

has been withheld due to renal insufficiency.  Oral hydration is very important when

she goes back to nursing home tomorrow.





MMODL / IJN: [id number] / Job#: [id number]

## 2018-09-09 NOTE — PN
PROGRESS NOTE



DATE OF SERVICE:

09/09/2018.



REASON FOR FOLLOWUP:

Urinary tract infection.



INTERVAL HISTORY:

The patient is afebrile.  She is more awake and alert.  She is breathing 
comfortably.

No chest pain.  No abdominal pain. No diarrhea.



EXAMINATION:

Blood pressure 137/91 with a pulse of 107, temperature 97.4, she is 98% on room 
air.

GENERAL DESCRIPTION: An elderly female up in the chair in no distress.

RESPIRATORY SYSTEM: Unlabored breathing. Clear to auscultation anteriorly.

HEART: S1, S2.  Regular rate and rhythm.

ABDOMEN: Soft, no tenderness.



LABS:

Hemoglobin is 12.3, white count 9.2, BUN of 64, creatinine is 1.92.  Urine 
culture so

far negative.



DIAGNOSTIC IMPRESSION AND PLAN:

1-Patient admitted to the hospital with mental status changes, multifactorial, 
with

possible component of urinary tract infection and the patient did have positive 
UA. So

far culture has been negative.  He is currently on Rocephin. That can be safely

discontinued on discharge. 

2-Bilateral groin area cutenous candidasis-- continue with nystatin powder 
twice daily x 7 days

3-Continue supportive care.





MMODL / IJN: [id number] / Job#: [id number]

MTDD

## 2018-09-09 NOTE — PN
PROGRESS NOTE



HISTORY:

Patient is seen for followup for acute kidney injury on chronic kidney disease.

Currently she is sitting up in a bedside chair.  Patient is having her breakfast.  She

denies any significant complaints.  She is off of IV fluids.  Renal function has

improved and back to baseline.



PHYSICAL EXAMINATION:

On examination today, blood pressure was 152/87, this morning 171/73, heart rate 105

per minute.  She is afebrile.  Examination of the heart S1, S2.  Examination of lungs,

bilateral breath sounds are heard.  Abdomen is soft, nontender.  Exam of lower

extremities shows trace edema bilaterally.  CNS exam is grossly intact.



LABS:

Show sodium 141, potassium 3.8, BUN 64, serum creatinine 1.92, hemoglobin 12.6, calcium

was 10.9.



ASSESSMENT:

1. Acute kidney injury, prerenal, currently improved.

2. Hypercalcemia associated with hyperparathyroidism, which appears to be primary

    since the PTH is significantly elevated as well.  Vitamin D level was not too high.

    I will start the patient on Sensipar.  She is not a candidate for surgery given her

    advanced age unless the hypercalcemia does not improve with Sensipar.

3. Hypokalemia, status post replacement.

4. Hypertension, controlled.  Continue with the Cozaar.

5. Mental status changes, somewhat improved.  The patient is being followed by

    Neurology.



PLAN:

1. Start Sensipar for hypercalcemia.

2. Avoid calcium supplements.





MMODL / IJN: [id number] / Job#: [id number]

## 2018-09-09 NOTE — P.PN
Subjective


Progress Note Date: 09/09/18





The patient is a 83-year-old woman who resides at a nursing home who presented 

to the hospital after a fall.  Patient is sitting up in a chair and is fully 

alert.  She is very hard of hearing.  She denies any headache or new weakness.  

 has had a CAT scan of the brain and cervical spine in the emergency room.

  CAT scan of the brain showed atrophy and CAT scan of the cervical spine 

showed degenerative changes.  She has had a carotid ultrasound during her 

hospital stay which shows arrhythmia as well as left ICA stenosis of 50-69%.





Objective





- Vital Signs


Vital signs: 


 Vital Signs











Temp  97.4 F L  09/09/18 14:17


 


Pulse  107 H  09/09/18 14:17


 


Resp  20   09/09/18 14:17


 


BP  137/91   09/09/18 17:25


 


Pulse Ox  98   09/09/18 14:17








 Intake & Output











 09/09/18 09/09/18 09/10/18





 06:59 18:59 06:59


 


Intake Total 100 120 


 


Output Total 1  


 


Balance 99 120 


 


Weight 73.936 kg  


 


Intake:   


 


  Oral 100 120 


 


Output:   


 


  Urine/Stool Mix 1  


 


Other:   


 


  Voiding Method Toilet  


 


  # Voids 2 1 


 


  # Bowel Movements 1 0 














- Constitutional


General appearance: Present: average body habitus





- EENT


ENT: Present: hard of hearing





- Respiratory


Respiratory: bilateral: CTA





- Cardiovascular


Rhythm: regular





- Neurologic


Neurologic: Present: focal deficits (Patient does have some facial asymmetry 

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





- Musculoskeletal


Musculoskeletal: Present: strength equal bilaterally





- Labs


CBC & Chem 7: 


 09/09/18 06:37





 09/09/18 06:37


Labs: 


 Abnormal Lab Results - Last 24 Hours (Table)











  09/08/18 09/09/18 09/09/18 Range/Units





  07:41 06:37 06:37 


 


MCHC    30.3 L  (31.0-37.0)  g/dL


 


RDW    16.4 H  (11.5-15.5)  %


 


Neutrophils #    8.0 H  (1.3-7.7)  k/uL


 


Lymphocytes #    0.5 L  (1.0-4.8)  k/uL


 


BUN   64 H   (7-17)  mg/dL


 


Creatinine   1.92 H   (0.52-1.04)  mg/dL


 


Glucose   120 H   (74-99)  mg/dL


 


Calcium   10.9 H   (8.4-10.2)  mg/dL


 


Albumin   3.4 L   (3.5-5.0)  g/dL


 


PTH Intact  259.9 H    (14.0-72.0)  pg/mL














Assessment and Plan


(1) Fall


Current Visit: Yes   Status: Acute   Priority: High   SNOMED Code(s): 6344108


   





(2) Dehydration


Current Visit: Yes   Status: Acute   SNOMED Code(s): 36239832


   





(3) Facial contusion


Current Visit: Yes   Status: Acute   SNOMED Code(s): 250677694


   


Plan: 





The patient is an 83-year-old woman who resides at a nursing home.  Apparently 

she had a fall in the bathroom.  The patient is a poor historian.  She does 

have a history of dementia.  She has had a carotid ultrasound which shows left 

ICA stenosis of 50-69%.  This can be followed up outpatient .  The patient is 

currently on Eliquis and is a fall risk.

## 2018-09-10 VITALS — TEMPERATURE: 97.4 F | DIASTOLIC BLOOD PRESSURE: 84 MMHG | HEART RATE: 81 BPM | SYSTOLIC BLOOD PRESSURE: 144 MMHG

## 2018-09-10 RX ADMIN — APIXABAN SCH MG: 2.5 TABLET, FILM COATED ORAL at 07:57

## 2018-09-10 RX ADMIN — FAMOTIDINE SCH MG: 20 TABLET, FILM COATED ORAL at 06:19

## 2018-09-10 RX ADMIN — PREDNISOLONE ACETATE SCH DROPS: 10 SUSPENSION/ DROPS OPHTHALMIC at 07:58

## 2018-09-10 RX ADMIN — DILTIAZEM HYDROCHLORIDE SCH MG: 60 TABLET, FILM COATED ORAL at 07:57

## 2018-09-10 RX ADMIN — CLOTRIMAZOLE AND BETAMETHASONE DIPROPIONATE SCH APPLIC: 10; .5 CREAM TOPICAL at 07:58

## 2018-09-10 RX ADMIN — ALLOPURINOL SCH MG: 100 TABLET ORAL at 07:55

## 2018-09-10 RX ADMIN — DOCUSATE SODIUM AND SENNOSIDES SCH EACH: 50; 8.6 TABLET ORAL at 07:57

## 2018-09-10 RX ADMIN — NEOMYCIN SULFATE, POLYMYXIN B SULFATE, AND DEXAMETHASONE SCH APPLIC: 3.5; 10000; 1 OINTMENT OPHTHALMIC at 07:58

## 2018-09-10 RX ADMIN — METOPROLOL TARTRATE SCH MG: 50 TABLET, FILM COATED ORAL at 07:56

## 2018-09-10 RX ADMIN — FLUTICASONE PROPIONATE SCH: 50 SPRAY, METERED NASAL at 06:19

## 2018-09-10 RX ADMIN — Medication SCH MG: at 07:56

## 2018-09-10 RX ADMIN — CINACALCET HYDROCHLORIDE SCH MG: 30 TABLET, COATED ORAL at 07:55

## 2018-09-10 RX ADMIN — METHYLPREDNISOLONE SODIUM SUCCINATE SCH MG: 40 INJECTION, POWDER, FOR SOLUTION INTRAMUSCULAR; INTRAVENOUS at 07:57

## 2018-09-10 RX ADMIN — Medication SCH: at 06:19

## 2018-09-10 RX ADMIN — POTASSIUM CHLORIDE SCH MEQ: 20 TABLET, EXTENDED RELEASE ORAL at 07:56

## 2018-09-10 RX ADMIN — I-VITE, TAB 1000-60-2MG (60/BT) SCH EACH: TAB at 13:03

## 2018-09-10 RX ADMIN — LEVOTHYROXINE SODIUM SCH MCG: 25 TABLET ORAL at 06:19

## 2018-09-10 NOTE — DS
DISCHARGE SUMMARY



83-year-old white female came in with acute dehydration, acute renal insufficiency,

urinary tract infection was treated with IV antibiotics, Lotrisone cream to the wounds

on her left leg and left groin.  IV antibiotics were given right up to discharge at

which time she was sent home in stable condition after urinary tract infection was

resolved.  Taken off _____Lasix.  Her creatinine greatly and improved due to she is

admitted with dehydration.



She will continue with current medications, off Lasix.  _____in the nursing home.

Urinary tract infection was treated and improved mental status on discharge.  COPD

exacerbation was treated with steroids, which is improved.  Facial contusion was

negative for any fractures and _____. PT/OT will be needed in the nursing home.





MMODL / IJN: [id number] / Job#: [id number]

## 2018-09-10 NOTE — PN
PROGRESS NOTE



DATE OF SERVICE:

09/10/2018.



REASON FOR FOLLOWUP:

1. Possible UTI.

2. Bilateral groin area cutaneous candidiasis.



INTERVAL HISTORY:

The patient is afebrile.  The patient is breathing comfortably.  Denies any chest pain,

cough, abdominal pain.  No diarrhea reported.



EXAMINATION:

Blood pressure 152/86 with a pulse of 96, temperature 97.9. She is 95% on room air.

General description is an elderly female up in the chair in no distress.

RESPIRATORY SYSTEM: Unlabored breathing.  Decreased breath sounds at the bases. No

wheeze.

HEART: S1, S2.  Regular rate and rhythm.

ABDOMEN: Soft, no tenderness.

EXTREMITIES: Right leg with cut, but no cellulitis.



LABS:

Hemoglobin 12.2, white count 9.2, BUN 64, creatinine 1.92.  Urine culture so far

negative.



DIAGNOSTIC IMPRESSION/PLAN:

1. Patient admitted to the hospital with mental status changes likely multifactorial,

    possibly component of urinary tract infection. Urine culture has been negative.

    Rocephin to be discontinued on discharge.

2. Patient bilateral groin area cutaneous candidiasis. Nystatin powder twice a day for

    about a week.

3. Right leg wound from a cut. Local care with Aquacel Silver. Dressing to be changed

    q.48 hours.





MMODL / IJN: [id number] / Job#: [id number]

## 2018-09-10 NOTE — PN
PROGRESS NOTE



Patient is seen for followup for acute kidney injury on top of chronic kidney disease.

Patient's mentation seems to have improved.  She is currently off of IV fluids.  She

has been tolerating oral intake.



PHYSICAL EXAMINATION:

On examination, blood pressure was 152/86, heart rate 96 per minute.  She is afebrile.

Examination of the heart S1, S2.  Examination of the lungs bilateral breath sounds are

heard.  Abdomen is soft, nontender.  Examination of lower extremities shows trace

edema.  CNS exam is grossly intact.



LAB:

Show sodium 141, potassium 3.8, BUN 64, serum creatinine 1.92, hemoglobin 12.6 g/dL.

Calcium is 10.9.



ASSESSMENT:

1. Acute kidney injury on prerenal currently improved.

2. Chronic kidney disease stage 4.  Renal function at baseline.

3. Hypercalcemia associated with hyperparathyroidism.  PTH is significantly elevated

    at 259.  The patient has been started on Sensipar.



PLAN:

Continue with Sensipar.  May need to increase the dose as outpatient.  Repeat labs in

a.m.





MMODL / IJN: [id number] / Job#: [id number]

## 2018-09-12 NOTE — CDI
Last Revision, December 2017





            Documentation Clarification Form

               2nd Request





Date: 9/6/2018 2:59:02 PM

From: Yanci Friend RN, CCDS

Phone: [phone]

MRN: [id number]

Admit Date: 9/5/2018 5:55:00 PM

Patient Name: Rony Scanlon

Visit Number: [id number]



ATTENTION: The Clinical Documentation Specialists (CDI) and [facility] Coding Staff 
appreciate your assistance in clarifying documentation. Please respond to the 
clarification below the line at the bottom and electronically sign. The CDI & 
[facility] Coding staff will review the response and follow-up if needed. Please note: 
Queries are made part of the Legal Health Record. If you have any questions, 
please contact the author of this message via ITS.



Dilshad Lawrence MD



History/Risk Factors:

CHF, COPD, Pneumonia, CRF stage 4

Tobacco use: ex-smoker

Home oxygen: 3L nasal cannula



Clinical Indicators: 

Vital signs: Temp 97.2, HR 96, RR 20, B/P 186/86, Spo2 97% ra

Pulse oximetry: 2l NC

9/6 Nephro Lung/Breathing assessment: "LUNGS: Lungs are clear to auscultation 
and percussion."



Treatment: 

Breathing TX: Duoneb Q 4 hrs INH

Pulse ox: per unit protocol

O2: 2l nasal cannula



In your professional opinion, can you please clarify if these findings signify 
one of the following conditions?  



Chronic hypoxic respiratory failure

Chronic hypercapnic respiratory failure

Other Diagnosis, please specify 

Unable to determine



Please continue to document in your progress notes and discharge summary in 
order to capture severity of illness and risk of mortality. Include clinical 
findings that support your diagnosis.

____________________________________________________________________



MTDD

## 2018-09-12 NOTE — CDI
Last Revision, December 2017





               Documentation Clarification Form

                  2nd Request



Date: 9/6/2018 2:41:17 PM

From: Yanci Friend RN, CCDS

Phone: [phone]

MRN: [id number]

Admit Date: 9/5/2018 5:55:00 PM

Patient Name: Rony Scanlon

Visit Number: [id number]



ATTENTION: The Clinical Documentation Specialists (CDI) and [facility] Coding Staff 
appreciate your assistance in clarifying documentation. Please respond to the 
clarification below the line at the bottom and electronically sign. The CDI & 
[facility] Coding staff will review the response and follow-up if needed. Please note: 
Queries are made part of the Legal Health Record. If you have any questions, 
please contact the author of this message via ITS.



Dilshad Lawrence MD



Patient history/risk factors:

CHF, COPD, Dementia, HTN, Chronic respiratory disorder, CRF stage 4, Blindness, 
ESBL in urine



Clinical Indicators:

9/5 EC HPI: "she's started demonstrating some confusion and garbled speech 
which is apparently not her norm. Stated Complaint: fall, altered mental status

9/6 H&P: "She has some confusion"

Labs: BUN 76/66, Creatinine 2.99/2.19, U/A: +

9/5 CXR: "possible pulmonary venous hypertension and interstitial edema." 

9/5 CT Brain: "Age-related atrophy with periventricular white matter ischemic 
type changes.



Treatment: 

Rocephin 1 gm IVP Q 24 hrs

D5.45 @ 75 cc/hr



In your professional opinion, please clarify the etiology of the altered mental 
status, if known.



Encephalopathy (specify Type- Metabolic, Toxic, Etc. and Underlying Medical 
Illness)

Dementia (if know, specify Type and if with/without Behavioral Disturbance)

Other condition (please specify)

Unable to determine



Please continue to document in your progress notes and discharge summary in 
order to capture severity of illness and risk of mortality. Include clinical 
findings that support your diagnosis.

______________________________________________________________________





MTDD

## 2018-09-24 ENCOUNTER — HOSPITAL ENCOUNTER (INPATIENT)
Dept: HOSPITAL 47 - 5MS5E | Age: 83
LOS: 7 days | Discharge: NURSING, REHAB OR LONG-TERM CARE | DRG: 64 | End: 2018-10-01
Payer: MEDICARE
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
## 2018-09-24 DIAGNOSIS — Z82.61: ICD-10-CM

## 2018-09-24 DIAGNOSIS — N18.4: ICD-10-CM

## 2018-09-24 DIAGNOSIS — F48.2: ICD-10-CM

## 2018-09-24 DIAGNOSIS — Z79.890: ICD-10-CM

## 2018-09-24 DIAGNOSIS — K21.9: ICD-10-CM

## 2018-09-24 DIAGNOSIS — Z79.01: ICD-10-CM

## 2018-09-24 DIAGNOSIS — Z87.730: ICD-10-CM

## 2018-09-24 DIAGNOSIS — Z86.19: ICD-10-CM

## 2018-09-24 DIAGNOSIS — H54.7: ICD-10-CM

## 2018-09-24 DIAGNOSIS — D63.8: ICD-10-CM

## 2018-09-24 DIAGNOSIS — E03.9: ICD-10-CM

## 2018-09-24 DIAGNOSIS — Z91.19: ICD-10-CM

## 2018-09-24 DIAGNOSIS — I13.0: ICD-10-CM

## 2018-09-24 DIAGNOSIS — H91.92: ICD-10-CM

## 2018-09-24 DIAGNOSIS — Z87.75: ICD-10-CM

## 2018-09-24 DIAGNOSIS — Z16.12: ICD-10-CM

## 2018-09-24 DIAGNOSIS — R47.81: ICD-10-CM

## 2018-09-24 DIAGNOSIS — E87.1: ICD-10-CM

## 2018-09-24 DIAGNOSIS — I27.20: ICD-10-CM

## 2018-09-24 DIAGNOSIS — Z96.652: ICD-10-CM

## 2018-09-24 DIAGNOSIS — Z87.891: ICD-10-CM

## 2018-09-24 DIAGNOSIS — J96.20: ICD-10-CM

## 2018-09-24 DIAGNOSIS — R40.2243: ICD-10-CM

## 2018-09-24 DIAGNOSIS — F01.51: ICD-10-CM

## 2018-09-24 DIAGNOSIS — B96.20: ICD-10-CM

## 2018-09-24 DIAGNOSIS — H40.9: ICD-10-CM

## 2018-09-24 DIAGNOSIS — E87.2: ICD-10-CM

## 2018-09-24 DIAGNOSIS — Z84.1: ICD-10-CM

## 2018-09-24 DIAGNOSIS — R40.2143: ICD-10-CM

## 2018-09-24 DIAGNOSIS — Z97.4: ICD-10-CM

## 2018-09-24 DIAGNOSIS — Z87.721: ICD-10-CM

## 2018-09-24 DIAGNOSIS — K72.90: ICD-10-CM

## 2018-09-24 DIAGNOSIS — Z90.710: ICD-10-CM

## 2018-09-24 DIAGNOSIS — I50.33: ICD-10-CM

## 2018-09-24 DIAGNOSIS — R13.10: ICD-10-CM

## 2018-09-24 DIAGNOSIS — Z91.14: ICD-10-CM

## 2018-09-24 DIAGNOSIS — Z80.0: ICD-10-CM

## 2018-09-24 DIAGNOSIS — Z81.1: ICD-10-CM

## 2018-09-24 DIAGNOSIS — R47.1: ICD-10-CM

## 2018-09-24 DIAGNOSIS — F32.9: ICD-10-CM

## 2018-09-24 DIAGNOSIS — N30.90: ICD-10-CM

## 2018-09-24 DIAGNOSIS — Z81.2: ICD-10-CM

## 2018-09-24 DIAGNOSIS — G93.41: ICD-10-CM

## 2018-09-24 DIAGNOSIS — I63.9: Primary | ICD-10-CM

## 2018-09-24 DIAGNOSIS — E78.5: ICD-10-CM

## 2018-09-24 DIAGNOSIS — Z79.51: ICD-10-CM

## 2018-09-24 DIAGNOSIS — Z79.899: ICD-10-CM

## 2018-09-24 DIAGNOSIS — J44.9: ICD-10-CM

## 2018-09-24 DIAGNOSIS — I07.1: ICD-10-CM

## 2018-09-24 DIAGNOSIS — R47.01: ICD-10-CM

## 2018-09-24 DIAGNOSIS — Z87.440: ICD-10-CM

## 2018-09-24 DIAGNOSIS — G93.6: ICD-10-CM

## 2018-09-24 DIAGNOSIS — I48.2: ICD-10-CM

## 2018-09-24 DIAGNOSIS — N17.0: ICD-10-CM

## 2018-09-24 DIAGNOSIS — R32: ICD-10-CM

## 2018-09-24 DIAGNOSIS — J84.10: ICD-10-CM

## 2018-09-24 DIAGNOSIS — R40.2363: ICD-10-CM

## 2018-09-24 DIAGNOSIS — S06.0X9A: ICD-10-CM

## 2018-09-24 PROCEDURE — 87186 SC STD MICRODIL/AGAR DIL: CPT

## 2018-09-24 PROCEDURE — 94760 N-INVAS EAR/PLS OXIMETRY 1: CPT

## 2018-09-24 PROCEDURE — 87086 URINE CULTURE/COLONY COUNT: CPT

## 2018-09-24 PROCEDURE — 80061 LIPID PANEL: CPT

## 2018-09-24 PROCEDURE — 74176 CT ABD & PELVIS W/O CONTRAST: CPT

## 2018-09-24 PROCEDURE — 80053 COMPREHEN METABOLIC PANEL: CPT

## 2018-09-24 PROCEDURE — 71250 CT THORAX DX C-: CPT

## 2018-09-24 PROCEDURE — 83735 ASSAY OF MAGNESIUM: CPT

## 2018-09-24 PROCEDURE — 83090 ASSAY OF HOMOCYSTEINE: CPT

## 2018-09-24 PROCEDURE — 93005 ELECTROCARDIOGRAM TRACING: CPT

## 2018-09-24 PROCEDURE — 84443 ASSAY THYROID STIM HORMONE: CPT

## 2018-09-24 PROCEDURE — 84100 ASSAY OF PHOSPHORUS: CPT

## 2018-09-24 PROCEDURE — 85025 COMPLETE CBC W/AUTO DIFF WBC: CPT

## 2018-09-24 PROCEDURE — 94640 AIRWAY INHALATION TREATMENT: CPT

## 2018-09-24 PROCEDURE — 82607 VITAMIN B-12: CPT

## 2018-09-24 PROCEDURE — 71046 X-RAY EXAM CHEST 2 VIEWS: CPT

## 2018-09-24 PROCEDURE — 80048 BASIC METABOLIC PNL TOTAL CA: CPT

## 2018-09-24 PROCEDURE — 83880 ASSAY OF NATRIURETIC PEPTIDE: CPT

## 2018-09-24 PROCEDURE — 70450 CT HEAD/BRAIN W/O DYE: CPT

## 2018-09-24 PROCEDURE — 81001 URINALYSIS AUTO W/SCOPE: CPT

## 2018-09-24 PROCEDURE — 87077 CULTURE AEROBIC IDENTIFY: CPT

## 2018-09-25 LAB
ALBUMIN SERPL-MCNC: 2.7 G/DL (ref 3.5–5)
ALP SERPL-CCNC: 149 U/L (ref 38–126)
ALT SERPL-CCNC: 70 U/L (ref 9–52)
ANION GAP SERPL CALC-SCNC: 8 MMOL/L
AST SERPL-CCNC: 55 U/L (ref 14–36)
BASOPHILS # BLD AUTO: 0 K/UL (ref 0–0.2)
BASOPHILS NFR BLD AUTO: 0 %
BUN SERPL-SCNC: 39 MG/DL (ref 7–17)
CALCIUM SPEC-MCNC: 9.6 MG/DL (ref 8.4–10.2)
CHLORIDE SERPL-SCNC: 108 MMOL/L (ref 98–107)
CO2 SERPL-SCNC: 19 MMOL/L (ref 22–30)
EOSINOPHIL # BLD AUTO: 0.1 K/UL (ref 0–0.7)
EOSINOPHIL NFR BLD AUTO: 2 %
ERYTHROCYTE [DISTWIDTH] IN BLOOD BY AUTOMATED COUNT: 3.87 M/UL (ref 3.8–5.4)
ERYTHROCYTE [DISTWIDTH] IN BLOOD: 17.2 % (ref 11.5–15.5)
GLUCOSE SERPL-MCNC: 114 MG/DL (ref 74–99)
HCT VFR BLD AUTO: 33.1 % (ref 34–46)
HGB BLD-MCNC: 10.3 GM/DL (ref 11.4–16)
LYMPHOCYTES # SPEC AUTO: 0.4 K/UL (ref 1–4.8)
LYMPHOCYTES NFR SPEC AUTO: 5 %
MCH RBC QN AUTO: 26.7 PG (ref 25–35)
MCHC RBC AUTO-ENTMCNC: 31.2 G/DL (ref 31–37)
MCV RBC AUTO: 85.6 FL (ref 80–100)
MONOCYTES # BLD AUTO: 0.4 K/UL (ref 0–1)
MONOCYTES NFR BLD AUTO: 6 %
NEUTROPHILS # BLD AUTO: 6.4 K/UL (ref 1.3–7.7)
NEUTROPHILS NFR BLD AUTO: 86 %
PLATELET # BLD AUTO: 160 K/UL (ref 150–450)
POTASSIUM SERPL-SCNC: 5.1 MMOL/L (ref 3.5–5.1)
PROT SERPL-MCNC: 5.3 G/DL (ref 6.3–8.2)
SODIUM SERPL-SCNC: 135 MMOL/L (ref 137–145)
WBC # BLD AUTO: 7.5 K/UL (ref 3.8–10.6)

## 2018-09-25 RX ADMIN — FAMOTIDINE SCH MG: 20 TABLET, FILM COATED ORAL at 17:59

## 2018-09-25 RX ADMIN — METOPROLOL TARTRATE SCH MG: 50 TABLET, FILM COATED ORAL at 22:00

## 2018-09-25 RX ADMIN — APIXABAN SCH MG: 2.5 TABLET, FILM COATED ORAL at 22:00

## 2018-09-25 RX ADMIN — FLUTICASONE PROPIONATE SCH: 50 SPRAY, METERED NASAL at 12:34

## 2018-09-25 RX ADMIN — CINACALCET HYDROCHLORIDE SCH MG: 30 TABLET, COATED ORAL at 05:55

## 2018-09-25 RX ADMIN — Medication SCH MG: at 22:00

## 2018-09-25 RX ADMIN — POTASSIUM CHLORIDE SCH: 20 TABLET, EXTENDED RELEASE ORAL at 12:34

## 2018-09-25 RX ADMIN — APIXABAN SCH MG: 2.5 TABLET, FILM COATED ORAL at 12:33

## 2018-09-25 RX ADMIN — PREDNISOLONE ACETATE SCH DROPS: 10 SUSPENSION/ DROPS OPHTHALMIC at 22:01

## 2018-09-25 RX ADMIN — LEVOTHYROXINE SODIUM SCH MCG: 25 TABLET ORAL at 05:55

## 2018-09-25 RX ADMIN — Medication SCH MG: at 12:33

## 2018-09-25 RX ADMIN — LATANOPROST SCH DROPS: 50 SOLUTION OPHTHALMIC at 22:02

## 2018-09-25 RX ADMIN — DOCUSATE SODIUM AND SENNOSIDES SCH EACH: 50; 8.6 TABLET ORAL at 12:34

## 2018-09-25 RX ADMIN — FAMOTIDINE SCH MG: 20 TABLET, FILM COATED ORAL at 12:33

## 2018-09-25 RX ADMIN — Medication SCH MG: at 18:00

## 2018-09-25 RX ADMIN — ALLOPURINOL SCH MG: 100 TABLET ORAL at 12:33

## 2018-09-25 RX ADMIN — DILTIAZEM HYDROCHLORIDE SCH MG: 60 TABLET, FILM COATED ORAL at 05:55

## 2018-09-25 RX ADMIN — POTASSIUM CHLORIDE SCH MEQ: 20 TABLET, EXTENDED RELEASE ORAL at 18:00

## 2018-09-25 RX ADMIN — LOSARTAN POTASSIUM SCH MG: 50 TABLET, FILM COATED ORAL at 12:34

## 2018-09-25 RX ADMIN — DILTIAZEM HYDROCHLORIDE SCH MG: 60 TABLET, FILM COATED ORAL at 14:29

## 2018-09-25 RX ADMIN — DOCUSATE SODIUM AND SENNOSIDES SCH EACH: 50; 8.6 TABLET ORAL at 22:00

## 2018-09-25 RX ADMIN — METOPROLOL TARTRATE SCH MG: 50 TABLET, FILM COATED ORAL at 09:20

## 2018-09-25 RX ADMIN — CEFAZOLIN SCH MLS/HR: 330 INJECTION, POWDER, FOR SOLUTION INTRAMUSCULAR; INTRAVENOUS at 14:25

## 2018-09-25 RX ADMIN — LORATADINE SCH MG: 10 TABLET ORAL at 12:34

## 2018-09-25 RX ADMIN — FLUTICASONE PROPIONATE SCH SPRAY: 50 SPRAY, METERED NASAL at 22:00

## 2018-09-25 RX ADMIN — DILTIAZEM HYDROCHLORIDE SCH MG: 60 TABLET, FILM COATED ORAL at 22:30

## 2018-09-25 NOTE — CDI
Last Revision, December 2017





            Documentation Clarification Form

               3rd Request



Date: 9/6/2018 2:41:00 PM

From: Yanci Friend RN, CCDS

Phone: [phone]

MRN: [id number]

Admit Date: 9/5/2018 5:55:00 PM

Patient Name: Rony Scanlon

Visit Number: [id number]



ATTENTION: The Clinical Documentation Specialists (CDI) and [facility] Coding Staff 
appreciate your assistance in clarifying documentation. Please respond to the 
clarification below the line at the bottom and electronically sign. The CDI & 
[facility] Coding staff will review the response and follow-up if needed. Please note: 
Queries are made part of the Legal Health Record. If you have any questions, 
please contact the author of this message via ITS.



Dilshad Lo MD



Patient history/risk factors:

CHF, COPD, Dementia, HTN, Chronic respiratory disorder, CRF stage 4, Blindness, 
ESBL in urine



Clinical Indicators:

9/5 EC HPI: "she's started demonstrating some confusion and garbled speech 
which is apparently not her norm. Stated Complaint: fall, altered mental status

9/6 H&P: "She has some confusion"

Labs: BUN 76/66, Creatinine 2.99/2.19, U/A: +

9/5 CXR: "possible pulmonary venous hypertension and interstitial edema." 

9/5 CT Brain: "Age-related atrophy with periventricular white matter ischemic 
type changes.



Treatment: 

Rocephin 1 gm IVP Q 24 hrs

D5.45 @ 75 cc/hr



In your professional opinion, please clarify the etiology of the altered mental 
status, if known.



Encephalopathy (specify Type- Metabolic, Toxic, Etc. and Underlying Medical 
Illness)

Dementia (if know, specify Type and if with/without Behavioral Disturbance)

Other condition (please specify)

Unable to determine



Please continue to document in your progress notes and discharge summary in 
order to capture severity of illness and risk of mortality. Include clinical 
findings that support your diagnosis.

______________________________________________________________________





MTDD

## 2018-09-25 NOTE — CDI
Last Revision, December 2017





            Documentation Clarification Form

               3rd Request

               

Date: 9/6/2018 2:59:00 PM

From: Yanci Friend RN, CCDS

Phone: [phone]

MRN: [id number]

Admit Date: 9/5/2018 5:55:00 PM

Patient Name: Rony Scanlon

Visit Number: [id number]



ATTENTION: The Clinical Documentation Specialists (CDI) and [facility] Coding Staff 
appreciate your assistance in clarifying documentation. Please respond to the 
clarification below the line at the bottom and electronically sign. The CDI & 
[facility] Coding staff will review the response and follow-up if needed. Please note: 
Queries are made part of the Legal Health Record. If you have any questions, 
please contact the author of this message via ITS.



Dilshad Lo MD



History/Risk Factors:

CHF, COPD, Pneumonia, CRF stage 4

Tobacco use: ex-smoker

Home oxygen: 3L nasal cannula



Clinical Indicators: 

Vital signs: Temp 97.2, HR 96, RR 20, B/P 186/86, Spo2 97% ra

Pulse oximetry: 2l NC

9/6 Nephro Lung/Breathing assessment: "LUNGS: Lungs are clear to auscultation 
and percussion."



Treatment: 

Breathing TX: Duoneb Q 4 hrs INH

Pulse ox: per unit protocol

O2: 2l nasal cannula



In your professional opinion, can you please clarify if these findings signify 
one of the following conditions?  



Chronic hypoxic respiratory failure

Chronic hypercapnic respiratory failure

Other Diagnosis, please specify 

Unable to determine



Please continue to document in your progress notes and discharge summary in 
order to capture severity of illness and risk of mortality. Include clinical 
findings that support your diagnosis.

____________________________________________________________________





















































MTDD

## 2018-09-25 NOTE — P.CRDCN
History of Present Illness


History of present illness: 





Mrs. Scanlon is a pleasant 83-year-old  female past medical history 

significant for paroxysmal atrial fibrillation on long term anticoagulation 

with coumadin, diastolic congestive heart failure, hypertension, dyslipidemia, 

dementia, COPD and chronic kidney disease. We have been asked to see her in 

consultation for tachycardia. She was sent into the hospital per her PCP for 

altered mental status, slurred speech and personality changes. She is confused 

and a poor historian. All information is obtained from the chart and nursing 

staff. She lives at an [facility] and apparently suffered a fall with a head injury 2-

3 weeks ago and per the family has been behaving abnormally ever since. CT of 

the brain on admission and the case findings suggestive of an acute to subacute 

area of ischemia involving the left temporal parietal lobe.  MRI recommended to 

exclude other etiologies such as neoplasm.  There is no evidence of midline 

shift. Heart rate on admission was 117-145. There is no EKG or telemetry 

tracings to indicate the rhythm. EKG has been obtained per our request this 

morning which indicates atrial fibrillation with controlled ventricular 

response. She is seen and examined resting comfortably flat in bed in no acute 

distress oxygenating on room air. Per nursing staff she is refusing meds 

intermittently and not responding to questions appropriately. Laboratory data 

reviewed, hemoglobin 10.3, platelets 160, sodium 135, potassium 5.1, creatinine 

2.01, AST 55, ALT 70, TSH 3.02, and he proBNP 8180, alkaline phosphatase 149.  

Current cardiac medications include Eliquis 2.5 mg twice a day, atorvastatin 10 

mg daily, diltiazem 60 mg 3 times a day, losartan 100 mg daily, Lopressor 50 mg 

twice a day, amlodipine 5 mg daily and Diamox 250 mg  at 9 AM.  

Echocardiogram obtained on previous admission earlier this month reveals 

preserved left ventricular systolic function with ejection fraction 50-55%, 

moderately dilated left atrium, mild to moderate pulmonary hypertension with an 

RVSP of 45.84 mmHg and mild to moderate TR.





Review of Systems


ROS unobtainable: due to mental status





Past Medical History


Past Medical History: Heart Failure, COPD, Dementia, GERD/Reflux, Hyperlipidemia

, Hypertension, Pneumonia, Renal Disease, Respiratory Disorder, Thyroid Disorder


Additional Past Medical History / Comment(s): Pt was born with cleft palate and 

other physical problems with facial structures including nasal passages and ear 

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 

collapsing.  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 

glaucoma.pt's daughter stated pt was able to read and write up until last admit 

18 but not since", chronic renal disease stage IV-has L arm fistula(PER 

DAUGHTER IT'S A FAILED FISTULA) but no hemodialysis, frequent UTIs, wears O2 

PRN hypothyroid.


Last Myocardial Infarction Date:: unknown


History of Any Multi-Drug Resistant Organisms: ESBL


Date of last positivie culture/infection: 18


MDRO Source:: ESBL URINE


Past Surgical History: Bladder Surgery, Hysterectomy, Joint Replacement, 

Orthopedic Surgery


Additional Past Surgical History / Comment(s): Pt has had multiple surgeries 

for birth defects affecting mouth, nose and ears, total L knee arthroplasty, 

bladder sling, fistula L arm.PARTIAL HYSTERECTOMY, PICC LINE-SINCE REMOVED


Past Anesthesia/Blood Transfusion Reactions: No Reported Reaction


Smoking Status: Former smoker





- Past Family History


  ** Father


Family Medical History: Cancer


Additional Family Medical History / Comment(s): Father  prior to age 60 yrs 

with esophageal cancer.  He was a smoker and a drinker.





  ** Mother


Family Medical History: Osteoarthritis (OA), Renal Disease, Rheumatoid 

Arthritis (RA)


Additional Family Medical History / Comment(s): kidney problems, specific type 

unknown





Medications and Allergies


 Home Medications











 Medication  Instructions  Recorded  Confirmed  Type


 


Fluticasone Nasal Spray [Flonase 1 spray EA NOSTRIL BID@17 

History





Nasal Spray]    


 


Levothyroxine Sodium [Synthroid] 25 mcg PO DAILY@0600 17 History


 


Acetaminophen Tab [Tylenol] 650 mg PO Q6HR PRN  tab 17 Rx


 


ALPRAZolam [Xanax] 0.25 mg PO HS@2100 11/10/17 09/24/18 History


 


Atorvastatin [Lipitor] 10 mg PO HS@2100 11/10/17 09/24/18 History


 


Buprenorphine [Butrans 10 MCG/HOUR] 1 patch TRANSDERM TU@0600 11/10/17 09/24/18 

History


 


Ergocalciferol (Vitamin D2) 50,000 unit PO Q30D 11/10/17 09/24/18 History





[Vitamin D2]    


 


Loratadine [Claritin] 10 mg PO DAILY@0900 11/10/17 09/24/18 History


 


Potassium Chloride [Klor-Con 20] 20 meq PO BID@0900,1700 11/10/17 09/24/18 

History


 


Ranitidine HCl [Zantac] 150 mg PO BID@0900,1700 11/10/17 09/24/18 History


 


guaiFENesin [guaiFENesin Oral 200 mg PO Q4HR PRN 11/10/17 09/24/18 History





Solution]    


 


prednisoLONE ACETATE [Pred Forte 2 drop BOTH EYES HS@2100 11/10/17 09/24/18 

History





1%]    


 


Magnesium Hydroxide [Milk of 2,400 mg PO DAILY PRN  ml 17 Rx





Magnesia Concentrate]    


 


Ferrous Sulfate [Feosol] 325 mg PO BID@0900,1700 18 History


 


Latanoprost [Xalatan 0.005%] 1 drop LEFT EYE HS@18 History


 


Melatonin 3 mg PO HS@18 History


 


Vit C/E/Zn/Coppr/Lutein/Zeaxan 1 cap PO BID@18 History





[Preservision Areds 2 Softgel]    


 


acetaZOLAMIDE [Diamox] 250 mg PO TU@18 History


 


HYDROcodone/APAP 5-325MG [Norco 1 tab PO Q8H PRN 18 History





5-325]    


 


Allopurinol [Zyloprim] 200 mg PO DAILY@18 History


 


Apixaban [Eliquis] 2.5 mg PO BID@18 History


 


Cinacalcet [Sensipar] 30 mg PO DAILY@18 History


 


Diltiazem Oral [Cardizem*] 60 mg PO TID@,,18 History


 


Ipratropium-Albuterol Nebulize 3 ml INHALATION RT-BID@18 

History





[Duoneb 0.5 mg-3 mg/3 ml Soln]    


 


Ipratropium-Albuterol Nebulize 3 ml INHALATION RT-Q6H PRN 18 

History





[Duoneb 0.5 mg-3 mg/3 ml Soln]    


 


Levofloxacin [Levaquin] 500 mg PO HS@2100 18 History


 


Losartan Potassium 100 mg PO DAILY@18 History


 


Metoprolol Tartrate [Lopressor] 50 mg PO BID@18 History


 


Sennosides-Docusate Sodium 1 tab PO BID@18 History





[Senokot-S]    


 


amLODIPine [Norvasc] 5 mg PO DAILY@18 History











 Allergies











Allergy/AdvReac Type Severity Reaction Status Date / Time


 


No Known Allergies Allergy   Verified 18 17:36














Physical Exam


Vitals: 


 Vital Signs











  Temp Pulse Resp BP Pulse Ox


 


 18 12:41  96.7 F L  80  15  124/75  94 L


 


 18 05:00  99.5 F  145 H  17  152/110  93 L


 


 18 00:00    17  


 


 18 21:38  97 F L  142 H  17  121/84  92 L








 Intake and Output











 18





 22:59 06:59 14:59


 


Intake Total  240 


 


Balance  240 


 


Intake:   


 


  Oral  240 


 


Other:   


 


  Voiding Method  Diaper 





  Incontinent 


 


  # Voids 1 1 


 


  Weight  79 kg 














Blood pressure 124/75 heart rate 88 afebrile maintaining oxygen saturation on 

room air


GENERAL: This is a 83-year-old  female in no apparent distress at the 

time of my examination.


HEENT: Head is atraumatic, normocephalic. Sclerae anicteric. Right eyelid 

droop. Conjunctivae are clear. Mucous membranes of the mouth are moist. Neck is 

supple. There is no jugular venous distention. No carotid bruit is heard.


LUNGS: Bibasilar rales and rhonchi noted. No wheezes. No chest wall tenderness 

is noted on palpation or with deep breathing.


HEART: Irregular rate and rhythm without murmurs, rubs or gallops. S1 and S2 

heard.


ABDOMEN: Soft, nontender. Bowel sounds are heard. No organomegaly noted.


EXTREMITIES: No evidence of peripheral edema and no calf tenderness noted.


VASCULAR: Radial and dorsalis pedis pulses palpated, no evidence of clubbing.  


NEUROLOGIC: Patient is awake, alert and disoriented. 


 








Results





 18 07:19





 18 07:19


 Cardiac Enzymes











  18 Range/Units





  07:19 


 


AST  55 H  (14-36)  U/L








 CBC











  18 Range/Units





  07:19 


 


WBC  7.5  (3.8-10.6)  k/uL


 


RBC  3.87  (3.80-5.40)  m/uL


 


Hgb  10.3 L  (11.4-16.0)  gm/dL


 


Hct  33.1 L  (34.0-46.0)  %


 


Plt Count  160  (150-450)  k/uL








 Comprehensive Metabolic Panel











  18 Range/Units





  07:19 


 


Sodium  135 L  (137-145)  mmol/L


 


Potassium  5.1  (3.5-5.1)  mmol/L


 


Chloride  108 H  ()  mmol/L


 


Carbon Dioxide  19 L  (22-30)  mmol/L


 


BUN  39 H  (7-17)  mg/dL


 


Creatinine  2.01 H  (0.52-1.04)  mg/dL


 


Glucose  114 H  (74-99)  mg/dL


 


Calcium  9.6  (8.4-10.2)  mg/dL


 


AST  55 H  (14-36)  U/L


 


ALT  70 H  (9-52)  U/L


 


Alkaline Phosphatase  149 H  ()  U/L


 


Total Protein  5.3 L  (6.3-8.2)  g/dL


 


Albumin  2.7 L  (3.5-5.0)  g/dL








 Current Medications











Generic Name Dose Route Start Last Admin





  Trade Name Freq  PRN Reason Stop Dose Admin


 


Acetaminophen  650 mg  18 23:29  





  Tylenol Tab  PO   





  Q6HR PRN   





  Mild Pain or Fever > 100.5   





     





     





     


 


Hydrocodone Bitart/Acetaminophen  1 each  18 23:29  





  Barre 5-325  PO   





  Q8H PRN   





  Pain   





     





     





     


 


Acetazolamide  250 mg  18 09:00  18 12:33





  Diamox  PO   250 mg





  TU@0900 PATRICK   Administration





     





     





     





     


 


Albuterol/Ipratropium  3 ml  18 23:29  





  Duoneb 0.5 Mg-3 Mg/3 Ml Soln  INHALATION   





  RT-Q6H PRN   





  COPD   





     





     





     


 


Allopurinol  200 mg  18 09:00  18 12:33





  Zyloprim  PO   200 mg





  DAILY@0900 [facility]   Administration





     





     





     





     


 


Alprazolam  0.25 mg  18 21:00  





  Xanax  PO   





  HS@2100 [facility]   





     





     





     





     


 


Amlodipine Besylate  5 mg  18 09:00  18 12:33





  Norvasc  PO   5 mg





  DAILY@09 [facility]   Administration





     





     





     





     


 


Apixaban  2.5 mg  18 09:00  18 12:33





  Eliquis  PO   2.5 mg





  BID@ [facility]   Administration





     





     





     





     


 


Atorvastatin Calcium  10 mg  18 21:00  





  Lipitor  PO   





  HS@2100 [facility]   





     





     





     





     


 


Cinacalcet  30 mg  18 06:00  18 05:55





  Sensipar  PO   30 mg





  DAILY@0600 [facility]   Administration





     





     





     





     


 


Diltiazem HCl  60 mg  18 06:00  18 05:55





  Cardizem Oral  PO   60 mg





  TID@,, [facility]   Administration





     





     





     





     


 


Ergocalciferol  50,000 unit  10/11/18 09:00  





  Vitamin D2  PO   





  Q30D [facility]   





     





     





     





     


 


Famotidine  20 mg  18 09:00  18 12:33





  Pepcid  PO   20 mg





  BID@0900,1700 [facility]   Administration





     





     





     





     


 


Ferrous Sulfate  325 mg  18 09:00  18 12:33





  Feosol  PO   325 mg





  BID@0900,1700 [facility]   Administration





     





     





     





     


 


Fluticasone Propionate  1 spray  18 09:00  18 12:34





  Flonase Nasal Spray  EA NOSTRIL   Not Given





  BID@ [facility]   





     





     





     





     


 


Guaifenesin  200 mg  18 23:29  





  Robitussin  PO   





  Q4HR PRN   





  Cough   





     





     





     


 


Sodium Chloride  1,000 mls @ 50 mls/hr  18 08:45  





  Saline 0.9%  IV   





  .Q20H [facility]   





     





     





     





     


 


Latanoprost  1 drops  18 21:00  





  Xalatan 0.005%  LEFT EYE   





  HS@2100 [facility]   





     





     





     





     


 


Levofloxacin  500 mg  18 21:00  





  Levaquin  PO   





  Q48H [facility]   





     





     





     





     


 


Levothyroxine Sodium  25 mcg  18 06:00  18 05:55





  Synthroid  PO   25 mcg





  DAILY@0600 [facility]   Administration





     





     





     





     


 


Loratadine  10 mg  18 09:00  18 12:34





  Claritin  PO   10 mg





  DAILY@0900 [facility]   Administration





     





     





     





     


 


Losartan Potassium  100 mg  18 09:00  18 12:34





  Cozaar  PO   100 mg





  DAILY@0900 [facility]   Administration





     





     





     





     


 


Magnesium Hydroxide  2,400 mg  18 23:29  





  Milk Of Magnesia  PO   





  DAILY PRN   





  Constipation   





     





     





     


 


Melatonin  3 mg  18 21:00  





  Melatonin  PO   





  HS@2100 [facility]   





     





     





     





     


 


Metoprolol Tartrate  50 mg  18 09:00  18 09:20





  Lopressor  PO   50 mg





  BID@ [facility]   Administration





     





     





     





     


 


Buprenorphine [  1 patch  18 06:00  18 05:55





Butrans 10 Mcg/Hour]  TRANSDERM   Not Given





1 Patch  TU@0600 [facility]   





     





     





     





     


 


Potassium Chloride  20 meq  18 09:00  18 12:34





  K-Dur 20  PO   Not Given





  BID@0900,1700 [facility]   





     





     





     





     


 


Prednisolone Acetate  2 drops  18 21:00  





  Pred Forte 1%  BOTH EYES   





  HS@2100 [facility]   





     





     





     





     


 


Senna/Docusate Sodium  1 each  18 09:00  18 12:34





  Senokot-S  PO   1 each





  BID@ [facility]   Administration





     





     





     





     








 Intake and Output











 18





 22:59 06:59 14:59


 


Intake Total  240 


 


Balance  240 


 


Intake:   


 


  Oral  240 


 


Other:   


 


  Voiding Method  Diaper 





  Incontinent 


 


  # Voids 1 1 


 


  Weight  79 kg 








 





 18 07:19 





 18 07:19 











Assessment and Plan


Assessment: 





ASSESSMENT


Paroxysmal atrial fibrillation on long term anticoagulation. 


Acute left tempora and parietal lobe infarct


Chronic diastolic heart failure.


Pulmonary hypertension


Tricuspid regurgitation


Hypertension


Dyslipidemia


COPD


Dementia


Hypothyroidism


Chronic kidney disease, GFR 23 Stage 4





PLAN


Obtain chest xray. 


Apply telemetry monitor. 


Continue with cardizem and lopressor for rate control. 


Tachycardia may have been atrial fibrillation with rapid ventricular response, 

however no EKG or telemetry tracings to review. 


Consider adding a small dose of lasix if chest xray is indicative of fluid 

overload if ok with nephrology. 


Thank you kindly for this consultation. 





Nurse Practitioner note has been reviewed, I agree with a documented findings 

and plan of care.  Patient was seen and examined.

## 2018-09-25 NOTE — XR
EXAMINATION: XR chest 2V

 

DATE AND TIME:  9/25/2018 7:28 PM

 

CLINICAL INDICATION: elevated proBNP

TECHNIQUE: AP and lateral

 

COMPARISON: 9/6/2018

 

FINDINGS: 

There is marked obscuration of the pulmonary vasculature by a fine reticular pattern of increased den
sity region periphery of septal lines, along with a few scattered small areas of focal airspace space
 filling process. Findings are consistent with advanced interstitial phase and alveolar phase pulmona
ry edema, presumably cardiogenic etiology due to the moderate marked cardiac silhouette enlargement.

 

Pleural spaces are unremarkable. 

 

No definite acute skeletal or soft tissue findings.

 

IMPRESSION: 

MARKED PULMONARY EDEMA.

## 2018-09-25 NOTE — CONS
CONSULTATION



DATE OF CONSULTATION:

09/25/2018.



CHIEF COMPLAINT:

Stroke.



HISTORY OF PRESENT ILLNESS:

Mrs. Scanlon is a pleasant 83-year-old  female, who is being evaluated by the

neurology service per the request of Dr. Dilshad Street for a stroke.  The patient was

brought into [facility] Emergency Room after her family noticed that she was

slurring her speech and not making sense when she is talking.  It is unclear when the

symptoms began and the patient is a poor historian.  According to the chart, the

patient had a fall approximately 2 weeks ago and had a head injury with a concussion.

She was worked up at that time and was discharged.  Soon after that, the above-

mentioned symptoms began.  In the emergency room, a CT scan of the brain was done,

which showed evidence of an acute versus subacute ischemic stroke involving the left

temporoparietal region.  Her CBC showed anemia with a hemoglobin of 10.3 and hematocrit

of 31%.  Her comprehensive metabolic profile showed mild hyponatremia at 135, renal

insufficiency with a BUN of 39 and creatinine of 2.01, and elevated liver enzymes with

an AST of 55 and ALT of 70.  At the time of my evaluation, the patient is lying in her

bed and appears to be in no acute distress.  She denies any actual neurological

complaints but appears to be having significant receptive aphasia.



PAST MEDICAL HISTORY:

Pulmonary fibrosis, congestive heart failure, previous history of renal insufficiency,

dementia, gastroesophageal reflux disease, dyslipidemia, hypertension, hypothyroidism,

chronic obstructive pulmonary disease, history of bladder surgery, hysterectomy, joint

replacement surgery.



SOCIAL HISTORY:

The patient is a former smoker.  There is no history of any alcohol or drug use.



FAMILY HISTORY:

Unknown.



HOME MEDICATIONS:

Reviewed in the chart.



ALLERGIES:

No known drug allergies.



REVIEW OF SYSTEMS:

Unable to obtain due to receptive aphasia.



PHYSICAL EXAM:

Vital signs show a temperature of 96.7, pulse 80, respiration 15, blood pressure

124/75.

GENERAL APPEARANCE:  The patient is a well-developed, elderly  female who

appears to be in no acute distress.

HEENT:  Normocephalic, atraumatic, no obvious facial drooping is seen.

NECK:  Supple with no masses felt.

CARDIOVASCULAR:  Regular rate and rhythm.

ABDOMEN:  Nontender, nondistended.

Extremities showed trace edema with no clubbing seen.  Neurological exam:  The patient

is awake and oriented to person.  She appears to be having significant receptive

aphasia.  Speech is mildly dysarthric.  She does move all 4 extremities spontaneously

with no obvious lateralizing weakness seen.  Sensory exam appears to be normal to light

touch in all 4 extremities.  No obvious facial asymmetry is seen on cranial nerve

testing.



IMPRESSION:

1. Acute ischemic stroke, left temporoparietal region.

2. Receptive aphasia/Wernicke aphasia.

3. Dysarthria.

4. Renal insufficiency.

5. Hepatic insufficiency.

6. Anemia.



RECOMMENDATION:

The patient does appear to have suffered an acute ischemic stroke as mentioned above.

She does have significant receptive aphasia and mild dysarthria on my examination.

Continue Eliquis for anticoagulation therapy.  Continue IV hydration as tolerated.  I

will order a carotid Doppler, EEG, fasting lipid panel, and serum homocystine level.  I

will consult speech therapy to evaluate and treat.  Continue GI prophylaxis and DVT

prophylaxis.  Continue neuro checks.  I will continue to follow with you.  Further

recommendations to follow.



Thank you, Dr. Street, for allowing me to participate in the care of your patient.

If you have any questions, please feel free to contact me.  Please send a copy of this

dictation to Dr. Dilshad Street and to my office.





MMODL / IJN: [id number] / Job#: [id number]

## 2018-09-25 NOTE — CT
EXAMINATION TYPE: CT brain wo con

 

DATE OF EXAM: 9/25/2018

 

COMPARISON: 9/5/2018

 

HISTORY: AMS

 

CT DLP: 994.7 mGycm

Automated exposure control for dose reduction was used.

 

FINDINGS: 

Abnormal soft tissue attenuation is noted within the paranasal sinuses and within the nasal airway. C
hronic deformity of the right maxillary sinus. Abnormal attenuation the sphenoid sinus. Findings are 
suggestive of sinusitis.

Previous surgery involving the right orbit. There is an area of abnormal attenuation within the left 
temporal lobe which appears more well-defined on today's exam. Extends into the left parietal lobe. F
indings are suspicious for acute or subacute ischemia.

 

Generalized degenerative changes seen and there is nonspecific periventricular low attenuation most t
ypical remote microvascular ischemia. Previous changes of right mastoidectomy noted. Intracranial ath
erosclerotic changes noted.

 

IMPRESSION: 

1. FINDINGS SUGGESTIVE OF A  ACUTE TO SUBACUTE AREA OF ISCHEMIA INVOLVING THE LEFT TEMPORAL AND PARIE
FARIDA LOBE. MRI RECOMMENDED TO EXCLUDE OTHER ETIOLOGIES INCLUDING NEOPLASM GIVEN THE EDEMA PATTERN. REP
ORT CALLED TO THE PATIENT'S NURSE WITH RECOMMENDATION OF STAT MRI.

2. NO MIDLINE SHIFT OR 3 DEGENERATIVE AND NONSPECIFIC WHITE MATTER CHANGES MOST TYPICAL REMOTE ISCHEM
IA.

3. CHANGES OF HANDS SINUSITIS.

## 2018-09-25 NOTE — HP
HISTORY AND PHYSICAL



SUBJECTIVE:

This is an 83-year-old white female admitted with slurred speech and altered mental

status.



HISTORY OF PRESENT ILLNESS:

An 83-year-old white female, admitted with altered mental status, mainly a change in

personality since she fell and hit her head 2 to 3 weeks ago with a concussion.  She

had a neurologic workup a week ago which was normal, but family is concerned that her

personality is completely changed since she fell and she has got slurred speech that

she never had before and they cannot  understand her. They are worried about a stroke.

She was admitted for stroke workup and psychiatric consult for change in personality

since the fall and slurred speech workup. Neurology and Psychiatry are consulted.  She

is also being treated with low-grade urinary tract infection, which family states is

not related to her slurred speech or change in her personality.  She has just been

taking Levaquin for this, which she is sensitive to.



PAST MEDICAL HISTORY:

She had pulmonary fibrosis, diastolic CHF, and she recently had some renal

insufficiency which improved with rehydration as she was dehydrated on last admission.



MEDICATIONS:

See old list.



FAMILY HISTORY:

See old list.



SOCIAL HISTORY:

She does not smoke or drink.  She lives at [facility].



REVIEW OF SYSTEMS:

A 14-point review of systems is negative except for as mentioned in HPI.



PHYSICAL EXAM:

Temperature 97, pulse rate low 100s, respiratory rate 17 to 20.  She is 93% on room

air.  Blood pressure is 150s over 110s. Possibly some tachycardia.

CARDIOVASCULAR:  S1, S2.  Slightly tachy.

LUNGS:  Wheezes x4.

HEMATOLOGY: Negative Homans.

PSYCH: She answers questions appropriately.  She has some slurring of her speech, but

she is answering things like she normally does in my opinion, but her personality is

definitely changed.  She has a more pleasant affect compared to before.

LUNGS:  Scattered wheeze x4.

HEMATOLOGY: 2+ pedal edema.

GI: Soft, distended, obesity.

LUNGS: Transmitted upper airway sounds.



ASSESSMENT:

1. Chronic renal disease stage III.

2. Urinary tract infection.

3. Slurred speech, unclear etiology.

4. Tachycardia, possibly rule out atrial fibrillation.



PLAN:

We will do a CT scan of the brain with contrast.  Await for neurology's opinion and

psychiatric opinion as mentioned above for altered mental status and slurred speech.

Continue with Levaquin for antibiotic.





MMODL / IJN: [id number] / Job#: [id number]

## 2018-09-25 NOTE — P.HP
Psychiatric H&P





- .


H&P Date: 09/25/18


History & Physical: 


 Allergies











Allergy/AdvReac Type Severity Reaction Status Date / Time


 


No Known Allergies Allergy   Verified 09/24/18 17:36








 Vital Signs











Temp  96.7 F L  09/25/18 12:41


 


Pulse  80   09/25/18 12:41


 


Resp  15   09/25/18 12:41


 


BP  124/75   09/25/18 12:41


 


Pulse Ox  94 L  09/25/18 12:41








 Intake & Output











 09/24/18 09/25/18 09/25/18





 18:59 06:59 18:59


 


Intake Total  240 


 


Balance  240 


 


Weight  79 kg 


 


Intake:   


 


  Oral  240 


 


Other:   


 


  Voiding Method  Diaper 





  Incontinent 


 


  # Voids  1 








 Laboratory Last Values











WBC  7.5 k/uL (3.8-10.6)   09/25/18  07:19    


 


RBC  3.87 m/uL (3.80-5.40)   09/25/18  07:19    


 


Hgb  10.3 gm/dL (11.4-16.0)  L  09/25/18  07:19    


 


Hct  33.1 % (34.0-46.0)  L  09/25/18  07:19    


 


MCV  85.6 fL (80.0-100.0)   09/25/18  07:19    


 


MCH  26.7 pg (25.0-35.0)   09/25/18  07:19    


 


MCHC  31.2 g/dL (31.0-37.0)   09/25/18  07:19    


 


RDW  17.2 % (11.5-15.5)  H  09/25/18  07:19    


 


Plt Count  160 k/uL (150-450)   09/25/18  07:19    


 


Neutrophils %  86 %  09/25/18  07:19    


 


Lymphocytes %  5 %  09/25/18  07:19    


 


Monocytes %  6 %  09/25/18  07:19    


 


Eosinophils %  2 %  09/25/18  07:19    


 


Basophils %  0 %  09/25/18  07:19    


 


Neutrophils #  6.4 k/uL (1.3-7.7)   09/25/18  07:19    


 


Lymphocytes #  0.4 k/uL (1.0-4.8)  L  09/25/18  07:19    


 


Monocytes #  0.4 k/uL (0-1.0)   09/25/18  07:19    


 


Eosinophils #  0.1 k/uL (0-0.7)   09/25/18  07:19    


 


Basophils #  0.0 k/uL (0-0.2)   09/25/18  07:19    


 


Hypochromasia  Slight   09/25/18  07:19    


 


Anisocytosis  Slight   09/25/18  07:19    


 


Sodium  135 mmol/L (137-145)  L  09/25/18  07:19    


 


Potassium  5.1 mmol/L (3.5-5.1)   09/25/18  07:19    


 


Chloride  108 mmol/L ()  H  09/25/18  07:19    


 


Carbon Dioxide  19 mmol/L (22-30)  L  09/25/18  07:19    


 


Anion Gap  8 mmol/L  09/25/18  07:19    


 


BUN  39 mg/dL (7-17)  H  09/25/18  07:19    


 


Creatinine  2.01 mg/dL (0.52-1.04)  H  09/25/18  07:19    


 


Est GFR (CKD-EPI)AfAm  26  (>60 ml/min/1.73 sqM)   09/25/18  07:19    


 


Est GFR (CKD-EPI)NonAf  23  (>60 ml/min/1.73 sqM)   09/25/18  07:19    


 


Glucose  114 mg/dL (74-99)  H  09/25/18  07:19    


 


Calcium  9.6 mg/dL (8.4-10.2)   09/25/18  07:19    


 


Total Bilirubin  0.8 mg/dL (0.2-1.3)   09/25/18  07:19    


 


AST  55 U/L (14-36)  H  09/25/18  07:19    


 


ALT  70 U/L (9-52)  H  09/25/18  07:19    


 


Alkaline Phosphatase  149 U/L ()  H  09/25/18  07:19    


 


NT-Pro-B Natriuret Pep  8180 pg/mL  09/25/18  07:19    


 


Total Protein  5.3 g/dL (6.3-8.2)  L  09/25/18  07:19    


 


Albumin  2.7 g/dL (3.5-5.0)  L  09/25/18  07:19    


 


TSH  3.020 mIU/L (0.465-4.680)   09/25/18  07:19    








Past Medical History


Past Medical History: Heart Failure, COPD, Dementia, GERD/Reflux, Hyperlipidemia

, Hypertension, Pneumonia, Renal Disease, Respiratory Disorder, Thyroid Disorder


Additional Past Medical History / Comment(s): Pt was born with cleft palate and 

other physical problems with facial structures including nasal passages and ear 

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 

collapsing.  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 

glaucoma.pt's daughter stated pt was able to read and write up until last admit 

9/5/18 but not since", chronic renal disease stage IV-has L arm fistula(PER 

DAUGHTER IT'S A FAILED FISTULA) but no hemodialysis, frequent UTIs, wears O2 

PRN hypothyroid.


Last Myocardial Infarction Date:: unknown


History of Any Multi-Drug Resistant Organisms: ESBL


Date of last positivie culture/infection: 9/21/18


MDRO Source:: ESBL URINE


Past Surgical History: Bladder Surgery, Hysterectomy, Joint Replacement, 

Orthopedic Surgery


Additional Past Surgical History / Comment(s): Pt has had multiple surgeries 

for birth defects affecting mouth, nose and ears, total L knee arthroplasty, 

bladder sling, fistula L arm.PARTIAL HYSTERECTOMY, PICC LINE-SINCE REMOVED


Past Anesthesia/Blood Transfusion Reactions: No Reported Reaction


Smoking Status: Former smoker





Brain CT: I evaluated the CT and reviewed report:Of particular interest is 

hypofronatality and middle cerebral artery dysfucntion with loss left greater 

than right temporal and parietal loss which would indicated acute and chronic 

cerebral vascular disease


09/25/18 14:54





09/25/18 14:57








Assessment and Plan


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 

behavioral disturbance


Current Visit: Yes   Status: Acute   Code(s): F02.81 - DEMENTIA IN OTH DISEASES 

CLASSD ELSWHR W BEHAVIORAL DISTURB   SNOMED Code(s): 950648746


   





(2) Altered mental status


Current Visit: Yes   Status: Acute   Code(s): R41.82 - ALTERED MENTAL STATUS, 

UNSPECIFIED   SNOMED Code(s): 884319180


   


Plan: 


Chief Complaint: [Consult reason is due to change in personality after closed 

head injury]


History of Present Illness: [Recent changes and personality after an 83-year-

old  female with slurred speech and altered mental status.  She had a 

neurological workup a week ago which is normal but family is concerned that her 

personality is completely changed since she fell.  She has got slurred speech 

at times she had never had before and they cannot understand her.  They're 

worried about her stroke and is being evaluated for current time.]


Past Psychiatric History: [None]


Drug/Alcohol Abuse History: [None]


Medical History: [Includes pulmonary fibrosis, diastolic congestive heart.,  

And recently had some renal insufficiency with improved with rehydration]


Allergies: [NO KNOWN DRUG ALLERGIES]


Social History: [She lives at [facility]]





Current Medications: see current list]


Constitutional Review: [Altered mental status]








Mental Status Examination - 


General Appearance: [appears older than stated age with an inability to 

understand and have to talk to her her ears and close distance]


Speech/Language: [Garbled words salad and  soft, disconnected auditory and 

motor cortex]


Attitude/Behavior: [withdrawn, indifferent, other]


Mood: [Unable to answer]


Affect: [ blunted constricted, other]


Orientation: [Sponsor to person, not place situation]


Thought Content: [Unable to answer]


Risk Factors: [Unable to answer]


Perception: [Unable to answer]


Thought Processes: [ concrete, circumstantial, tangential,]


Concentration/Attention Span: [Severely impaired] [Per observation and 

interview with the patient]


Recent Memory: Severely impaired] [ out of 3 in 3 minutes]


Remote Memory: [wnl, impaired] [past events, as related history]


Intelligence: [below average] [based on history, based on vocabulary, syntax, 

grammar, and content]


Judgement: [poor] [per patient's behavior/history of present illness]


Insight: [poor [understanding severity of illness/history of present illness]





Admitting Diagnosis: [Dementia and neurocognitive disorder] 








Initial Plan of Care: [Psychiatric impression neurocognitive disorder]








Prognosis: guarded]





Psychiatric recommendations: Continue current medical workup and no need of 

further psychiatric care


Rico Weldon DO PhD and psychiatrist at [facility] 

unit and professor of psychiatry [facility]

## 2018-09-26 LAB
ALBUMIN SERPL-MCNC: 2.6 G/DL (ref 3.5–5)
ALP SERPL-CCNC: 142 U/L (ref 38–126)
ALT SERPL-CCNC: 68 U/L (ref 9–52)
ANION GAP SERPL CALC-SCNC: 8 MMOL/L
AST SERPL-CCNC: 38 U/L (ref 14–36)
BASOPHILS # BLD AUTO: 0 K/UL (ref 0–0.2)
BASOPHILS NFR BLD AUTO: 0 %
BUN SERPL-SCNC: 41 MG/DL (ref 7–17)
CALCIUM SPEC-MCNC: 9.3 MG/DL (ref 8.4–10.2)
CHLORIDE SERPL-SCNC: 110 MMOL/L (ref 98–107)
CHOLEST SERPL-MCNC: 121 MG/DL (ref ?–200)
CO2 SERPL-SCNC: 19 MMOL/L (ref 22–30)
EOSINOPHIL # BLD AUTO: 0.3 K/UL (ref 0–0.7)
EOSINOPHIL NFR BLD AUTO: 5 %
ERYTHROCYTE [DISTWIDTH] IN BLOOD BY AUTOMATED COUNT: 3.91 M/UL (ref 3.8–5.4)
ERYTHROCYTE [DISTWIDTH] IN BLOOD: 17.4 % (ref 11.5–15.5)
GLUCOSE SERPL-MCNC: 89 MG/DL (ref 74–99)
HCT VFR BLD AUTO: 35.4 % (ref 34–46)
HDLC SERPL-MCNC: 46 MG/DL (ref 40–60)
HGB BLD-MCNC: 10.5 GM/DL (ref 11.4–16)
LDLC SERPL CALC-MCNC: 62 MG/DL (ref 0–99)
LYMPHOCYTES # SPEC AUTO: 0.5 K/UL (ref 1–4.8)
LYMPHOCYTES NFR SPEC AUTO: 7 %
MCH RBC QN AUTO: 26.8 PG (ref 25–35)
MCHC RBC AUTO-ENTMCNC: 29.6 G/DL (ref 31–37)
MCV RBC AUTO: 90.5 FL (ref 80–100)
MONOCYTES # BLD AUTO: 0.4 K/UL (ref 0–1)
MONOCYTES NFR BLD AUTO: 6 %
NEUTROPHILS # BLD AUTO: 5.6 K/UL (ref 1.3–7.7)
NEUTROPHILS NFR BLD AUTO: 81 %
PLATELET # BLD AUTO: 205 K/UL (ref 150–450)
POTASSIUM SERPL-SCNC: 5.1 MMOL/L (ref 3.5–5.1)
PROT SERPL-MCNC: 5.2 G/DL (ref 6.3–8.2)
SODIUM SERPL-SCNC: 137 MMOL/L (ref 137–145)
TRIGL SERPL-MCNC: 67 MG/DL (ref ?–150)
WBC # BLD AUTO: 6.9 K/UL (ref 3.8–10.6)

## 2018-09-26 RX ADMIN — ALLOPURINOL SCH: 100 TABLET ORAL at 12:48

## 2018-09-26 RX ADMIN — APIXABAN SCH MG: 2.5 TABLET, FILM COATED ORAL at 08:40

## 2018-09-26 RX ADMIN — APIXABAN SCH MG: 2.5 TABLET, FILM COATED ORAL at 22:05

## 2018-09-26 RX ADMIN — DILTIAZEM HYDROCHLORIDE SCH MG: 60 TABLET, FILM COATED ORAL at 06:04

## 2018-09-26 RX ADMIN — Medication SCH: at 22:07

## 2018-09-26 RX ADMIN — DILTIAZEM HYDROCHLORIDE SCH MG: 60 TABLET, FILM COATED ORAL at 22:07

## 2018-09-26 RX ADMIN — Medication SCH MG: at 13:00

## 2018-09-26 RX ADMIN — LEVOTHYROXINE SODIUM SCH MCG: 25 TABLET ORAL at 06:03

## 2018-09-26 RX ADMIN — DOCUSATE SODIUM AND SENNOSIDES SCH: 50; 8.6 TABLET ORAL at 22:07

## 2018-09-26 RX ADMIN — FAMOTIDINE SCH: 20 TABLET, FILM COATED ORAL at 23:03

## 2018-09-26 RX ADMIN — POTASSIUM CHLORIDE SCH MEQ: 20 TABLET, EXTENDED RELEASE ORAL at 08:40

## 2018-09-26 RX ADMIN — METOPROLOL TARTRATE SCH MG: 50 TABLET, FILM COATED ORAL at 08:40

## 2018-09-26 RX ADMIN — Medication SCH: at 12:48

## 2018-09-26 RX ADMIN — DOCUSATE SODIUM AND SENNOSIDES SCH: 50; 8.6 TABLET ORAL at 12:47

## 2018-09-26 RX ADMIN — Medication SCH MG: at 22:05

## 2018-09-26 RX ADMIN — LORATADINE SCH: 10 TABLET ORAL at 12:47

## 2018-09-26 RX ADMIN — POTASSIUM CHLORIDE SCH MEQ: 20 TABLET, EXTENDED RELEASE ORAL at 16:30

## 2018-09-26 RX ADMIN — PREDNISOLONE ACETATE SCH DROPS: 10 SUSPENSION/ DROPS OPHTHALMIC at 22:06

## 2018-09-26 RX ADMIN — LATANOPROST SCH DROPS: 50 SOLUTION OPHTHALMIC at 22:06

## 2018-09-26 RX ADMIN — Medication SCH: at 16:40

## 2018-09-26 RX ADMIN — POTASSIUM CHLORIDE SCH: 20 TABLET, EXTENDED RELEASE ORAL at 16:41

## 2018-09-26 RX ADMIN — METOPROLOL TARTRATE SCH MG: 50 TABLET, FILM COATED ORAL at 22:06

## 2018-09-26 RX ADMIN — LOSARTAN POTASSIUM SCH MG: 50 TABLET, FILM COATED ORAL at 08:40

## 2018-09-26 RX ADMIN — CEFAZOLIN SCH MLS/HR: 330 INJECTION, POWDER, FOR SOLUTION INTRAMUSCULAR; INTRAVENOUS at 04:12

## 2018-09-26 RX ADMIN — FLUTICASONE PROPIONATE SCH: 50 SPRAY, METERED NASAL at 12:48

## 2018-09-26 RX ADMIN — FLUTICASONE PROPIONATE SCH SPRAY: 50 SPRAY, METERED NASAL at 22:06

## 2018-09-26 RX ADMIN — Medication SCH MG: at 16:30

## 2018-09-26 RX ADMIN — CINACALCET HYDROCHLORIDE SCH MG: 30 TABLET, COATED ORAL at 06:03

## 2018-09-26 NOTE — P.NPCON
History of Present Illness





- Reason for Consult


acute renal failure, chronic renal failure





- History of Present Illness





reason for consultation: Acute kidney injury on chronic kidney disease





History of present illness:


Patient is a 83-year-old female seen in renal consultation for acute kidney 

injury on chronic kidney disease.  Patient has chronic kidney disease stage III 

with baseline creatinine near 2.  Creatinine was 2.01 on admission and is up to 

2.22 today.  Patient presented to the hospital with altered mental status and 

slurring of speech.  CAT scan of the brain is suggestive of acute ischemia in 

the left temporal and parietal lobe.  Neurology is following.  She also has 

history of diastolic CHF with moderate to severe tricuspid regurgitation and 

mild to moderate pulmonary hypertension.  Chest x-ray suggestive of pulmonary 

edema.  Patient's currently resting in bed.  Denies chest pain or shortness of 

breath.  No vomiting or diarrhea.  Oral intake is poor.  No hematuria or 

dysuria.  Hemodynamically stable.





Vital signs are stable.


General: The patient appeared well nourished and normally developed. 


HEENT: Head exam is unremarkable. Neck is without jugular venous distension.


LUNGS: Breath sounds decreased.


HEART: Rate and Rhythm are regular. First and second heart sounds normal. No 

murmurs, rubs or gallops. 


ABDOMEN: Abdominal exam reveals normal bowel sounds. Non-tender and non-

distended. No evidence of peritonitis.


EXTREMITITES: No clubbing, cyanosis, or edema.





Past Medical History


Past Medical History: Heart Failure, COPD, Dementia, GERD/Reflux, Hyperlipidemia

, Hypertension, Pneumonia, Renal Disease, Respiratory Disorder, Thyroid Disorder


Additional Past Medical History / Comment(s): Pt was born with cleft palate and 

other physical problems with facial structures including nasal passages and ear 

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 

collapsing.  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 

glaucoma.pt's daughter stated pt was able to read and write up until last admit 

18 but not since", chronic renal disease stage IV-has L arm fistula(PER 

DAUGHTER IT'S A FAILED FISTULA) but no hemodialysis, frequent UTIs, wears O2 

PRN hypothyroid.


Last Myocardial Infarction Date:: unknown


History of Any Multi-Drug Resistant Organisms: ESBL


Date of last positivie culture/infection: 18


MDRO Source:: ESBL URINE


Past Surgical History: Bladder Surgery, Hysterectomy, Joint Replacement, 

Orthopedic Surgery


Additional Past Surgical History / Comment(s): Pt has had multiple surgeries 

for birth defects affecting mouth, nose and ears, total L knee arthroplasty, 

bladder sling, fistula L arm.PARTIAL HYSTERECTOMY, PICC LINE-SINCE REMOVED


Past Anesthesia/Blood Transfusion Reactions: No Reported Reaction


Smoking Status: Former smoker





- Past Family History


  ** Father


Family Medical History: Cancer


Additional Family Medical History / Comment(s): Father  prior to age 60 yrs 

with esophageal cancer.  He was a smoker and a drinker.





  ** Mother


Family Medical History: Osteoarthritis (OA), Renal Disease, Rheumatoid 

Arthritis (RA)


Additional Family Medical History / Comment(s): kidney problems, specific type 

unknown





Medications and Allergies


 Home Medications











 Medication  Instructions  Recorded  Confirmed  Type


 


RX: Fluticasone Nasal Spray 1 spray EA NOSTRIL BID@17 

History





[Flonase Nasal Spray]    


 


RX: Levothyroxine Sodium 25 mcg PO DAILY@0600 17 History





[Synthroid]    


 


RX: Acetaminophen Tab [Tylenol] 650 mg PO Q6HR PRN  tab 17 Rx


 


RX: ALPRAZolam [Xanax] 0.25 mg PO HS@2100 11/10/17 09/24/18 History


 


RX: Atorvastatin [Lipitor] 10 mg PO HS@2100 11/10/17 09/24/18 History


 


RX: Buprenorphine [Butrans 10 1 patch TRANSDERM TU@0600 11/10/17 09/24/18 

History





MCG/HOUR]    


 


RX: Ergocalciferol (Vitamin D2) 50,000 unit PO Q30D 11/10/17 09/24/18 History





[Vitamin D2]    


 


RX: Loratadine [Claritin] 10 mg PO DAILY@0900 11/10/17 09/24/18 History


 


RX: Potassium Chloride [Klor-Con 20 meq PO BID@0900,1700 11/10/17 09/24/18 

History





20]    


 


RX: Ranitidine HCl [Zantac] 150 mg PO BID@0900,1700 11/10/17 09/24/18 History


 


RX: guaiFENesin [guaiFENesin Oral 200 mg PO Q4HR PRN 11/10/17 09/24/18 History





Solution]    


 


RX: prednisoLONE ACETATE [Pred 2 drop BOTH EYES HS@2100 11/10/17 09/24/18 

History





Forte 1%]    


 


RX: Magnesium Hydroxide [Milk of 2,400 mg PO DAILY PRN  ml 17 Rx





Magnesia Concentrate]    


 


RX: Ferrous Sulfate [Feosol] 325 mg PO BID@0900,1700 18 History


 


RX: Latanoprost [Xalatan 0.005%] 1 drop LEFT EYE HS@18 

History


 


RX: Melatonin 3 mg PO HS@18 History


 


RX: Vit C/E/Zn/Coppr/Lutein/Zeaxan 1 cap PO BID@18 History





[Preservision Areds 2 Softgel]    


 


RX: acetaZOLAMIDE [Diamox] 250 mg PO TU@18 History


 


RX: HYDROcodone/APAP 5-325MG 1 tab PO Q8H PRN 18 History





[Norco 5-325]    


 


Ipratropium-Albuterol Nebulize 3 ml INHALATION RT-Q6H PRN 18 

History





[Duoneb 0.5 mg-3 mg/3 ml Soln]    


 


Levofloxacin [Levaquin] 500 mg PO HS@18 History


 


RX: Allopurinol [Zyloprim] 200 mg PO DAILY@18 History


 


RX: Apixaban [Eliquis] 2.5 mg PO BID@18 History


 


RX: Cinacalcet [Sensipar] 30 mg PO DAILY@18 History


 


RX: Diltiazem Oral [Cardizem*] 60 mg PO TID@,,18 History


 


RX: Ipratropium-Albuterol Nebulize 3 ml INHALATION RT-BID@,18 History





[Duoneb 0.5 mg-3 mg/3 ml Soln]    


 


RX: Losartan Potassium 100 mg PO DAILY@18 History


 


RX: Metoprolol Tartrate [Lopressor] 50 mg PO BID@,18 History


 


RX: Sennosides-Docusate Sodium 1 tab PO BID@,18 History





[Senokot-S]    


 


RX: amLODIPine [Norvasc] 5 mg PO DAILY@18 History











 Allergies











Allergy/AdvReac Type Severity Reaction Status Date / Time


 


No Known Allergies Allergy   Verified 18 17:36














Physical Exam


Vitals: 


 Vital Signs











  Temp Pulse Pulse Resp BP Pulse Ox


 


 18 08:29   82    


 


 18 08:14   82    


 


 18 04:30  97.9 F   82  17  135/61  94 L


 


 18 23:48     16  


 


 18 21:00  97.6 F   87  16  135/47  96


 


 18 16:00    80  15  


 


 18 12:41  96.7 F L   80  15  124/75  94 L








 Intake and Output











 18





 22:59 06:59 14:59


 


Intake Total 240 400 


 


Balance 240 400 


 


Intake:   


 


  Intake, IV Titration  400 





  Amount   


 


    Sodium Chloride 0.9% 1,  400 





    000 ml @ 50 mls/hr IV .   





    [facility][id number]   


 


  Oral 240  


 


Other:   


 


  Voiding Method Diaper Diaper 





 Incontinent Incontinent 


 


  # Voids  1 1


 


  # Bowel Movements 1 1 














Results





- Lab Results


 Most recent lab results











Calcium  9.3 mg/dL (8.4-10.2)   18  07:35    














 18 07:35





 18 07:35





Assessment and Plan


Plan: 





assessment:


1.nonoliguric acute kidney injury secondary to ATN secondary to cardiorenal 

syndrome.  Creatinine 2.22 today.


2.  Chronic kidney disease stage IIIb/4 secondary tonephrosclerosis and 

cardiorenal syndrome with baseline creatinine near 2.


3.  Pulmonary edema.


4.  Diastolic CHF with moderate to severe tricuspid regurgitation.


5.  Mild to moderate pulmonary hypertension.


6.  Acute CVA.  CAT scan suggestive of left temporal and parietal lobe ischemia.


7.  Hypertension with chronic kidney disease.  Controlled.


8.  Metabolic acidosis secondary to acute kidney injury.





Plan:


Lasix 40 mg IV once daily.


Hep-Lock IV fluids.


Add oral sodium bicarbonate.


Avoid nephrotoxins.


Continue losartan for now as blood pressure is stable.


Repeat electrolytes in the morning.





Thank you for the consultation.  I will continue to follow patient with you 

during her hospital stay.

## 2018-09-26 NOTE — US
EXAMINATION TYPE: US carotid duplex RT

 

DATE OF EXAM: 9/26/2018

 

COMPARISON: Previous exam 9/7/2018

 

CLINICAL HISTORY: CVA. Hx of stroke x 1 week ago. Poor historian

 

***Limited/Suboptimal exam due to patient unable to be still and not talk.  During exam, patient william
me combative and had to stop.  Only partial right CCA was performed due to the above. 

 

EXAM MEASUREMENTS: 

 

RIGHT:  Peak Systolic Velocity (PSV) cm/sec

----- Right CCA:  80.1  

----- Right ICA:  129.1     

----- Right ECA:  Not performed   

ICA/CCA ratio:  1.6    

 

RIGHT:  End Diastole cm/sec

----- Right CCA:  21.9   

----- Right ICA:  20.8      

----- Right ECA:  Not performed     

 

 

 

VERTEBRALS (direction of flow):

Right Vertebral not performed

 

 

Rhythm:  Arrhythmia

 

Plaque seen in right bulb.  No wall thickening and significant stenosis seen in right CCA. Slightly e
levated proximal right ICA.  

 

 

 

IMPRESSION:  Limited exam.   Cardiac arrhythmia noted.

## 2018-09-26 NOTE — EEG
ELECTROENCEPHALOGRAM REPORT



DATE OF EE2018



ELECTROENCEPHALOGRAPHIC EXAMINATION REPORT:



INDICATION FOR EXAMINATION:

This patient is an 83-year-old female with history of multiple falls.



[age]:

[age].



EEG FINDINGS:

A routine 21 channel awake digital EEG recording was accomplished utilizing the 10-20

international system with bipolar and referential montages.  The background activity in

the most alert resting state consists of a low to medium amplitude, poorly developed

and poorly sustained 5-6 Hz activity over the posterior head region.  This posterior

rhythm attenuates minimally to eye opening.  There was an excessive amount of low

amplitude beta activity seen in a generalized fashion throughout the tracing.  No

activation procedures were performed.  No epileptiform discharges were seen.



IMPRESSION:

This EEG gives evidence of a severe widespread diffuse disturbance in cerebral

function.  The EEG failed to reveal any focal, lateralized, or epileptiform

abnormalities.  If clinically indicated a followup EEG is recommended.  Clinical

correlation is recommended.





RUSTY / OLIMPIAN: [id number] / Job#: [id number]

## 2018-09-26 NOTE — P.PN
Subjective





Mrs. Scanlon is seen and examined resting comfortably laying flat in bed in no 

respiratory distress. Chest xray obtained yesterday indicates pulmonary edema. 

Compared to old films looks to be consistent for her. Currently not on any 

diuretics secondary to renal function. One dose of IV lasix ordered this 

morning and nephrology consult placed for further diuretic recommendations. 

Renal function slightly worse today.  Hemoglobin 10.5, platelets 205, sodium 137

, potassium 5.1, creatinine 2.2, GFR 20, AST 38, ALT 68, alkaline phosphatase 

142. Blood pressure 122/54 heart rate 72 afebrile maintaining oxygen saturation 

on room air.





Objective





- Vital Signs


Vital signs: 


 Vital Signs











Temp  97.9 F   09/26/18 04:30


 


Pulse  82   09/26/18 08:29


 


Resp  17   09/26/18 04:30


 


BP  135/61   09/26/18 04:30


 


Pulse Ox  94 L  09/26/18 04:30








 Intake & Output











 09/25/18 09/26/18 09/26/18





 18:59 06:59 18:59


 


Intake Total 200 640 


 


Balance 200 640 


 


Intake:   


 


  Intake, IV Titration 200 400 





  Amount   


 


    Sodium Chloride 0.9% 1, 200 400 





    000 ml @ 50 mls/hr IV .   





    Q20H [facility] Rx#:[id number]   


 


  Oral  240 


 


Other:   


 


  Voiding Method Diaper Diaper 





 Incontinent Incontinent 


 


  # Voids  1 1


 


  # Bowel Movements 2 1 














- Exam





GENERAL: Well-appearing, well-nourished and in no acute distress.


NECK: Supple without JVD or thyromegaly.


LUNGS: Bibasilar rales or rhonchi noted.  No wheezes. Respiration equal and 

unlabored.  


HEART: Irregular rate and rhythm without murmurs, rubs or gallops. S1 and S2 

heard.


EXTREMITIES: Normal range of motion, no edema.  No clubbing or cyanosis. 

Peripheral pulses intact.  Dressing in place to right lower extremity.





- Labs


CBC & Chem 7: 


 09/26/18 07:35





 09/26/18 07:35


Labs: 


 Abnormal Lab Results - Last 24 Hours (Table)











  09/26/18 09/26/18 Range/Units





  07:35 07:35 


 


Hgb  10.5 L   (11.4-16.0)  gm/dL


 


MCHC  29.6 L   (31.0-37.0)  g/dL


 


RDW  17.4 H   (11.5-15.5)  %


 


Lymphocytes #  0.5 L   (1.0-4.8)  k/uL


 


Chloride   110 H  ()  mmol/L


 


Carbon Dioxide   19 L  (22-30)  mmol/L


 


BUN   41 H  (7-17)  mg/dL


 


Creatinine   2.22 H  (0.52-1.04)  mg/dL


 


AST   38 H  (14-36)  U/L


 


ALT   68 H  (9-52)  U/L


 


Alkaline Phosphatase   142 H  ()  U/L


 


Total Protein   5.2 L  (6.3-8.2)  g/dL


 


Albumin   2.6 L  (3.5-5.0)  g/dL








 Microbiology - Last 24 Hours (Table)











 09/25/18 19:50 Urine Culture - Preliminary





 Urine,Voided 














Assessment and Plan


Assessment: 





ASSESSMENT


Chronic persistent atrial fibrillation on long term anticoagulation. 


Acute left temporal and parietal lobe infarct


Acute on chronic diastolic heart failure.


Pulmonary hypertension


Tricuspid regurgitation


Hypertension


Dyslipidemia


COPD


Dementia


Hypothyroidism


Chronic kidney disease, GFR 23 Stage 4


Elevated liver enzymes, unknown etiology.





PLAN


Give dose of IV Lasix 40 mg 1 now.


Consult nephrology for diuretic therapy guidance secondary to acute on chronic 

kidney injury.


Obtain daily weights and accurate intake and output. 


Repeat kidney function and electrolytes in the morning. 


Discontinue atorvastatin secondary to elevated liver enzymes. 


We will continue to follow. 





Nurse Practitioner note has been reviewed, I agree with a documented findings 

and plan of care.  Patient was seen and examined.

## 2018-09-26 NOTE — P.PN
Subjective


Progress Note Date: 09/26/18


Principal diagnosis: 





Stroke


Neurology is following in an 82-year-old-year-old female for stroke.  Patient 

was brought to the emergency room when the family noticed she was slurring her 

speech and her speech was not making sense.  It was unclear when the symptoms 

began the patient is a poor historian.  Patient is noted to have a fall 

approximately 2 weeks ago and a head injury with a concussion.  She was worked 

up at that time and discharged.  Soon after the incident she began having 

symptoms.  In the emergency room, CT of the brain was done which showed 

evidence of an acute versus subacute ischemic stroke involving the left tempo 

parietal region CBC showed anemia.  CMP showed hyponatremia, renal 

insufficiency and elevated liver enzymes.  Time of evaluation, patient was in 

the room, no acute distress alert, anxious and appeared visibly frustrated and 

at times agitated.





Objective





- Vital Signs


Vital signs: 


 Vital Signs











Temp  96.7 F L  09/26/18 12:04


 


Pulse  72   09/26/18 16:00


 


Resp  16   09/26/18 16:00


 


BP  122/54   09/26/18 12:04


 


Pulse Ox  94 L  09/26/18 12:04








 Intake & Output











 09/26/18 09/26/18 09/27/18





 06:59 18:59 06:59


 


Intake Total 640  


 


Balance 640  


 


Intake:   


 


  Intake, IV Titration 400  





  Amount   


 


    Sodium Chloride 0.9% 1, 400  





    000 ml @ 50 mls/hr IV .   





    Q20H [facility] Rx#:[id number]   


 


  Oral 240  


 


Other:   


 


  Voiding Method Diaper Diaper 





 Incontinent Incontinent 


 


  # Voids 1 1 


 


  # Bowel Movements 1  














- Exam


General appearance: Alert & oriented, no apparent distress.


Head: Atraumatic, normocephalic, normal inspection


Eyes: Well appearance, PERRLA, EOMI.  Absent scleral icterus, conjunctival 

injection, nystagmus, periorbital swelling.


Ear, nose and throat: Normal exam, mucous membranes moist


Neck: Normal inspection, absent tenderness, lymphadenopathy.


Respiratory: No increased work of breathing


Cardiovascular: Regular rate, rhythm


GI/abdominal: No guarding 


Extremities: Moves all 4 extremities





Neurological: 


No obvious facial asymmetry 


no lateralizing weakness


no seizure activity noted on physical exam


no pronator drift and no nystagmus.


Significant receptive aphasia


Each mildly dysarthric


Strength is equal and symmetrical in all 4 extremities 


Sensation: Normal 4 


Psychological: Vacillates and ranges from extremely calm to extremely 

frustrated with intermittent anger and frustration consistent with possible 

pseudobulbar affect.  Psych has evaluated the patient and deemed the patients 

actions to be consistent with post brain injury.








- Labs


CBC & Chem 7: 


 09/26/18 07:35





 09/26/18 07:35


Labs: 


 Abnormal Lab Results - Last 24 Hours (Table)











  09/26/18 09/26/18 Range/Units





  07:35 07:35 


 


Hgb  10.5 L   (11.4-16.0)  gm/dL


 


MCHC  29.6 L   (31.0-37.0)  g/dL


 


RDW  17.4 H   (11.5-15.5)  %


 


Lymphocytes #  0.5 L   (1.0-4.8)  k/uL


 


Chloride   110 H  ()  mmol/L


 


Carbon Dioxide   19 L  (22-30)  mmol/L


 


BUN   41 H  (7-17)  mg/dL


 


Creatinine   2.22 H  (0.52-1.04)  mg/dL


 


AST   38 H  (14-36)  U/L


 


ALT   68 H  (9-52)  U/L


 


Alkaline Phosphatase   142 H  ()  U/L


 


Total Protein   5.2 L  (6.3-8.2)  g/dL


 


Albumin   2.6 L  (3.5-5.0)  g/dL








 Microbiology - Last 24 Hours (Table)











 09/25/18 19:50 Urine Culture - Preliminary





 Urine,Voided 














Assessment and Plan


(1) Acute ischemic stroke


Current Visit: Yes   Status: Acute   Code(s): I63.9 - CEREBRAL INFARCTION, 

UNSPECIFIED   SNOMED Code(s): 302270738


   





(2) Receptive aphasia


Current Visit: Yes   Status: Acute   Code(s): R47.01 - APHASIA   SNOMED Code(s)

: 565880448


   





(3) Dysarthria


Current Visit: Yes   Status: Acute   Code(s): R47.1 - DYSARTHRIA AND ANARTHRIA 

  SNOMED Code(s): 3891279


   





(4) PBA (pseudobulbar affect)


Current Visit: Yes   Status: Acute   Code(s): F48.2 - PSEUDOBULBAR AFFECT   

SNOMED Code(s): 80145407


   


Plan: 


Patient does appear to have experienced a documented ischemic stroke with 

significant Aphasia. Continue antiplatelet therapy, continue antihyperlipidemic 

therapy.


 


Based on psychiatric consultation note, patient does also appear to have 

symptoms consistent with possible pseudobulbar affect secondary to brain injury 

related to head trauma approximately 2 weeks ago.  Hospital pharmacy does not 

carry on formulary Nudexta. Pharmacy recommends substituting with Zoloft 12.5 

mg daily for patient's symptoms.  Order placed in patient's chart if needed 

should symptoms increase.  Nursing expressed concern related to patient's 

significant mood swings and agitation throughout the day prior to rounding. 

Review of CT brain noted recommendation for MRI brain, requested MRI brain to 

further investigate imaging findings.  MRI with and without contrast ordered. 

Further recommendations based on the results of the MRI. 





Neurology to be notified with any neurological status changes.





Status: Neurology will continue to follow and provide update as needed or 

warranted.





I have discussed the plan of care with the physician prior to implementation 

and he agrees with the plan as implemented.

## 2018-09-26 NOTE — DS
DISCHARGE SUMMARY



Please add to the discharge summary:



Metabolic encephalopathy.





MMODL / IJN: [id number] / Job#: [id number]

## 2018-09-26 NOTE — DS
DISCHARGE SUMMARY



Please add to the discharge summary:



Chronic hypoxemic respiratory failure.





MMODL / IJN: [id number] / Job#: [id number]

## 2018-09-27 LAB
ANION GAP SERPL CALC-SCNC: 8 MMOL/L
BUN SERPL-SCNC: 51 MG/DL (ref 7–17)
CALCIUM SPEC-MCNC: 9.2 MG/DL (ref 8.4–10.2)
CHLORIDE SERPL-SCNC: 111 MMOL/L (ref 98–107)
CO2 SERPL-SCNC: 19 MMOL/L (ref 22–30)
GLUCOSE SERPL-MCNC: 108 MG/DL (ref 74–99)
MAGNESIUM SPEC-SCNC: 2 MG/DL (ref 1.6–2.3)
POTASSIUM SERPL-SCNC: 5 MMOL/L (ref 3.5–5.1)
SODIUM SERPL-SCNC: 138 MMOL/L (ref 137–145)

## 2018-09-27 RX ADMIN — CINACALCET HYDROCHLORIDE SCH MG: 30 TABLET, COATED ORAL at 05:42

## 2018-09-27 RX ADMIN — FLUTICASONE PROPIONATE SCH SPRAY: 50 SPRAY, METERED NASAL at 20:47

## 2018-09-27 RX ADMIN — PREDNISOLONE ACETATE SCH DROPS: 10 SUSPENSION/ DROPS OPHTHALMIC at 20:55

## 2018-09-27 RX ADMIN — LORATADINE SCH MG: 10 TABLET ORAL at 08:50

## 2018-09-27 RX ADMIN — APIXABAN SCH MG: 2.5 TABLET, FILM COATED ORAL at 08:51

## 2018-09-27 RX ADMIN — IPRATROPIUM BROMIDE AND ALBUTEROL SULFATE SCH ML: .5; 3 SOLUTION RESPIRATORY (INHALATION) at 11:05

## 2018-09-27 RX ADMIN — DOCUSATE SODIUM AND SENNOSIDES SCH EACH: 50; 8.6 TABLET ORAL at 09:06

## 2018-09-27 RX ADMIN — METOPROLOL TARTRATE SCH MG: 50 TABLET, FILM COATED ORAL at 20:47

## 2018-09-27 RX ADMIN — Medication SCH MG: at 20:47

## 2018-09-27 RX ADMIN — Medication SCH MG: at 08:50

## 2018-09-27 RX ADMIN — DOCUSATE SODIUM AND SENNOSIDES SCH EACH: 50; 8.6 TABLET ORAL at 20:47

## 2018-09-27 RX ADMIN — FAMOTIDINE SCH MG: 20 TABLET, FILM COATED ORAL at 08:50

## 2018-09-27 RX ADMIN — FLUTICASONE PROPIONATE SCH SPRAY: 50 SPRAY, METERED NASAL at 08:51

## 2018-09-27 RX ADMIN — Medication SCH: at 16:59

## 2018-09-27 RX ADMIN — LEVOTHYROXINE SODIUM SCH MCG: 25 TABLET ORAL at 05:42

## 2018-09-27 RX ADMIN — IPRATROPIUM BROMIDE AND ALBUTEROL SULFATE SCH ML: .5; 3 SOLUTION RESPIRATORY (INHALATION) at 19:19

## 2018-09-27 RX ADMIN — FUROSEMIDE SCH MG: 10 INJECTION, SOLUTION INTRAMUSCULAR; INTRAVENOUS at 08:51

## 2018-09-27 RX ADMIN — METOPROLOL TARTRATE SCH MG: 50 TABLET, FILM COATED ORAL at 08:50

## 2018-09-27 RX ADMIN — LOSARTAN POTASSIUM SCH MG: 50 TABLET, FILM COATED ORAL at 08:50

## 2018-09-27 RX ADMIN — ERTAPENEM SODIUM SCH MLS/HR: 1 INJECTION, POWDER, LYOPHILIZED, FOR SOLUTION INTRAMUSCULAR; INTRAVENOUS at 23:25

## 2018-09-27 RX ADMIN — DILTIAZEM HYDROCHLORIDE SCH MG: 60 TABLET, FILM COATED ORAL at 12:31

## 2018-09-27 RX ADMIN — LATANOPROST SCH DROPS: 50 SOLUTION OPHTHALMIC at 20:55

## 2018-09-27 RX ADMIN — ALLOPURINOL SCH MG: 100 TABLET ORAL at 08:50

## 2018-09-27 RX ADMIN — Medication SCH MG: at 08:51

## 2018-09-27 RX ADMIN — DILTIAZEM HYDROCHLORIDE SCH MG: 60 TABLET, FILM COATED ORAL at 21:04

## 2018-09-27 RX ADMIN — DILTIAZEM HYDROCHLORIDE SCH MG: 60 TABLET, FILM COATED ORAL at 05:43

## 2018-09-27 RX ADMIN — POTASSIUM CHLORIDE SCH MEQ: 20 TABLET, EXTENDED RELEASE ORAL at 08:51

## 2018-09-27 RX ADMIN — IPRATROPIUM BROMIDE AND ALBUTEROL SULFATE SCH ML: .5; 3 SOLUTION RESPIRATORY (INHALATION) at 15:13

## 2018-09-27 RX ADMIN — APIXABAN SCH MG: 2.5 TABLET, FILM COATED ORAL at 20:46

## 2018-09-27 RX ADMIN — IPRATROPIUM BROMIDE AND ALBUTEROL SULFATE SCH ML: .5; 3 SOLUTION RESPIRATORY (INHALATION) at 23:51

## 2018-09-27 RX ADMIN — IPRATROPIUM BROMIDE AND ALBUTEROL SULFATE SCH: .5; 3 SOLUTION RESPIRATORY (INHALATION) at 15:17

## 2018-09-27 NOTE — P.PN
Subjective





Patient is seen in follow-up for acute kidney injury on chronic kidney disease.

  Patient has chronic kidney disease stage III with baseline creatinine near 2.

  Creatinine up to 2.56 today.  Patient appears quite congested.  She is 

maintained on Lasix 40 mg IV once daily.  Urine culture positive for gram-

negative bacilli.  Patient is hard of hearing.  She is nonoliguric.





Vital signs are stable.


General: The patient appeared well nourished and normally developed. 


HEENT: Head exam is unremarkable. Neck is without jugular venous distension.


LUNGS: Scattered wheezing.  Breath sounds decreased.


HEART: Rate and Rhythm are regular. First and second heart sounds normal. No 

murmurs, rubs or gallops. 


ABDOMEN: Abdominal exam reveals normal bowel sounds. Non-tender and non-

distended. No evidence of peritonitis.


EXTREMITITES: No clubbing, cyanosis, or edema.





Objective





- Vital Signs


Vital signs: 


 Vital Signs











Temp  97.8 F   09/27/18 05:00


 


Pulse  94   09/27/18 05:00


 


Resp  22   09/27/18 05:00


 


BP  131/64   09/27/18 05:00


 


Pulse Ox  94 L  09/27/18 05:00








 Intake & Output











 09/26/18 09/27/18 09/27/18





 18:59 06:59 18:59


 


Intake Total  120 30


 


Balance  120 30


 


Weight 70 kg  


 


Intake:   


 


  Oral  120 30


 


Other:   


 


  Voiding Method Diaper Diaper Diaper





 Incontinent Incontinent Incontinent


 


  # Voids 1  1














- Labs


CBC & Chem 7: 


 09/26/18 07:35





 09/27/18 07:04


Labs: 


 Abnormal Lab Results - Last 24 Hours (Table)











  09/27/18 Range/Units





  07:04 


 


Chloride  111 H  ()  mmol/L


 


Carbon Dioxide  19 L  (22-30)  mmol/L


 


BUN  51 H  (7-17)  mg/dL


 


Creatinine  2.56 H  (0.52-1.04)  mg/dL


 


Glucose  108 H  (74-99)  mg/dL








 Microbiology - Last 24 Hours (Table)











 09/25/18 19:50 Urine Culture - Preliminary





 Urine,Voided    Gram Neg Bacilli














Assessment and Plan


Plan: 





assessment:


1.  Nonoliguric acute kidney injury secondary to ATN secondary to cardiorenal 

syndrome.  Creatinine 2.56 today.


2.  Chronic kidney disease stage IIIb/4 secondary tonephrosclerosis and 

cardiorenal syndrome with baseline creatinine near 2.


3.  Pulmonary edema.


4.  Diastolic CHF with moderate to severe tricuspid regurgitation.


5.  Mild to moderate pulmonary hypertension.


6.  Acute CVA.  CAT scan suggestive of left temporal and parietal lobe 

ischemia.  MRI pending.


7.  Hypertension with chronic kidney disease.  Controlled.


8.  Metabolic acidosis secondary to acute kidney injury.


9.  UTI.  Urine culture positive for gram-negative bacilli.





Plan:


Maintain Lasix 40 mg IV once daily.


Continue oral sodium bicarbonate.


Avoid nephrotoxins.


Continue losartan for now as blood pressure is stable.


Repeat electrolytes in the morning.


Add breathing treatments every 4 hours.


Check urinalysis.


Add Rocephin 1 g once daily.

## 2018-09-27 NOTE — P.PN
Subjective


Progress Note Date: 09/27/18


Principal diagnosis: 





Stroke


Neurology is following in an 82-year-old-year-old female for stroke.  Patient 

was brought to the emergency room when the family noticed she was slurring her 

speech and her speech was not making sense.  It was unclear when the symptoms 

began the patient is a poor historian.  Patient is noted to have a fall 

approximately 2 weeks ago and a head injury with a concussion.  She was worked 

up at that time and discharged.  Soon after the incident she began having 

symptoms.  In the emergency room, CT of the brain was done which showed 

evidence of an acute versus subacute ischemic stroke involving the left tempo 

parietal region CBC showed anemia.  CMP showed hyponatremia, renal 

insufficiency and elevated liver enzymes.  Time of evaluation, patient was in 

the room, no acute distress alert, anxious and appeared visibly frustrated and 

at times agitated.





Interval update 9/27/18:


Neurology is continuing to follow in an 82-year-old female for possible stroke 

with abnormal CT finding suggestive of possible mass. patient appears much more 

calm today.  However, patient would not actively allow provider to conduct a 

full physical exam.  Patient was observed to be seated in bedside chair and 

appears to be alert.  She will converse with nursing staff however will answer 

inappropriately as expected with patient's known Wernicke's aphasia. patient 

does not appear to be in any acute distress.  Provider did order Zoloft 12.5 mg 

to be used if patient's anxiety and agitation increased yesterday in place of 

Nudexta or possible pseudobulbar affect but patient did not require medication 

to be used.  MRI of the brain was previously ordered as suggested and CT brain 

of 9/25/18.  However, radiology advises the patient does have metal fragments 

which make MRI contraindicated at this time.  Updated CT of the brain was 

requested.  CT brain notes "there is an area of hyperdensity within the left 

inferior parietal lobe with surrounding hypodensity suggestive for underlying 

mass."  Impression notes that findings are suspicious for underlying mass in 

the left parietal lobe or subacute ischemic changes.  It does recommend MRI 

which has previously noted is unable to be obtained.  Imaging appears stable 

over the previous interval.  








Objective





- Vital Signs


Vital signs: 


 Vital Signs











Temp  97.8 F   09/27/18 05:00


 


Pulse  80   09/27/18 19:30


 


Resp  20   09/27/18 19:19


 


BP  131/64   09/27/18 05:00


 


Pulse Ox  94 L  09/27/18 05:00








 Intake & Output











 09/27/18 09/27/18 09/28/18





 06:59 18:59 06:59


 


Intake Total 120 30 


 


Balance 120 30 


 


Intake:   


 


  Oral 120 30 


 


Other:   


 


  Voiding Method Diaper Diaper 





 Incontinent Incontinent 


 


  # Voids  1 














- Exam


General appearance: Alert & oriented, no apparent distress.


Head: Atraumatic, normocephalic, normal inspection


Eyes: Well appearance, PERRLA, EOMI.  Absent scleral icterus, conjunctival 

injection, nystagmus, periorbital swelling.


Ear, nose and throat: Normal exam, mucous membranes moist


Neck: Normal inspection, absent tenderness, lymphadenopathy.


Respiratory: No increased work of breathing


Cardiovascular: Regular rate, rhythm


GI/abdominal: No guarding 


Extremities: Moves all 4 extremities





Neurological: 


No obvious facial asymmetry 


no lateralizing weakness


no seizure activity noted on physical exam


no pronator drift and no nystagmus.


Significant receptive aphasia


Each mildly dysarthric


Strength is equal and symmetrical in all 4 extremities 


Sensation: Normal 4 


Psychological: Vacillates and ranges from extremely calm to extremely 

frustrated with intermittent anger and frustration consistent with possible 

pseudobulbar affect.  Psych has evaluated the patient and deemed the patients 

actions to be consistent with post brain injury.








- Labs


CBC & Chem 7: 


 09/26/18 07:35





 09/27/18 07:04


Labs: 


 Abnormal Lab Results - Last 24 Hours (Table)











  09/27/18 Range/Units





  07:04 


 


Chloride  111 H  ()  mmol/L


 


Carbon Dioxide  19 L  (22-30)  mmol/L


 


BUN  51 H  (7-17)  mg/dL


 


Creatinine  2.56 H  (0.52-1.04)  mg/dL


 


Glucose  108 H  (74-99)  mg/dL








 Microbiology - Last 24 Hours (Table)











 09/25/18 19:50 Urine Culture - Preliminary





 Urine,Voided    Gram Neg Bacilli














Assessment and Plan


(1) Acute ischemic stroke


Current Visit: Yes   Status: Acute   Code(s): I63.9 - CEREBRAL INFARCTION, 

UNSPECIFIED   SNOMED Code(s): 866609055


   





(2) Receptive aphasia


Current Visit: Yes   Status: Acute   Code(s): R47.01 - APHASIA   SNOMED Code(s)

: 526562817


   





(3) Dysarthria


Current Visit: Yes   Status: Acute   Code(s): R47.1 - DYSARTHRIA AND ANARTHRIA 

  SNOMED Code(s): 0305115


   





(4) PBA (pseudobulbar affect)


Current Visit: Yes   Status: Acute   Code(s): F48.2 - PSEUDOBULBAR AFFECT   

SNOMED Code(s): 23205863


   





(5) Mass of left parietal lobe


Current Visit: Yes   Status: Acute   Code(s): G93.9 - DISORDER OF BRAIN, 

UNSPECIFIED   SNOMED Code(s): 098818508


   


Plan: 


Although the original CT brain was more suggestive for ischemic stroke with 

significant Aphasia, updated CT of the brain at this point is highly suspicious 

for mass within the left inferior parietal lobe after review by supervising 

physician.  Recommend oncology consult at this time for further evaluation of 

the patient as well as imaging findings as noted. 





Continue antiplatelet therapy, continue antihyperlipidemic therapy.


 


Based on psychiatric consultation note, patient does also appear to have 

symptoms consistent with possible pseudobulbar affect. Nursing can implement 

Zoloft 12.5 mg daily for patient's symptoms if necessary.  Order placed in 

patient's chart if needed should symptoms increase.  Nursing expressed concern 

related to patient's significant mood swings and agitation times. If the Zoloft 

is implemented, nursing must notify neurology at time of implementation.





Neurology to be notified with any neurological status changes.





Status: Neurology will continue to follow and provide update as needed or 

warranted.





I have discussed the plan of care with the physician prior to implementation 

and he agrees with the plan as implemented.

## 2018-09-27 NOTE — PN
PROGRESS NOTE



DATE OF SERVICE:

09/26/2018



SUBJECTIVE:

An 83-year-old white female seen by neurologist, found to have an acute stroke versus

subacute ischemic stroke in the left temporoparietal region.  She had some anemia,

hyponatremia, renal insufficiency, elevated liver enzymes.  Psychiatry saw her and

diagnosed receptive aphasia, acute ischemic stroke, dysarthria and pseudobulbar affect,

recommended antiplatelet and antilipidemic therapy and possible pseudobulbar affect

secondary to acute brain injury possibly use Nuedexta, they state if she gets back to

the nursing home once stabilization from the stroke is done.  Cardiology saw the

patient also and their assessment was chronic persistent atrial fibrillation on long-

term anticoagulation, acute left temporal and parietal infarct, acute on chronic

diastolic heart failure, pulmonary hypertension, tricuspid regurgitation, dyslipidemia,

hypertension, COPD, dementia, hypothyroidism, chronic kidney disease stage 4.  They

want to give some IV Lasix and get Nephrology involved.  Nephrology saw the patient.

Urine culture is negative for gram-negative bacilli.  She is given Lasix 40 IV daily.

Hemoglobin is 10.3. BUN is 51, creatinine 2.56, which is up from 2.0 on admission and

their assessment was nonoliguric acute kidney injury secondary to ATN secondary to

cardiorenal syndrome, chronic kidney disease stage 3B to 4, pulmonary edema, diastolic

CHF, moderate to severe tricuspid regurgitation, mild to moderate pulmonary

hypertension, acute CVA, hypertension, chronic kidney disease, metabolic acidosis, UTI.

They recommend continued Lasix 40 IV daily, oral bicarbonate, continue losartan,

_____continue with IV antibiotics for urinary tract infection, but have not changed any

of her long-term medications as a significant point of this time.



CARDIOVASCULAR: S1, S2.

Lungs reveal scattered wheeze x4.  She has a history of pulmonary fibrosis.

: Nontender.

HEMATOLOGIC: She has 2+ pedal edema.



ASSESSMENT:

Possibly untreated pseudobulbar affect, we may use Nuedexta when she gets to the

nursing home.



Continue current recommendations for medications. Switch her Lasix to oral in the next

day or 2.  Continue her Diamox, Zyloprim, her Xanax, Norvasc, Eliquis, Butrans patch,

Rocephin for UTI, Sensipar for kidney function, oral Cardizem for hypertension, Pepcid,

iron pills, nasal sprays, Lasix daily through the IV, probably  switch to oral next day

or 2.  Monitor electrolytes.  Continue with Rocephin until urine cultures are back.

Take her off her Levaquin at this time. Continue on sodium bicarbonate 650 b.i.d.

Please see further orders.





MMODL / IJN: [id number] / Job#: [id number]

## 2018-09-27 NOTE — P.PN
Subjective





Mrs. Scanlon is seen and examined sleeping comfortably laying flat in bed 

oxygenating on room air. She denies chest pain or shortness of breath. Blood 

pressure 131/64 heart rate 94. Laboratory data reviewed, sodium 138, potassium 

5.0, creatinine 2.56. Nephrology is managing her diuretic therapy. She 

continues on lasix 40 mg IV daily. Weight is documented down 9 kg with no 

output due to incontinence. 





Objective





- Vital Signs


Vital signs: 


 Vital Signs











Temp  97.8 F   09/27/18 05:00


 


Pulse  80   09/27/18 11:19


 


Resp  22   09/27/18 05:00


 


BP  131/64   09/27/18 05:00


 


Pulse Ox  94 L  09/27/18 05:00








 Intake & Output











 09/26/18 09/27/18 09/27/18





 18:59 06:59 18:59


 


Intake Total  120 30


 


Balance  120 30


 


Weight 70 kg  


 


Intake:   


 


  Oral  120 30


 


Other:   


 


  Voiding Method Diaper Diaper Diaper





 Incontinent Incontinent Incontinent


 


  # Voids 1  1














- Exam





GENERAL: Well-appearing, well-nourished and in no acute distress.


NECK: Supple without JVD or thyromegaly.


LUNGS: Faint bibasilar rales, no wheezes or rhonchi noted. Respiration equal 

and unlabored.  


HEART: Irregular rate and rhythm without murmurs, rubs or gallops. S1 and S2 

heard.


EXTREMITIES: Normal range of motion, no edema.  No clubbing or cyanosis. 

Peripheral pulses intact.  Dressing in place to right lower extremity.





- Labs


CBC & Chem 7: 


 09/26/18 07:35





 09/27/18 07:04


Labs: 


 Abnormal Lab Results - Last 24 Hours (Table)











  09/27/18 Range/Units





  07:04 


 


Chloride  111 H  ()  mmol/L


 


Carbon Dioxide  19 L  (22-30)  mmol/L


 


BUN  51 H  (7-17)  mg/dL


 


Creatinine  2.56 H  (0.52-1.04)  mg/dL


 


Glucose  108 H  (74-99)  mg/dL








 Microbiology - Last 24 Hours (Table)











 09/25/18 19:50 Urine Culture - Preliminary





 Urine,Voided    Gram Neg Bacilli














Assessment and Plan


Assessment: 





ASSESSMENT


Chronic persistent atrial fibrillation on long term anticoagulation. 


Acute left temporal and parietal lobe infarct


Acute on chronic diastolic heart failure.


Pulmonary hypertension


Tricuspid regurgitation


Hypertension


Dyslipidemia


COPD


Dementia


Hypothyroidism


Chronic kidney disease, GFR 23 Stage 4


Elevated liver enzymes, unknown etiology.





PLAN


Continue with diuresis per nephrology. 


Obtain daily weights and accurate intake and output. 


Repeat kidney function and electrolytes in the morning. 


We will continue to follow. 





Nurse Practitioner note has been reviewed, I agree with a documented findings 

and plan of care.  Patient was seen and examined.

## 2018-09-27 NOTE — CT
EXAMINATION TYPE: CT brain wo con

 

DATE OF EXAM: 9/27/2018

 

COMPARISON: 9/25/2018

 

INDICATION: ams, confusion

 

DLP: 1064 mGycm, Automated exposure control for dose reduction was used.

 

CONTRAST: None

 

CT of the brain is performed utilizing 3 mm thick sections through the posterior fossa and 3 mm thick
 sections through the remaining calvarium.  Study is not performed within 24 hours of arrival to the 
hospital. 

 

No abnormal hyperdensity is present to suggest an acute intracranial hemorrhage.

There is an area of hyperdensity within the left inferior parietal lobe with surrounding hypodensity 
suggestive for underlying mass. MRI with contrast recommended for additional evaluation. Mass effect 
on the adjacent lateral ventricle is not evident. There does appear to be some local sulci effacement
. No midline shift is evident. This was present previously. This  may reflect some subacute ischemic 
change.

 

Periventricular white matter hypodensity is present.

Ventricles appear appropriate. Sulci on the right appear appropriate for the patient age.

There is opacification of the maxillary sinuses. Some mild mucosal thickening within posterior right 
ethmoid air cells.

 

IMPRESSIONS:

1.   Findings suspicious for underlying mass left parietal lobe or subacute ischemic change. MRI with
 contrast is recommended for additional evaluation. This was present 9/25/2018 appear stable over the
 interval.

## 2018-09-28 LAB
ALBUMIN SERPL-MCNC: 2.5 G/DL (ref 3.5–5)
ALP SERPL-CCNC: 128 U/L (ref 38–126)
ALT SERPL-CCNC: 40 U/L (ref 9–52)
ANION GAP SERPL CALC-SCNC: 8 MMOL/L
AST SERPL-CCNC: 17 U/L (ref 14–36)
BASOPHILS # BLD AUTO: 0 K/UL (ref 0–0.2)
BASOPHILS NFR BLD AUTO: 0 %
BUN SERPL-SCNC: 50 MG/DL (ref 7–17)
CALCIUM SPEC-MCNC: 8.8 MG/DL (ref 8.4–10.2)
CHLORIDE SERPL-SCNC: 112 MMOL/L (ref 98–107)
CO2 SERPL-SCNC: 19 MMOL/L (ref 22–30)
EOSINOPHIL # BLD AUTO: 0.2 K/UL (ref 0–0.7)
EOSINOPHIL NFR BLD AUTO: 5 %
ERYTHROCYTE [DISTWIDTH] IN BLOOD BY AUTOMATED COUNT: 3.77 M/UL (ref 3.8–5.4)
ERYTHROCYTE [DISTWIDTH] IN BLOOD: 17.4 % (ref 11.5–15.5)
GLUCOSE SERPL-MCNC: 122 MG/DL (ref 74–99)
HCT VFR BLD AUTO: 33.2 % (ref 34–46)
HGB BLD-MCNC: 10.2 GM/DL (ref 11.4–16)
LYMPHOCYTES # SPEC AUTO: 0.3 K/UL (ref 1–4.8)
LYMPHOCYTES NFR SPEC AUTO: 7 %
MAGNESIUM SPEC-SCNC: 1.9 MG/DL (ref 1.6–2.3)
MCH RBC QN AUTO: 27 PG (ref 25–35)
MCHC RBC AUTO-ENTMCNC: 30.7 G/DL (ref 31–37)
MCV RBC AUTO: 88 FL (ref 80–100)
MONOCYTES # BLD AUTO: 0.3 K/UL (ref 0–1)
MONOCYTES NFR BLD AUTO: 7 %
NEUTROPHILS # BLD AUTO: 3.8 K/UL (ref 1.3–7.7)
NEUTROPHILS NFR BLD AUTO: 80 %
PH UR: 5 [PH] (ref 5–8)
PLATELET # BLD AUTO: 232 K/UL (ref 150–450)
POTASSIUM SERPL-SCNC: 4.5 MMOL/L (ref 3.5–5.1)
PROT SERPL-MCNC: 5 G/DL (ref 6.3–8.2)
RBC UR QL: 7 /HPF (ref 0–5)
SODIUM SERPL-SCNC: 139 MMOL/L (ref 137–145)
SP GR UR: 1.01 (ref 1–1.03)
SQUAMOUS UR QL AUTO: <1 /HPF (ref 0–4)
UROBILINOGEN UR QL STRIP: <2 MG/DL (ref ?–2)
WBC # BLD AUTO: 4.8 K/UL (ref 3.8–10.6)

## 2018-09-28 RX ADMIN — Medication SCH MG: at 08:36

## 2018-09-28 RX ADMIN — IPRATROPIUM BROMIDE AND ALBUTEROL SULFATE SCH: .5; 3 SOLUTION RESPIRATORY (INHALATION) at 20:42

## 2018-09-28 RX ADMIN — APIXABAN SCH MG: 2.5 TABLET, FILM COATED ORAL at 08:37

## 2018-09-28 RX ADMIN — DILTIAZEM HYDROCHLORIDE SCH MG: 60 TABLET, FILM COATED ORAL at 05:14

## 2018-09-28 RX ADMIN — DOCUSATE SODIUM AND SENNOSIDES SCH EACH: 50; 8.6 TABLET ORAL at 20:50

## 2018-09-28 RX ADMIN — IPRATROPIUM BROMIDE AND ALBUTEROL SULFATE SCH: .5; 3 SOLUTION RESPIRATORY (INHALATION) at 13:53

## 2018-09-28 RX ADMIN — DOCUSATE SODIUM AND SENNOSIDES SCH EACH: 50; 8.6 TABLET ORAL at 08:56

## 2018-09-28 RX ADMIN — Medication SCH MG: at 08:37

## 2018-09-28 RX ADMIN — Medication SCH MG: at 20:37

## 2018-09-28 RX ADMIN — FLUTICASONE PROPIONATE SCH: 50 SPRAY, METERED NASAL at 20:58

## 2018-09-28 RX ADMIN — METOPROLOL TARTRATE SCH MG: 50 TABLET, FILM COATED ORAL at 20:37

## 2018-09-28 RX ADMIN — DILTIAZEM HYDROCHLORIDE SCH: 60 TABLET, FILM COATED ORAL at 22:03

## 2018-09-28 RX ADMIN — METOPROLOL TARTRATE SCH MG: 50 TABLET, FILM COATED ORAL at 08:37

## 2018-09-28 RX ADMIN — APIXABAN SCH MG: 2.5 TABLET, FILM COATED ORAL at 20:36

## 2018-09-28 RX ADMIN — FLUTICASONE PROPIONATE SCH SPRAY: 50 SPRAY, METERED NASAL at 08:36

## 2018-09-28 RX ADMIN — LOSARTAN POTASSIUM SCH MG: 50 TABLET, FILM COATED ORAL at 08:36

## 2018-09-28 RX ADMIN — IPRATROPIUM BROMIDE AND ALBUTEROL SULFATE SCH ML: .5; 3 SOLUTION RESPIRATORY (INHALATION) at 08:03

## 2018-09-28 RX ADMIN — FAMOTIDINE SCH MG: 20 TABLET, FILM COATED ORAL at 08:37

## 2018-09-28 RX ADMIN — DILTIAZEM HYDROCHLORIDE SCH MG: 60 TABLET, FILM COATED ORAL at 21:59

## 2018-09-28 RX ADMIN — Medication SCH MG: at 17:49

## 2018-09-28 RX ADMIN — CINACALCET HYDROCHLORIDE SCH MG: 30 TABLET, COATED ORAL at 05:14

## 2018-09-28 RX ADMIN — LEVOTHYROXINE SODIUM SCH MCG: 25 TABLET ORAL at 05:14

## 2018-09-28 RX ADMIN — IPRATROPIUM BROMIDE AND ALBUTEROL SULFATE SCH: .5; 3 SOLUTION RESPIRATORY (INHALATION) at 23:35

## 2018-09-28 RX ADMIN — ERTAPENEM SODIUM SCH MLS/HR: 1 INJECTION, POWDER, LYOPHILIZED, FOR SOLUTION INTRAMUSCULAR; INTRAVENOUS at 22:01

## 2018-09-28 RX ADMIN — ALLOPURINOL SCH MG: 100 TABLET ORAL at 08:36

## 2018-09-28 RX ADMIN — LATANOPROST SCH DROPS: 50 SOLUTION OPHTHALMIC at 20:50

## 2018-09-28 RX ADMIN — IPRATROPIUM BROMIDE AND ALBUTEROL SULFATE SCH: .5; 3 SOLUTION RESPIRATORY (INHALATION) at 16:53

## 2018-09-28 RX ADMIN — PREDNISOLONE ACETATE SCH DROPS: 10 SUSPENSION/ DROPS OPHTHALMIC at 20:38

## 2018-09-28 RX ADMIN — DILTIAZEM HYDROCHLORIDE SCH: 60 TABLET, FILM COATED ORAL at 13:53

## 2018-09-28 RX ADMIN — FUROSEMIDE SCH MG: 10 INJECTION, SOLUTION INTRAMUSCULAR; INTRAVENOUS at 08:37

## 2018-09-28 RX ADMIN — LORATADINE SCH MG: 10 TABLET ORAL at 08:37

## 2018-09-28 RX ADMIN — IPRATROPIUM BROMIDE AND ALBUTEROL SULFATE SCH: .5; 3 SOLUTION RESPIRATORY (INHALATION) at 00:04

## 2018-09-28 NOTE — CONS
CONSULTATION



DATE OF SERVICE:

2018.



REASON FOR CONSULTATION:

ESBL E coli UTI infection.



HISTORY OF PRESENT ILLNESS:

The patient is an 83-year-old  female who has been admitted hospital

2018 for management of slurred speech and altered mental status and the patient

has been evaluated by Cardiology and Neurology services.  The patient was brought into

the ER after the family noticed that she was having slurred speech and not making sense

when she was talking though not very clear when exactly the symptoms started. The

patient was evaluated in the hospital.  The patient did have a CT of the brain that was

suggestive of acute to subacute area of ischemia involving the left temporal and

parietal lobe.  The patient has been afebrile during this hospital stay and her white

count has been normal.  The patient did have a urine cultures obtained without any UA

and those cultures were finalized last night with an ESBL E coli.  I was contacted by

the patient nurse after midnight for antibiotic recommendations.  The patient was

getting treated with Rocephin by her physicians on this admission.  Rocephin was

discontinued.  The patient started on Invanz pending evaluation this morning.  The

patient was evaluated.  The patient is not a very good historian and very hard of

hearing but not specifically for any urinary symptoms.  She would not answer yes or no,

however, talking to nursing staff the patient did have a problem with incontinence of

urine and is currently wearing Depends. When asked specifically the patient denies

having nausea, no vomiting or any diarrhea.  No chest pain, shortness of breath or

cough.  However, the history remains to be limited as most of the information has been

from asking leading questions and review of her chart.



REVIEW OF SYSTEMS:

Could not be reliably obtained.  The positive points have been mentioned in the HPI.



PAST MEDICAL HISTORY:

Significant for recurrent urinary tract infection, heart failure, COPD, dementia,

gastroesophageal reflux disease, hypertension, hyperlipidemia, chronic insufficiency,

hypothyroidism, pneumonia, and previous history of ESBL E coli UTI.



PAST SURGICAL HISTORY:

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

defect.



SOCIAL HISTORY:

No history of smoking, drinking or drug use.  Currently a nursing home resident.



FAMILY HISTORY:

Father  from esophageal cancer.  Mother has _____ insufficiency and rheumatoid

arthritis.



ALLERGIES:

No known drug allergies.



MEDICATION:

Currently include the patient is on Zoloft, Senokot, Lopressor, melatonin, milk of

magnesia, Claritin. Synthroid, Robitussin, Lasix, Flonase, iron sulfate, ertapenem dose

has been adjusted down to 3.5 g daily by the pharmacy, diltiazem, Sensipar, Eliquis,

Norvasc, Zyloprim, DuoNeb.



EXAMINATION:

Blood pressure is 141/77with pulse of 83, temperature 98.1. She is 98% on 2 L nasal

cannula.

General description is an elderly female up in the chair in no distress.  No tachypnea

or accessory muscle of respiration use.

HEENT:  Shows slight pallor.  No scleral icterus.  Oral mucosa is dry.  No pharyngeal

erythema or thrush.

NECK: Trachea central. No thyromegaly.

LUNGS: Unlabored breathing. Clear to auscultation anteriorly.  No wheeze or crackle.

HEART: S1, S2.  Regular rate and rhythm.

ABDOMEN:  Soft, no tenderness.  No guarding, no rigidity.

EXTREMITIES:  No edema.

SKIN EXAMINATION: No rash or mass palpable.

NEUROLOGIC: The patient is awake, alert, oriented x1.  Mood and affect normal.



LABS:

Hemoglobin 10.8, white count 4.8 with a BUN of 15, creatinine 0.70.  No UA was done,

however, the urine culture showing more than 100,000 colonies of the ESBL E coli.



DIAGNOSTIC IMPRESSION AND PLAN:

Patient with a positive urine culture with an ESBL E coli with a question of possible

mild cystitis.  Clinically doubt deep infection versus colonization/contamination as

the patient is currently not running any fever or elevated white count and it is very

hard to get any history from this patient regarding urinary symptoms.  However, the RN

did mention a urinary incontinence.



PLAN:

1. We will try to obtain a straight cath UA and cultures to rule out contamination or

    colonization.

2. We will keep the patient on ertapenem 3.5 g IV piggyback daily while the repeat

    urine culture finalized.

3. We will follow up on clinical condition and further adjust medication if needed.

Thank you for this consultation. I will follow this patient along with you.





MMODL / IJN: [id number] / Job#: [id number]

## 2018-09-28 NOTE — P.PN
Subjective





Pt is seen and examined sitting up in the chair with her feet elevated on a 

stool watching TV. She is using oxygen and is audibly wheezing. She denies 

chest pain, shortness of breath, dizziness or palpitations. Blood pressure 141/

77 heart rate 86 afebrile. Laboratory data reviewed, hgb 10.2, plt 232, sodium 

139, potassium 4.5, creatinine 2.7. She has been transitioned to PO lasix per 

nephrology. Lower extremity edema has improved since admission. No weight 

documented for today and inaccurate output due to incontinence. Neurology has 

performed a repeat CT brain and is now concerned about a possible mass and is 

requesting oncology consult. 





Objective





- Vital Signs


Vital signs: 


 Vital Signs











Temp  98.1 F   09/28/18 12:04


 


Pulse  86   09/28/18 12:04


 


Resp  16   09/28/18 12:04


 


BP  141/77   09/28/18 12:04


 


Pulse Ox  98   09/28/18 12:04








 Intake & Output











 09/27/18 09/28/18 09/28/18





 18:59 06:59 18:59


 


Intake Total 30 410 


 


Balance 30 410 


 


Weight 79 kg  


 


Intake:   


 


  Intake, IV Titration  50 





  Amount   


 


    Ertapenem 0.5 gm In  50 





    Sodium Chloride 0.9% 50   





    ml @ 100 mls/hr IVPB Q24H   





    [facility] Rx#:[id number]   


 


  Oral 30 360 


 


Other:   


 


  Voiding Method Diaper Diaper Diaper





 Incontinent Incontinent Incontinent


 


  # Voids 1 1 














- Exam





GENERAL: Well-appearing, well-nourished and in no acute distress.


NECK: Supple without JVD or thyromegaly.


LUNGS: Expiratory wheezes noted throughout. No rales or rhonchi. Respiration 

equal and unlabored.  


HEART: Irregular rate and rhythm without murmurs, rubs or gallops. S1 and S2 

heard.


EXTREMITIES: Normal range of motion, no edema.  No clubbing or cyanosis. 

Peripheral pulses intact.  Dressing in place to right lower extremity.





- Labs


CBC & Chem 7: 


 09/28/18 06:44





 09/28/18 06:44


Labs: 


 Abnormal Lab Results - Last 24 Hours (Table)











  09/28/18 09/28/18 09/28/18 Range/Units





  06:44 06:44 13:35 


 


RBC   3.77 L   (3.80-5.40)  m/uL


 


Hgb   10.2 L   (11.4-16.0)  gm/dL


 


Hct   33.2 L   (34.0-46.0)  %


 


MCHC   30.7 L   (31.0-37.0)  g/dL


 


RDW   17.4 H   (11.5-15.5)  %


 


Lymphocytes #   0.3 L   (1.0-4.8)  k/uL


 


Chloride  112 H    ()  mmol/L


 


Carbon Dioxide  19 L    (22-30)  mmol/L


 


BUN  50 H    (7-17)  mg/dL


 


Creatinine  2.70 H    (0.52-1.04)  mg/dL


 


Glucose  122 H    (74-99)  mg/dL


 


Alkaline Phosphatase  128 H    ()  U/L


 


Total Protein  5.0 L    (6.3-8.2)  g/dL


 


Albumin  2.5 L    (3.5-5.0)  g/dL


 


Urine Blood    Trace H  (Negative)  


 


Urine RBC    7 H  (0-5)  /hpf


 


Urine Bacteria    Rare H  (None)  /hpf


 


Urine Mucus    Rare H  (None)  /hpf








 Microbiology - Last 24 Hours (Table)











 09/25/18 19:50 Urine Culture - Final





 Urine,Voided    Escherichia coli














Assessment and Plan


Assessment: 





ASSESSMENT


Chronic persistent atrial fibrillation on long term anticoagulation. 


Acute left temporal and parietal lobe infarct vs mass


Acute on chronic diastolic heart failure.


Pulmonary hypertension


Tricuspid regurgitation


Hypertension


Dyslipidemia


COPD


Dementia


Hypothyroidism


Chronic kidney disease, GFR 23 Stage 4


Elevated liver enzymes, unknown etiology.





PLAN


Stable from a cardiac perspective. 


Ongoing medical management of acute CVA vs mass. 


We will continue to follow as needed for the remainder of this hospitalization. 





Nurse Practitioner note has been reviewed, I agree with a documented findings 

and plan of care.  Patient was seen and examined.

## 2018-09-28 NOTE — P.PN
Subjective





Patient is seen in follow-up for acute kidney injury on chronic kidney disease.

  Patient has chronic kidney disease stage III with baseline creatinine near 2.

  Renal function worsening the last few days with creatinine up to 2.7 today.  

Patient was quite congested the last few days.  She is maintained on Lasix 40 

mg IV once daily.  Dyspnea is better today.  Urine culture positive for ESBL E. 

coli.  Patient is hard of hearing.  She is nonoliguric but incontinent.





Vital signs are stable.


General: The patient appeared well nourished and normally developed. 


HEENT: Head exam is unremarkable. Neck is without jugular venous distension.


LUNGS: Breath sounds decreased.


HEART: Rate and Rhythm are regular. First and second heart sounds normal. No 

murmurs, rubs or gallops. 


ABDOMEN: Abdominal exam reveals normal bowel sounds. Non-tender and non-

distended. No evidence of peritonitis.


EXTREMITITES: No clubbing, cyanosis, or edema.





Objective





- Vital Signs


Vital signs: 


 Vital Signs











Temp  97.5 F L  09/28/18 05:00


 


Pulse  84   09/28/18 08:10


 


Resp  15   09/28/18 05:00


 


BP  145/61   09/28/18 05:00


 


Pulse Ox  97   09/28/18 05:00








 Intake & Output











 09/27/18 09/28/18 09/28/18





 18:59 06:59 18:59


 


Intake Total 30 410 


 


Balance 30 410 


 


Weight 79 kg  


 


Intake:   


 


  Intake, IV Titration  50 





  Amount   


 


    Ertapenem 0.5 gm In  50 





    Sodium Chloride 0.9% 50   





    ml @ 100 mls/hr IVPB Q24H   





    [facility] Rx#:[id number]   


 


  Oral 30 360 


 


Other:   


 


  Voiding Method Diaper Diaper 





 Incontinent Incontinent 


 


  # Voids 1 1 














- Labs


CBC & Chem 7: 


 09/28/18 06:44





 09/28/18 06:44


Labs: 


 Abnormal Lab Results - Last 24 Hours (Table)











  09/28/18 09/28/18 Range/Units





  06:44 06:44 


 


RBC   3.77 L  (3.80-5.40)  m/uL


 


Hgb   10.2 L  (11.4-16.0)  gm/dL


 


Hct   33.2 L  (34.0-46.0)  %


 


MCHC   30.7 L  (31.0-37.0)  g/dL


 


RDW   17.4 H  (11.5-15.5)  %


 


Lymphocytes #   0.3 L  (1.0-4.8)  k/uL


 


Chloride  112 H   ()  mmol/L


 


Carbon Dioxide  19 L   (22-30)  mmol/L


 


BUN  50 H   (7-17)  mg/dL


 


Creatinine  2.70 H   (0.52-1.04)  mg/dL


 


Glucose  122 H   (74-99)  mg/dL


 


Alkaline Phosphatase  128 H   ()  U/L


 


Total Protein  5.0 L   (6.3-8.2)  g/dL


 


Albumin  2.5 L   (3.5-5.0)  g/dL








 Microbiology - Last 24 Hours (Table)











 09/25/18 19:50 Urine Culture - Final





 Urine,Voided    Escherichia coli














Assessment and Plan


Plan: 





assessment:


1.  Nonoliguric acute kidney injury secondary to ATN secondary to cardiorenal 

syndrome and UTI.  Also on Cozaar.  Creatinine up to 2.7 today.


2.  Chronic kidney disease stage IIIb/4 secondary tonephrosclerosis and 

cardiorenal syndrome with baseline creatinine near 2.


3.  Pulmonary edema maintained on IV Lasix.


4.  Diastolic CHF with moderate to severe tricuspid regurgitation.


5.  Mild to moderate pulmonary hypertension.


6.  Acute CVA.  CAT scan suggestive of left temporal and parietal lobe 

ischemia.  Neurology following.


7.  Hypertension with chronic kidney disease.  Controlled.


8.  Metabolic acidosis secondary to acute kidney injury.


9.  UTI.  Urine culture positive for ESBL E. coli.  Maintained on IV 

antibiotics per infectious disease.





Plan:


I will change Lasix to 40 mg orally once daily.


Discontinue Cozaar.


Increase amlodipine to 5 mg twice daily.


Continue oral sodium bicarbonate.


Avoid nephrotoxins.


Repeat electrolytes in the morning.


Repeat chest x-ray.

## 2018-09-28 NOTE — P.PN
Subjective


Principal diagnosis: 





Stroke


Neurology is following in an 82-year-old-year-old female for stroke.  Patient 

was brought to the emergency room when the family noticed she was slurring her 

speech and her speech was not making sense.  It was unclear when the symptoms 

began the patient is a poor historian.  Patient is noted to have a fall 

approximately 2 weeks ago and a head injury with a concussion.  She was worked 

up at that time and discharged.  Soon after the incident she began having 

symptoms.  In the emergency room, CT of the brain was done which showed 

evidence of an acute versus subacute ischemic stroke involving the left tempo 

parietal region CBC showed anemia.  CMP showed hyponatremia, renal 

insufficiency and elevated liver enzymes.  Time of evaluation, patient was in 

the room, no acute distress alert, anxious and appeared visibly frustrated and 

at times agitated.





Interval update 9/27/18:


Neurology is continuing to follow in an 82-year-old female for possible stroke 

with abnormal CT finding suggestive of possible mass. patient appears much more 

calm today.  However, patient would not actively allow provider to conduct a 

full physical exam.  Patient was observed to be seated in bedside chair and 

appears to be alert.  She will converse with nursing staff however will answer 

inappropriately as expected with patient's known Wernicke's aphasia. patient 

does not appear to be in any acute distress.  Provider did order Zoloft 12.5 mg 

to be used if patient's anxiety and agitation increased yesterday in place of 

Nudexta or possible pseudobulbar affect but patient did not require medication 

to be used.  MRI of the brain was previously ordered as suggested and CT brain 

of 9/25/18.  However, radiology advises the patient does have metal fragments 

which make MRI contraindicated at this time.  Updated CT of the brain was 

requested.  CT brain notes "there is an area of hyperdensity within the left 

inferior parietal lobe with surrounding hypodensity suggestive for underlying 

mass."  Impression notes that findings are suspicious for underlying mass in 

the left parietal lobe or subacute ischemic changes.  It does recommend MRI 

which has previously noted is unable to be obtained.  Imaging appears stable 

over the previous interval.  





Interval update 9/28/18:


Neurology is continuing to follow in an 82-year-old female for possible stroke 

with abnormal CT finding suggestive of possible mass. Patient appears 

unchanged.Patient would not actively allow provider to conduct a full physical 

exam.  Patient was observed to be seated in bedside chair and appears to be 

alert.  She will converse with nursing staff however will answer 

inappropriately as expected with patient's known Wernicke's aphasia. Patient 

does not appear to be in any acute distress. Zoloft 12.5 mg has still not had 

to be used for anxiety and agitation. oncology has been recommended for consult 

on this patient as it is possible that the patient's changes noted on CT and 

repeat CT of the brain are more suggestive of parietal lobe mass versus 

ischemia.








Objective





- Vital Signs


Vital signs: 


 Vital Signs











Temp  98.1 F   09/28/18 12:04


 


Pulse  86   09/28/18 12:04


 


Resp  16   09/28/18 12:04


 


BP  141/77   09/28/18 12:04


 


Pulse Ox  98   09/28/18 12:04








 Intake & Output











 09/28/18 09/28/18 09/29/18





 06:59 18:59 06:59


 


Intake Total 410  


 


Balance 410  


 


Weight  73.5 kg 


 


Intake:   


 


  Intake, IV Titration 50  





  Amount   


 


    Ertapenem 0.5 gm In 50  





    Sodium Chloride 0.9% 50   





    ml @ 100 mls/hr IVPB Q24H   





    [facility] Rx#:[id number]   


 


  Oral 360  


 


Other:   


 


  Voiding Method Diaper Diaper 





 Incontinent Incontinent 


 


  # Voids 1  














- Exam


General appearance: Alert & oriented, no apparent distress.


Head: Atraumatic, normocephalic, normal inspection


Eyes: Well appearance, PERRLA, EOMI.  Absent scleral icterus, conjunctival 

injection, nystagmus, periorbital swelling.


Ear, nose and throat: Normal exam, mucous membranes moist


Neck: Normal inspection, absent tenderness, lymphadenopathy.


Respiratory: No increased work of breathing


Cardiovascular: Regular rate, rhythm


GI/abdominal: No guarding 


Extremities: Moves all 4 extremities





Neurological: 


No obvious facial asymmetry 


no lateralizing weakness


no seizure activity noted on physical exam


no pronator drift and no nystagmus.


Significant receptive aphasia


Each mildly dysarthric


Strength is equal and symmetrical in all 4 extremities 


Sensation: Normal 4 


Psychological: Vacillates and ranges from extremely calm to extremely 

frustrated with intermittent anger and frustration consistent with possible 

pseudobulbar affect.  Psych has evaluated the patient and deemed the patients 

actions to be consistent with post brain injury.








- Labs


CBC & Chem 7: 


 09/28/18 06:44





 09/28/18 06:44


Labs: 


 Abnormal Lab Results - Last 24 Hours (Table)











  09/28/18 09/28/18 09/28/18 Range/Units





  06:44 06:44 13:35 


 


RBC   3.77 L   (3.80-5.40)  m/uL


 


Hgb   10.2 L   (11.4-16.0)  gm/dL


 


Hct   33.2 L   (34.0-46.0)  %


 


MCHC   30.7 L   (31.0-37.0)  g/dL


 


RDW   17.4 H   (11.5-15.5)  %


 


Lymphocytes #   0.3 L   (1.0-4.8)  k/uL


 


Chloride  112 H    ()  mmol/L


 


Carbon Dioxide  19 L    (22-30)  mmol/L


 


BUN  50 H    (7-17)  mg/dL


 


Creatinine  2.70 H    (0.52-1.04)  mg/dL


 


Glucose  122 H    (74-99)  mg/dL


 


Alkaline Phosphatase  128 H    ()  U/L


 


Total Protein  5.0 L    (6.3-8.2)  g/dL


 


Albumin  2.5 L    (3.5-5.0)  g/dL


 


Urine Blood    Trace H  (Negative)  


 


Urine RBC    7 H  (0-5)  /hpf


 


Urine Bacteria    Rare H  (None)  /hpf


 


Urine Mucus    Rare H  (None)  /hpf








 Microbiology - Last 24 Hours (Table)











 09/25/18 19:50 Urine Culture - Final





 Urine,Voided    Escherichia coli














Assessment and Plan


(1) Acute ischemic stroke


Current Visit: Yes   Status: Acute   Code(s): I63.9 - CEREBRAL INFARCTION, 

UNSPECIFIED   SNOMED Code(s): 742046747


   





(2) Receptive aphasia


Current Visit: Yes   Status: Acute   Code(s): R47.01 - APHASIA   SNOMED Code(s)

: 149507343


   





(3) Dysarthria


Current Visit: Yes   Status: Acute   Code(s): R47.1 - DYSARTHRIA AND ANARTHRIA 

  SNOMED Code(s): 6593739


   





(4) PBA (pseudobulbar affect)


Current Visit: Yes   Status: Acute   Code(s): F48.2 - PSEUDOBULBAR AFFECT   

SNOMED Code(s): 95528428


   





(5) Mass of left parietal lobe


Current Visit: Yes   Status: Acute   Code(s): G93.9 - DISORDER OF BRAIN, 

UNSPECIFIED   SNOMED Code(s): 724619831


   


Plan: 


Although the original CT brain was more suggestive for ischemic stroke with 

significant Aphasia, updated CT of the brain at this point is highly suspicious 

for mass within the left inferior parietal lobe after review by supervising 

physician.  Recommend oncology consult at this time for further evaluation of 

the patient as well as imaging findings as noted. 





Continue antiplatelet therapy, continue antihyperlipidemic therapy.


 


Based on psychiatric consultation note, patient does also appear to have 

symptoms consistent with possible pseudobulbar affect. Nursing can implement 

Zoloft 12.5 mg daily for patient's symptoms if necessary.  Order placed in 

patient's chart if needed should symptoms increase.  Nursing expressed concern 

related to patient's significant mood swings and agitation times. If the Zoloft 

is implemented, nursing must notify neurology at time of implementation.





Neurology to be notified with any neurological status changes.





Status: Neurology will defer treatment to primary team and oncology.  Patient 

can be cleared for discharge and placement in rehabilitation facility from a 

neurological standpoint.  Neurology will remain available for reconsult if 

needed in the future.





Patient to follow up with neurology within 14 days of discharge





I have discussed the plan of care with the physician prior to implementation 

and he agrees with the plan as implemented.

## 2018-09-28 NOTE — PN
PROGRESS NOTE



SUBJECTIVE:

83-year-old white female with altered mental status changes, possibly acute CVA and

aphasia.  Possibly receptive in expressive aphasia.  Although I can understand what she

is saying now.  She has ESBL/UTI awaiting Dr. Matthews's recommendations for IV

antibiotics to go home.



Cardiovascular S1, S2.  Lungs clear.  Oral facial function, but family wants on regular

diet at this time as she is refusing a swallow eval.



She has dementia.



Continue home medications.



Diastolic heart failure.  Continue home medications.  Please see further orders.  She

has end stage, stage IV renal disease which is slowly worsening. We will have to follow

it closely.  Possible discharge to nursing home on Monday.





MMODL / IJN: [id number] / Job#: [id number]

## 2018-09-29 LAB
ANION GAP SERPL CALC-SCNC: 9 MMOL/L
BUN SERPL-SCNC: 48 MG/DL (ref 7–17)
CALCIUM SPEC-MCNC: 9.1 MG/DL (ref 8.4–10.2)
CHLORIDE SERPL-SCNC: 112 MMOL/L (ref 98–107)
CO2 SERPL-SCNC: 20 MMOL/L (ref 22–30)
GLUCOSE SERPL-MCNC: 111 MG/DL (ref 74–99)
POTASSIUM SERPL-SCNC: 4.4 MMOL/L (ref 3.5–5.1)
SODIUM SERPL-SCNC: 141 MMOL/L (ref 137–145)

## 2018-09-29 RX ADMIN — DEXAMETHASONE SODIUM PHOSPHATE SCH MG: 4 INJECTION, SOLUTION INTRAMUSCULAR; INTRAVENOUS at 13:18

## 2018-09-29 RX ADMIN — Medication SCH: at 18:12

## 2018-09-29 RX ADMIN — ALLOPURINOL SCH: 100 TABLET ORAL at 13:21

## 2018-09-29 RX ADMIN — ERTAPENEM SODIUM SCH MLS/HR: 1 INJECTION, POWDER, LYOPHILIZED, FOR SOLUTION INTRAMUSCULAR; INTRAVENOUS at 23:26

## 2018-09-29 RX ADMIN — FAMOTIDINE SCH: 20 TABLET, FILM COATED ORAL at 13:23

## 2018-09-29 RX ADMIN — FLUTICASONE PROPIONATE SCH: 50 SPRAY, METERED NASAL at 13:16

## 2018-09-29 RX ADMIN — LATANOPROST SCH DROPS: 50 SOLUTION OPHTHALMIC at 21:57

## 2018-09-29 RX ADMIN — DILTIAZEM HYDROCHLORIDE SCH MG: 60 TABLET, FILM COATED ORAL at 21:57

## 2018-09-29 RX ADMIN — FLUTICASONE PROPIONATE SCH: 50 SPRAY, METERED NASAL at 21:58

## 2018-09-29 RX ADMIN — METOPROLOL TARTRATE SCH MG: 50 TABLET, FILM COATED ORAL at 10:34

## 2018-09-29 RX ADMIN — Medication SCH: at 13:16

## 2018-09-29 RX ADMIN — APIXABAN SCH: 2.5 TABLET, FILM COATED ORAL at 13:22

## 2018-09-29 RX ADMIN — Medication SCH: at 13:23

## 2018-09-29 RX ADMIN — DILTIAZEM HYDROCHLORIDE SCH MG: 60 TABLET, FILM COATED ORAL at 05:25

## 2018-09-29 RX ADMIN — CINACALCET HYDROCHLORIDE SCH MG: 30 TABLET, COATED ORAL at 05:25

## 2018-09-29 RX ADMIN — IPRATROPIUM BROMIDE AND ALBUTEROL SULFATE SCH ML: .5; 3 SOLUTION RESPIRATORY (INHALATION) at 15:42

## 2018-09-29 RX ADMIN — DOCUSATE SODIUM AND SENNOSIDES SCH: 50; 8.6 TABLET ORAL at 21:58

## 2018-09-29 RX ADMIN — LEVOTHYROXINE SODIUM SCH MCG: 25 TABLET ORAL at 05:25

## 2018-09-29 RX ADMIN — FUROSEMIDE SCH MG: 40 TABLET ORAL at 10:34

## 2018-09-29 RX ADMIN — IPRATROPIUM BROMIDE AND ALBUTEROL SULFATE SCH ML: .5; 3 SOLUTION RESPIRATORY (INHALATION) at 07:06

## 2018-09-29 RX ADMIN — Medication SCH: at 21:58

## 2018-09-29 RX ADMIN — IPRATROPIUM BROMIDE AND ALBUTEROL SULFATE SCH ML: .5; 3 SOLUTION RESPIRATORY (INHALATION) at 19:20

## 2018-09-29 RX ADMIN — Medication SCH MG: at 21:57

## 2018-09-29 RX ADMIN — DOCUSATE SODIUM AND SENNOSIDES SCH: 50; 8.6 TABLET ORAL at 13:21

## 2018-09-29 RX ADMIN — METOPROLOL TARTRATE SCH MG: 50 TABLET, FILM COATED ORAL at 21:56

## 2018-09-29 RX ADMIN — DILTIAZEM HYDROCHLORIDE SCH: 60 TABLET, FILM COATED ORAL at 13:24

## 2018-09-29 RX ADMIN — IPRATROPIUM BROMIDE AND ALBUTEROL SULFATE SCH ML: .5; 3 SOLUTION RESPIRATORY (INHALATION) at 11:28

## 2018-09-29 RX ADMIN — PREDNISOLONE ACETATE SCH DROPS: 10 SUSPENSION/ DROPS OPHTHALMIC at 21:57

## 2018-09-29 RX ADMIN — APIXABAN SCH: 2.5 TABLET, FILM COATED ORAL at 21:58

## 2018-09-29 RX ADMIN — LORATADINE SCH: 10 TABLET ORAL at 13:23

## 2018-09-29 RX ADMIN — DEXAMETHASONE SODIUM PHOSPHATE SCH MG: 4 INJECTION, SOLUTION INTRAMUSCULAR; INTRAVENOUS at 18:14

## 2018-09-29 RX ADMIN — DEXAMETHASONE SODIUM PHOSPHATE SCH MG: 4 INJECTION, SOLUTION INTRAMUSCULAR; INTRAVENOUS at 23:27

## 2018-09-29 NOTE — XR
EXAMINATION TYPE: XR chest 2V

 

DATE OF EXAM: 9/29/2018

 

COMPARISON: 9/25/2018

 

INDICATION: Short of breath

 

TECHNIQUE:  Frontal and lateral views of the chest are obtained.

 

FINDINGS:  

The heart size is normal.  

The pulmonary vasculature is prominent.

Diffuse increased lung markings are present. There is some silhouetting of the right diaphragm. Findi
ngs are worsening.

 

IMPRESSION:  

1. Clinical correlation recommended for congestive heart failure and pulmonary edema.

## 2018-09-29 NOTE — P.PN
Subjective


Progress Note Date: 09/29/18


Principal diagnosis: 





This is a 83-year-old female seen in consultation because of acute kidney 

injury secondary to congestive heart failure and chronic kidney disease.  Her 

creatinine continues to remain elevated.  She was changed from IV Lasix to by 

mouth Lasix yesterday.





It is difficult to understand her as her speech is slurred and there is 

computed tomography scan of the head showing a mass in the left temporal and 

parietal lobe..  She is audibly wheezing.  She is also has had ESBL E. coli 

urine culture.





History of present illness:


Patient is a 83-year-old female seen in renal consultation for acute kidney 

injury on chronic kidney disease.  Patient has chronic kidney disease stage III 

with baseline creatinine near 2.  Creatinine was 2.01 on admission and is up to 

2.22 today.  Patient presented to the hospital with altered mental status and 

slurring of speech.  CAT scan of the brain is suggestive of acute ischemia in 

the left temporal and parietal lobe.  Neurology is following.  She also has 

history of diastolic CHF with moderate to severe tricuspid regurgitation and 

mild to moderate pulmonary hypertension.  Chest x-ray suggestive of pulmonary 

edema.  Patient's currently resting in bed.  Denies chest pain or shortness of 

breath.  No vomiting or diarrhea.  Oral intake is poor.  No hematuria or 

dysuria.  Hemodynamically stable.





Objective





- Vital Signs


Vital signs: 


 Vital Signs











Temp  97.9 F   09/29/18 05:00


 


Pulse  100   09/29/18 11:37


 


Resp  18   09/29/18 11:37


 


BP  142/68   09/29/18 05:00


 


Pulse Ox  97   09/29/18 07:07








 Intake & Output











 09/28/18 09/29/18 09/29/18





 18:59 06:59 18:59


 


Intake Total  50 


 


Balance  50 


 


Weight 73.5 kg  


 


Intake:   


 


  Intake, IV Titration  50 





  Amount   


 


    Ertapenem 0.5 gm In  50 





    Sodium Chloride 0.9% 50   





    ml @ 100 mls/hr IVPB Q24H   





    [facility] Rx#:[id number]   


 


Other:   


 


  Voiding Method Diaper Diaper 





 Incontinent Incontinent 


 


  # Voids  2 1








On examination she is awake alert.  Difficult to figure out her orientation 

because of her speech impediment





HEENT exam no JVP neck is supple no facial asymmetry





Lungs are significant for bilateral wheezing which is fairly audible.  Air 

entry is unremarkable and nearly normal





Heart sounds are difficult to hear because of the audible wheezing.





Abdomen is soft nontender non-distended no masses felt





Extremity exam trace edema





The right leg is bandaged.





Neurologically awake alert but difficult to understand her speech.  Moves all 

her activities.  She is known with dementia in the past for the past medical 

history noted in the chart.  She's had cleft palate in the past.





- Labs


CBC & Chem 7: 


 09/28/18 06:44





 09/29/18 06:32


Labs: 


 Abnormal Lab Results - Last 24 Hours (Table)











  09/28/18 09/29/18 Range/Units





  13:35 06:32 


 


Chloride   112 H  ()  mmol/L


 


Carbon Dioxide   20 L  (22-30)  mmol/L


 


BUN   48 H  (7-17)  mg/dL


 


Creatinine   2.61 H  (0.52-1.04)  mg/dL


 


Glucose   111 H  (74-99)  mg/dL


 


Urine Blood  Trace H   (Negative)  


 


Urine RBC  7 H   (0-5)  /hpf


 


Urine Bacteria  Rare H   (None)  /hpf


 


Urine Mucus  Rare H   (None)  /hpf














Assessment and Plan


Assessment: 


Impression


1.  Acute kidney injury secondary to cardiorenal syndrome and slightly better 

today after discontinuation of Cozaar and change of Lasix to oral from IV.  

Creatinine went down from 2.7-2.61.





2.  Chronic kidney disease stage IIIB, etiology is nephrosclerosis.





3.  Congestive heart failure.  Continuing to have wheezing.





4.  ESBL E. coli UTI.





5.  Acute CVA, computed tomography scan shows left parietal lobe mass or 

ischemic change.





6.  Mild non-gap acidosis with bicarb of 20.





7.  Anemia hemoglobin is 10.2





Recommendation.





1.  Check postvoid residual bladder scan.





2.  Check orthostatic changes.





3.  Maintain diuretic regimen for right now

## 2018-09-29 NOTE — P.CONS
History of Present Illness





- Reason for Consult


Consult date: 18


possible left brain mass





- History of Present Illness





  Ms. Scanlon is an 83-year-old with multiple medical problems.  The patient has 

a known history of atrial fibrillation and is on anticoagulation.  She is an 

[facility] resident and had apparently fallen and hurt her head about 2-3 weeks ago.  

She had not lost consciousness but according to her family, since then, had 

developed major changes in mental status.  She had been more lethargic, sleepy, 

and irritable with marked forgetfulness and confusion.  It does appear that 

there is some underlying dementia previously, but her current condition was a 

definite change from before.  She was therefore brought into the emergency room

, and had a CT scan of the brain done, without contrast.  This was initially 

read as possible new ischemic changes in the left temporal area.  The patient 

was seen by neurology, and had repeat CT scan of the brain done today.  This 

revealed this area to have vasogenic edema, with, now, an appearance more 

suspicious for an underlying mass.  Therefore consultfor further evaluation and 

recommendations.


  The patient at the time of exam was lethargic, irritable, and agitated.  She 

was unable to provide any history.  History was obtained from the EMR.  Family 

will also be contacted subsequently.





Review of Systems





patient unable to provide.  Obtained from EMR


Constitutional: Reports poor appetite, Reports weakness


Eyes: right decreased vision, right loss of vision


Ears: deny: decreased hearing, ear discharge, earache, tinnitus


Ears, nose, mouth and throat: Denies headache, Denies sore throat


Cardiovascular: Reports irregular heart beat


Respiratory: Denies cough


Gastrointestinal: Denies abdominal pain, Denies diarrhea, Denies nausea, Denies 

vomiting


Genitourinary: Denies dysuria, Denies hematuria


Menstruation: Reports postmenopausal


Musculoskeletal: Reports muscle weakness


Integumentary: Denies pruritus, Denies rash


Neurological: Reports as per HPI, Reports change in mentation, Reports change 

in speech, Reports memory loss


Psychiatric: Reports confusion, Reports irritability


Endocrine: Reports fatigue


Hematologic/Lymphatic: Reports as per HPI





Past Medical History


Past Medical History: Heart Failure, COPD, Dementia, GERD/Reflux, Hyperlipidemia

, Hypertension, Pneumonia, Renal Disease, Respiratory Disorder, Thyroid Disorder


Additional Past Medical History / Comment(s): Pt was born with cleft palate and 

other physical problems with facial structures including nasal passages and ear 

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 

collapsing.  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 

glaucoma.pt's daughter stated pt was able to read and write up until last admit 

18 but not since", chronic renal disease stage IV-has L arm fistula(PER 

DAUGHTER IT'S A FAILED FISTULA) but no hemodialysis, frequent UTIs, wears O2 

PRN hypothyroid.


Last Myocardial Infarction Date:: unknown


History of Any Multi-Drug Resistant Organisms: ESBL


Year Discovered:: 18


MDRO Source:: ESBL URINE


Past Surgical History: Bladder Surgery, Hysterectomy, Joint Replacement, 

Orthopedic Surgery


Additional Past Surgical History / Comment(s): Pt has had multiple surgeries 

for birth defects affecting mouth, nose and ears, total L knee arthroplasty, 

bladder sling, fistula L arm.PARTIAL HYSTERECTOMY, PICC LINE-SINCE REMOVED


Past Anesthesia/Blood Transfusion Reactions: No Reported Reaction


Smoking Status: Former smoker





- Past Family History


  ** Father


Family Medical History: Cancer


Additional Family Medical History / Comment(s): Father  prior to age 60 yrs 

with esophageal cancer.  He was a smoker and a drinker.





  ** Mother


Family Medical History: Osteoarthritis (OA), Renal Disease, Rheumatoid 

Arthritis (RA)


Additional Family Medical History / Comment(s): kidney problems, specific type 

unknown





Medications and Allergies


 Home Medications











 Medication  Instructions  Recorded  Confirmed  Type


 


Fluticasone Nasal Spray [Flonase 1 spray EA NOSTRIL BID@17 

History





Nasal Spray]    


 


Levothyroxine Sodium [Synthroid] 25 mcg PO DAILY@0617 History


 


Acetaminophen Tab [Tylenol] 650 mg PO Q6HR PRN  tab 17 Rx


 


ALPRAZolam [Xanax] 0.25 mg PO HS@2100 11/10/17 09/24/18 History


 


Atorvastatin [Lipitor] 10 mg PO HS@2100 11/10/17 09/24/18 History


 


Buprenorphine [Butrans 10 MCG/HOUR] 1 patch TRANSDERM TU@0600 11/10/17 09/24/18 

History


 


Ergocalciferol (Vitamin D2) 50,000 unit PO Q30D 11/10/17 09/24/18 History





[Vitamin D2]    


 


Loratadine [Claritin] 10 mg PO DAILY@0900 11/10/17 09/24/18 History


 


Potassium Chloride [Klor-Con 20] 20 meq PO BID@0900,1700 11/10/17 09/24/18 

History


 


Ranitidine HCl [Zantac] 150 mg PO BID@0900,1700 11/10/17 09/24/18 History


 


guaiFENesin [guaiFENesin Oral 200 mg PO Q4HR PRN 11/10/17 09/24/18 History





Solution]    


 


prednisoLONE ACETATE [Pred Forte 2 drop BOTH EYES HS@2100 11/10/17 09/24/18 

History





1%]    


 


Magnesium Hydroxide [Milk of 2,400 mg PO DAILY PRN  ml 17 Rx





Magnesia Concentrate]    


 


Ferrous Sulfate [Feosol] 325 mg PO BID@0900,1700 18 History


 


Latanoprost [Xalatan 0.005%] 1 drop LEFT EYE HS@18 History


 


Melatonin 3 mg PO HS@18 History


 


Vit C/E/Zn/Coppr/Lutein/Zeaxan 1 cap PO BID@18 History





[Preservision Areds 2 Softgel]    


 


acetaZOLAMIDE [Diamox] 250 mg PO TU@18 History


 


HYDROcodone/APAP 5-325MG [Norco 1 tab PO Q8H PRN 18 History





5-325]    


 


Allopurinol [Zyloprim] 200 mg PO DAILY@18 History


 


Apixaban [Eliquis] 2.5 mg PO BID@18 History


 


Cinacalcet [Sensipar] 30 mg PO DAILY@18 History


 


Diltiazem Oral [Cardizem*] 60 mg PO TID@06,,18 History


 


Ipratropium-Albuterol Nebulize 3 ml INHALATION RT-BID@18 

History





[Duoneb 0.5 mg-3 mg/3 ml Soln]    


 


Ipratropium-Albuterol Nebulize 3 ml INHALATION RT-Q6H PRN 18 

History





[Duoneb 0.5 mg-3 mg/3 ml Soln]    


 


Levofloxacin [Levaquin] 500 mg PO HS@2100 18 History


 


Losartan Potassium 100 mg PO DAILY@18 History


 


Metoprolol Tartrate [Lopressor] 50 mg PO BID@18 History


 


Sennosides-Docusate Sodium 1 tab PO BID@18 History





[Senokot-S]    


 


amLODIPine [Norvasc] 5 mg PO DAILY@18 History











 Allergies











Allergy/AdvReac Type Severity Reaction Status Date / Time


 


No Known Allergies Allergy   Verified 18 17:36














Physical Exam


Vitals: 


 Vital Signs











  Temp Pulse Pulse Pulse Resp BP Pulse Ox


 


 18 21:00  98.6 F   112 H  97  14  141/74  94 L


 


 18 12:04  98.1 F    86  16  141/77  98


 


 18 08:10   84     


 


 18 08:02   84     


 


 18 05:00  97.5 F L    98  15  145/61  97


 


 18 00:03   80     


 


 18 23:51   80     








 Intake and Output











 18





 06:59 14:59 22:59


 


Intake Total 410  


 


Balance 410  


 


Intake:   


 


  Intake, IV Titration 50  





  Amount   


 


    Ertapenem 0.5 gm In 50  





    Sodium Chloride 0.9% 50   





    ml @ 100 mls/hr IVPB Q24H   





    [facility] Rx#:[id number]   


 


  Oral 360  


 


Other:   


 


  Voiding Method Diaper Diaper Diaper





 Incontinent Incontinent Incontinent


 


  # Voids 1  


 


  Weight  73.5 kg 














 the patient was lethargic, but irritable and easily agitated.  She was 

resistant to physical exam, and therefore only a limited exam was possible.  

Lymph node exam, chest/CVS exam was not possible to perform due to the patient'

s uncle





- Constitutional


General appearance: no acute distress





- EENT





loss of vision right eye, with atrophied eyeball and scarring of sclera


ENT: hearing grossly normal





- Neck


Neck: no lymphadenopathy


Thyroid: bilateral: normal size





- Cardiovascular


Rhythm: irregularly irregular





- Gastrointestinal


General gastrointestinal: normal bowel sounds, soft





- Integumentary


Integumentary: normal





- Neurologic





right-sided vision loss.  Strength in the extremities diminished generally, but 

symmetric





- Musculoskeletal


Musculoskeletal: generalized weakness, strength equal bilaterally





- Psychiatric





confused, poor recall, agitated.  Able to follow some commands.





Results


CBC & Chem 7: 


 18 06:44





 18 06:32


Labs: 


 Abnormal Lab Results - Last 24 Hours (Table)











  18 Range/Units





  06:44 06:44 13:35 


 


RBC   3.77 L   (3.80-5.40)  m/uL


 


Hgb   10.2 L   (11.4-16.0)  gm/dL


 


Hct   33.2 L   (34.0-46.0)  %


 


MCHC   30.7 L   (31.0-37.0)  g/dL


 


RDW   17.4 H   (11.5-15.5)  %


 


Lymphocytes #   0.3 L   (1.0-4.8)  k/uL


 


Chloride  112 H    ()  mmol/L


 


Carbon Dioxide  19 L    (22-30)  mmol/L


 


BUN  50 H    (7-17)  mg/dL


 


Creatinine  2.70 H    (0.52-1.04)  mg/dL


 


Glucose  122 H    (74-99)  mg/dL


 


Alkaline Phosphatase  128 H    ()  U/L


 


Total Protein  5.0 L    (6.3-8.2)  g/dL


 


Albumin  2.5 L    (3.5-5.0)  g/dL


 


Urine Blood    Trace H  (Negative)  


 


Urine RBC    7 H  (0-5)  /hpf


 


Urine Bacteria    Rare H  (None)  /hpf


 


Urine Mucus    Rare H  (None)  /hpf








 Microbiology - Last 24 Hours (Table)











 18 19:50 Urine Culture - Final





 Urine,Voided    Escherichia coli











Chest x-ray: report reviewed


CT Scan - head: report reviewed





Assessment and Plan


(1) Brain mass


Narrative/Plan: 


  the patient was initially felt to have an ischemic stroke.  However 

subsequent imaging indicates that the left-sided lesion is more likely to be a 

mass. she has had a noncontrast CAT scan of the brain, which is not very 

sensitive or specific for a mass.  Unfortunately more specific imaging cannot 

be ordered at this point.  Her creatinine is too high to allow contrast study 

with either CT scan or MRI.  MRI without contrast also is not an option, as the 

patient has some metal from previous surgery in her skull.


 Therefore at this time we will empirically start steroids, and monitor her 

response to the same


  I will also order CT scans of the chest abdomen and pelvis, with oral 

contrast only.  If these show evidence of malignancy, then metastatic disease 

in the brain becomes more likely.


  The patient herself, at this time is unable to provide any history, or make 

decisions.  Depending on her response, and additional findings, we will need to 

discuss with the family Re: Additional investigations especially if invasive 

procedure such as biopsies are felt to be required


Current Visit: Yes   Status: Acute   Code(s): G93.9 - DISORDER OF BRAIN, 

UNSPECIFIED   SNOMED Code(s): 361984254


   





(2) JACK (acute kidney injury)


Narrative/Plan: 


 nephrology is following.  Follow kidney function.  However it appears that her 

baseline creatinine is in the 2 range and therefore it is unlikely to improve 

to a point where contrast would be safe to administer. if we have a situation 

where construct contrast administration is felt to be absolutely necessary, 

then we will coordinate with nephrology to see if it be possible to perform, 

with additional protection such as hydration and Mucomyst.


Current Visit: No   Status: Acute   Priority: High   Code(s): N17.9 - ACUTE 

KIDNEY FAILURE, UNSPECIFIED   SNOMED Code(s): 75496495


   


Plan: 





 the patient has multiple other medical problems.  Refer to the admitting 

service and other consultants for management of the above

## 2018-09-29 NOTE — PN
PROGRESS NOTE



DATE OF SERVICE:

09/29/2018.



REASON FOR FOLLOWUP:

ESBL E coli UTI.



INTERVAL HISTORY:

The patient is currently afebrile. She is breathing comfortably.  Hemodynamically

stable.  No nausea or vomiting has been noticed or any diarrhea.  She was unable to

provide reliable history though.



EXAMINATION:

Blood pressure 152/84 with a pulse of 104. Temperature 97.9.  She is 90% on room air.

General description is an elderly female lying in bed in no distress.

RESPIRATORY SYSTEM: Unlabored breathing. Clear to auscultation anteriorly.

HEART: S1, S2.  Regular rate and rhythm.

ABDOMEN: Soft, no tenderness.



LABS:

BUN of 48, creatinine is 2.61.  Repeat urine is not significantly positive.



DIAGNOSTIC IMPRESSION AND PLAN:

Patient with ESBL E coli and positive urine culture. Unfortunately no UA was done on

this but urine culture with question of possible mild cystitis versus a colonizer

contaminant.  Recommend only a 3 day course of IV Invanz and should be discontinued

afterwards and continue monitoring patient closely on antibiotic therapy. Continue

supportive care.





MMODL / IJN: [id number] / Job#: [id number]

## 2018-09-30 VITALS — RESPIRATION RATE: 16 BRPM

## 2018-09-30 RX ADMIN — ALLOPURINOL SCH: 100 TABLET ORAL at 10:46

## 2018-09-30 RX ADMIN — DOCUSATE SODIUM AND SENNOSIDES SCH: 50; 8.6 TABLET ORAL at 20:17

## 2018-09-30 RX ADMIN — LEVOTHYROXINE SODIUM SCH MCG: 25 TABLET ORAL at 05:35

## 2018-09-30 RX ADMIN — IPRATROPIUM BROMIDE AND ALBUTEROL SULFATE SCH ML: .5; 3 SOLUTION RESPIRATORY (INHALATION) at 11:04

## 2018-09-30 RX ADMIN — FLUTICASONE PROPIONATE SCH: 50 SPRAY, METERED NASAL at 10:47

## 2018-09-30 RX ADMIN — DEXAMETHASONE SODIUM PHOSPHATE SCH MG: 4 INJECTION, SOLUTION INTRAMUSCULAR; INTRAVENOUS at 17:49

## 2018-09-30 RX ADMIN — DOCUSATE SODIUM AND SENNOSIDES SCH: 50; 8.6 TABLET ORAL at 10:47

## 2018-09-30 RX ADMIN — CINACALCET HYDROCHLORIDE SCH MG: 30 TABLET, COATED ORAL at 05:36

## 2018-09-30 RX ADMIN — LORATADINE SCH: 10 TABLET ORAL at 10:47

## 2018-09-30 RX ADMIN — Medication SCH: at 10:47

## 2018-09-30 RX ADMIN — APIXABAN SCH MG: 2.5 TABLET, FILM COATED ORAL at 09:08

## 2018-09-30 RX ADMIN — IPRATROPIUM BROMIDE AND ALBUTEROL SULFATE SCH: .5; 3 SOLUTION RESPIRATORY (INHALATION) at 15:18

## 2018-09-30 RX ADMIN — DILTIAZEM HYDROCHLORIDE SCH MG: 60 TABLET, FILM COATED ORAL at 14:35

## 2018-09-30 RX ADMIN — Medication SCH MG: at 20:16

## 2018-09-30 RX ADMIN — Medication SCH: at 20:17

## 2018-09-30 RX ADMIN — METOPROLOL TARTRATE SCH MG: 50 TABLET, FILM COATED ORAL at 09:08

## 2018-09-30 RX ADMIN — METOPROLOL TARTRATE SCH MG: 50 TABLET, FILM COATED ORAL at 20:15

## 2018-09-30 RX ADMIN — DEXAMETHASONE SODIUM PHOSPHATE SCH MG: 4 INJECTION, SOLUTION INTRAMUSCULAR; INTRAVENOUS at 09:08

## 2018-09-30 RX ADMIN — DILTIAZEM HYDROCHLORIDE SCH MG: 60 TABLET, FILM COATED ORAL at 05:35

## 2018-09-30 RX ADMIN — FUROSEMIDE SCH MG: 40 TABLET ORAL at 09:08

## 2018-09-30 RX ADMIN — IPRATROPIUM BROMIDE AND ALBUTEROL SULFATE SCH ML: .5; 3 SOLUTION RESPIRATORY (INHALATION) at 19:24

## 2018-09-30 RX ADMIN — FLUTICASONE PROPIONATE SCH: 50 SPRAY, METERED NASAL at 20:16

## 2018-09-30 RX ADMIN — Medication SCH: at 17:39

## 2018-09-30 RX ADMIN — LATANOPROST SCH DROPS: 50 SOLUTION OPHTHALMIC at 20:16

## 2018-09-30 RX ADMIN — PREDNISOLONE ACETATE SCH DROPS: 10 SUSPENSION/ DROPS OPHTHALMIC at 20:16

## 2018-09-30 RX ADMIN — IPRATROPIUM BROMIDE AND ALBUTEROL SULFATE SCH ML: .5; 3 SOLUTION RESPIRATORY (INHALATION) at 07:21

## 2018-09-30 RX ADMIN — FAMOTIDINE SCH: 20 TABLET, FILM COATED ORAL at 10:46

## 2018-09-30 RX ADMIN — APIXABAN SCH MG: 2.5 TABLET, FILM COATED ORAL at 20:16

## 2018-09-30 RX ADMIN — DILTIAZEM HYDROCHLORIDE SCH: 60 TABLET, FILM COATED ORAL at 23:24

## 2018-09-30 NOTE — CT
EXAMINATION TYPE: CT ChestAbdPelvis wo con

 

DATE OF EXAM: 9/30/2018

 

COMPARISON: None

 

HISTORY: POSSIBLE METS chest and abdominal pain

 

CT DLP: 946.7 mGycm. Automated Exposure Control for Dose Reduction was Utilized.

 

TECHNIQUE: 

CT scan of the thorax, abdomen and pelvis is performed without IV contrast.

 

FINDINGS:

 

There are bilateral pleural effusions. There is some infiltrate and atelectasis at the lung bases adj
acent to the pleural fluid. There is atherosclerotic vascular calcification. There is large hiatal he
rnia. Liver shows no focal defect. Spleen appears normal. There is no evidence of a pancreatic mass.

 

There is no adrenal mass. There is renal atrophy and worse on the right side. There is no hydronephro
sis. Abdominal aorta is atheromatous. There is no retroperitoneal adenopathy. There is no mesenteric 
edema or adenopathy. I see no intestinal wall thickening. There are no dilated loops. There are sigmo
id multiple diverticula. There is some free fluid in the cul-de-sac. Latter distends smoothly. There 
is no inguinal hernia. There is left hip prosthesis. There is subcutaneous edema around the abdomen. 
There is some spurring in the thoracic and lumbar spine. There is no compression fracture. There is o
steopenia. I see no focal bone destruction. Bony pelvis appears intact.

 

IMPRESSION:

Pleural effusions and cardiomegaly consistent with congestive heart failure. Basilar atelectasis. Sub
cutaneous edema. Renal atrophy.

 

Large hiatal hernia. I see no evidence of metastatic disease. Sigmoid diverticulosis.

## 2018-09-30 NOTE — P.PN
Subjective


Progress Note Date: 09/30/18


Principal diagnosis: 





esbl uti


brain mass


crd 4


encephalopathy


pulmonary fibrosis





less garbled speech today


thickened liquids


iv ivanz for esbl uti





Objective





- Vital Signs


Vital signs: 


 Vital Signs











Temp  96.7 F L  09/30/18 12:22


 


Pulse  89   09/30/18 12:22


 


Resp  18   09/30/18 12:22


 


BP  133/75   09/30/18 12:22


 


Pulse Ox  95   09/30/18 12:22








 Intake & Output











 09/29/18 09/30/18 09/30/18





 18:59 06:59 18:59


 


Intake Total  80 


 


Output Total 775  


 


Balance -775 80 


 


Weight 79 kg  78.5 kg


 


Intake:   


 


  Intake, IV Titration  50 





  Amount   


 


    Ertapenem 0.5 gm In  50 





    Sodium Chloride 0.9% 50   





    ml @ 100 mls/hr IVPB Q24H   





    [facility] Rx#:[id number]   


 


  Oral  30 


 


Output:   


 


  Post Void Residual 775  


 


Other:   


 


  Voiding Method Diaper Diaper Diaper





 Incontinent Incontinent Incontinent


 


  # Voids 1 1 1














- Constitutional


General appearance: Present: no acute distress, obese





- EENT


ENT: Present: hearing grossly normal





- Neck


Thyroid: bilateral: normal size





- Respiratory


Respiratory: left: wheezing





- Cardiovascular


Heart sounds: normal: S1, S2





- Gastrointestinal


General gastrointestinal: Present: normal bowel sounds





- Musculoskeletal


Musculoskeletal: Present: generalized weakness





- Labs


CBC & Chem 7: 


 09/28/18 06:44





 09/29/18 06:32





Assessment and Plan


Assessment: 





crd4


brain mass


pulmonary fibrosis


uti esbl


Plan: 





iv antibiotics


oral lasix


labs in am


nursing home tomorrow


Time with Patient: Less than 30

## 2018-09-30 NOTE — P.PN
Subjective


Progress Note Date: 09/30/18


Principal diagnosis: 





This is a 83-year-old female seen in consultation because of acute kidney 

injury secondary to congestive heart failure and chronic kidney disease.  Her 

creatinine continues to remain elevated.  She was changed from IV Lasix to by 

mouth Lasix day before yesterday.





It is difficult to understand her as her speech is slurred and there is 

computed tomography scan of the head showing a mass in the left temporal and 

parietal lobe, although this morning  not garbled anymore.  She is able to 

speak.  She is remains confused though.  She is also has had ESBL E. coli urine 

culture.


She had a bladder scan yesterday which was in the 380 mL of urine but repeat 

was unremarkable.  Therefore a catheter was not placed.  Her intake is rather 

minimal.  She has difficulty swallowing and is on a thickened liquid.


History of present illness:


Patient is a 83-year-old female seen in renal consultation for acute kidney 

injury on chronic kidney disease.  Patient has chronic kidney disease stage III 

with baseline creatinine near 2.  Creatinine was 2.01 on admission and is up to 

2.22 today.  Patient presented to the hospital with altered mental status and 

slurring of speech.  CAT scan of the brain is suggestive of acute ischemia in 

the left temporal and parietal lobe.  Neurology is following.  She also has 

history of diastolic CHF with moderate to severe tricuspid regurgitation and 

mild to moderate pulmonary hypertension.  Chest x-ray suggestive of pulmonary 

edema.  Patient's currently resting in bed.  Denies chest pain or shortness of 

breath.  No vomiting or diarrhea.  Oral intake is poor.  No hematuria or 

dysuria.  Hemodynamically stable.





Objective





- Vital Signs


Vital signs: 


 Vital Signs











Temp  96.7 F L  09/30/18 12:22


 


Pulse  89   09/30/18 12:22


 


Resp  18   09/30/18 12:22


 


BP  133/75   09/30/18 12:22


 


Pulse Ox  95   09/30/18 12:22








 Intake & Output











 09/29/18 09/30/18 09/30/18





 18:59 06:59 18:59


 


Intake Total  80 


 


Output Total 775  


 


Balance -775 80 


 


Weight 79 kg  


 


Intake:   


 


  Intake, IV Titration  50 





  Amount   


 


    Ertapenem 0.5 gm In  50 





    Sodium Chloride 0.9% 50   





    ml @ 100 mls/hr IVPB Q24H   





    [facility] Rx#:[id number]   


 


  Oral  30 


 


Output:   


 


  Post Void Residual 775  


 


Other:   


 


  Voiding Method Diaper Diaper Diaper





 Incontinent Incontinent Incontinent


 


  # Voids 1 1 1








On examination she is awake alert today compared to yesterday.  She is 

disoriented





HEENT exam no JVP neck is supple no facial asymmetry





Lungs are clear to auscultation fair air entry bilaterally





Heart sounds are unremarkable no murmur rub gallop





Abdomen soft nontender





Extremity exam was minimal edema





Neurologically awake alert but disoriented.  Moves all her extremities.





- Labs


CBC & Chem 7: 


 09/28/18 06:44





 09/29/18 06:32





Assessment and Plan


Assessment: 


Impression


1.  Acute kidney injury secondary to cardiorenal syndrome and slightly better 

today after discontinuation of Cozaar and change of Lasix to oral from IV.  

Creatinine went down from 2.7-2.61, as of yesterday no labs are available this 

morning..





2.  Chronic kidney disease stage IIIB, etiology is nephrosclerosis.





3.  Congestive heart failure.  Breathing is improved compared to yesterday when 

she had wheezing.  Chest x-ray today shows worsening as of yesterday 09/29/2018

, on Lasix 40 mg by mouth daily





4.  ESBL E. coli UTI.





5.  Acute CVA, computed tomography scan shows left parietal lobe mass or 

ischemic change.





6.  Mild non-gap acidosis with bicarb of 20.





7.  Anemia hemoglobin is 10.2





8.





Recommendation.





1.  Give her extra dose of 40 mg Lasix today








2.  Check orthostatic changes.

## 2018-09-30 NOTE — PN
PROGRESS NOTE



DATE OF SERVICE:

09/30/2018.



REASON FOR FOLLOWUP:

ESBL E coli positive culture, possible cystitis.



INTERVAL HISTORY:

The patient is afebrile.  She is more awake and alert.  She is breathing comfortably.

Denies any chest pain, cough, abdominal pain, diarrhea.



EXAMINATION:

Blood pressure 133/75 with a pulse of 89, temperature 96.7, she is 95% on room air.

General description is an elderly female lying in bed in no distress.  Respiratory

system:  Unlabored breathing.  Clear to auscultation anteriorly.  Heart S1, S2.

Regular rate and rhythm.  Abdomen soft, no tenderness.

EXTREMITIES:  No edema of the feet.



LABS:

No new labs have been obtained today.  _____ was 2.6 yesterday.



DIAGNOSTIC IMPRESSION AND PLAN:

Patient with positive urine culture with ESBL E coli however no UA was done on that

specimen.  Repeat UA is not significantly positive.  Patient remains still has been

incontinence with a question of possible cystitis versus colonization/contamination.

She will continue on a short course of Invanz that will be discontinued on discharge.

Continue supportive care.





MMODL / IJN: [id number] / Job#: [id number]

## 2018-10-01 VITALS — DIASTOLIC BLOOD PRESSURE: 70 MMHG | TEMPERATURE: 96.7 F | SYSTOLIC BLOOD PRESSURE: 138 MMHG | HEART RATE: 92 BPM

## 2018-10-01 LAB
ALBUMIN SERPL-MCNC: 2.7 G/DL (ref 3.5–5)
ALP SERPL-CCNC: 121 U/L (ref 38–126)
ALT SERPL-CCNC: 32 U/L (ref 9–52)
ANION GAP SERPL CALC-SCNC: 8 MMOL/L
AST SERPL-CCNC: 22 U/L (ref 14–36)
BASOPHILS # BLD AUTO: 0 K/UL (ref 0–0.2)
BASOPHILS NFR BLD AUTO: 0 %
BUN SERPL-SCNC: 50 MG/DL (ref 7–17)
CALCIUM SPEC-MCNC: 9.2 MG/DL (ref 8.4–10.2)
CHLORIDE SERPL-SCNC: 110 MMOL/L (ref 98–107)
CO2 SERPL-SCNC: 23 MMOL/L (ref 22–30)
EOSINOPHIL # BLD AUTO: 0 K/UL (ref 0–0.7)
EOSINOPHIL NFR BLD AUTO: 0 %
ERYTHROCYTE [DISTWIDTH] IN BLOOD BY AUTOMATED COUNT: 4.15 M/UL (ref 3.8–5.4)
ERYTHROCYTE [DISTWIDTH] IN BLOOD: 17.3 % (ref 11.5–15.5)
GLUCOSE SERPL-MCNC: 151 MG/DL (ref 74–99)
HCT VFR BLD AUTO: 36.1 % (ref 34–46)
HGB BLD-MCNC: 11 GM/DL (ref 11.4–16)
LYMPHOCYTES # SPEC AUTO: 0.3 K/UL (ref 1–4.8)
LYMPHOCYTES NFR SPEC AUTO: 5 %
MCH RBC QN AUTO: 26.5 PG (ref 25–35)
MCHC RBC AUTO-ENTMCNC: 30.4 G/DL (ref 31–37)
MCV RBC AUTO: 87 FL (ref 80–100)
MONOCYTES # BLD AUTO: 0.2 K/UL (ref 0–1)
MONOCYTES NFR BLD AUTO: 3 %
NEUTROPHILS # BLD AUTO: 5.3 K/UL (ref 1.3–7.7)
NEUTROPHILS NFR BLD AUTO: 91 %
PLATELET # BLD AUTO: 370 K/UL (ref 150–450)
POTASSIUM SERPL-SCNC: 4.5 MMOL/L (ref 3.5–5.1)
PROT SERPL-MCNC: 5.3 G/DL (ref 6.3–8.2)
SODIUM SERPL-SCNC: 141 MMOL/L (ref 137–145)
WBC # BLD AUTO: 5.8 K/UL (ref 3.8–10.6)

## 2018-10-01 RX ADMIN — DEXAMETHASONE SODIUM PHOSPHATE SCH MG: 4 INJECTION, SOLUTION INTRAMUSCULAR; INTRAVENOUS at 07:55

## 2018-10-01 RX ADMIN — DILTIAZEM HYDROCHLORIDE SCH: 60 TABLET, FILM COATED ORAL at 14:42

## 2018-10-01 RX ADMIN — IPRATROPIUM BROMIDE AND ALBUTEROL SULFATE SCH ML: .5; 3 SOLUTION RESPIRATORY (INHALATION) at 11:17

## 2018-10-01 RX ADMIN — DOCUSATE SODIUM AND SENNOSIDES SCH EACH: 50; 8.6 TABLET ORAL at 08:06

## 2018-10-01 RX ADMIN — CINACALCET HYDROCHLORIDE SCH MG: 30 TABLET, COATED ORAL at 05:24

## 2018-10-01 RX ADMIN — ERTAPENEM SODIUM SCH MLS/HR: 1 INJECTION, POWDER, LYOPHILIZED, FOR SOLUTION INTRAMUSCULAR; INTRAVENOUS at 00:12

## 2018-10-01 RX ADMIN — LEVOTHYROXINE SODIUM SCH MCG: 25 TABLET ORAL at 05:24

## 2018-10-01 RX ADMIN — LORATADINE SCH MG: 10 TABLET ORAL at 07:59

## 2018-10-01 RX ADMIN — DILTIAZEM HYDROCHLORIDE SCH MG: 60 TABLET, FILM COATED ORAL at 05:25

## 2018-10-01 RX ADMIN — Medication SCH MG: at 08:01

## 2018-10-01 RX ADMIN — IPRATROPIUM BROMIDE AND ALBUTEROL SULFATE SCH: .5; 3 SOLUTION RESPIRATORY (INHALATION) at 07:55

## 2018-10-01 RX ADMIN — IPRATROPIUM BROMIDE AND ALBUTEROL SULFATE SCH ML: .5; 3 SOLUTION RESPIRATORY (INHALATION) at 07:47

## 2018-10-01 RX ADMIN — APIXABAN SCH MG: 2.5 TABLET, FILM COATED ORAL at 07:57

## 2018-10-01 RX ADMIN — IPRATROPIUM BROMIDE AND ALBUTEROL SULFATE SCH: .5; 3 SOLUTION RESPIRATORY (INHALATION) at 15:18

## 2018-10-01 RX ADMIN — FUROSEMIDE SCH MG: 40 TABLET ORAL at 08:00

## 2018-10-01 RX ADMIN — ALLOPURINOL SCH MG: 100 TABLET ORAL at 07:56

## 2018-10-01 RX ADMIN — Medication SCH MG: at 07:58

## 2018-10-01 RX ADMIN — DEXAMETHASONE SODIUM PHOSPHATE SCH MG: 4 INJECTION, SOLUTION INTRAMUSCULAR; INTRAVENOUS at 00:13

## 2018-10-01 RX ADMIN — METOPROLOL TARTRATE SCH MG: 50 TABLET, FILM COATED ORAL at 08:01

## 2018-10-01 RX ADMIN — FLUTICASONE PROPIONATE SCH SPRAY: 50 SPRAY, METERED NASAL at 08:00

## 2018-10-01 RX ADMIN — FAMOTIDINE SCH MG: 20 TABLET, FILM COATED ORAL at 07:58

## 2018-10-01 NOTE — PN
PROGRESS NOTE



DATE OF SERVICE:

10/01/2018



REASON FOR FOLLOWUP:

ESBL E. coli cystitis.



INTERVAL HISTORY:

The patient is currently afebrile.  She is breathing comfortably.  Denies having any

chest pain, shortness of breath. No cough, no abdominal pain, or any diarrhea.



PHYSICAL EXAMINATION:

Blood pressure is 132/70 with a pulse of 90, temperature 96.7, she is 95% on room air.

General description is an elderly female, lying in bed in no distress.

RESPIRATORY SYSTEM:  Unlabored breathing, clear to auscultation anteriorly.

HEART:  S1, S2.  Regular rate and rhythm.

ABDOMEN:  Soft, no tenderness.



LABS:

Hemoglobin is 11, white count of 5.8.  BUN of 15, creatinine 1.8.



DIAGNOSTIC IMPRESSION AND PLAN:

Patient with ESBL Escherichia coli, positive urine culture more than _____ colonies.

No urine within the specimen.  Repeat urine _____ positive, possible [QAMARKER] for

further antibiotics.  Antibiotics will be discontinued.  Continue supportive care.





MMODL / IJN: [id number] / Job#: [id number]

## 2018-10-01 NOTE — PN
PROGRESS NOTE



The patient is seen for followup for chronic kidney disease.  Her renal function is

fairly stable with creatinine actually lower than usual.  A CT of the brain with IV

contrast is requested by Heme-Onc to rule out any brain lesion.  The patient has been

refusing medications and IVs.



She is currently comfortable.  She is not in any acute distress.  Blood pressure

126/68.  This morning heart rate 84 per minute patient is afebrile.  Examination of the

heart S1, S2.  Examination lungs bilateral breath sounds are heard.  Abdomen is soft,

nontender.  Examination lower extremity shows no significant edema.  CNS exam shows

patient moving all 4 extremities.



LAB:

Show sodium 141, potassium 4.5, chloride 110, BUN 50, serum creatinine 1.98, hemoglobin

11.0.



ASSESSMENT:

1. Chronic kidney disease and NKF stage IV.  Renal function slightly better.  The

    creatinine down to 1.9.  Okay to proceed with CTA with IV contrast if needed from

    oncology standpoint.

2. 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

3. Congestive heart failure/volume overload.  Currently improved.

4. ESBL E coli urinary tract infection.

5. 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  CT with IV contrast has been

    ordered by Oncology.  Okay to proceed with it.

6. Anemia.



PLAN:

May proceed with CT with IV contrast if the patient allows.  She will need to continue

to maintain outpatient followup.





MMODL / IJN: [id number] / Job#: [id number]

## 2018-10-01 NOTE — DS
DISCHARGE SUMMARY



DISCHARGE MEDICATIONS:

1. Invanz 0.5 mg IV piggyback q.24 hours, length of duration as per Dr. Matthews's

    recommendation.

2. Lasix 40 mg daily.

3. DuoNeb q.i.d.

4. Zoloft 12.5 mg daily.

5. Sodium bicarbonate 650 b.i.d.

6. Synthroid 25 mcg daily.

7. Fluticasone nasal spray b.i.d.

8. Tylenol 650 q.6 hours p.r.n. for pain.

9. Xanax 0.25 q.h.s.

10.Guaifenesin oral solution 20 mg q.4 hours p.r.n. for cough.

11.Claritin 10 mg daily.

12.Zantac 150 mg p.o. b.i.d.

13.Prednisone acetate, Pred Forte 1% 2 drops both eyes q.h.s.

14.Vitamin D 50,000 units every month.

15.Butrans patch 10 mcg/hour.

16.Transdermal patch once a week on Tuesday.

17.Lipitor 10 mg q.h.s.

18.Mag oxide 2400 mg daily.

19.Melatonin 3 mg q.h.s.

20.Ferrous sulfate 325 b.i.d..

21.Latanoprost.

22.Xalatan eye drops daily.

23.Diamox 250 on Tuesdays at 9 am.

24.[place] 05/0325 one every 8 hours p.r.n. for pain.

25.Losartan 100 mg daily.

26.Zyloprim 200 mg daily.

27.Eliquis 2.5 mg b.i.d.

28.Sensipar 30 mg daily.

29.Cardizem 60 mg p.o. t.i.d.

30.Lopressor 50 mg b.i.d.

31.Senokot 1 tab b.i.d.



CONDITION:

Stable.



PROGNOSIS:

Guarded.



Ambulate as tolerated.



HOSPITAL COURSE OF EVENTS:

This is an 83-year-old white female who was admitted with slurred speech, altered

mental status.  Found to have a urinary tract infection with metabolic encephalopathy,

was also found to have possible brain mass in the left parietal lobe versus subacute

ischemic changes.  MRI was not able to be done due to patient's metal in her face.  CAT

scan abdomen, chest, pelvis was done to rule out cancer.  None of it showed any cancer.

Patient is still with IV antibiotics and medication was adjusted for end-stage chronic

renal disease stage IV.  Patient improved her renal function while in the hospital.

She had some noncompliance refusing medications and tested at different times, but she

will continue with IV antibiotics per Dr. Matthews's recommendations on discharge and then

will follow up with Dr. Street at the nursing home.  No plans for treatment of the

brain masses was done per family and patient as she has dementia.  She has a large

hiatal hernia.  She has pulmonary fibrosis and diastolic heart failure and everything

was stabilized while in the hospital.  Continue with IV antibiotics and she had

intermittent slurred speech at different times with will probably her new normal.  Will

follow up with her at the nursing home.





MMFRANCINEL / OLIMPIAN: [id number] / Job#: [id number]

## 2018-10-01 NOTE — P.PN
Subjective


Progress Note Date: 10/01/18


Principal diagnosis: 





Mental Status Changes





Still disoriented, no changes noted





Objective





- Vital Signs


Vital signs: 


 Vital Signs











Temp  96.7 F L  10/01/18 12:36


 


Pulse  92   10/01/18 12:36


 


Resp  16   10/01/18 12:36


 


BP  138/70   10/01/18 12:36


 


Pulse Ox  95   10/01/18 12:36








 Intake & Output











 09/30/18 10/01/18 10/01/18





 18:59 06:59 18:59


 


Intake Total  260 


 


Balance  260 


 


Weight 78.5 kg  


 


Intake:   


 


  Intake, IV Titration  50 





  Amount   


 


    Ertapenem 0.5 gm In  50 





    Sodium Chloride 0.9% 50   





    ml @ 100 mls/hr IVPB Q24H   





    [facility] Rx#:[id number]   


 


  Oral  210 


 


Other:   


 


  Voiding Method Diaper Diaper Diaper





 Incontinent Incontinent Incontinent


 


  # Voids 1 3 














- Exam





GEN: lethargic, but irritable and easily agitated.  





- Constitutional


General appearance: no acute distress





- EENT





loss of vision right eye, with atrophied eyeball and scarring of sclera


ENT: hearing grossly normal





- Neck


Neck: no lymphadenopathy


Thyroid: bilateral: normal size





- Cardiovascular


Rhythm: irregularly irregular





- Gastrointestinal


General gastrointestinal: normal bowel sounds, soft





- Integumentary


Integumentary: normal





- Neurologic





right-sided vision loss.  Strength in the extremities diminished generally, but 

symmetric





- Musculoskeletal


Musculoskeletal: generalized weakness, strength equal bilaterally





- Psychiatric





confused, poor recall, agitated.  Able to follow some commands.








- Labs


CBC & Chem 7: 


 10/01/18 07:36





 10/01/18 07:36


Labs: 


 Abnormal Lab Results - Last 24 Hours (Table)











  10/01/18 10/01/18 Range/Units





  07:36 07:36 


 


Hgb  11.0 L   (11.4-16.0)  gm/dL


 


MCHC  30.4 L   (31.0-37.0)  g/dL


 


RDW  17.3 H   (11.5-15.5)  %


 


Lymphocytes #  0.3 L   (1.0-4.8)  k/uL


 


Chloride   110 H  ()  mmol/L


 


BUN   50 H  (7-17)  mg/dL


 


Creatinine   1.98 H  (0.52-1.04)  mg/dL


 


Glucose   151 H  (74-99)  mg/dL


 


Total Protein   5.3 L  (6.3-8.2)  g/dL


 


Albumin   2.7 L  (3.5-5.0)  g/dL














Assessment and Plan


Plan: 





Chest x-ray: report reviewed


CT Scan - head: report reviewed





Assessment and Plan


(1) Brain mass


Narrative/Plan: 


  the patient was initially felt to have an ischemic stroke.  However 

subsequent imaging indicates that the left-sided lesion is more likely to be a 

mass. she has had a noncontrast CAT scan of the brain, which is not very 

sensitive or specific for a mass.  Unfortunately more specific imaging cannot 

be ordered at this point.  Her creatinine is too high to allow contrast study 

with either CT scan or MRI.  MRI without contrast also is not an option, as the 

patient has some metal from previous surgery in her skull.


 Therefore at this time we will empirically start steroids, and monitor her 

response to the same


  I will also order CT scans of the chest abdomen and pelvis, with oral 

contrast only.  If these show evidence of malignancy, then metastatic disease 

in the brain becomes more likely.


  The patient herself, at this time is unable to provide any history, or make 

decisions.  


 - There is no family at bedside during assessment. 


 - Depending on her response, and additional findings, we will need to discuss 

with the family Re: Additional investigations especially if invasive procedure 

such as biopsies are felt to be required


 - Will go forward with non contrast CT of head as discussed with Primary team 

today. 


Current Visit: Yes   Status: Acute   Code(s): G93.9 - DISORDER OF BRAIN, 

UNSPECIFIED   SNOMED Code(s): 474216580


   





(2) JACK (acute kidney injury)


Narrative/Plan: 


 nephrology is following.  Follow kidney function.


Current Visit: No   Status: Acute   Priority: High   Code(s): N17.9 - ACUTE 

KIDNEY FAILURE, UNSPECIFIED   SNOMED Code(s): 85033212


   


Plan: 





 the patient has multiple other medical problems.  Refer to the admitting 

service and other consultants for management of the above

## 2018-10-01 NOTE — CDI
Last Revision, December 2017





               Documentation Clarification Form



Date: 10/1/18

From: Johana Iqbal RN

Phone: [phone]

MRN: [id number]

Admit Date: 9/24/2018 5:18:00 PM

Patient Name: Rony Scanlon

Visit Number: [id number]



ATTENTION: The Clinical Documentation Specialists (CDI) and [facility] Coding Staff 
appreciate your assistance in clarifying documentation. Please respond to the 
clarification below the line at the bottom and electronically sign. The CDI & 
[facility] Coding staff will review the response and follow-up if needed. Please note: 
Queries are made part of the Legal Health Record. If you have any questions, 
please contact the author of this message via ITS.



Dr. Dilshad Street MD,



Documentation of COPD is located in the Chart from  9/25 - 9/28.

   Pt. admitted with possible CVA.



History/Risk Factors: pulmonary fibrosis, diastolic CHF, renal insufficiency, 
paroxysmal a fib, ESBL



Clinical Indicators: 

   CXR: Clinical correlation recommended for congestive heart failure and 
pulmonary edema. 

   Vital Signs on admission: T 97, P 142, R 17, 121/84, 92% RA

   Lung and Respiratory Assessment: wheezed x 4



Treatment:

   Nebulizers: Duoneb

   Steroids: Decadron

   O2 @ 2 liters PRN

   Antibiotics: Ceftriaxone IVPB, Ertapenem IVPB, Levaquin PO



In your professional opinion, can you please clarify if the above findings and 
treatment signify any of the following? 

   Acute Exacerbation of Chronic Obstructive Pulmonary Disease (COPD)

   Acute on Chronic Obstructive Asthma

   Acute on chronic bronchitis

   Chronic obstructive pulmonary disease with acute lower respiratory infection

   Emphysema

Other condition, please specify 

Unable to determine

________________________________________________________________________________
______________________________      
MTDD

## 2018-10-01 NOTE — CDI
Documentation Clarification Form





Date: 10/1/18

CDS: Johana Iqbal RN

Phone: [phone]

Admit Date:  9/10/18   

Account #: [id number]

Patient Name: Francisca Scanlon   



ATTENTION: The Clinical Documentation Specialists (CDI) and [facility] Coding Staff 
appreciate your assistance in clarifying documentation. Please respond to the 
clarification below the line at the bottom and electronically sign. The CDI & 
[facility] Coding staff will review the response and follow-up if needed. Please note: 
Queries are made part of the Legal Health Record. If you have any questions, 
please contact the author of this message via ITS.



Dr. Dilshad Street MD,

   A diagnosis of anemia lacks specificity to accurately reflect your patients 
severity of condition and clarification is needed.  

   Pt. was admitted with possible CVA.



History/Risk Factors: pulmonary fibrosis, diastolic CHF, renal insufficiency, 
paroxysmal a fib, ESBL



Clinical indicators: 

   Hemoglobin: 10.3

   Hematocrit: 33.1

   Anemia is charted in the history throughout the chart



Treatment: 

   Daily Feosol PO      

   Daily H&H

In order to capture the severity of condition, please clarify the type of 
anemia and etiology if known:

   Acute blood loss anemia

   Iron deficiency anemia

   Nutritional anemia

   Anemia of chronic kidney disease

   Unable to determine

   Other, please specify 

________________________________________________________________________________
_____________________________
MTDD

## 2018-10-03 NOTE — DS
DISCHARGE SUMMARY



ADDENDUM:

Please add:  Anemia of chronic disease to the discharge summary.





MMODL / IJN: [id number] / Job#: [id number]

## 2018-10-03 NOTE — DS
DISCHARGE SUMMARY



ADDENDUM:

Please add:



DISCHARGE DIAGNOSES:

Acute on chronic respiratory failure secondary to pulmonary fibrosis.





MMODL / IJN: [id number] / Job#: [id number]

## 2019-01-07 ENCOUNTER — HOSPITAL ENCOUNTER (INPATIENT)
Dept: HOSPITAL 47 - EC | Age: 84
LOS: 9 days | Discharge: NURSING, REHAB OR LONG-TERM CARE | DRG: 698 | End: 2019-01-16
Payer: MEDICARE

## 2019-01-07 VITALS — BODY MASS INDEX: 25.5 KG/M2

## 2019-01-07 DIAGNOSIS — Z96.652: ICD-10-CM

## 2019-01-07 DIAGNOSIS — Z99.81: ICD-10-CM

## 2019-01-07 DIAGNOSIS — G47.00: ICD-10-CM

## 2019-01-07 DIAGNOSIS — T83.518A: Primary | ICD-10-CM

## 2019-01-07 DIAGNOSIS — Z79.01: ICD-10-CM

## 2019-01-07 DIAGNOSIS — Z87.01: ICD-10-CM

## 2019-01-07 DIAGNOSIS — T50.2X5A: ICD-10-CM

## 2019-01-07 DIAGNOSIS — I69.320: ICD-10-CM

## 2019-01-07 DIAGNOSIS — E78.5: ICD-10-CM

## 2019-01-07 DIAGNOSIS — F03.91: ICD-10-CM

## 2019-01-07 DIAGNOSIS — K21.9: ICD-10-CM

## 2019-01-07 DIAGNOSIS — N18.4: ICD-10-CM
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
## 2019-01-07 DIAGNOSIS — E11.22: ICD-10-CM

## 2019-01-07 DIAGNOSIS — Z84.1: ICD-10-CM

## 2019-01-07 DIAGNOSIS — E86.0: ICD-10-CM

## 2019-01-07 DIAGNOSIS — I48.2: ICD-10-CM

## 2019-01-07 DIAGNOSIS — T38.0X5A: ICD-10-CM

## 2019-01-07 DIAGNOSIS — M10.9: ICD-10-CM

## 2019-01-07 DIAGNOSIS — Z80.0: ICD-10-CM

## 2019-01-07 DIAGNOSIS — E03.9: ICD-10-CM

## 2019-01-07 DIAGNOSIS — Z87.891: ICD-10-CM

## 2019-01-07 DIAGNOSIS — I13.0: ICD-10-CM

## 2019-01-07 DIAGNOSIS — J84.10: ICD-10-CM

## 2019-01-07 DIAGNOSIS — Z79.890: ICD-10-CM

## 2019-01-07 DIAGNOSIS — J69.0: ICD-10-CM

## 2019-01-07 DIAGNOSIS — J96.21: ICD-10-CM

## 2019-01-07 DIAGNOSIS — J44.0: ICD-10-CM

## 2019-01-07 DIAGNOSIS — E83.52: ICD-10-CM

## 2019-01-07 DIAGNOSIS — Z87.820: ICD-10-CM

## 2019-01-07 DIAGNOSIS — Z86.711: ICD-10-CM

## 2019-01-07 DIAGNOSIS — A41.51: ICD-10-CM

## 2019-01-07 DIAGNOSIS — E87.3: ICD-10-CM

## 2019-01-07 DIAGNOSIS — Z90.710: ICD-10-CM

## 2019-01-07 DIAGNOSIS — N17.0: ICD-10-CM

## 2019-01-07 DIAGNOSIS — Z79.899: ICD-10-CM

## 2019-01-07 DIAGNOSIS — A41.81: ICD-10-CM

## 2019-01-07 DIAGNOSIS — H54.61: ICD-10-CM

## 2019-01-07 DIAGNOSIS — Z16.12: ICD-10-CM
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
## 2019-01-07 DIAGNOSIS — Z66: ICD-10-CM

## 2019-01-07 DIAGNOSIS — Z91.81: ICD-10-CM

## 2019-01-07 DIAGNOSIS — D64.9: ICD-10-CM

## 2019-01-07 DIAGNOSIS — H40.9: ICD-10-CM

## 2019-01-07 DIAGNOSIS — I50.33: ICD-10-CM

## 2019-01-07 DIAGNOSIS — Z82.61: ICD-10-CM

## 2019-01-07 DIAGNOSIS — M89.9: ICD-10-CM

## 2019-01-07 DIAGNOSIS — R45.1: ICD-10-CM

## 2019-01-07 DIAGNOSIS — H91.93: ICD-10-CM

## 2019-01-07 DIAGNOSIS — N39.0: ICD-10-CM

## 2019-01-07 DIAGNOSIS — G93.41: ICD-10-CM

## 2019-01-07 DIAGNOSIS — Z87.440: ICD-10-CM

## 2019-01-07 DIAGNOSIS — Z87.730: ICD-10-CM

## 2019-01-07 LAB
ALBUMIN SERPL-MCNC: 3.7 G/DL (ref 3.5–5)
ALP SERPL-CCNC: 97 U/L (ref 38–126)
ALT SERPL-CCNC: 34 U/L (ref 9–52)
ANION GAP SERPL CALC-SCNC: 12 MMOL/L
APTT BLD: 25.1 SEC (ref 22–30)
AST SERPL-CCNC: 34 U/L (ref 14–36)
BASOPHILS # BLD AUTO: 0 K/UL (ref 0–0.2)
BASOPHILS NFR BLD AUTO: 0 %
BUN SERPL-SCNC: 60 MG/DL (ref 7–17)
CALCIUM SPEC-MCNC: 9.6 MG/DL (ref 8.4–10.2)
CHLORIDE SERPL-SCNC: 97 MMOL/L (ref 98–107)
CK SERPL-CCNC: 21 U/L (ref 30–135)
CK SERPL-CCNC: 23 U/L (ref 30–135)
CK SERPL-CCNC: 25 U/L (ref 30–135)
CO2 SERPL-SCNC: 26 MMOL/L (ref 22–30)
EOSINOPHIL # BLD AUTO: 0 K/UL (ref 0–0.7)
EOSINOPHIL NFR BLD AUTO: 0 %
ERYTHROCYTE [DISTWIDTH] IN BLOOD BY AUTOMATED COUNT: 4.97 M/UL (ref 3.8–5.4)
ERYTHROCYTE [DISTWIDTH] IN BLOOD: 16.8 % (ref 11.5–15.5)
GLUCOSE SERPL-MCNC: 114 MG/DL (ref 74–99)
HCT VFR BLD AUTO: 42.4 % (ref 34–46)
HGB BLD-MCNC: 12.5 GM/DL (ref 11.4–16)
INR PPP: 1 (ref ?–1.2)
LYMPHOCYTES # SPEC AUTO: 0.4 K/UL (ref 1–4.8)
LYMPHOCYTES NFR SPEC AUTO: 3 %
MAGNESIUM SPEC-SCNC: 1.9 MG/DL (ref 1.6–2.3)
MCH RBC QN AUTO: 25.2 PG (ref 25–35)
MCHC RBC AUTO-ENTMCNC: 29.6 G/DL (ref 31–37)
MCV RBC AUTO: 85.2 FL (ref 80–100)
MONOCYTES # BLD AUTO: 0.6 K/UL (ref 0–1)
MONOCYTES NFR BLD AUTO: 4 %
NEUTROPHILS # BLD AUTO: 12.9 K/UL (ref 1.3–7.7)
NEUTROPHILS NFR BLD AUTO: 91 %
PH UR: 5.5 [PH] (ref 5–8)
PLATELET # BLD AUTO: 269 K/UL (ref 150–450)
POTASSIUM SERPL-SCNC: 4.9 MMOL/L (ref 3.5–5.1)
PROT SERPL-MCNC: 6.7 G/DL (ref 6.3–8.2)
PROT UR QL: (no result)
PT BLD: 10.5 SEC (ref 9–12)
RBC UR QL: 17 /HPF (ref 0–5)
SODIUM SERPL-SCNC: 135 MMOL/L (ref 137–145)
SP GR UR: 1.02 (ref 1–1.03)
TROPONIN I SERPL-MCNC: 0.03 NG/ML (ref 0–0.03)
TROPONIN I SERPL-MCNC: 0.04 NG/ML (ref 0–0.03)
UROBILINOGEN UR QL STRIP: <2 MG/DL (ref ?–2)
WBC # BLD AUTO: 14.1 K/UL (ref 3.8–10.6)
WBC #/AREA URNS HPF: >182 /HPF (ref 0–5)

## 2019-01-07 PROCEDURE — 96365 THER/PROPH/DIAG IV INF INIT: CPT

## 2019-01-07 PROCEDURE — 84484 ASSAY OF TROPONIN QUANT: CPT

## 2019-01-07 PROCEDURE — 94640 AIRWAY INHALATION TREATMENT: CPT

## 2019-01-07 PROCEDURE — 87502 INFLUENZA DNA AMP PROBE: CPT

## 2019-01-07 PROCEDURE — 85730 THROMBOPLASTIN TIME PARTIAL: CPT

## 2019-01-07 PROCEDURE — 85025 COMPLETE CBC W/AUTO DIFF WBC: CPT

## 2019-01-07 PROCEDURE — 96375 TX/PRO/DX INJ NEW DRUG ADDON: CPT

## 2019-01-07 PROCEDURE — 87040 BLOOD CULTURE FOR BACTERIA: CPT

## 2019-01-07 PROCEDURE — 36415 COLL VENOUS BLD VENIPUNCTURE: CPT

## 2019-01-07 PROCEDURE — 51702 INSERT TEMP BLADDER CATH: CPT

## 2019-01-07 PROCEDURE — 85027 COMPLETE CBC AUTOMATED: CPT

## 2019-01-07 PROCEDURE — 76937 US GUIDE VASCULAR ACCESS: CPT

## 2019-01-07 PROCEDURE — 87186 SC STD MICRODIL/AGAR DIL: CPT

## 2019-01-07 PROCEDURE — 82553 CREATINE MB FRACTION: CPT

## 2019-01-07 PROCEDURE — 81001 URINALYSIS AUTO W/SCOPE: CPT

## 2019-01-07 PROCEDURE — 36410 VNPNXR 3YR/> PHY/QHP DX/THER: CPT

## 2019-01-07 PROCEDURE — 93308 TTE F-UP OR LMTD: CPT

## 2019-01-07 PROCEDURE — 83880 ASSAY OF NATRIURETIC PEPTIDE: CPT

## 2019-01-07 PROCEDURE — 85610 PROTHROMBIN TIME: CPT

## 2019-01-07 PROCEDURE — 82550 ASSAY OF CK (CPK): CPT

## 2019-01-07 PROCEDURE — 71045 X-RAY EXAM CHEST 1 VIEW: CPT

## 2019-01-07 PROCEDURE — 99285 EMERGENCY DEPT VISIT HI MDM: CPT

## 2019-01-07 PROCEDURE — 80053 COMPREHEN METABOLIC PANEL: CPT

## 2019-01-07 PROCEDURE — 87077 CULTURE AEROBIC IDENTIFY: CPT

## 2019-01-07 PROCEDURE — 80048 BASIC METABOLIC PNL TOTAL CA: CPT

## 2019-01-07 PROCEDURE — 83735 ASSAY OF MAGNESIUM: CPT

## 2019-01-07 PROCEDURE — 94660 CPAP INITIATION&MGMT: CPT

## 2019-01-07 PROCEDURE — 87086 URINE CULTURE/COLONY COUNT: CPT

## 2019-01-07 PROCEDURE — 83605 ASSAY OF LACTIC ACID: CPT

## 2019-01-07 PROCEDURE — 94760 N-INVAS EAR/PLS OXIMETRY 1: CPT

## 2019-01-07 PROCEDURE — 83036 HEMOGLOBIN GLYCOSYLATED A1C: CPT

## 2019-01-07 PROCEDURE — 96366 THER/PROPH/DIAG IV INF ADDON: CPT

## 2019-01-07 PROCEDURE — 93005 ELECTROCARDIOGRAM TRACING: CPT

## 2019-01-07 RX ADMIN — LATANOPROST SCH DROPS: 50 SOLUTION OPHTHALMIC at 22:49

## 2019-01-07 RX ADMIN — MIRTAZAPINE SCH MG: 15 TABLET, FILM COATED ORAL at 22:10

## 2019-01-07 RX ADMIN — FUROSEMIDE SCH MG: 10 INJECTION, SOLUTION INTRAMUSCULAR; INTRAVENOUS at 22:10

## 2019-01-07 RX ADMIN — IPRATROPIUM BROMIDE AND ALBUTEROL SULFATE SCH ML: .5; 3 SOLUTION RESPIRATORY (INHALATION) at 16:51

## 2019-01-07 RX ADMIN — Medication SCH MG: at 22:10

## 2019-01-07 RX ADMIN — APIXABAN SCH MG: 2.5 TABLET, FILM COATED ORAL at 22:10

## 2019-01-07 RX ADMIN — IPRATROPIUM BROMIDE AND ALBUTEROL SULFATE SCH ML: .5; 3 SOLUTION RESPIRATORY (INHALATION) at 12:11

## 2019-01-07 RX ADMIN — PIPERACILLIN AND TAZOBACTAM SCH MLS/HR: 3; .375 INJECTION, POWDER, FOR SOLUTION INTRAVENOUS at 16:13

## 2019-01-07 RX ADMIN — PREDNISOLONE ACETATE SCH DROPS: 10 SUSPENSION/ DROPS OPHTHALMIC at 22:49

## 2019-01-07 RX ADMIN — FLUTICASONE PROPIONATE SCH SPRAY: 50 SPRAY, METERED NASAL at 22:48

## 2019-01-07 RX ADMIN — FUROSEMIDE SCH: 10 INJECTION, SOLUTION INTRAMUSCULAR; INTRAVENOUS at 16:02

## 2019-01-07 RX ADMIN — METOPROLOL TARTRATE SCH MG: 50 TABLET, FILM COATED ORAL at 22:10

## 2019-01-07 RX ADMIN — IPRATROPIUM BROMIDE AND ALBUTEROL SULFATE SCH: .5; 3 SOLUTION RESPIRATORY (INHALATION) at 20:23

## 2019-01-07 RX ADMIN — FUROSEMIDE SCH: 10 INJECTION, SOLUTION INTRAMUSCULAR; INTRAVENOUS at 05:45

## 2019-01-07 NOTE — ED
SOB HPI





- General


Stated Complaint: altered mental status


Time Seen by Provider: 19 01:59





- History of Present Illness


Initial Comments: 


Rony is an 84 yo female brought to the ED via EMS from skilled nursing 

facility for altered mental status.


Her caregivers at the care facility the patient is supposed to be on oxygen 2-3 

L by nasal cannula.  She's been agitated unwilling to wear her oxygen finding 

with caregivers.  Patient does have advanced dementia and is usually alert and 

oriented 2 however she seems to be more agitated and only alert and oriented 

1 today.





Patient provides no meaningful history.  She is agitated and combative with 

staff.








- Related Data


 Home Medications











 Medication  Instructions  Recorded  Confirmed


 


Fluticasone Nasal Spray [Flonase 1 spray EA NOSTRIL BID@900,17





Nasal Spray]   


 


Levothyroxine Sodium [Synthroid] 25 mcg PO DAILY@0600 17


 


ALPRAZolam [Xanax] 0.25 mg PO HS@2100 11/10/17 01/07/19


 


Buprenorphine [Butrans 10 MCG/HOUR] 1 patch TRANSDERM TU@0600 11/10/17 01/07/19


 


Ergocalciferol (Vitamin D2) 50,000 unit PO Q30D 11/10/17 01/07/19





[Vitamin D2]   


 


Loratadine [Claritin] 10 mg PO DAILY@0900 11/10/17 01/07/19


 


guaiFENesin [guaiFENesin Oral 200 mg PO Q4HR PRN 11/10/17 01/07/19





Solution]   


 


prednisoLONE ACETATE [Pred Forte 2 drop BOTH EYES HS@2100 11/10/17 01/07/19





1%]   


 


Ferrous Sulfate [Feosol] 325 mg PO BID@00,18


 


Latanoprost [Xalatan 0.005%] 1 drop LEFT EYE HS@18


 


acetaZOLAMIDE [Diamox] 250 mg PO TU@00 18


 


HYDROcodone/APAP 5-325MG [Norco 1 tab PO Q8H PRN 18





5-325]   


 


Apixaban [Eliquis] 2.5 mg PO BID@0900,18


 


Cinacalcet [Sensipar] 30 mg PO DAILY@0618


 


Metoprolol Tartrate [Lopressor] 50 mg PO BID@0900,18


 


Diltiazem Cd [Cardizem Cd] 240 mg PO DAILY@0919


 


Ensure Clear 1 can PO TID-W/MEALS 19


 


Furosemide [Lasix] 40 mg PO DAILY@19


 


Mirtazapine 7.5 mg PO HS@19


 


Sodium Bicarbonate Tab 650 mg PO BID@09,17019








 Previous Rx's











 Medication  Instructions  Recorded


 


Acetaminophen Tab [Tylenol] 650 mg PO Q6HR PRN  tab 17


 


Magnesium Hydroxide [Milk of 2,400 mg PO DAILY PRN  ml 17





Magnesia Concentrate]  


 


Ipratropium-Albuterol Nebulize 3 ml INHALATION RT-QID  ampul.neb 10/01/18





[Duoneb 0.5 mg-3 mg/3 ml Soln]  


 


predniSONE 10 mg PO DAILY #30 tab 10/01/18











 Allergies











Allergy/AdvReac Type Severity Reaction Status Date / Time


 


No Known Allergies Allergy   Verified 19 06:40














Review of Systems


ROS Statement: 


Those systems with pertinent positive or pertinent negative responses have been 

documented in the HPI.





ROS Other: All systems not noted in ROS Statement are negative.





Past Medical History


Past Medical History: Heart Failure, COPD, Dementia, GERD/Reflux, Hyperlipidemia

, Hypertension, Pneumonia, Renal Disease, Respiratory Disorder, Thyroid Disorder


Additional Past Medical History / Comment(s): Pt was born with cleft palate and 

other physical problems with facial structures including nasal passages and ear 

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 

collapsing.  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 

glaucoma.pt's daughter stated pt was able to read and write up until last admit 

18 but not since", chronic renal disease stage IV-has L arm fistula(PER 

DAUGHTER IT'S A FAILED FISTULA) but no hemodialysis, frequent UTIs, wears O2 

PRN hypothyroid.


Last Myocardial Infarction Date:: unknown


History of Any Multi-Drug Resistant Organisms: ESBL


Date of last positivie culture/infection: 18


MDRO Source:: ESBL URINE


Past Surgical History: Bladder Surgery, Hysterectomy, Joint Replacement, 

Orthopedic Surgery


Additional Past Surgical History / Comment(s): Pt has had multiple surgeries 

for birth defects affecting mouth, nose and ears, total L knee arthroplasty, 

bladder sling, fistula L arm.PARTIAL HYSTERECTOMY, PICC LINE-SINCE REMOVED


Past Anesthesia/Blood Transfusion Reactions: No Reported Reaction


Smoking Status: Former smoker





- Past Family History


  ** Father


Family Medical History: Cancer


Additional Family Medical History / Comment(s): Father  prior to age 60 yrs 

with esophageal cancer.  He was a smoker and a drinker.





  ** Mother


Family Medical History: Osteoarthritis (OA), Renal Disease, Rheumatoid 

Arthritis (RA)


Additional Family Medical History / Comment(s): kidney problems, specific type 

unknown





General Exam





- General Exam Comments


Initial Comments: 


GENERAL:


Demented, in acute respiratory distress





HENT:


Normocephalic, Atraumatic. 





EYES:


PERRL





PULMONARY:


audible crackles in all lung fields





CARDIOVASCULAR:


Tachycardic, irregularly irregular





ABDOMEN:


Soft and nontender with normal bowel sounds. 





SKIN:


Pale





NEUROLOGIC:


Oriented to self





MUSCULOSKELETAL:


Normal extremities with adequate strength and full range of motion.  No lower 

extremity swelling or edema.  No calf tenderness.  





LYMPHATICS:


No significant lymphadenopathy is noted





PSYCHIATRIC:


Normal psychiatric evaluation.  





Limitations: no limitations








Course


 Vital Signs











  19





  02:14 02:33 03:29


 


Temperature 97.7 F  


 


Pulse Rate 123 H  115 H


 


Pulse Rate [  123 H 





Cardiac Monitor   





]   


 


Respiratory 28 H 30 H 23





Rate   


 


Blood Pressure 166/103  169/99


 


O2 Sat by Pulse 93 L  95





Oximetry   














  19





  05:08 06:03


 


Temperature  


 


Pulse Rate 105 H 108 H


 


Pulse Rate [  





Cardiac Monitor  





]  


 


Respiratory 24 26 H





Rate  


 


Blood Pressure 156/99 159/96


 


O2 Sat by Pulse 100 96





Oximetry  














Medical Decision Making





- Medical Decision Making


The patient was seen and evaluated history is obtained from EMS and medical 

record





Physical exam there is concern for over congestive heart failure.  Patient has 

audible crackles even without a stethoscope.  She appears to be fluid overloaded


Patient does have a signed DO NOT RESUSCITATE, therefore we attempted treatment 

with BiPAP however the patient would not tolerate


 


Labs and imaging were ordered





Chest x-ray concerning for overt heart failure


Labs with evidence of acute kidney injury suggestive of intravascular fluid 

deficit despite overt pulmonary fluid overload





Patient continues to refuse BiPAP next line we will treat the CHF with Lasix


Evidence of urinary tract infection, review of previous microbiology reveals 

the patient has had ESBL.  Appropriate antibiotics ordered


Patient care was discussed with her primary care physician Dr. Street who 

accepts admission for altered mental status, UTI, CHF and respiratory distress








- Lab Data


Result diagrams: 


 19 02:10





 19 02:10


 Lab Results











  19 Range/Units





  02:10 02:10 02:10 


 


WBC   14.1 H   (3.8-10.6)  k/uL


 


RBC   4.97   (3.80-5.40)  m/uL


 


Hgb   12.5   (11.4-16.0)  gm/dL


 


Hct   42.4   (34.0-46.0)  %


 


MCV   85.2   (80.0-100.0)  fL


 


MCH   25.2   (25.0-35.0)  pg


 


MCHC   29.6 L   (31.0-37.0)  g/dL


 


RDW   16.8 H   (11.5-15.5)  %


 


Plt Count   269   (150-450)  k/uL


 


Neutrophils %   91   %


 


Lymphocytes %   3   %


 


Monocytes %   4   %


 


Eosinophils %   0   %


 


Basophils %   0   %


 


Neutrophils #   12.9 H   (1.3-7.7)  k/uL


 


Lymphocytes #   0.4 L   (1.0-4.8)  k/uL


 


Monocytes #   0.6   (0-1.0)  k/uL


 


Eosinophils #   0.0   (0-0.7)  k/uL


 


Basophils #   0.0   (0-0.2)  k/uL


 


Hypochromasia   Slight   


 


Anisocytosis   Slight   


 


PT     (9.0-12.0)  sec


 


INR     (<1.2)  


 


APTT     (22.0-30.0)  sec


 


Sodium    135 L  (137-145)  mmol/L


 


Potassium    4.9  (3.5-5.1)  mmol/L


 


Chloride    97 L  ()  mmol/L


 


Carbon Dioxide    26  (22-30)  mmol/L


 


Anion Gap    12  mmol/L


 


BUN    60 H  (7-17)  mg/dL


 


Creatinine    3.48 H  (0.52-1.04)  mg/dL


 


Est GFR (CKD-EPI)AfAm    14  (>60 ml/min/1.73 sqM)  


 


Est GFR (CKD-EPI)NonAf    12  (>60 ml/min/1.73 sqM)  


 


Glucose    114 H  (74-99)  mg/dL


 


Plasma Lactic Acid Sumit     (0.7-2.0)  mmol/L


 


Calcium    9.6  (8.4-10.2)  mg/dL


 


Magnesium    1.9  (1.6-2.3)  mg/dL


 


Total Bilirubin    0.8  (0.2-1.3)  mg/dL


 


AST    34  (14-36)  U/L


 


ALT    34  (9-52)  U/L


 


Alkaline Phosphatase    97  ()  U/L


 


Total Creatine Kinase  25 L    ()  U/L


 


CK-MB (CK-2)  0.6    (0.0-2.4)  ng/mL


 


CK-MB (CK-2) Rel Index  2.4    


 


Troponin I  0.033    (0.000-0.034)  ng/mL


 


NT-Pro-B Natriuret Pep     pg/mL


 


Total Protein    6.7  (6.3-8.2)  g/dL


 


Albumin    3.7  (3.5-5.0)  g/dL


 


Urine Color     


 


Urine Appearance     (Clear)  


 


Urine pH     (5.0-8.0)  


 


Ur Specific Gravity     (1.001-1.035)  


 


Urine Protein     (Negative)  


 


Urine Glucose (UA)     (Negative)  


 


Urine Ketones     (Negative)  


 


Urine Blood     (Negative)  


 


Urine Nitrite     (Negative)  


 


Urine Bilirubin     (Negative)  


 


Urine Urobilinogen     (<2.0)  mg/dL


 


Ur Leukocyte Esterase     (Negative)  


 


Urine RBC     (0-5)  /hpf


 


Urine WBC     (0-5)  /hpf


 


Urine WBC Clumps     (None)  /hpf














  19 Range/Units





  02:10 02:10 02:25 


 


WBC     (3.8-10.6)  k/uL


 


RBC     (3.80-5.40)  m/uL


 


Hgb     (11.4-16.0)  gm/dL


 


Hct     (34.0-46.0)  %


 


MCV     (80.0-100.0)  fL


 


MCH     (25.0-35.0)  pg


 


MCHC     (31.0-37.0)  g/dL


 


RDW     (11.5-15.5)  %


 


Plt Count     (150-450)  k/uL


 


Neutrophils %     %


 


Lymphocytes %     %


 


Monocytes %     %


 


Eosinophils %     %


 


Basophils %     %


 


Neutrophils #     (1.3-7.7)  k/uL


 


Lymphocytes #     (1.0-4.8)  k/uL


 


Monocytes #     (0-1.0)  k/uL


 


Eosinophils #     (0-0.7)  k/uL


 


Basophils #     (0-0.2)  k/uL


 


Hypochromasia     


 


Anisocytosis     


 


PT     (9.0-12.0)  sec


 


INR     (<1.2)  


 


APTT     (22.0-30.0)  sec


 


Sodium     (137-145)  mmol/L


 


Potassium     (3.5-5.1)  mmol/L


 


Chloride     ()  mmol/L


 


Carbon Dioxide     (22-30)  mmol/L


 


Anion Gap     mmol/L


 


BUN     (7-17)  mg/dL


 


Creatinine     (0.52-1.04)  mg/dL


 


Est GFR (CKD-EPI)AfAm     (>60 ml/min/1.73 sqM)  


 


Est GFR (CKD-EPI)NonAf     (>60 ml/min/1.73 sqM)  


 


Glucose     (74-99)  mg/dL


 


Plasma Lactic Acid Sumit   1.8   (0.7-2.0)  mmol/L


 


Calcium     (8.4-10.2)  mg/dL


 


Magnesium     (1.6-2.3)  mg/dL


 


Total Bilirubin     (0.2-1.3)  mg/dL


 


AST     (14-36)  U/L


 


ALT     (9-52)  U/L


 


Alkaline Phosphatase     ()  U/L


 


Total Creatine Kinase     ()  U/L


 


CK-MB (CK-2)     (0.0-2.4)  ng/mL


 


CK-MB (CK-2) Rel Index     


 


Troponin I     (0.000-0.034)  ng/mL


 


NT-Pro-B Natriuret Pep  75307    pg/mL


 


Total Protein     (6.3-8.2)  g/dL


 


Albumin     (3.5-5.0)  g/dL


 


Urine Color    Yellow  


 


Urine Appearance    Turbid H  (Clear)  


 


Urine pH    5.5  (5.0-8.0)  


 


Ur Specific Gravity    1.018  (1.001-1.035)  


 


Urine Protein    2+ H  (Negative)  


 


Urine Glucose (UA)    Negative  (Negative)  


 


Urine Ketones    Negative  (Negative)  


 


Urine Blood    Small H  (Negative)  


 


Urine Nitrite    Negative  (Negative)  


 


Urine Bilirubin    Negative  (Negative)  


 


Urine Urobilinogen    <2.0  (<2.0)  mg/dL


 


Ur Leukocyte Esterase    Large H  (Negative)  


 


Urine RBC    17 H  (0-5)  /hpf


 


Urine WBC    >182 H  (0-5)  /hpf


 


Urine WBC Clumps    Many H  (None)  /hpf














  19 Range/Units





  02:26 


 


WBC   (3.8-10.6)  k/uL


 


RBC   (3.80-5.40)  m/uL


 


Hgb   (11.4-16.0)  gm/dL


 


Hct   (34.0-46.0)  %


 


MCV   (80.0-100.0)  fL


 


MCH   (25.0-35.0)  pg


 


MCHC   (31.0-37.0)  g/dL


 


RDW   (11.5-15.5)  %


 


Plt Count   (150-450)  k/uL


 


Neutrophils %   %


 


Lymphocytes %   %


 


Monocytes %   %


 


Eosinophils %   %


 


Basophils %   %


 


Neutrophils #   (1.3-7.7)  k/uL


 


Lymphocytes #   (1.0-4.8)  k/uL


 


Monocytes #   (0-1.0)  k/uL


 


Eosinophils #   (0-0.7)  k/uL


 


Basophils #   (0-0.2)  k/uL


 


Hypochromasia   


 


Anisocytosis   


 


PT  10.5  (9.0-12.0)  sec


 


INR  1.0  (<1.2)  


 


APTT  25.1  (22.0-30.0)  sec


 


Sodium   (137-145)  mmol/L


 


Potassium   (3.5-5.1)  mmol/L


 


Chloride   ()  mmol/L


 


Carbon Dioxide   (22-30)  mmol/L


 


Anion Gap   mmol/L


 


BUN   (7-17)  mg/dL


 


Creatinine   (0.52-1.04)  mg/dL


 


Est GFR (CKD-EPI)AfAm   (>60 ml/min/1.73 sqM)  


 


Est GFR (CKD-EPI)NonAf   (>60 ml/min/1.73 sqM)  


 


Glucose   (74-99)  mg/dL


 


Plasma Lactic Acid Sumit   (0.7-2.0)  mmol/L


 


Calcium   (8.4-10.2)  mg/dL


 


Magnesium   (1.6-2.3)  mg/dL


 


Total Bilirubin   (0.2-1.3)  mg/dL


 


AST   (14-36)  U/L


 


ALT   (9-52)  U/L


 


Alkaline Phosphatase   ()  U/L


 


Total Creatine Kinase   ()  U/L


 


CK-MB (CK-2)   (0.0-2.4)  ng/mL


 


CK-MB (CK-2) Rel Index   


 


Troponin I   (0.000-0.034)  ng/mL


 


NT-Pro-B Natriuret Pep   pg/mL


 


Total Protein   (6.3-8.2)  g/dL


 


Albumin   (3.5-5.0)  g/dL


 


Urine Color   


 


Urine Appearance   (Clear)  


 


Urine pH   (5.0-8.0)  


 


Ur Specific Gravity   (1.001-1.035)  


 


Urine Protein   (Negative)  


 


Urine Glucose (UA)   (Negative)  


 


Urine Ketones   (Negative)  


 


Urine Blood   (Negative)  


 


Urine Nitrite   (Negative)  


 


Urine Bilirubin   (Negative)  


 


Urine Urobilinogen   (<2.0)  mg/dL


 


Ur Leukocyte Esterase   (Negative)  


 


Urine RBC   (0-5)  /hpf


 


Urine WBC   (0-5)  /hpf


 


Urine WBC Clumps   (None)  /hpf














- EKG Data


-: EKG Interpreted by Me


EKG Comments: 


EKG obtained at 1:55 AM rhythm is atrial fibrillation rate is 129, there is 

normal axis, QRS is 72 QTc is 418, there is significant respiratory variation 

but no acute ST elevations or depressions no evidence of acute extreme air 

infarction.








Disposition


Clinical Impression: 


 JACK (acute kidney injury), Atrial fibrillation with RVR, CHF exacerbation, 

Urinary tract infection, Dementia





Disposition: ADMITTED AS IP TO THIS HOSP

## 2019-01-07 NOTE — XR
EXAMINATION TYPE: XR chest 1V portable

 

DATE OF EXAM: 1/7/2019

 

COMPARISON: 9/29/2018

 

HISTORY: Dyspnea hypoxemia

 

TECHNIQUE: Single frontal view of the chest is obtained.

 

FINDINGS:  There is pulmonary edema. Heart is enlarged. Thoracic aorta is atheromatous. There are gabriela
st leads. There is slight blunting of the costophrenic angles. There are chest leads.

 

IMPRESSION:  Congestive heart failure. Patchy pulmonary infiltrates. RDS is possible. Chest appears s
lightly worse than last exam.

## 2019-01-08 RX ADMIN — APIXABAN SCH MG: 2.5 TABLET, FILM COATED ORAL at 08:05

## 2019-01-08 RX ADMIN — FUROSEMIDE SCH MG: 10 INJECTION, SOLUTION INTRAMUSCULAR; INTRAVENOUS at 11:15

## 2019-01-08 RX ADMIN — LORATADINE SCH MG: 10 TABLET ORAL at 08:05

## 2019-01-08 RX ADMIN — Medication SCH MG: at 17:21

## 2019-01-08 RX ADMIN — PIPERACILLIN AND TAZOBACTAM SCH MLS/HR: 3; .375 INJECTION, POWDER, FOR SOLUTION INTRAVENOUS at 17:21

## 2019-01-08 RX ADMIN — PIPERACILLIN AND TAZOBACTAM SCH MLS/HR: 3; .375 INJECTION, POWDER, FOR SOLUTION INTRAVENOUS at 05:31

## 2019-01-08 RX ADMIN — DILTIAZEM HYDROCHLORIDE SCH MG: 240 CAPSULE, COATED, EXTENDED RELEASE ORAL at 08:04

## 2019-01-08 RX ADMIN — ASPIRIN SCH: 325 TABLET ORAL at 06:28

## 2019-01-08 RX ADMIN — FUROSEMIDE SCH MG: 10 INJECTION, SOLUTION INTRAMUSCULAR; INTRAVENOUS at 19:32

## 2019-01-08 RX ADMIN — FLUTICASONE PROPIONATE SCH SPRAY: 50 SPRAY, METERED NASAL at 19:33

## 2019-01-08 RX ADMIN — LATANOPROST SCH DROPS: 50 SOLUTION OPHTHALMIC at 19:33

## 2019-01-08 RX ADMIN — PREDNISOLONE ACETATE SCH DROPS: 10 SUSPENSION/ DROPS OPHTHALMIC at 19:33

## 2019-01-08 RX ADMIN — FUROSEMIDE SCH MG: 10 INJECTION, SOLUTION INTRAMUSCULAR; INTRAVENOUS at 03:31

## 2019-01-08 RX ADMIN — IPRATROPIUM BROMIDE AND ALBUTEROL SULFATE SCH: .5; 3 SOLUTION RESPIRATORY (INHALATION) at 13:10

## 2019-01-08 RX ADMIN — METOPROLOL TARTRATE SCH MG: 50 TABLET, FILM COATED ORAL at 19:32

## 2019-01-08 RX ADMIN — IPRATROPIUM BROMIDE AND ALBUTEROL SULFATE SCH ML: .5; 3 SOLUTION RESPIRATORY (INHALATION) at 16:06

## 2019-01-08 RX ADMIN — APIXABAN SCH MG: 2.5 TABLET, FILM COATED ORAL at 19:32

## 2019-01-08 RX ADMIN — LEVOTHYROXINE SODIUM SCH MCG: 25 TABLET ORAL at 08:05

## 2019-01-08 RX ADMIN — Medication SCH MG: at 08:04

## 2019-01-08 RX ADMIN — MIRTAZAPINE SCH MG: 15 TABLET, FILM COATED ORAL at 19:32

## 2019-01-08 RX ADMIN — IPRATROPIUM BROMIDE AND ALBUTEROL SULFATE SCH ML: .5; 3 SOLUTION RESPIRATORY (INHALATION) at 09:44

## 2019-01-08 RX ADMIN — CINACALCET HYDROCHLORIDE SCH MG: 30 TABLET, COATED ORAL at 08:43

## 2019-01-08 RX ADMIN — IPRATROPIUM BROMIDE AND ALBUTEROL SULFATE SCH ML: .5; 3 SOLUTION RESPIRATORY (INHALATION) at 21:06

## 2019-01-08 RX ADMIN — FLUTICASONE PROPIONATE SCH SPRAY: 50 SPRAY, METERED NASAL at 08:05

## 2019-01-08 RX ADMIN — METOPROLOL TARTRATE SCH MG: 50 TABLET, FILM COATED ORAL at 08:04

## 2019-01-08 NOTE — CONS
CONSULTATION



Rony is an 83-year-old lady with history of COPD, hypothyroidism, hypertension,

renal insufficiency, who presented to hospital with altered mental status and

Cardiology had been consulted because of mild elevation in troponin.  She is a poor

historian.  Her BNP is also elevated.  She denies chest pain, but has some shortness of

breath.



PAST MEDICAL HISTORY:

Significant for CVA, COPD, atrial fibrillation, dementia, heart failure, hypertension,

dyslipidemia.



MEDICATIONS:

At home included Synthroid Xanax, vitamin D, Claritin, magnesium, Xalatan, Eliquis 2.5

b.i.d., metoprolol 50 b.i.d., DuoNeb, Cardizem CD, Lasix 40 mg daily.



ALLERGIES:

There are no known drug allergies.



FAMILY HISTORY:

Negative for premature coronary artery disease.



SOCIAL HISTORY:

She denies current smoking.



REVIEW OF SYSTEMS:

HEENT is significant for is confusion.

CARDIAC:  Significant atrial fibrillation.

RESPIRATORY:  Shortness of breath.

GI:  Negative.

GENITOURINARY:  Negative.

ALLERGY/IMMUNOLOGY:  Negative.

SKIN:  Negative.

MUSCULOSKELETAL:  Significant for arthritis.

PSYCHOSOCIAL:  Negative.

ENDOCRINE:  Negative.

DERMATOLOGICAL:  Negative.

CONSTITUTIONAL:  Negative.

ONCOLOGICAL:  Negative.



Rest of the system review is not relevant.



PHYSICAL EXAM:

Patient is afebrile.  Heart rate is 100 beats per minute, irregular.  Blood pressure is

155/96, respiratory rate is 18.

Chest exam reveals diminished air entry at the bases with occasional rhonchi.  Heart

exam reveals first and second heart sounds.  Irregular rhythm.  Abdomen is soft.  Exam

of extremities did reveal trace edema.  Peripheral pulses are palpable EKG shows atrial

fibrillation with poorly-controlled ventricular rate.



ASSESSMENT:

1. Chronic atrial fibrillation with poorly-controlled ventricular rate.

2. Hypertension.

3. Chronic obstructive pulmonary disease.



PLAN:

I will obtain a 2D echo to document her LV function, resume the Eliquis, use Cardizem

CD and metoprolol for rate control.  If necessary, go up on the dose of the metoprolol.





MMODL / IJN: [id number] / Job#: [id number]

## 2019-01-08 NOTE — P.CNPUL
History of Present Illness


Consult date: 19


Reason for consult: dyspnea, cough, COPD, pneumonia


Chief complaint: Cough shortness of breath and wheezing with brown sputum 

production


History of present illness: 





83-year-old female with history of prior CVA with significant aphasia patient 

has some residual weakness in the extremity a patient is a resident of extended 

care facility she was noted to be more short of breath as well as started 

having cough with thick purulent sputum production with those problem patient 

was brought into emergency department for further evaluation has been admitted 

into the hospital, her admitted chest x-ray suggestive of patchy bilateral 

infiltrate, both lungs and wall more so on the right side compared to the left 

side, urine culture and blood culture have been negative, patient is currently 

being treated with Zosyn, her labs are reviewed, labs white cell count is 14,000

, BUN/creatinine 60 and 3.48 slight worsening from previous renal functions 

have been noted,





Review of Systems


All systems: negative





Past Medical History


Past Medical History: Atrial Fibrillation, Heart Failure, COPD, Dementia, GERD/

Reflux, Hyperlipidemia, Hypertension, Pneumonia, Renal Disease, Respiratory 

Disorder, Thyroid Disorder


Additional Past Medical History / Comment(s): Pt was born with cleft palate and 

other physical problems with facial structures including nasal passages and ear 

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 

collapsing.  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 

chronic renal disease stage IV-has L arm fistula(PER DAUGHTER IT'S A FAILED 

FISTULA) but no hemodialysis, pulmonary fibrosis, frequent UTIs, wears O2 PRN, 

hypothyroid, insomnia, gout, past fall with concussion/L hip fracture.  Marielle 

states pt may have had a CVA or a brain mass but unable to have MRI d/t metal 

in ear.


Last Myocardial Infarction Date:: unknown


History of Any Multi-Drug Resistant Organisms: ESBL


Date of last positivie culture/infection: 18


MDRO Source:: ESBL URINE


Past Surgical History: Bladder Surgery, Hysterectomy, Joint Replacement, 

Orthopedic Surgery


Additional Past Surgical History / Comment(s): Pt has had multiple surgeries 

for birth defects affecting mouth, nose and ears, total L knee arthroplasty, 

bladder sling, fistula L arm.PARTIAL HYSTERECTOMY, PICC LINE-SINCE REMOVED, L 

hip cemented hemiarthroplasty.


Past Anesthesia/Blood Transfusion Reactions: No Reported Reaction


Smoking Status: Former smoker





- Past Family History


  ** Father


Family Medical History: Cancer


Additional Family Medical History / Comment(s): Father  prior to age 60 yrs 

with esophageal cancer.  He was a smoker and a drinker.





  ** Mother


Family Medical History: Osteoarthritis (OA), Renal Disease


Additional Family Medical History / Comment(s): kidney problems, specific type 

unknown





Medications and Allergies


 Home Medications











 Medication  Instructions  Recorded  Confirmed  Type


 


Fluticasone Nasal Spray [Flonase 1 spray EA NOSTRIL BID@0900,17 History





Nasal Spray]    


 


Levothyroxine Sodium [Synthroid] 25 mcg PO DAILY@0600 17 History


 


Acetaminophen Tab [Tylenol] 650 mg PO Q6HR PRN  tab 17 Rx


 


ALPRAZolam [Xanax] 0.25 mg PO HS@2100 11/10/17 01/07/19 History


 


Buprenorphine [Butrans 10 MCG/HOUR] 1 patch TRANSDERM TU@0600 11/10/17 01/07/19 

History


 


Ergocalciferol (Vitamin D2) 50,000 unit PO Q30D 11/10/17 01/07/19 History





[Vitamin D2]    


 


Loratadine [Claritin] 10 mg PO DAILY@0900 11/10/17 01/07/19 History


 


guaiFENesin [guaiFENesin Oral 200 mg PO Q4HR PRN 11/10/17 01/07/19 History





Solution]    


 


prednisoLONE ACETATE [Pred Forte 2 drop BOTH EYES HS@2100 11/10/17 01/07/19 

History





1%]    


 


Magnesium Hydroxide [Milk of 2,400 mg PO DAILY PRN  ml 17 Rx





Magnesia Concentrate]    


 


Ferrous Sulfate [Feosol] 325 mg PO BID@00,18 History


 


Latanoprost [Xalatan 0.005%] 1 drop LEFT EYE HS@18 History


 


acetaZOLAMIDE [Diamox] 250 mg PO TU@0900 18 History


 


HYDROcodone/APAP 5-325MG [Norco 1 tab PO Q8H PRN 18 History





5-325]    


 


Apixaban [Eliquis] 2.5 mg PO BID@0900,2100 18 History


 


Cinacalcet [Sensipar] 30 mg PO DAILY@0600 18 History


 


Metoprolol Tartrate [Lopressor] 50 mg PO BID@0900,2100 18 History


 


Ipratropium-Albuterol Nebulize 3 ml INHALATION RT-QID  ampul.neb 10/01/18 01/07/

19 Rx





[Duoneb 0.5 mg-3 mg/3 ml Soln]    


 


predniSONE 10 mg PO DAILY #30 tab 10/01/18 01/07/19 Rx


 


Diltiazem Cd [Cardizem Cd] 240 mg PO DAILY@0919 History


 


Ensure Clear 1 can PO TID-W/MEALS 19 History


 


Furosemide [Lasix] 40 mg PO DAILY@0619 History


 


Mirtazapine 7.5 mg PO HS@19 History


 


Sodium Bicarbonate Tab 650 mg PO BID@0900,1700 19 History











 Allergies











Allergy/AdvReac Type Severity Reaction Status Date / Time


 


No Known Allergies Allergy   Verified 19 06:40














Physical Exam


Vitals: 


 Vital Signs











  Temp Pulse Pulse Resp BP BP Pulse Ox


 


 19 09:56   92      94 L


 


 19 09:44   88     


 


 19 08:00  97.4 F L   117 H  18   184/81  86 L


 


 19 03:44    104 H  24   154/97  88 L


 


 19 00:00    104 H  20   162/94  85 L


 


 19 23:23    81  18   


 


 19 21:35  98.9 F      


 


 19 21:31   100   17  165/90   96


 


 19 17:02   100     


 


 19 16:51   102 H     


 


 19 16:00  98.1 F   129 H  16   169/95  95


 


 19 14:41   112 H     


 


 19 13:00  98.1 F  108 H   22  169/96   95


 


 19 12:19   114 H     


 


 19 12:12   112 H     








 Intake and Output











 19





 22:59 06:59 14:59


 


Intake Total   180


 


Output Total 300 1000 


 


Balance -300 -1000 180


 


Intake:   


 


  Oral   180


 


Output:   


 


  Urine 300 1000 


 


Other:   


 


  Voiding Method Indwelling Catheter Indwelling Catheter Indwelling Catheter


 


  Weight  75 kg 














- Constitutional


General appearance: average body habitus, cooperative, disheveled, mild distress





- EENT


Eyes: anicteric sclerae, EOMI, PERRLA, poor dentition


ENT: hard of hearing, normal oropharynx


Ears: bilateral: normal





- Neck


Carotids: bilateral: upstroke normal


Thyroid: bilateral: normal size





- Respiratory


Respiratory: bilateral: CTA, wheezing, prolonged expiration, negative: dullness

, rales, rhonchi





- Cardiovascular


Rhythm: regular


Heart sounds: normal: S1, S2





- Gastrointestinal


General gastrointestinal: decreased bowel sounds, normal bowel sounds, soft





- Neurologic


Neurologic: CNII-XII intact





- Musculoskeletal


Musculoskeletal: gait normal, generalized weakness, strength equal bilaterally





- Psychiatric





Due to stroke significant speech impairment noted


Psychiatric: A&O x's 3, appropriate affect, intact judgment & insight





Results





- Laboratory Findings


CBC and BMP: 


 19 02:10





 19 02:10


PT/INR, D-dimer











PT  10.5 sec (9.0-12.0)   19  02:26    


 


INR  1.0  (<1.2)   19  02:26    








Abnormal lab findings: 


 Abnormal Labs











  19





  02:10 02:10 02:10


 


WBC   14.1 H 


 


MCHC   29.6 L 


 


RDW   16.8 H 


 


Neutrophils #   12.9 H 


 


Lymphocytes #   0.4 L 


 


Sodium    135 L


 


Chloride    97 L


 


BUN    60 H


 


Creatinine    3.48 H


 


Glucose    114 H


 


Total Creatine Kinase  25 L  


 


Troponin I   


 


Urine Appearance   


 


Urine Protein   


 


Urine Blood   


 


Ur Leukocyte Esterase   


 


Urine RBC   


 


Urine WBC   


 


Urine WBC Clumps   














  19





  02:25 05:59 05:59


 


WBC   


 


MCHC   


 


RDW   


 


Neutrophils #   


 


Lymphocytes #   


 


Sodium   


 


Chloride   


 


BUN   


 


Creatinine   


 


Glucose   


 


Total Creatine Kinase   21 L 


 


Troponin I    0.045 H*


 


Urine Appearance  Turbid H  


 


Urine Protein  2+ H  


 


Urine Blood  Small H  


 


Ur Leukocyte Esterase  Large H  


 


Urine RBC  17 H  


 


Urine WBC  >182 H  


 


Urine WBC Clumps  Many H  














  19





  15:25


 


WBC 


 


MCHC 


 


RDW 


 


Neutrophils # 


 


Lymphocytes # 


 


Sodium 


 


Chloride 


 


BUN 


 


Creatinine 


 


Glucose 


 


Total Creatine Kinase  23 L


 


Troponin I  0.038 H*


 


Urine Appearance 


 


Urine Protein 


 


Urine Blood 


 


Ur Leukocyte Esterase 


 


Urine RBC 


 


Urine WBC 


 


Urine WBC Clumps 














- Diagnostic Findings


Chest x-ray: report reviewed, image reviewed (Bilateral diffuse infiltrates are 

present)





Assessment and Plan


Assessment: 





Acute hypoxic respiratory failure





Bilateral pneumonia





Acute on chronic renal failure, stage III





History of CVA





UTI





Elevated troponins occurred MI non-ST segment elevated cannot be excluded given 

that the BNP is elevated associated congestive heart failure remains an issue














Plan: 





Continue Zosyn





Agree with cardiovascular evaluation and obtaining echocardiogram





Will check a sputum for Gram stain and culture also sent nasopharyngeal swab 

for influenza A and B





Continue breathing treatments 





We will add IV steroids, and DC oral prednisone





Repeat chest x-ray in a.m.





Other recommendations pending plan of care as per clinical response of the 

patient


Time with Patient: Greater than 30

## 2019-01-08 NOTE — HP
HISTORY AND PHYSICAL



CHIEF COMPLAINT:

An 83-year-old white female came in for altered mental status, on 2 to 3 L oxygen.  She

was found to have UTI with sepsis and healthcare-acquired pneumonia.  She has a history

of agitated advanced dementia and a possible brain tumor.



HOME MEDICINES:

Includes Synthroid, fluticasone, Xanax, Butrans patch, Claritin, vitamin D, Pred Forte,

Xalatan, Diamox, Norco, Eliquis, Sensipar, Lopressor, Cardizem CD, Lasix, mirtazapine,

sodium bicarbonate.



PAST MEDICAL HISTORY:

Heart failure, COPD, dementia, GERD, dyslipidemia, hypertension, pneumonia, renal

disease, respiratory disorder, hypothyroidism, hearing aid, deaf, she is blind and

glaucoma in the right eye.



PAST SURGICAL HISTORY:

Bladder surgery, hysterectomy, joint replacement, orthopedic surgery, umbilical

surgeries.



PHYSICAL EXAM:

LUNGS: Show scattered rhonchi in all lung fields.

HEART:  Irregular regular rhythm.  Tachycardia.

ABDOMEN:  Soft.

SKIN: Dry.

NEUROLOGIC:  Alert and oriented x3.

MUSCULOSKELETAL: Range of motion x4 extremities.

PSYCH: She appears anxious, loud, and nervous.

OPHTHALMOLOGIC: Pupils equal, round, reactive to light.

VITAL SIGNS: Pulse is 115 and 123, respiratory 18 to 30, blood pressure 160/99 to 103,

O2 of 93 and 95% on 2 L.



ASSESSMENT:

1. Sepsis.

2. Healthcare-acquired pneumonia.

3. Urinary tract infection.

4. Urosepsis.

5. Acute on chronic renal insufficiency.

6. Dehydration.

7. Leukocytosis secondary to above.

8. Atrial fibrillation with rapid ventricular response.



PLAN:

Continue current treatments with broad-spectrum antibiotics. Await Infectious Disease

and Pulmonary consult. Atrial fibrillation with rapid ventricular response will also be

treated with medications for atrial fibrillation.





MMODL / IJN: [id number] / Job#: [id number]

## 2019-01-09 LAB
ANION GAP SERPL CALC-SCNC: 12 MMOL/L
BASOPHILS # BLD AUTO: 0 K/UL (ref 0–0.2)
BASOPHILS NFR BLD AUTO: 0 %
BUN SERPL-SCNC: 62 MG/DL (ref 7–17)
CALCIUM SPEC-MCNC: 9.2 MG/DL (ref 8.4–10.2)
CHLORIDE SERPL-SCNC: 98 MMOL/L (ref 98–107)
CO2 SERPL-SCNC: 31 MMOL/L (ref 22–30)
EOSINOPHIL # BLD AUTO: 0 K/UL (ref 0–0.7)
EOSINOPHIL NFR BLD AUTO: 0 %
ERYTHROCYTE [DISTWIDTH] IN BLOOD BY AUTOMATED COUNT: 4.58 M/UL (ref 3.8–5.4)
ERYTHROCYTE [DISTWIDTH] IN BLOOD: 16.8 % (ref 11.5–15.5)
GLUCOSE SERPL-MCNC: 122 MG/DL (ref 74–99)
HCT VFR BLD AUTO: 39.7 % (ref 34–46)
HGB BLD-MCNC: 12.2 GM/DL (ref 11.4–16)
LYMPHOCYTES # SPEC AUTO: 0.7 K/UL (ref 1–4.8)
LYMPHOCYTES NFR SPEC AUTO: 6 %
MCH RBC QN AUTO: 26.7 PG (ref 25–35)
MCHC RBC AUTO-ENTMCNC: 30.8 G/DL (ref 31–37)
MCV RBC AUTO: 86.6 FL (ref 80–100)
MONOCYTES # BLD AUTO: 0.5 K/UL (ref 0–1)
MONOCYTES NFR BLD AUTO: 5 %
NEUTROPHILS # BLD AUTO: 9.5 K/UL (ref 1.3–7.7)
NEUTROPHILS NFR BLD AUTO: 87 %
PLATELET # BLD AUTO: 243 K/UL (ref 150–450)
POTASSIUM SERPL-SCNC: 3.8 MMOL/L (ref 3.5–5.1)
SODIUM SERPL-SCNC: 141 MMOL/L (ref 137–145)
WBC # BLD AUTO: 10.8 K/UL (ref 3.8–10.6)

## 2019-01-09 RX ADMIN — LATANOPROST SCH: 50 SOLUTION OPHTHALMIC at 21:23

## 2019-01-09 RX ADMIN — LORATADINE SCH MG: 10 TABLET ORAL at 08:12

## 2019-01-09 RX ADMIN — APIXABAN SCH MG: 2.5 TABLET, FILM COATED ORAL at 21:22

## 2019-01-09 RX ADMIN — ERTAPENEM SODIUM SCH MLS/HR: 1 INJECTION, POWDER, LYOPHILIZED, FOR SOLUTION INTRAMUSCULAR; INTRAVENOUS at 16:32

## 2019-01-09 RX ADMIN — METOPROLOL TARTRATE SCH MG: 50 TABLET, FILM COATED ORAL at 08:12

## 2019-01-09 RX ADMIN — FLUTICASONE PROPIONATE SCH: 50 SPRAY, METERED NASAL at 21:23

## 2019-01-09 RX ADMIN — PREDNISOLONE ACETATE SCH: 10 SUSPENSION/ DROPS OPHTHALMIC at 21:23

## 2019-01-09 RX ADMIN — IPRATROPIUM BROMIDE AND ALBUTEROL SULFATE SCH ML: .5; 3 SOLUTION RESPIRATORY (INHALATION) at 07:46

## 2019-01-09 RX ADMIN — CINACALCET HYDROCHLORIDE SCH MG: 30 TABLET, COATED ORAL at 06:37

## 2019-01-09 RX ADMIN — METOPROLOL TARTRATE SCH MG: 50 TABLET, FILM COATED ORAL at 16:32

## 2019-01-09 RX ADMIN — FUROSEMIDE SCH: 10 INJECTION, SOLUTION INTRAMUSCULAR; INTRAVENOUS at 04:54

## 2019-01-09 RX ADMIN — METOPROLOL TARTRATE SCH MG: 50 TABLET, FILM COATED ORAL at 21:22

## 2019-01-09 RX ADMIN — LEVOTHYROXINE SODIUM SCH MCG: 25 TABLET ORAL at 06:37

## 2019-01-09 RX ADMIN — MIRTAZAPINE SCH MG: 15 TABLET, FILM COATED ORAL at 22:46

## 2019-01-09 RX ADMIN — Medication SCH MG: at 06:37

## 2019-01-09 RX ADMIN — FUROSEMIDE SCH MG: 10 INJECTION, SOLUTION INTRAMUSCULAR; INTRAVENOUS at 11:27

## 2019-01-09 RX ADMIN — FLUTICASONE PROPIONATE SCH: 50 SPRAY, METERED NASAL at 08:12

## 2019-01-09 RX ADMIN — APIXABAN SCH MG: 2.5 TABLET, FILM COATED ORAL at 22:46

## 2019-01-09 RX ADMIN — Medication SCH: at 08:13

## 2019-01-09 RX ADMIN — METOPROLOL TARTRATE SCH MG: 50 TABLET, FILM COATED ORAL at 22:45

## 2019-01-09 RX ADMIN — Medication SCH MG: at 16:32

## 2019-01-09 RX ADMIN — IPRATROPIUM BROMIDE AND ALBUTEROL SULFATE SCH: .5; 3 SOLUTION RESPIRATORY (INHALATION) at 21:12

## 2019-01-09 RX ADMIN — APIXABAN SCH MG: 2.5 TABLET, FILM COATED ORAL at 08:12

## 2019-01-09 RX ADMIN — IPRATROPIUM BROMIDE AND ALBUTEROL SULFATE SCH: .5; 3 SOLUTION RESPIRATORY (INHALATION) at 11:34

## 2019-01-09 RX ADMIN — IPRATROPIUM BROMIDE AND ALBUTEROL SULFATE SCH: .5; 3 SOLUTION RESPIRATORY (INHALATION) at 15:49

## 2019-01-09 RX ADMIN — FUROSEMIDE SCH MG: 10 INJECTION, SOLUTION INTRAMUSCULAR; INTRAVENOUS at 21:23

## 2019-01-09 RX ADMIN — DILTIAZEM HYDROCHLORIDE SCH MG: 240 CAPSULE, COATED, EXTENDED RELEASE ORAL at 08:12

## 2019-01-09 RX ADMIN — MIRTAZAPINE SCH MG: 15 TABLET, FILM COATED ORAL at 21:22

## 2019-01-09 RX ADMIN — PIPERACILLIN AND TAZOBACTAM SCH: 3; .375 INJECTION, POWDER, FOR SOLUTION INTRAVENOUS at 04:55

## 2019-01-09 NOTE — P.PN
Subjective


Progress Note Date: 01/09/19


6 is an 83-year-old female with history of COPD, hypothyroidism, hypertension, 

renal insufficiency, hypothyroidism who presented to the hospital with symptoms 

of altered mental status changes.  Cardiology consultation was requested 

because of abnormality in troponin.  Patient was also noted to have a 

significantly elevated BNP level.  BNP 24,800.  Patient also noted to have a 

positive UTI, influenza A and B were negative.  Troponin 0.03, 0.04, 0.03.  

Patient was seen and examined this morning, chest x-ray showed congestive heart 

failure.  She is on IV Lasix and has been diuresing well through the night last 

night.  Her weight today is down 4 kg, she continues to be in atrial 

fibrillation with a heart rate in the 90s to low 100s, blood pressure 154/88.








Objective





- Vital Signs


Vital signs: 


 Vital Signs











Temp  97.3 F L  01/09/19 11:38


 


Pulse  99   01/09/19 11:38


 


Resp  20   01/09/19 11:38


 


BP  154/90   01/09/19 11:38


 


Pulse Ox  91 L  01/09/19 11:38








 Intake & Output











 01/08/19 01/09/19 01/09/19





 18:59 06:59 18:59


 


Intake Total 500  322


 


Output Total 1975 1300 1400


 


Balance -1937 -6743 -6671


 


Weight  71 kg 


 


Intake:   


 


  Intake, IV Titration 200  100





  Amount   


 


    Piperacillin-Tazobactam 3 200  100





    .375 gm In Sodium   





    Chloride 0.9% 100 ml @ 25   





    mls/hr IVPB Q12H [facility] Rx#   





    :[id number]   


 


  Oral 300  222


 


Output:   


 


  Urine 1975 1300 1400


 


Other:   


 


  Voiding Method Indwelling Catheter Indwelling Catheter Indwelling Catheter


 


  # Voids  3 














- Exam





PHYSICAL EXAMINATION: 





GENERAL: 83-year-old  female in no acute distress at the time of my 

examination





HEENT: Head is atraumatic, normocephalic.  Pupils equal, round.  Sclera 

anicteric. Conjunctiva are clear.  Mucous membranes of the mouth are moist.  

Neck is supple.  There is  elevated jugular venous pressure.  No carotid  bruit 

is heard.





HEART EXAMINATION: Heart S1 and S2 irregularly irregular a systolic murmur is 

heard.





CHEST EXAMINATION: Revealed decreased air entry to bilateral bases with rales 

noted to the bases bilaterally.





ABDOMEN:  Soft, nontender. Bowel sounds are heard. No organomegaly noted.


 


EXTREMITIES: 2+ peripheral pulses with  evidence of peripheral edema and no 

calf tenderness noted.





NEUROLOGIC patient is awake, alert and oriented 1 .


 


.


 








- Labs


CBC & Chem 7: 


 01/09/19 05:27





 01/09/19 05:27


Labs: 


 Abnormal Lab Results - Last 24 Hours (Table)











  01/09/19 01/09/19 Range/Units





  05:27 05:27 


 


WBC  10.8 H   (3.8-10.6)  k/uL


 


MCHC  30.8 L   (31.0-37.0)  g/dL


 


RDW  16.8 H   (11.5-15.5)  %


 


Neutrophils #  9.5 H   (1.3-7.7)  k/uL


 


Lymphocytes #  0.7 L   (1.0-4.8)  k/uL


 


Carbon Dioxide   31 H  (22-30)  mmol/L


 


BUN   62 H  (7-17)  mg/dL


 


Creatinine   3.11 H  (0.52-1.04)  mg/dL


 


Glucose   122 H  (74-99)  mg/dL








 Microbiology - Last 24 Hours (Table)











 01/07/19 02:10 Blood Culture - Preliminary





 Blood    No Growth after 48 hours


 


 01/07/19 02:25 Urine Culture - Preliminary





 Urine,Catheterized    Gram Neg Bacilli





    Group D Enterococcus














Assessment and Plan


Plan: 


Assessment and plan


#1 chronic atrial fibrillation


#2 hypertension


#3 COPD


#4 congestive heart failure, LV function unknown


#5 UTI


#6 sepsis


#7 acute renal insufficiency








Plan


We will continue with current dose of IV Lasix.  Continue to monitor intake and 

output along with daily weights and daily lytes BUN and creatinine.  Patient 

has also been initiated on antibiotics.  She is on Eliquis for anticoagulation, 

we will increase her dose of metoprolol to 50 mg 3 times a day for more optimal 

heart rate control.





DNP note has been reviewed, I agree with a documented findings and plan of 

care.  Patient was seen and examined.

## 2019-01-09 NOTE — CONS
CONSULTATION



DATE OF SERVICE:

2019.



REASON FOR CONSULTATION:

Urinary tract infection.



HISTORY OF PRESENT ILLNESS:

The patient is an 83-year-old  female who was brought into the ER at [facility] by the EMS from the [facility] nursing facility for mental status changes.

According to the caregiver at that facility, the patient has been agitation and

unwilling to be on oxygen. Symptoms had been getting worse from that.  No clear history

of any fevers, rigors, or chills.  No history of any nausea, vomiting, or any diarrhea.

With these symptoms, the patient was evaluated by the ER physician on 2019. On

arrival, the patient has been afebrile. She did have a white count of 14.1 on

admission. Creatinine was elevated at 3.48.  She did have a UA done which was large

leukocyte esterase, more than 1 to 2 WBC.  Culture now showing gram-negative bacilli,

group D Enterococcus.  Blood culture has been negative.  Infectious Disease was

consulted for further recommendation regarding antibiotic therapy. The patient herself

is not a very good historian.  Most of the information has been obtained from _____.

The RN mentioned that the patient was not placed BiPAP or any oxygen. Did not seem to

be _____ but in distress.



REVIEW OF SYSTEMS:

Could not be reliably obtained.  Positive points have been mentioned in HPI.



MEDICAL HISTORY:

COPD, dementia, hyperlipidemia, hypertension, pneumonia, hypothyroidism. Previous

history of PE.



PAST SURGICAL HISTORY:

Hysterectomy, bladder surgery, left knee arthroplasty.



SOCIAL HISTORY:

Remote history of smoking. No drinking or drug use.



FAMILY HISTORY:

Father  of esophageal cancer. Mother history of osteoarthritis and rheumatoid

arthritis.



ALLERGIES:

No known drug allergies.



MEDICATIONS:

Currently on Tylenol, Norco, Diamox, DuoNeb, Xanax, Eliquis, Sensipar, Cardizem, iron

sulfate, Lasix, Robitussin, Synthroid, Claritin, Lopressor, Remeron, Narcan and Zosyn.



PHYSICAL EXAMINATION:

Blood pressure 139/75, pulse of 90, temperature 98, she is 100% on 3 L nasal cannula.

GENERAL DESCRIPTION: An elderly female, up in the bed in no distress.  No tachypnea or

accessory muscle of respiration use.

HEENT:  Shows no pallor.  No scleral icterus.  Oral mucosa is dry.  No pharyngeal

erythema.

NECK: Trachea central. No thyromegaly.

LUNGS: Unlabored breathing with decreased breath sounds in the bases.

HEART: S1, S2.  Regular rate and rhythm.

ABDOMEN:  Soft, no tenderness.

EXTREMITIES: No edema of the feet.

NEUROLOGICAL: The patient is awake, alert, oriented.  No agitation was noticed today.



LABS:

Hemoglobin is 12.5, white count 14.1 with a BUN of 16, creatinine of 3.48.  Liver

enzymes are normal.  UA has been positive.



DIAGNOSTIC IMPRESSION AND PLAN:

Patient admitted to the hospital with mental status changes, agitation, in this patient

who has multiple factors that could be responsible for the underlying symptomatology

including possible metabolic with elevated BUN and creatinine and dehydration.  She

also has significant positive underlying UTI not entirely excluded.  However, it was

really hard to get any history from this patient as for as urinary symptoms are

concerned.



PLAN:

1. Gentle IV fluid.

2. Zosyn 3.75 should provide adequate cover for gram-negative urinary tract infection.

3. We will repeat CBC and BMP tomorrow.

4. We will follow up on clinical condition and further adjust medication if needed.

Thank you for this consultation.  Will follow this patient along with you.





MMODL / IJN: [id number] / Job#: [id number]

## 2019-01-09 NOTE — CDI
Documentation Clarification Form



Date: 1/9/2019 10:24:58 AM

From: Ne CardenasKEVIN, CCDS

Phone: [phone]

MRN: [id number]

Admit Date: 1/7/2019 3:42:00 AM

Patient Name: Rony Scanlon

Visit Number: [id number]

Discharge Date:  



ATTENTION: The Clinical Documentation Specialists (CDI) and [facility] Coding Staff 
appreciate your assistance in clarifying documentation. Please respond to the 
clarification below the line at the bottom and electronically sign. The CDI & 
[facility] Coding staff will review the response and follow-up if needed. Please note: 
Queries are made part of the Legal Health Record. If you have any questions, 
please contact the author of this message via ITS.



Dr. Dilshad Street:



Per the H & P: the patient is diagnosed with sepsis, healthcare acquired 
pneumonia & UTI. 

History/Risk Factors: Previous CVA w/residual aphasia & weakness, from SNF. 
ESBL Urine infection, CKD stage IV, Hypertension with Hypertensive CHF & CKD, 
Dementia, previous pneumonia, O2 dependent, glaucoma with blindness, previous 
CVA w/residual aphasia & weakness, from SNF.

Clinical Indicators: From SNF with altered mental status, combative & agitated, 
not wearing O2, Incontinent. 

Urinalysis: Yellow, Turbid, 2+ protein, small blood, large esterase, RBC 17, 
WBC >182

Urine culture: Gm Neg Bacilli, Group D enterococcus.

Blood culture: negative @ 48 hrs.

Lab results: WBC 14.1^, Neut 12.9^, Na 135*, BUN 60^, Cr 3.48^, Elev trops 2/3. 

RAD: CXR: CHF, patchy pulmonary infiltrates. RDS possible, worse than previous 
exam.

Treatment: IV Zosyn, IV Lasix, IV Ativan, Albuterol INH.



In order to capture the severity of condition, please clarify if the condition 
possibly signifies and you are treating for:   

    Aspiration Pneumonia, identify if:

      o Due to solids or liquids 

    Bacterial Pneumonia, specify causal organism (if known)

           (Blood culture is currently pending final)

      o Gram Negative Pneumonia

      o Due to Strep

      o Due to Staph

      o Due to E. Coli

      o Other bacteria (please specify)

    Viral Pneumonia, specify casual organism (if known)

    Healthcare Acquired Pneumonia/Pneumonia, unspecified

    Other, please specify

    Unable to determine



(Last Revision: April 2018)

___________________________________________________________________________

MTDD

## 2019-01-09 NOTE — PN
PROGRESS NOTE



SUBJECTIVE:

White female admitted to the hospital with community-acquired pneumonia, urinary tract

infection, sepsis, COPD, hypothyroidism, renal insufficiency.  BNP was 77284 and she

has influenza A and B have been negative.  Troponins are negative x3.  She has history

of atrial fibrillation.



Heart rates in the 90s to low 100s.  Blood pressure 154/90.  She gives appropriate

answers and kind of confused at times.  Heart is S1, S2.  Lungs are clear.  Abdomen is

soft.  Extremities:  No cyanosis or clubbing. She has 2+ to 3+ pitting edema.



ASSESSMENT:

1. Chronic atrial fibrillation.

2. Urinary tract infection.

3. Community-acquired pneumonia.

4. Chronic obstructive pulmonary disease.

5. Hypertension.

6. Acute renal insufficiency.

7. Sepsis.

8. Possible heart failure.

Await Cardiology, Infectious Disease. She is on Eliquis for anticoagulation.

Metoprolol has been increased to 50 t.i.d.  Await further cultures and IV antibiotics

to be decided upon.





MMODL / IJN: [id number] / Job#: [id number]

## 2019-01-09 NOTE — CDI
Documentation Clarification Form



Date: 1/9/2019 10:33:04 AM

From: Ne CardenasKEVIN, CCDS

Phone: [phone]

MRN: [id number]

Admit Date: 1/7/2019 3:42:00 AM

Patient Name: Rony Scanlon

Visit Number: [id number]

Discharge Date:  





ATTENTION: The Clinical Documentation Specialists (CDI) and [facility] Coding Staff 
appreciate your assistance in clarifying documentation. Please respond to the 
clarification below the line at the bottom and electronically sign. The CDI & 
[facility] Coding staff will review the response and follow-up if needed. Please note: 
Queries are made part of the Legal Health Record. If you have any questions, 
please contact the author of this message via ITS.



Dr. Fred Martins:



Per the H & P: the patient is diagnosed with sepsis, healthcare acquired 
pneumonia & UTI. 

Per the Pulmonary Consult, the patient is also diagnosed with acute respiratory 
failure.



History/Risk Factors: O2 dependent, Previous CVA w/residual aphasia & weakness, 
from SNF. ESBL Urine infection, CKD stage IV, Hypertension with Hypertensive 
CHF & CKD, Dementia, previous pneumonia, glaucoma with blindness, previous CVA w
/residual aphasia & weakness, from SNF.



Clinical Indicators: From SNF with altered mental status, combative & agitated, 
SOB & not wearing O2, Incontinent. 

Urinalysis: Positive with positive culture: Gm Neg Bacilli, Group D 
enterococcus.

Blood culture: negative @ 48 hrs.

Lab results: WBC 14.1^, Neut 12.9^, Na 135*, Cl 97*, BUN 60^, Cr 3.48^, Elev 
trops 2/3, 

RAD: CXR: CHF, patchy pulmonary infiltrates. RDS possible, worse than previous 
exam.



Treatment: IV Zosyn, IV Lasix, IV Ativan, Albuterol INH.



In order to capture the severity of condition, please clarify the acuity and 
the cause of the patient's condition you are treating: 



    Acute hypoxic respiratory failure (documented in 1/8 pulmonary consult)

    Chronic respiratory failure with specificity

    Acute on Chronic hypoxic respiratory failure   

    Acute on Chronic respiratory with other specificity

    Other respiratory disease or type of failure

    Unable to determine





(Last Revision: April 2018)

___________________________________________________________________________



MTDD

## 2019-01-09 NOTE — ECHOF
Referral Reason:chf



MEASUREMENTS

--------

HEIGHT: 160.0 cm

WEIGHT: 70.8 kg

BP: 

RVIDd:   2.4 cm     (< 3.3)

IVSd:   1.3 cm     (0.6 - 1.1)

LVIDd:   3.8 cm     (3.9 - 5.3)

LVPWd:   1.2 cm     (0.6 - 1.1)

IVSs:   1.7 cm

LVIDs:   2.4 cm

LVPWs:   1.6 cm

LA Diam:   3.3 cm     (2.7 - 3.8)

Ao Diam:   2.6 cm     (2.0 - 3.7)

LA Diam:   3.3 cm     (2.7 - 3.8)







FINDINGS

--------

Sinus rhythm.

Limited Study Pt. combative, test stopped.

The left ventricular size is normal.   There is mild concentric left ventricular hypertrophy.   Overa
ll left ventricular systolic function is normal with, an EF between 55 - 60 %.

The right ventricle is normal in size.

The left atrial size is normal.

The right atrial size is normal.



CONCLUSIONS

--------

1. Limited Study Pt. combative, test stopped.

2. The left ventricular size is normal.

3. There is mild concentric left ventricular hypertrophy.

4. Overall left ventricular systolic function is normal with, an EF between 55 - 60 %.

5. The right ventricle is normal in size.

6. The left atrial size is normal.

7. The right atrial size is normal.





SONOGRAPHER: Yenny Gandhi RDCS

## 2019-01-09 NOTE — PN
PROGRESS NOTE



DATE OF SERVICE:

01/09/2019



REASON FOR FOLLOWUP:

Urinary tract infection.



INTERVAL HISTORY:

The patient is currently afebrile.  She is breathing comfortably.  Remains pleasantly

confused, but no agitation has been noticed.  No nausea, no vomiting.  No abdominal

pain or any diarrhea.



PHYSICAL EXAMINATION:

Her blood pressure 154/90 with a pulse of 99, temperature 97.3.  She is 91% on room

air.

General description is an elderly female lying in bed in no distress.

RESPIRATORY SYSTEM: Unlabored breathing. Clear to auscultation anteriorly.

HEART: S1, S2.  Regular rate and rhythm.

ABDOMEN: Soft. No tenderness.



LABS:

Hemoglobin 12.2, white count 10.8, BUN of 62, creatinine 3.11.



DIAGNOSTIC IMPRESSION AND PLAN:

Patient admitted to hospital with mental status changes, likely multifactorial.  This

patient did have a component of UTI; urine now showing any ESBL E coli with small

colony _____ of Enterococcus faecalis.  We will discontinue the Zosyn, start the

patient on Invanz 500 mg daily to cover for the UTI, and watch her clinical course

closely.  Continue with supportive care.





MMODL / IJN: [id number] / Job#: [id number]

## 2019-01-09 NOTE — CDI
Documentation Clarification Form



Date: 1/9/2019 10:41:14 AM

From: Ne Cardenas, KEVIN, CCDS

Phone: [phone]

MRN: [id number]

Admit Date: 1/7/2019 3:42:00 AM

Patient Name: Rony Scanlon

Visit Number: [id number]

Discharge Date:  





ATTENTION: The Clinical Documentation Specialists (CDI) and [facility] Coding Staff 
appreciate your assistance in clarifying documentation. Please respond to the 
clarification below the line at the bottom and electronically sign. The CDI & 
[facility] Coding staff will review the response and follow-up if needed. Please note: 
Queries are made part of the Legal Health Record. If you have any questions, 
please contact the author of this message via ITS.



Dr. Dilshad Street:



Per the H & P: the patient is diagnosed with sepsis, healthcare acquired 
pneumonia, UTI and advanced dementia. 



History/Risk Factors: O2 dependent, Previous CVA w/residual aphasia & weakness, 
from SNF. ESBL Urine infection, CKD stage IV, Hypertension with Hypertensive 
CHF & CKD, Dementia, previous pneumonia, glaucoma with blindness, previous CVA w
/residual aphasia & weakness, from SNF.



Clinical Indicators: From SNF with altered mental status, more combative & 
agitated than usual, SOB & not wearing O2, Incontinent. 

Urinalysis: Positive with positive culture: Gm Neg Bacilli, Group D 
enterococcus.

Blood culture: negative @ 48 hrs.

Lab results: WBC 14.1^, Neut 12.9^, Na 135*, Cl 97*, BUN 60^, Cr 3.48^, Elev 
trops 2/3, 

RAD: CXR: CHF, patchy pulmonary infiltrates. RDS possible, worse than previous 
exam.



Treatment: IV Zosyn, IV Lasix, IV Ativan, Albuterol INH.



In your professional opinion, can you please clarify the specific type of 
Encephalopathy, if known?  



    Anoxic Encephalopathy 

    Hypertensive Encephalopathy

    Metabolic Encephalopathy

    Septic Encephalopathy

    Other, please specify

    Unable to determine





(Last Revision: April 2018)

__________________________________________________________________________
MTDD

## 2019-01-09 NOTE — XR
EXAMINATION TYPE: XR chest 1V portable

 

DATE OF EXAM: 1/9/2019

 

COMPARISON: 1/7/2019

 

HISTORY: Shortness of breath

 

FINDINGS:

There are bilateral pleural effusions with  bibasilar infiltrate.  There is a diffuse interstitial pa
ttern. Heart size stable. No pneumothorax. Diffuse osteopenia.

 

IMPRESSION:

1. Bilateral infiltrate and pleural effusion correlate for CHF versus pulmonary edema. Findings azul reeder.

## 2019-01-09 NOTE — CDI
Documentation Clarification Form



Date: 1/9/2019 10:11:48 AM

From: Ne CardenasKEVIN, CCDS

Phone: [phone]

MRN: [id number]

Admit Date: 1/7/2019 3:42:00 AM

Patient Name: Rony Scanlon

Visit Number: [id number]

Discharge Date:  





ATTENTION: The Clinical Documentation Specialists (CDI) and [facility] Coding Staff 
appreciate your assistance in clarifying documentation. Please respond to the 
clarification below the line at the bottom and electronically sign. The CDI & 
[facility] Coding staff will review the response and follow-up if needed. Please note: 
Queries are made part of the Legal Health Record. If you have any questions, 
please contact the author of this message via ITS.



Dr. Dilshad Street:



Per the History & Physical, the patient is diagnosed with sepsis, healthcare 
acquired pneumonia & UTI.

Per the floor nursing note: "pt came from [facility] with bingham catheter in place, may 
be chronic, history of ESBL in urine, contact precautions initiated."



History/Risk Factors: ESBL Urine infection, CKD stage IV, Hypertension with 
Hypertensive CHF & CKD, Dementia, previous pneumonia, O2 dependent, glaucoma 
with blindness, previous CVA w/residual aphasia & weakness, from SNF.



Clinical Indicators: From SNF with altered mental status, combative & agitated, 
not wearing O2, Incontinent. 

Urinalysis: Yellow, Turbid, 2+ protein, small blood, large esterase, RBC 17, 
WBC >182

Urine culture: Gm Neg Bacilli, Group D enterococcus.

Blood culture: negative @ 48 hrs.

Lab results: WBC 14.1^, Neut 12.9^, Na 135*, BUN 60^, Cr 3.48^, Elev trops 2/3. 



Treatment: IV Zosyn, IV Lasix, IV Ativan, Albuterol INH, Blood, sputum & urine 
cultures & ID, Pulmonary & Cardiology consults.



In your professional opinion, can you please clarify the etiology of the UTI, 
if known?

    UTI related to Bingham catheter

    UTI not related to catheter

    Other condition, please specify

    Unable to determine



If an infective organism is present, please specify cause and effect 
relationship if applicable.



(Last Revision: April 2018)

___________________________________________________________________________

MTDD

## 2019-01-10 LAB
ERYTHROCYTE [DISTWIDTH] IN BLOOD BY AUTOMATED COUNT: 4.9 M/UL (ref 3.8–5.4)
ERYTHROCYTE [DISTWIDTH] IN BLOOD: 16.9 % (ref 11.5–15.5)
HCT VFR BLD AUTO: 42.2 % (ref 34–46)
HGB BLD-MCNC: 13 GM/DL (ref 11.4–16)
MCH RBC QN AUTO: 26.5 PG (ref 25–35)
MCHC RBC AUTO-ENTMCNC: 30.8 G/DL (ref 31–37)
MCV RBC AUTO: 86.2 FL (ref 80–100)
PLATELET # BLD AUTO: 312 K/UL (ref 150–450)
WBC # BLD AUTO: 14 K/UL (ref 3.8–10.6)

## 2019-01-10 RX ADMIN — FUROSEMIDE SCH MG: 10 INJECTION, SOLUTION INTRAMUSCULAR; INTRAVENOUS at 21:51

## 2019-01-10 RX ADMIN — APIXABAN SCH MG: 2.5 TABLET, FILM COATED ORAL at 21:51

## 2019-01-10 RX ADMIN — FUROSEMIDE SCH MG: 10 INJECTION, SOLUTION INTRAMUSCULAR; INTRAVENOUS at 12:12

## 2019-01-10 RX ADMIN — MIRTAZAPINE SCH MG: 15 TABLET, FILM COATED ORAL at 21:51

## 2019-01-10 RX ADMIN — FUROSEMIDE SCH MG: 10 INJECTION, SOLUTION INTRAMUSCULAR; INTRAVENOUS at 04:32

## 2019-01-10 RX ADMIN — LORATADINE SCH MG: 10 TABLET ORAL at 09:17

## 2019-01-10 RX ADMIN — DILTIAZEM HYDROCHLORIDE SCH MG: 240 CAPSULE, COATED, EXTENDED RELEASE ORAL at 09:17

## 2019-01-10 RX ADMIN — IPRATROPIUM BROMIDE AND ALBUTEROL SULFATE SCH ML: .5; 3 SOLUTION RESPIRATORY (INHALATION) at 10:38

## 2019-01-10 RX ADMIN — LEVOTHYROXINE SODIUM SCH: 25 TABLET ORAL at 06:02

## 2019-01-10 RX ADMIN — Medication SCH: at 15:39

## 2019-01-10 RX ADMIN — IPRATROPIUM BROMIDE AND ALBUTEROL SULFATE SCH ML: .5; 3 SOLUTION RESPIRATORY (INHALATION) at 07:07

## 2019-01-10 RX ADMIN — Medication SCH MG: at 09:17

## 2019-01-10 RX ADMIN — METOPROLOL TARTRATE SCH MG: 50 TABLET, FILM COATED ORAL at 21:51

## 2019-01-10 RX ADMIN — Medication SCH: at 06:02

## 2019-01-10 RX ADMIN — METHYLPREDNISOLONE SODIUM SUCCINATE SCH MG: 40 INJECTION, POWDER, FOR SOLUTION INTRAMUSCULAR; INTRAVENOUS at 21:52

## 2019-01-10 RX ADMIN — METHYLPREDNISOLONE SODIUM SUCCINATE SCH MG: 40 INJECTION, POWDER, FOR SOLUTION INTRAMUSCULAR; INTRAVENOUS at 09:16

## 2019-01-10 RX ADMIN — APIXABAN SCH MG: 2.5 TABLET, FILM COATED ORAL at 09:17

## 2019-01-10 RX ADMIN — APIXABAN SCH: 2.5 TABLET, FILM COATED ORAL at 23:16

## 2019-01-10 RX ADMIN — FLUTICASONE PROPIONATE SCH: 50 SPRAY, METERED NASAL at 21:52

## 2019-01-10 RX ADMIN — METOPROLOL TARTRATE SCH MG: 50 TABLET, FILM COATED ORAL at 09:17

## 2019-01-10 RX ADMIN — MIRTAZAPINE SCH: 15 TABLET, FILM COATED ORAL at 23:16

## 2019-01-10 RX ADMIN — METOPROLOL TARTRATE SCH: 50 TABLET, FILM COATED ORAL at 23:16

## 2019-01-10 RX ADMIN — CINACALCET HYDROCHLORIDE SCH: 30 TABLET, COATED ORAL at 06:02

## 2019-01-10 RX ADMIN — LATANOPROST SCH: 50 SOLUTION OPHTHALMIC at 21:52

## 2019-01-10 RX ADMIN — FLUTICASONE PROPIONATE SCH: 50 SPRAY, METERED NASAL at 09:29

## 2019-01-10 RX ADMIN — IPRATROPIUM BROMIDE AND ALBUTEROL SULFATE SCH: .5; 3 SOLUTION RESPIRATORY (INHALATION) at 16:20

## 2019-01-10 RX ADMIN — IPRATROPIUM BROMIDE AND ALBUTEROL SULFATE SCH: .5; 3 SOLUTION RESPIRATORY (INHALATION) at 20:34

## 2019-01-10 RX ADMIN — ERTAPENEM SODIUM SCH MLS/HR: 1 INJECTION, POWDER, LYOPHILIZED, FOR SOLUTION INTRAMUSCULAR; INTRAVENOUS at 09:29

## 2019-01-10 RX ADMIN — PREDNISOLONE ACETATE SCH: 10 SUSPENSION/ DROPS OPHTHALMIC at 21:52

## 2019-01-10 NOTE — PN
PROGRESS NOTE



DATE OF SERVICE:

01/10/2019.



REASON FOR FOLLOWUP:

ESBL E coli urinary tract infection.



INTERVAL HISTORY:

The patient is afebrile.  The patient was noticed to be hypoxic this morning.  The

patient has been refusing the non-rebreather for supplemental oxygen.  She has been

fighting to take it off. No nausea or vomiting has been noticed. No diarrhea per the

nursing staff.  The patient is unable to provide reliable history.



EXAMINATION:

Blood pressure is 182/93 with a pulse of 104, temperature of 98.3, she is 89% on room

air.

GENERAL DESCRIPTION: An elderly female lying in bed in no distress.

RESPIRATORY SYSTEM:  Unlabored breathing with decreased breath sounds in the base. No

wheeze.

HEART: S1, S2.  Regular rate and rhythm.

ABDOMEN: Soft, no tenderness.



LABS:

Hemoglobin is 13, white count of 14,000. Urine with ESBL E coli and Enterococcus

faecalis.



DIAGNOSTIC IMPRESSION AND PLAN:

Patient with ESBL E coli urinary tract infection for which the patient is currently

covered with Invanz to continue for now, watching her clinical course closely.  Overall

prognosis is guarded.  Continue supportive care.





MMODL / IJN: [id number] / Job#: [id number]

## 2019-01-10 NOTE — P.PN
Subjective


Progress Note Date: 01/09/19 (Late entry note)


Principal diagnosis: 





Acute on chronic hypoxic respiratory failure, history of prior stroke, 

healthcare associated pneumonia, acute on chronic renal failure,


01/09/2019, patient seen eval examined during the rounds still have ongoing 

intermittent cough congestion patient remains on broad-spectrum antibiotics 

patient has issues with keeping the oxygen on, labs reviewed medications 

reviewed care plan discussed with the staff at length, the chest x-ray continue 

show by basilar infiltrate and small effusion with interstitial edema





83-year-old female with history of prior CVA with significant aphasia patient 

has some residual weakness in the extremity a patient is a resident of [facility] she was noted to be more short of breath as well as started 

having cough with thick purulent sputum production with those problem patient 

was brought into emergency department for further evaluation has been admitted 

into the hospital, her admitted chest x-ray suggestive of patchy bilateral 

infiltrate, both lungs and wall more so on the right side compared to the left 

side, urine culture and blood culture have been negative, patient is currently 

being treated with Zosyn, her labs are reviewed, labs white cell count is 14,000

, BUN/creatinine 60 and 3.48 slight worsening from previous renal functions 

have been noted,








Objective





- Vital Signs


Vital signs: 


 Vital Signs











Temp  98.4 F   01/10/19 04:00


 


Pulse  87   01/10/19 04:00


 


Resp  19   01/10/19 04:00


 


BP  171/86   01/10/19 04:00


 


Pulse Ox  90 L  01/10/19 04:00








 Intake & Output











 01/09/19 01/09/19 01/10/19





 06:59 18:59 06:59


 


Intake Total  622 


 


Output Total 1300 1400 1150


 


Balance -5497 -899 -4952


 


Weight 71 kg 71 kg 72.5 kg


 


Intake:   


 


  Intake, IV Titration  100 





  Amount   


 


    Piperacillin-Tazobactam 3  100 





    .375 gm In Sodium   





    Chloride 0.9% 100 ml @ 25   





    mls/hr IVPB Q12H [facility] Rx#   





    :[id number]   


 


  Oral  522 


 


Output:   


 


  Urine 1300 1400 1150


 


Other:   


 


  Voiding Method Indwelling Catheter Indwelling Catheter Indwelling Catheter


 


  # Voids 3  














- Exam





- Constitutional


General appearance: average body habitus, cooperative, disheveled, mild distress





- EENT


Eyes: anicteric sclerae, EOMI, PERRLA, poor dentition


ENT: hard of hearing, normal oropharynx


Ears: bilateral: normal





- Neck


Carotids: bilateral: upstroke normal


Thyroid: bilateral: normal size





- Respiratory


Respiratory: bilateral: CTA, wheezing, prolonged expiration, negative: dullness

, rales, rhonchi





- Cardiovascular


Rhythm: regular


Heart sounds: normal: S1, S2





- Gastrointestinal


General gastrointestinal: decreased bowel sounds, normal bowel sounds, soft





- Neurologic


Neurologic: CNII-XII intact





- Musculoskeletal


Musculoskeletal: gait normal, generalized weakness, strength equal bilaterally





- Psychiatric





Due to stroke significant speech impairment noted


Psychiatric: A&O x's one to 2, appropriate affect, 





- Labs


CBC & Chem 7: 


 01/09/19 05:27





 01/09/19 05:27


Labs: 


 Abnormal Lab Results - Last 24 Hours (Table)











  01/09/19 Range/Units





  05:27 


 


WBC  10.8 H  (3.8-10.6)  k/uL


 


MCHC  30.8 L  (31.0-37.0)  g/dL


 


RDW  16.8 H  (11.5-15.5)  %


 


Neutrophils #  9.5 H  (1.3-7.7)  k/uL


 


Lymphocytes #  0.7 L  (1.0-4.8)  k/uL








 Microbiology - Last 24 Hours (Table)











 01/07/19 02:10 Blood Culture - Preliminary





 Blood    No Growth after 72 hours


 


 01/07/19 02:25 Urine Culture - Final





 Urine,Catheterized    Escherichia coli





    Enterococcus faecalis














Assessment and Plan


Assessment: 





Acute hypoxic respiratory failure





Bilateral pneumonia, aspiration related/healthcare associated pneumonia





Acute on chronic renal failure, stage III





History of CVA





UTI, polymicrobial associated with the E. coli and enterococcus, E. coli 

appears to be ESBL, would recommend ID consultation





Elevated troponins occurred MI non-ST segment elevated cannot be excluded given 

that the BNP is elevated associated congestive heart failure remains an issue














Plan: 





Continue Zosyn





Agree with cardiovascular evaluation and obtaining echocardiogram





Reviewed sputum and influenza A and B reports, both negative





Continue breathing treatments 





Continue IV steroids





Reviewed chest x-ray





Other recommendations pending plan of care as per clinical response of the 

patient


Time with Patient: Greater than 30

## 2019-01-10 NOTE — P.PN
Subjective


Progress Note Date: 01/10/19


Principal diagnosis: 





Acute on chronic hypoxic respiratory failure, history of prior stroke, 

healthcare associated pneumonia, acute on chronic renal failure,


[place] 10 2019, patient seen eval reexamined during the rounds clinically 

patient has been out much change, continued to have a respiratory issues and 

pulling of secretion back of the throat, patient has been a no code, patient 

does not keep the oxygen on, care plan discussed with the staff at length would 

continue supportive care overall long-term prognosis poor





01/09/2019, patient seen eval examined during the rounds still have ongoing 

intermittent cough congestion patient remains on broad-spectrum antibiotics 

patient has issues with keeping the oxygen on, labs reviewed medications 

reviewed care plan discussed with the staff at length, the chest x-ray continue 

show by basilar infiltrate and small effusion with interstitial edema





83-year-old female with history of prior CVA with significant aphasia patient 

has some residual weakness in the extremity a patient is a resident of [facility] she was noted to be more short of breath as well as started 

having cough with thick purulent sputum production with those problem patient 

was brought into emergency department for further evaluation has been admitted 

into the hospital, her admitted chest x-ray suggestive of patchy bilateral 

infiltrate, both lungs and wall more so on the right side compared to the left 

side, urine culture and blood culture have been negative, patient is currently 

being treated with Zosyn, her labs are reviewed, labs white cell count is 14,000

, BUN/creatinine 60 and 3.48 slight worsening from previous renal functions 

have been noted,








Objective





- Vital Signs


Vital signs: 


 Vital Signs











Temp  98.4 F   01/10/19 04:00


 


Pulse  87   01/10/19 04:00


 


Resp  19   01/10/19 04:00


 


BP  171/86   01/10/19 04:00


 


Pulse Ox  90 L  01/10/19 04:00








 Intake & Output











 01/09/19 01/10/19 01/10/19





 18:59 06:59 18:59


 


Intake Total 622  


 


Output Total 1400 1150 


 


Balance -778 -1150 


 


Weight 71 kg 72.5 kg 


 


Intake:   


 


  Intake, IV Titration 100  





  Amount   


 


    Piperacillin-Tazobactam 3 100  





    .375 gm In Sodium   





    Chloride 0.9% 100 ml @ 25   





    mls/hr IVPB Q12H [facility] Rx#   





    :[id number]   


 


  Oral 522  


 


Output:   


 


  Urine 1400 1150 


 


Other:   


 


  Voiding Method Indwelling Catheter Indwelling Catheter 














- Exam





- Constitutional


General appearance: average body habitus, cooperative, disheveled, mild distress





- EENT


Eyes: anicteric sclerae, EOMI, PERRLA, poor dentition


ENT: hard of hearing, normal oropharynx


Ears: bilateral: normal





- Neck


Carotids: bilateral: upstroke normal


Thyroid: bilateral: normal size





- Respiratory


Respiratory: bilateral: CTA, wheezing, prolonged expiration, negative: dullness

, rales, rhonchi





- Cardiovascular


Rhythm: regular


Heart sounds: normal: S1, S2





- Gastrointestinal


General gastrointestinal: decreased bowel sounds, normal bowel sounds, soft





- Neurologic


Neurologic: CNII-XII intact





- Musculoskeletal


Musculoskeletal: gait normal, generalized weakness, strength equal bilaterally





- Psychiatric





Due to stroke significant speech impairment noted


Psychiatric: A&O x's one to 2, appropriate affect, 





- Labs


CBC & Chem 7: 


 01/09/19 05:27





 01/09/19 05:27


Labs: 


 Abnormal Lab Results - Last 24 Hours (Table)











  01/09/19 Range/Units





  05:27 


 


WBC  10.8 H  (3.8-10.6)  k/uL


 


MCHC  30.8 L  (31.0-37.0)  g/dL


 


RDW  16.8 H  (11.5-15.5)  %


 


Neutrophils #  9.5 H  (1.3-7.7)  k/uL


 


Lymphocytes #  0.7 L  (1.0-4.8)  k/uL








 Microbiology - Last 24 Hours (Table)











 01/07/19 02:10 Blood Culture - Preliminary





 Blood    No Growth after 72 hours


 


 01/07/19 02:25 Urine Culture - Final





 Urine,Catheterized    Escherichia coli





    Enterococcus faecalis














Assessment and Plan


Assessment: 





Acute hypoxic respiratory failure





Bilateral pneumonia, aspiration related/healthcare associated pneumonia





Acute on chronic renal failure, stage III





History of CVA





UTI, polymicrobial associated with the E. coli and enterococcus, E. coli 

appears to be ESBL, you broad-spectrum antibiotics





Elevated troponins occurred MI non-ST segment elevated cannot be excluded given 

that the BNP is elevated associated congestive heart failure remains an issue














Plan: 





Continue Invanz, now patient is off of Zosyn





Agree with cardiovascular evaluation and obtaining echocardiogram





Reviewed sputum and influenza A and B reports, both negative





Continue breathing treatments 





IV steroids





Reviewed chest x-ray





Other recommendations pending plan of care as per clinical response of the 

patient, overall long-term prognosis poor


Time with Patient: Greater than 30

## 2019-01-10 NOTE — CONS
CONSULTATION



REASON FOR CONSULT:

Renal failure.



HISTORY OF PRESENT ILLNESS:

The patient is an 83-year-old female with history of chronic kidney disease, NKF stage

IV with baseline creatinine about 1.862 mg/dL.  Recently, her creatinine has been more

elevated at about 2.9 in September of 2018 but did come down to 1.9 mg/dL on 9/09/2018.

Patient also has underlying dementia.  She is currently a NO CODE, NO INTUBATION Code

status.  She was admitted to the hospital with the complaints of weakness, not feeling

well and worsening of mentation.  Patient is currently being treated for CHF and volume

overload and is maintained on IV Lasix.  She also has an underlying urinary tract

infection with E coli and enterococcus faecalis.  The serum creatinine was 3.48 on 1/07

and it is down to 3.1 today.  Patient has a chronic indwelling Black catheter.  She has

had good urine output.



PAST MEDICAL HISTORY:

Significant for CKD, stage IV, dementia.  Previous history of urinary tract infection,

hypothyroidism, gastroesophageal reflux disease, hyperlipidemia, history of CHF,

hearing loss, recurrent urinary tract infections.



PAST SURGICAL HISTORY:

Hysterectomy, bladder surgery, left knee arthroplasty, cyst in the left arm which is

nonfunctional, hysterectomy.



SOCIAL HISTORY:

Negative for smoking, drug abuse or alcohol abuse.  Patient is a former smoker.  She

resides at an extended care facility.



MEDICATIONS:

At home prior to admission included Flonase, Synthroid, Xanax, vitamin D, Claritin,

iron, _____ Eliquis, Sensipar, Lopressor, Cardizem, Lasix, sodium bicarb.  Patient had

been on prednisone at one time.



ALLERGIES:

None.



REVIEW OF SYSTEMS:

No evidence of ongoing diarrhea, nausea, or vomiting.  The patient is refusing to wear

oxygen.  Mentation remains impaired.



PHYSICAL EXAMINATION:

Currently patient is comfortable.  She is awake.  She is not in any acute distress.

She is pleasantly confused.  Blood pressure was 161/93, heart rate 102 per minute,

patient is afebrile.

Examination of the heart, S1, S2.  Examination of the lungs, bilateral breath sounds

are heard.  Abdomen is soft, nontender.  Examination of the lower extremities shows

chronic skin changes, chronic edema is noted, 1+ bilaterally seems to have decreased as

there is significantly dimpling of the skin seen.  CNS exam, patient is moving all 4

extremities.



LABS:

Show sodium 141, potassium 3.8, chloride of 98, CO2 is 31, BUN 62, serum creatinine

3.1, hemoglobin 12.4 g/dL.  UA showed WBCs more than 182, 2+ protein.  Urine culture is

growing E coli and enterococcus faecalis.



ASSESSMENT:

1. Acute kidney injury secondary to cardiorenal syndrome, currently improving.

    Patient is being diuresed.  No nephrotoxic agents on board at this time.

2. Chronic kidney disease, stage IV secondary to nephrosclerosis with baseline

    creatinine of about 2 mg/dL.

3. Congestive heart failure, acute on top of chronic, mainly diastolic, ejection

    fraction of 50%-55% on echocardiogram in September of 2018.

4. 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

    Sensipar, which we can continue.



PLAN:

Continue with IV Lasix.  Repeat labs in a.m. Continue to avoid nephrotoxic agents.



Thank you for the consultation.  Will continue to follow the patient with you during

her hospitalization.





MMODL / IJN: [id number] / Job#: [id number]

## 2019-01-10 NOTE — P.PN
Subjective


Progress Note Date: 01/10/19


This is an 83-year-old female with history of COPD, hypothyroidism, hypertension

, renal insufficiency, hypothyroidism who presented to the hospital with 

symptoms of altered mental status changes.  Cardiology consultation was 

requested because of abnormality in troponin.  Patient was also noted to have a 

significantly elevated BNP level.  BNP 24,800.  Patient also noted to have a 

positive UTI, influenza A and B were negative.  Troponin 0.03, 0.04, 0.03.  

Patient was seen and examined this morning, chest x-ray showed congestive heart 

failure.  She is on IV Lasix and has been diuresing well through the night last 

night.  Her weight today is down 4 kg, she continues to be in atrial 

fibrillation with a heart rate in the 90s to low 100s, blood pressure 154/88.








01/10/2019


Patient seen and examined this morning, continues to have a productive cough.  

Blood pressure 160/90 with a heart rate of 102, 87% on 5 L of oxygen.  Blood 

cell count 14.0, hemoglobin 13, platelet count 312.  We will increase dose of 

metoprolol to 100 mg twice a day for blood pressure and heart rate control.








Objective





- Vital Signs


Vital signs: 


 Vital Signs











Temp  97.7 F   01/10/19 08:20


 


Pulse  102 H  01/10/19 10:49


 


Resp  18   01/10/19 10:28


 


BP  161/93   01/10/19 08:20


 


Pulse Ox  92 L  01/10/19 10:48








 Intake & Output











 01/09/19 01/10/19 01/10/19





 18:59 06:59 18:59


 


Intake Total 622  


 


Output Total 1400 1150 


 


Balance -778 -1150 


 


Weight 71 kg 72.5 kg 


 


Intake:   


 


  Intake, IV Titration 100  





  Amount   


 


    Piperacillin-Tazobactam 3 100  





    .375 gm In Sodium   





    Chloride 0.9% 100 ml @ 25   





    mls/hr IVPB Q12H [facility] Rx#   





    :[id number]   


 


  Oral 522  


 


Output:   


 


  Urine 1400 1150 


 


Other:   


 


  Voiding Method Indwelling Catheter Indwelling Catheter 














- Exam





PHYSICAL EXAMINATION: 





GENERAL: 83-year-old  female in no acute distress at the time of my 

examination





HEENT: Head is atraumatic, normocephalic.  Pupils equal, round.  Sclera 

anicteric. Conjunctiva are clear.  Mucous membranes of the mouth are moist.  

Neck is supple.  There is  elevated jugular venous pressure.  No carotid  bruit 

is heard.





HEART EXAMINATION: Heart S1 and S2 irregularly irregular a systolic murmur is 

heard.





CHEST EXAMINATION: Revealed decreased air entry to bilateral bases with rales 

noted to the bases bilaterally.





ABDOMEN:  Soft, nontender. Bowel sounds are heard. No organomegaly noted.


 


EXTREMITIES: 2+ peripheral pulses with  evidence of peripheral edema and no 

calf tenderness noted.





NEUROLOGIC patient is awake, alert and oriented 1 .


 


.


 








- Labs


CBC & Chem 7: 


 01/10/19 07:02





 01/09/19 05:27


Labs: 


 Abnormal Lab Results - Last 24 Hours (Table)











  01/10/19 Range/Units





  07:02 


 


WBC  14.0 H  (3.8-10.6)  k/uL


 


MCHC  30.8 L  (31.0-37.0)  g/dL


 


RDW  16.9 H  (11.5-15.5)  %








 Microbiology - Last 24 Hours (Table)











 01/07/19 02:25 Urine Culture - Final





 Urine,Catheterized    Escherichia coli





    Enterococcus faecalis


 


 01/07/19 02:10 Blood Culture - Preliminary





 Blood    No Growth after 72 hours














Assessment and Plan


Plan: 


Assessment and plan


#1 chronic atrial fibrillation


#2 hypertension


#3 COPD


#4 congestive heart failure, LV function unknown


#5 UTI


#6 sepsis


#7 acute renal insufficiency








Plan


We will continue with current dose of IV Lasix.  Continue to monitor intake and 

output along with daily weights and daily lytes BUN and creatinine.  Patient 

has also been initiated on antibiotics.  She is on Eliquis for anticoagulation, 

we will increase her dose of metoprolol to and 100 mg by mouth twice a day for 

more optimal heart rate control.





DNP note has been reviewed, I agree with a documented findings and plan of 

care.  Patient was seen and examined.

## 2019-01-11 LAB
ANION GAP SERPL CALC-SCNC: 10 MMOL/L
BUN SERPL-SCNC: 63 MG/DL (ref 7–17)
CALCIUM SPEC-MCNC: 9.3 MG/DL (ref 8.4–10.2)
CHLORIDE SERPL-SCNC: 99 MMOL/L (ref 98–107)
CO2 SERPL-SCNC: 33 MMOL/L (ref 22–30)
ERYTHROCYTE [DISTWIDTH] IN BLOOD BY AUTOMATED COUNT: 5.34 M/UL (ref 3.8–5.4)
ERYTHROCYTE [DISTWIDTH] IN BLOOD: 16.7 % (ref 11.5–15.5)
GLUCOSE BLD-MCNC: 128 MG/DL (ref 75–99)
GLUCOSE BLD-MCNC: 146 MG/DL (ref 75–99)
GLUCOSE BLD-MCNC: 185 MG/DL (ref 75–99)
GLUCOSE BLD-MCNC: 192 MG/DL (ref 75–99)
GLUCOSE SERPL-MCNC: 139 MG/DL (ref 74–99)
HBA1C MFR BLD: 5.8 % (ref 4–6)
HCT VFR BLD AUTO: 45.6 % (ref 34–46)
HGB BLD-MCNC: 13.9 GM/DL (ref 11.4–16)
MCH RBC QN AUTO: 26.1 PG (ref 25–35)
MCHC RBC AUTO-ENTMCNC: 30.5 G/DL (ref 31–37)
MCV RBC AUTO: 85.5 FL (ref 80–100)
PLATELET # BLD AUTO: 304 K/UL (ref 150–450)
POTASSIUM SERPL-SCNC: 3.6 MMOL/L (ref 3.5–5.1)
SODIUM SERPL-SCNC: 142 MMOL/L (ref 137–145)
WBC # BLD AUTO: 5.4 K/UL (ref 3.8–10.6)

## 2019-01-11 RX ADMIN — Medication SCH MG: at 07:53

## 2019-01-11 RX ADMIN — METHYLPREDNISOLONE SODIUM SUCCINATE SCH MG: 40 INJECTION, POWDER, FOR SOLUTION INTRAMUSCULAR; INTRAVENOUS at 07:52

## 2019-01-11 RX ADMIN — FUROSEMIDE SCH MG: 10 INJECTION, SOLUTION INTRAMUSCULAR; INTRAVENOUS at 19:42

## 2019-01-11 RX ADMIN — CINACALCET HYDROCHLORIDE SCH: 30 TABLET, COATED ORAL at 05:25

## 2019-01-11 RX ADMIN — DILTIAZEM HYDROCHLORIDE SCH MG: 240 CAPSULE, COATED, EXTENDED RELEASE ORAL at 07:53

## 2019-01-11 RX ADMIN — FUROSEMIDE SCH MG: 10 INJECTION, SOLUTION INTRAMUSCULAR; INTRAVENOUS at 04:15

## 2019-01-11 RX ADMIN — IPRATROPIUM BROMIDE AND ALBUTEROL SULFATE SCH: .5; 3 SOLUTION RESPIRATORY (INHALATION) at 16:51

## 2019-01-11 RX ADMIN — METHYLPREDNISOLONE SODIUM SUCCINATE SCH MG: 40 INJECTION, POWDER, FOR SOLUTION INTRAMUSCULAR; INTRAVENOUS at 19:42

## 2019-01-11 RX ADMIN — FLUTICASONE PROPIONATE SCH SPRAY: 50 SPRAY, METERED NASAL at 07:53

## 2019-01-11 RX ADMIN — LORATADINE SCH MG: 10 TABLET ORAL at 07:54

## 2019-01-11 RX ADMIN — METOPROLOL TARTRATE SCH MG: 50 TABLET, FILM COATED ORAL at 19:43

## 2019-01-11 RX ADMIN — IPRATROPIUM BROMIDE AND ALBUTEROL SULFATE SCH: .5; 3 SOLUTION RESPIRATORY (INHALATION) at 20:59

## 2019-01-11 RX ADMIN — APIXABAN SCH MG: 2.5 TABLET, FILM COATED ORAL at 07:53

## 2019-01-11 RX ADMIN — IPRATROPIUM BROMIDE AND ALBUTEROL SULFATE SCH ML: .5; 3 SOLUTION RESPIRATORY (INHALATION) at 07:13

## 2019-01-11 RX ADMIN — APIXABAN SCH MG: 2.5 TABLET, FILM COATED ORAL at 19:42

## 2019-01-11 RX ADMIN — Medication SCH: at 17:47

## 2019-01-11 RX ADMIN — LEVOTHYROXINE SODIUM SCH: 25 TABLET ORAL at 05:25

## 2019-01-11 RX ADMIN — METOPROLOL TARTRATE SCH MG: 50 TABLET, FILM COATED ORAL at 07:53

## 2019-01-11 RX ADMIN — FUROSEMIDE SCH MG: 10 INJECTION, SOLUTION INTRAMUSCULAR; INTRAVENOUS at 11:27

## 2019-01-11 RX ADMIN — IPRATROPIUM BROMIDE AND ALBUTEROL SULFATE SCH: .5; 3 SOLUTION RESPIRATORY (INHALATION) at 11:16

## 2019-01-11 RX ADMIN — PREDNISOLONE ACETATE SCH DROPS: 10 SUSPENSION/ DROPS OPHTHALMIC at 19:55

## 2019-01-11 RX ADMIN — MIRTAZAPINE SCH MG: 15 TABLET, FILM COATED ORAL at 19:43

## 2019-01-11 RX ADMIN — LATANOPROST SCH DROPS: 50 SOLUTION OPHTHALMIC at 19:42

## 2019-01-11 RX ADMIN — FLUTICASONE PROPIONATE SCH SPRAY: 50 SPRAY, METERED NASAL at 19:42

## 2019-01-11 RX ADMIN — Medication SCH: at 05:25

## 2019-01-11 RX ADMIN — ERTAPENEM SODIUM SCH MLS/HR: 1 INJECTION, POWDER, LYOPHILIZED, FOR SOLUTION INTRAMUSCULAR; INTRAVENOUS at 07:53

## 2019-01-11 NOTE — PN
PROGRESS NOTE



Patient is seen for followup for acute kidney injury on top of chronic kidney disease.

She does have underlying dementia and has been confused.  Currently patient is being

treated for CHF and is being diuresed.  Renal function has been improving.



PHYSICAL EXAMINATION:

On examination today, blood pressure is 144/85, heart rate 85 per minute.  She is

afebrile.

EXAMINATION OF THE HEART: S1, S2.

EXAMINATION OF LUNGS: Bilateral breath sounds are heard.

ABDOMEN:  Soft, non-tender.

Examination of lower extremities shows chronic skin changes, chronic edema bilaterally.

CNS exam is grossly intact.



LABS:

Sodium of 142, potassium 3.6, BUN 63, serum creatinine 2.5, hemoglobin 13.9 g/dL.



ASSESSMENT:

1. Acute kidney injury, mainly cardiorenal, currently improving.  Patient is

    maintained on IV diuretics. Serum creatinine continues to decrease.

2. Chronic kidney disease, stage IV, secondary to nephrosclerosis with baseline

    creatinine about 2 mg/dL.

3. Congestive heart failure, mainly diastolic, acute on top of chronic with ejection

    fraction of 50% to 55% on echocardiogram in September of 2018.

4. Chronic kidney disease mineral bone disorder with previous history of

    hypercalcemia, maintained on Sensipar, which we will continue.

5. Urinary tract infection with urine culture growing Escherichia coli and

    Enterococcus faecalis.



PLAN:

Continue Lasix. Repeat labs in a.m.  Encourage increased oral intake.





MMODL / IJN: [id number] / Job#: [id number]

## 2019-01-11 NOTE — PN
PROGRESS NOTE



Rony is an 83-year-old lady with history of COPD, hypertension, renal insufficiency,

who is admitted to hospital because of mental status changes, has elevated troponin and

BNP for which Cardiology has been consulted.  This morning she is still appears

confused.



PHYSICAL EXAMINATION:

On exam, heart rate is 90 beats per minute.  Blood pressure 148/80.

Chest exam reveals diminished air entry at the bases.

Heart exam reveals first and second heart sounds.  No gallop.

Examination of the extremities reveals 1+ edema.  Peripheral pulses are felt.



LABS:

Labs show a hemoglobin of 13.9, BUN is 63, creatinine is 2.5.



ASSESSMENT:

1. Chronic atrial fibrillation.

2. Hypertension.

3. Chronic obstructive pulmonary disease.

4. Sepsis.

5. Acute exacerbation of chronic diastolic heart failure.



PLAN:

Will continue with the Eliquis, Cardizem CD, IV Lasix that the patient is currently on.





MMODL / IJN: [id number] / Job#: [id number]

## 2019-01-11 NOTE — P.PN
Subjective


Progress Note Date: 01/11/19


Principal diagnosis: 





Acute on chronic hypoxic respiratory failure, history of prior stroke, 

healthcare associated pneumonia, acute on chronic renal failure,


01/11/2019, patient seen eval examined during the rounds she has refused the 

utilization of oxygen, he still have intermittent congestion and pulling of 

secretions into the throat,, patient has been swallowing pills fairly okay, 





January 10 2019, patient seen eval reexamined during the rounds clinically 

patient has been out much change, continued to have a respiratory issues and 

pulling of secretion back of the throat, patient has been a no code, patient 

does not keep the oxygen on, care plan discussed with the staff at length would 

continue supportive care overall long-term prognosis poor





01/09/2019, patient seen eval examined during the rounds still have ongoing 

intermittent cough congestion patient remains on broad-spectrum antibiotics 

patient has issues with keeping the oxygen on, labs reviewed medications 

reviewed care plan discussed with the staff at length, the chest x-ray continue 

show by basilar infiltrate and small effusion with interstitial edema





83-year-old female with history of prior CVA with significant aphasia patient 

has some residual weakness in the extremity a patient is a resident of [facility] facility she was noted to be more short of breath as well as started 

having cough with thick purulent sputum production with those problem patient 

was brought into emergency department for further evaluation has been admitted 

into the hospital, her admitted chest x-ray suggestive of patchy bilateral 

infiltrate, both lungs and wall more so on the right side compared to the left 

side, urine culture and blood culture have been negative, patient is currently 

being treated with Zosyn, her labs are reviewed, labs white cell count is 14,000

, BUN/creatinine 60 and 3.48 slight worsening from previous renal functions 

have been noted,








Objective





- Vital Signs


Vital signs: 


 Vital Signs











Temp  97.8 F   01/11/19 12:00


 


Pulse  83   01/11/19 12:00


 


Resp  18   01/11/19 12:00


 


BP  169/68   01/11/19 12:00


 


Pulse Ox  91 L  01/11/19 12:00








 Intake & Output











 01/10/19 01/11/19 01/11/19





 18:59 06:59 18:59


 


Intake Total 170  50


 


Output Total 825 9140 900


 


Balance -468 -3535 -993


 


Weight  68 kg 


 


Intake:   


 


  Intake, IV Titration 50  50





  Amount   


 


    Ertapenem 0.5 gm In 50  50





    Sodium Chloride 0.9% 50   





    ml @ 100 mls/hr IVPB   





    DAILY [facility] Rx#:[id number]   


 


  Oral 120  


 


Output:   


 


  Urine 825 1350 900


 


Other:   


 


  Voiding Method Indwelling Catheter Indwelling Catheter Indwelling Catheter


 


  # Voids  300 














- Exam





- Constitutional


General appearance: average body habitus, cooperative, disheveled, mild distress





- EENT


Eyes: anicteric sclerae, EOMI, PERRLA, poor dentition


ENT: hard of hearing, normal oropharynx


Ears: bilateral: normal





- Neck


Carotids: bilateral: upstroke normal


Thyroid: bilateral: normal size





- Respiratory


Respiratory: bilateral: CTA, wheezing, prolonged expiration, negative: dullness

, rales, rhonchi





- Cardiovascular


Rhythm: regular


Heart sounds: normal: S1, S2





- Gastrointestinal


General gastrointestinal: decreased bowel sounds, normal bowel sounds, soft





- Neurologic


Neurologic: CNII-XII intact





- Musculoskeletal


Musculoskeletal: gait normal, generalized weakness, strength equal bilaterally





- Psychiatric





Due to stroke significant speech impairment noted


Psychiatric: A&O x's one to 2, appropriate affect, 





- Labs


CBC & Chem 7: 


 01/11/19 06:30





 01/11/19 06:30


Labs: 


 Abnormal Lab Results - Last 24 Hours (Table)











  01/11/19 01/11/19 01/11/19 Range/Units





  05:42 06:30 06:30 


 


MCHC    30.5 L  (31.0-37.0)  g/dL


 


RDW    16.7 H  (11.5-15.5)  %


 


Carbon Dioxide   33 H   (22-30)  mmol/L


 


BUN   63 H   (7-17)  mg/dL


 


Creatinine   2.55 H   (0.52-1.04)  mg/dL


 


Glucose   139 H   (74-99)  mg/dL


 


POC Glucose (mg/dL)  128 H    (75-99)  mg/dL














  01/11/19 Range/Units





  11:14 


 


MCHC   (31.0-37.0)  g/dL


 


RDW   (11.5-15.5)  %


 


Carbon Dioxide   (22-30)  mmol/L


 


BUN   (7-17)  mg/dL


 


Creatinine   (0.52-1.04)  mg/dL


 


Glucose   (74-99)  mg/dL


 


POC Glucose (mg/dL)  185 H  (75-99)  mg/dL








 Microbiology - Last 24 Hours (Table)











 01/07/19 02:10 Blood Culture - Preliminary





 Blood    No Growth after 96 hours














Assessment and Plan


Assessment: 





Acute hypoxic respiratory failure





Bilateral pneumonia, aspiration related/healthcare associated pneumonia





Acute on chronic renal failure, stage III





History of CVA





UTI, polymicrobial associated with the E. coli and enterococcus, E. coli 

appears to be ESBL, you broad-spectrum antibiotics





Elevated troponins occurred MI non-ST segment elevated cannot be excluded given 

that the BNP is elevated associated congestive heart failure remains an issue














Plan: 





Continue Invanz, now patient is off of Zosyn





Agree with cardiovascular evaluation and obtaining echocardiogram





Reviewed sputum and influenza A and B reports, both negative





Continue breathing treatments 





IV steroids





Reviewed chest x-ray





Other recommendations pending plan of care as per clinical response of the 

patient, overall long-term prognosis poor


Time with Patient: Greater than 30

## 2019-01-11 NOTE — CDI
Documentation Clarification Form



Date: 1/9/2019 10:11:00 AM

From: Ne CardenasKEVIN, CCDS

Phone: [phone]

MRN: [id number]

Admit Date: 1/7/2019 3:42:00 AM

Patient Name: Rony Scanlon

Visit Number: [id number]

Discharge Date:  





ATTENTION: The Clinical Documentation Specialists (CDI) and [facility] Coding Staff 
appreciate your assistance in clarifying documentation. Please respond to the 
clarification below the line at the bottom and electronically sign. The CDI & 
[facility] Coding staff will review the response and follow-up if needed. Please note: 
Queries are made part of the Legal Health Record. If you have any questions, 
please contact the author of this message via ITS.



Dr. Lisa Matthews:



Per the History & Physical, the patient is diagnosed with sepsis, healthcare 
acquired pneumonia & UTI.

Per the floor nursing note: "pt came from [facility] with bingham catheter in place, may 
be chronic, history of ESBL in urine, contact precautions initiated."



History/Risk Factors: ESBL Urine infection, CKD stage IV, Hypertension with 
Hypertensive CHF & CKD, Dementia, previous pneumonia, O2 dependent, glaucoma 
with blindness, previous CVA w/residual aphasia & weakness, from SNF.



Clinical Indicators: From SNF with altered mental status, combative & agitated, 
not wearing O2, Incontinent. 

Urinalysis: Yellow, Turbid, 2+ protein, small blood, large esterase, RBC 17, 
WBC >182

Urine culture: Gm Neg Bacilli, Group D enterococcus.

Blood culture: negative @ 48 hrs.

Lab results: WBC 14.1^, Neut 12.9^, Na 135*, BUN 60^, Cr 3.48^, Elev trops 2/3. 

Treatment: IV Zosyn, IV Lasix, IV Ativan, Albuterol INH.



In your professional opinion, can you please clarify the etiology of the UTI, 
if known?



    UTI related to Bingham catheter

    UTI not related to catheter

    Other condition, please specify

    Unable to determine



If an infective organism is present, please specify cause and effect 
relationship if applicable.





(Last Revision: April 2018)

___________________________________________________________________________

MTDD

## 2019-01-12 LAB
ANION GAP SERPL CALC-SCNC: 9 MMOL/L
BASOPHILS # BLD AUTO: 0 K/UL (ref 0–0.2)
BASOPHILS NFR BLD AUTO: 0 %
BUN SERPL-SCNC: 78 MG/DL (ref 7–17)
CALCIUM SPEC-MCNC: 9.4 MG/DL (ref 8.4–10.2)
CHLORIDE SERPL-SCNC: 98 MMOL/L (ref 98–107)
CO2 SERPL-SCNC: 35 MMOL/L (ref 22–30)
EOSINOPHIL # BLD AUTO: 0 K/UL (ref 0–0.7)
EOSINOPHIL NFR BLD AUTO: 0 %
ERYTHROCYTE [DISTWIDTH] IN BLOOD BY AUTOMATED COUNT: 5.09 M/UL (ref 3.8–5.4)
ERYTHROCYTE [DISTWIDTH] IN BLOOD: 16.7 % (ref 11.5–15.5)
GLUCOSE BLD-MCNC: 135 MG/DL (ref 75–99)
GLUCOSE BLD-MCNC: 212 MG/DL (ref 75–99)
GLUCOSE BLD-MCNC: 214 MG/DL (ref 75–99)
GLUCOSE BLD-MCNC: 238 MG/DL (ref 75–99)
GLUCOSE SERPL-MCNC: 145 MG/DL (ref 74–99)
HCT VFR BLD AUTO: 43.3 % (ref 34–46)
HGB BLD-MCNC: 13.4 GM/DL (ref 11.4–16)
LYMPHOCYTES # SPEC AUTO: 0.4 K/UL (ref 1–4.8)
LYMPHOCYTES NFR SPEC AUTO: 4 %
MCH RBC QN AUTO: 26.3 PG (ref 25–35)
MCHC RBC AUTO-ENTMCNC: 30.9 G/DL (ref 31–37)
MCV RBC AUTO: 85.1 FL (ref 80–100)
MONOCYTES # BLD AUTO: 0.4 K/UL (ref 0–1)
MONOCYTES NFR BLD AUTO: 4 %
NEUTROPHILS # BLD AUTO: 8.1 K/UL (ref 1.3–7.7)
NEUTROPHILS NFR BLD AUTO: 90 %
PLATELET # BLD AUTO: 317 K/UL (ref 150–450)
POTASSIUM SERPL-SCNC: 4.1 MMOL/L (ref 3.5–5.1)
SODIUM SERPL-SCNC: 142 MMOL/L (ref 137–145)
WBC # BLD AUTO: 9 K/UL (ref 3.8–10.6)

## 2019-01-12 RX ADMIN — METHYLPREDNISOLONE SODIUM SUCCINATE SCH MG: 40 INJECTION, POWDER, FOR SOLUTION INTRAMUSCULAR; INTRAVENOUS at 09:42

## 2019-01-12 RX ADMIN — LORATADINE SCH MG: 10 TABLET ORAL at 09:43

## 2019-01-12 RX ADMIN — METOPROLOL TARTRATE SCH MG: 50 TABLET, FILM COATED ORAL at 20:58

## 2019-01-12 RX ADMIN — FUROSEMIDE SCH MG: 10 INJECTION, SOLUTION INTRAMUSCULAR; INTRAVENOUS at 20:58

## 2019-01-12 RX ADMIN — CINACALCET HYDROCHLORIDE SCH: 30 TABLET, COATED ORAL at 06:41

## 2019-01-12 RX ADMIN — FUROSEMIDE SCH MG: 10 INJECTION, SOLUTION INTRAMUSCULAR; INTRAVENOUS at 12:17

## 2019-01-12 RX ADMIN — Medication SCH: at 06:41

## 2019-01-12 RX ADMIN — METHYLPREDNISOLONE SODIUM SUCCINATE SCH MG: 40 INJECTION, POWDER, FOR SOLUTION INTRAMUSCULAR; INTRAVENOUS at 20:57

## 2019-01-12 RX ADMIN — IPRATROPIUM BROMIDE AND ALBUTEROL SULFATE SCH: .5; 3 SOLUTION RESPIRATORY (INHALATION) at 16:38

## 2019-01-12 RX ADMIN — PREDNISOLONE ACETATE SCH DROPS: 10 SUSPENSION/ DROPS OPHTHALMIC at 22:25

## 2019-01-12 RX ADMIN — IPRATROPIUM BROMIDE AND ALBUTEROL SULFATE SCH ML: .5; 3 SOLUTION RESPIRATORY (INHALATION) at 16:38

## 2019-01-12 RX ADMIN — MIRTAZAPINE SCH MG: 15 TABLET, FILM COATED ORAL at 20:58

## 2019-01-12 RX ADMIN — METOPROLOL TARTRATE SCH MG: 50 TABLET, FILM COATED ORAL at 09:42

## 2019-01-12 RX ADMIN — LATANOPROST SCH DROPS: 50 SOLUTION OPHTHALMIC at 22:25

## 2019-01-12 RX ADMIN — ERTAPENEM SODIUM SCH MLS/HR: 1 INJECTION, POWDER, LYOPHILIZED, FOR SOLUTION INTRAMUSCULAR; INTRAVENOUS at 09:41

## 2019-01-12 RX ADMIN — IPRATROPIUM BROMIDE AND ALBUTEROL SULFATE SCH: .5; 3 SOLUTION RESPIRATORY (INHALATION) at 09:14

## 2019-01-12 RX ADMIN — Medication SCH: at 18:35

## 2019-01-12 RX ADMIN — FLUTICASONE PROPIONATE SCH SPRAY: 50 SPRAY, METERED NASAL at 22:25

## 2019-01-12 RX ADMIN — FLUTICASONE PROPIONATE SCH: 50 SPRAY, METERED NASAL at 09:43

## 2019-01-12 RX ADMIN — DILTIAZEM HYDROCHLORIDE SCH MG: 240 CAPSULE, COATED, EXTENDED RELEASE ORAL at 12:17

## 2019-01-12 RX ADMIN — APIXABAN SCH MG: 2.5 TABLET, FILM COATED ORAL at 20:58

## 2019-01-12 RX ADMIN — IPRATROPIUM BROMIDE AND ALBUTEROL SULFATE SCH ML: .5; 3 SOLUTION RESPIRATORY (INHALATION) at 20:23

## 2019-01-12 RX ADMIN — APIXABAN SCH MG: 2.5 TABLET, FILM COATED ORAL at 09:43

## 2019-01-12 RX ADMIN — FUROSEMIDE SCH MG: 10 INJECTION, SOLUTION INTRAMUSCULAR; INTRAVENOUS at 03:41

## 2019-01-12 RX ADMIN — Medication SCH MG: at 09:43

## 2019-01-12 RX ADMIN — LEVOTHYROXINE SODIUM SCH: 25 TABLET ORAL at 06:41

## 2019-01-12 NOTE — PN
PROGRESS NOTE



Admitted with sepsis, UTI, CHF, dementia, brain mass.  She is has some compliance with

her oxygen.  She has been not wear oxygen level varies from mid 90s down to 80s into

70s. She is on broad-spectrum antibiotics for UTI.  Her breathing appears to be fairly

stable.



Cardiovascular:  S1, S2.  Lungs scattered rhonchi and wheeze.  Hematology 2+ edema.



ASSESSMENT:

1. Urinary tract infection.

2. Metabolic encephalopathy.

3. Sepsis.

4. Dementia.

5. Brain mass.

6. Congestive heart failure.

7. Chronic obstructive pulmonary disease.

8. O2 dependent.

Possible back to rehab center on Monday or Tuesday after cultures obtained for the UTI

and antibiotics were arranged and her breathing improves.





MMODL / IJN: [id number] / Job#: [id number]

## 2019-01-12 NOTE — PN
PROGRESS NOTE



SUBJECTIVE:

An 83-year-old white female who appears to be improving from sepsis, UTI, CHF.

Mentation slowly improving.  She is sitting up in bed.



CARDIOVASCULAR: S1, S2.

LUNGS:  Clear.

NEUROLOGY:  Alert and oriented x0.



She is admitted with sepsis, UTI, CHF.  Remains on broad-spectrum antibiotics with

Ertapenem.



White count is 9, BUN is 78, creatinine is 2.3, which is greatly improved.  Her acute

tubular necrosis, improving.  UTI is improving.  Her mentation is improving.  Possible

discharge home in the next couple days.





MMODL / IJN: [id number] / Job#: [id number]

## 2019-01-12 NOTE — PN
PROGRESS NOTE



DATE OF SERVICE:

01/11/2019



REASON FOR FOLLOWUP:

ESBL E coli, catheter associated urinary tract infection.



INTERVAL HISTORY:

The patient is afebrile.  The patient remains to be pleasantly confused.  No agitation

has been noticed.  No nausea, no vomiting.  No abdominal pain or diarrhea.



PHYSICAL EXAMINATION:

Blood pressure is 156/92 with a pulse of 100, temperature 97.4.  She is 90% on room

air.

GENERAL DESCRIPTION:  An elderly female lying in bed in no distress.

RESPIRATORY SYSTEM:  Unlabored breathing, clear to auscultation anteriorly.

HEART:  S1, S2.  Regular rate and rhythm.

ABDOMEN:  Soft, no tenderness.



LABS:

White count 5.4, BUN of 63, creatinine is 2.5.  Blood culture has been negative.



DIAGNOSTIC IMPRESSION AND PLAN:

Patient with catheter associated urinary tract infection, urine culture positive for

ESBL E coli, Enterococcus faecalis.  Patient is currently covered with Invanz to

continue for now and continue supportive care.





MMODL / IJN: [id number] / Job#: [id number]

## 2019-01-12 NOTE — P.PN
Subjective





Patient is seen in follow-up for acute kidney injury on chronic kidney disease.

  Patient has chronic kidney disease stage IV with baseline creatinine near 2 

secondary to nephrosclerosis and cardiorenal syndrome.  Patient has history of 

diastolic CHF.  Currently maintained on Lasix 40 mg IV 3 times daily.  She is 

nonoliguric.  Weight trending down.  She is also being treated for UTI.  Urine 

cultures positive for E. coli and enterococcus.  Patient is not a reliable 

historian.





Vital signs are stable.


General: The patient appeared well nourished and normally developed. 


HEENT: Head exam is unremarkable. Neck is without jugular venous distension.


LUNGS: Breath sounds decreased.  Scattered rhonchi.


HEART: Rate and Rhythm are regular. First and second heart sounds normal. No 

murmurs, rubs or gallops. 


ABDOMEN: Abdominal exam reveals normal bowel sounds. Non-tender and non-

distended. No evidence of peritonitis.


EXTREMITITES: 1+ edema.





Objective





- Vital Signs


Vital signs: 


 Vital Signs











Temp  97.9 F   01/12/19 08:02


 


Pulse  80   01/12/19 08:02


 


Resp  16   01/12/19 08:02


 


BP  170/93   01/12/19 08:02


 


Pulse Ox  96   01/12/19 08:02








 Intake & Output











 01/11/19 01/12/19 01/12/19





 18:59 06:59 18:59


 


Intake Total 770 300 255


 


Output Total 1200 800 


 


Balance -430 -500 255


 


Weight  67.5 kg 


 


Intake:   


 


  IV   55


 


    normal saline   55


 


  Intake, IV Titration 50  





  Amount   


 


    Ertapenem 0.5 gm In 50  





    Sodium Chloride 0.9% 50   





    ml @ 100 mls/hr IVPB   





    DAILY [facility] Rx#:[id number]   


 


  Oral 720 300 200


 


Output:   


 


  Urine 1200 800 


 


Other:   


 


  Voiding Method Indwelling Catheter Indwelling Catheter 


 


  # Bowel Movements 1  














- Labs


CBC & Chem 7: 


 01/12/19 06:53





 01/12/19 06:53


Labs: 


 Abnormal Lab Results - Last 24 Hours (Table)











  01/11/19 01/11/19 01/11/19 Range/Units





  11:14 17:08 21:24 


 


MCHC     (31.0-37.0)  g/dL


 


RDW     (11.5-15.5)  %


 


Neutrophils #     (1.3-7.7)  k/uL


 


Lymphocytes #     (1.0-4.8)  k/uL


 


Carbon Dioxide     (22-30)  mmol/L


 


BUN     (7-17)  mg/dL


 


Creatinine     (0.52-1.04)  mg/dL


 


Glucose     (74-99)  mg/dL


 


POC Glucose (mg/dL)  185 H  146 H  192 H  (75-99)  mg/dL














  01/12/19 01/12/19 01/12/19 Range/Units





  05:50 06:53 06:53 


 


MCHC   30.9 L   (31.0-37.0)  g/dL


 


RDW   16.7 H   (11.5-15.5)  %


 


Neutrophils #   8.1 H   (1.3-7.7)  k/uL


 


Lymphocytes #   0.4 L   (1.0-4.8)  k/uL


 


Carbon Dioxide    35 H  (22-30)  mmol/L


 


BUN    78 H  (7-17)  mg/dL


 


Creatinine    2.30 H  (0.52-1.04)  mg/dL


 


Glucose    145 H  (74-99)  mg/dL


 


POC Glucose (mg/dL)  135 H    (75-99)  mg/dL








 Microbiology - Last 24 Hours (Table)











 01/07/19 02:10 Blood Culture - Preliminary





 Blood    No Growth after 120 hours














Assessment and Plan


Plan: 





Assessment:


1.  Nonoliguric acute kidney injury mostly prerenal secondary to cardiorenal 

syndrome.  Creatinine down to 2.3 today.


2.  Chronic kidney disease stage IV with basic creatinine near 2 secondary to 

nephrosclerosis and cardiorenal syndrome.


3.  Diastolic CHF.


4.  Volume overload.


5.  UTI with urine culture positive for ESBL E. coli and enterococcus 

maintained on antibiotics.


6.  History of hypercalcemia maintain on Sensipar.


7.  Hypertension with chronic kidney disease.  Blood pressures high.  Partially 

due to IV steroids.





Plan:


Maintain Lasix 40 mg IV 3 times daily.


Discontinue oral bicarb.


Avoid nephrotoxins.


Add hydralazine 10 mg IV every 4 hours as needed for systolic blood pressure 

greater than 160.


Repeat electrolytes in the morning.

## 2019-01-13 LAB
ANION GAP SERPL CALC-SCNC: 11 MMOL/L
BUN SERPL-SCNC: 76 MG/DL (ref 7–17)
CALCIUM SPEC-MCNC: 9.6 MG/DL (ref 8.4–10.2)
CHLORIDE SERPL-SCNC: 95 MMOL/L (ref 98–107)
CO2 SERPL-SCNC: 35 MMOL/L (ref 22–30)
GLUCOSE BLD-MCNC: 140 MG/DL (ref 75–99)
GLUCOSE BLD-MCNC: 160 MG/DL (ref 75–99)
GLUCOSE BLD-MCNC: 218 MG/DL (ref 75–99)
GLUCOSE SERPL-MCNC: 150 MG/DL (ref 74–99)
MAGNESIUM SPEC-SCNC: 2.1 MG/DL (ref 1.6–2.3)
POTASSIUM SERPL-SCNC: 4.2 MMOL/L (ref 3.5–5.1)
SODIUM SERPL-SCNC: 141 MMOL/L (ref 137–145)

## 2019-01-13 RX ADMIN — MIRTAZAPINE SCH MG: 15 TABLET, FILM COATED ORAL at 22:00

## 2019-01-13 RX ADMIN — FUROSEMIDE SCH MG: 10 INJECTION, SOLUTION INTRAMUSCULAR; INTRAVENOUS at 22:01

## 2019-01-13 RX ADMIN — IPRATROPIUM BROMIDE AND ALBUTEROL SULFATE SCH ML: .5; 3 SOLUTION RESPIRATORY (INHALATION) at 08:17

## 2019-01-13 RX ADMIN — IPRATROPIUM BROMIDE AND ALBUTEROL SULFATE SCH: .5; 3 SOLUTION RESPIRATORY (INHALATION) at 13:40

## 2019-01-13 RX ADMIN — METOPROLOL TARTRATE SCH MG: 50 TABLET, FILM COATED ORAL at 22:00

## 2019-01-13 RX ADMIN — FUROSEMIDE SCH MG: 10 INJECTION, SOLUTION INTRAMUSCULAR; INTRAVENOUS at 04:33

## 2019-01-13 RX ADMIN — METHYLPREDNISOLONE SODIUM SUCCINATE SCH MG: 40 INJECTION, POWDER, FOR SOLUTION INTRAMUSCULAR; INTRAVENOUS at 22:00

## 2019-01-13 RX ADMIN — Medication SCH MG: at 07:33

## 2019-01-13 RX ADMIN — Medication SCH MG: at 15:40

## 2019-01-13 RX ADMIN — LATANOPROST SCH DROPS: 50 SOLUTION OPHTHALMIC at 22:01

## 2019-01-13 RX ADMIN — IPRATROPIUM BROMIDE AND ALBUTEROL SULFATE SCH: .5; 3 SOLUTION RESPIRATORY (INHALATION) at 16:58

## 2019-01-13 RX ADMIN — APIXABAN SCH MG: 2.5 TABLET, FILM COATED ORAL at 22:01

## 2019-01-13 RX ADMIN — METOPROLOL TARTRATE SCH MG: 50 TABLET, FILM COATED ORAL at 07:33

## 2019-01-13 RX ADMIN — LORATADINE SCH MG: 10 TABLET ORAL at 07:33

## 2019-01-13 RX ADMIN — APIXABAN SCH MG: 2.5 TABLET, FILM COATED ORAL at 07:33

## 2019-01-13 RX ADMIN — FLUTICASONE PROPIONATE SCH SPRAY: 50 SPRAY, METERED NASAL at 07:32

## 2019-01-13 RX ADMIN — LEVOTHYROXINE SODIUM SCH MCG: 25 TABLET ORAL at 07:33

## 2019-01-13 RX ADMIN — PREDNISOLONE ACETATE SCH DROPS: 10 SUSPENSION/ DROPS OPHTHALMIC at 22:01

## 2019-01-13 RX ADMIN — FUROSEMIDE SCH MG: 10 INJECTION, SOLUTION INTRAMUSCULAR; INTRAVENOUS at 11:40

## 2019-01-13 RX ADMIN — IPRATROPIUM BROMIDE AND ALBUTEROL SULFATE SCH: .5; 3 SOLUTION RESPIRATORY (INHALATION) at 21:06

## 2019-01-13 RX ADMIN — ERTAPENEM SODIUM SCH MLS/HR: 1 INJECTION, POWDER, LYOPHILIZED, FOR SOLUTION INTRAMUSCULAR; INTRAVENOUS at 08:00

## 2019-01-13 RX ADMIN — METHYLPREDNISOLONE SODIUM SUCCINATE SCH MG: 40 INJECTION, POWDER, FOR SOLUTION INTRAMUSCULAR; INTRAVENOUS at 07:32

## 2019-01-13 RX ADMIN — CINACALCET HYDROCHLORIDE SCH MG: 30 TABLET, COATED ORAL at 07:33

## 2019-01-13 RX ADMIN — DILTIAZEM HYDROCHLORIDE SCH MG: 240 CAPSULE, COATED, EXTENDED RELEASE ORAL at 07:33

## 2019-01-13 RX ADMIN — HYDRALAZINE HYDROCHLORIDE PRN MG: 20 INJECTION INTRAMUSCULAR; INTRAVENOUS at 00:47

## 2019-01-13 RX ADMIN — FLUTICASONE PROPIONATE SCH SPRAY: 50 SPRAY, METERED NASAL at 22:01

## 2019-01-13 NOTE — PN
PROGRESS NOTE



DATE OF SERVICE:

01/13/2019



She was seen on January 13 year 2019.  She has been hemodynamically stable.  She does

not complain of shortness of breath or pain.



Her blood pressure is 161/87, respiratory rate of 14, pulse of 100, temperature 97.5,

O2 saturation on 3 L by nasal cannula is 93%.  HEENT is unremarkable.  Chest is clear.

Cardiovascular system revealed an S1, S2.  Abdomen is soft.  There is trace pedal

edema.



IMPRESSION:

1. Urinary tract infection.

2. Metabolic encephalopathy.

3. Sepsis.

4. Brain mass.

5. Dementia.

6. Congestive heart failure.

Continue her on her current medications which were reviewed.  Appreciate input by ID,

cardiology, Pulmonary, and Nephrology.





MMODL / IJN: [id number] / Job#: [id number]

## 2019-01-13 NOTE — P.PN
Subjective


Progress Note Date: 01/13/19


Principal diagnosis: 





Acute on chronic hypoxic respiratory failure, history of prior stroke, 

healthcare associated pneumonia, acute on chronic renal failure,


01/13/2019, patient seen eval examined during the rounds clinically patient has 

been doing well increase in her responsiveness is lately noted, patient is 

scheduled for placement ECF in next 24 hours





1/12/19, patient seen eval examined during the rounds clinically overall not 

much change patient has been intermittently using oxygen still have 

intermittent cough congestion severity is secretion slightly improved 

hemodynamic status stable care plan discussed with the staff at length given 

the current situation would recommend to continue current plan of care and 

follow clinical course closely 





01/11/2019, patient seen eval examined during the rounds she has refused the 

utilization of oxygen, he still have intermittent congestion and pulling of 

secretions into the throat,, patient has been swallowing pills fairly okay, 





January 10 2019, patient seen eval reexamined during the rounds clinically 

patient has been out much change, continued to have a respiratory issues and 

pulling of secretion back of the throat, patient has been a no code, patient 

does not keep the oxygen on, care plan discussed with the staff at length would 

continue supportive care overall long-term prognosis poor





01/09/2019, patient seen eval examined during the rounds still have ongoing 

intermittent cough congestion patient remains on broad-spectrum antibiotics 

patient has issues with keeping the oxygen on, labs reviewed medications 

reviewed care plan discussed with the staff at length, the chest x-ray continue 

show by basilar infiltrate and small effusion with interstitial edema





83-year-old female with history of prior CVA with significant aphasia patient 

has some residual weakness in the extremity a patient is a resident of extended 

care facility she was noted to be more short of breath as well as started 

having cough with thick purulent sputum production with those problem patient 

was brought into emergency department for further evaluation has been admitted 

into the hospital, her admitted chest x-ray suggestive of patchy bilateral 

infiltrate, both lungs and wall more so on the right side compared to the left 

side, urine culture and blood culture have been negative, patient is currently 

being treated with Zosyn, her labs are reviewed, labs white cell count is 14,000

, BUN/creatinine 60 and 3.48 slight worsening from previous renal functions 

have been noted,








Objective





- Vital Signs


Vital signs: 


 Vital Signs











Temp  96.9 F L  01/13/19 07:20


 


Pulse  103 H  01/13/19 07:20


 


Resp  16   01/13/19 07:20


 


BP  194/97   01/13/19 07:20


 


Pulse Ox  95   01/13/19 07:20








 Intake & Output











 01/12/19 01/13/19 01/13/19





 18:59 06:59 18:59


 


Intake Total 255 200 


 


Output Total 575 875 


 


Balance -320 -975 


 


Intake:   


 


  IV 55  


 


    normal saline 55  


 


  Oral 200 200 


 


Output:   


 


  Urine 575 875 


 


Other:   


 


  Voiding Method Indwelling Catheter Indwelling Catheter Indwelling Catheter














- Exam





- Constitutional


General appearance: average body habitus, cooperative, disheveled, mild distress





- EENT


Eyes: anicteric sclerae, EOMI, PERRLA, poor dentition


ENT: hard of hearing, normal oropharynx


Ears: bilateral: normal





- Neck


Carotids: bilateral: upstroke normal


Thyroid: bilateral: normal size





- Respiratory


Respiratory: bilateral: CTA, fine wheezing on forced expiration, prolonged 

expiration, negative: dullness, rales, rhonchi





- Cardiovascular


Rhythm: regular


Heart sounds: normal: S1, S2





- Gastrointestinal


General gastrointestinal: decreased bowel sounds, normal bowel sounds, soft





- Neurologic


Neurologic: CNII-XII intact





- Musculoskeletal


Musculoskeletal: gait normal, generalized weakness, strength equal bilaterally





- Psychiatric





Due to stroke significant speech impairment noted


Psychiatric: A&O x's one to 2, appropriate affect, 





- Labs


CBC & Chem 7: 


 01/12/19 06:53





 01/13/19 06:41


Labs: 


 Abnormal Lab Results - Last 24 Hours (Table)











  01/12/19 01/12/19 01/12/19 Range/Units





  12:13 16:47 19:58 


 


Chloride     ()  mmol/L


 


Carbon Dioxide     (22-30)  mmol/L


 


BUN     (7-17)  mg/dL


 


Creatinine     (0.52-1.04)  mg/dL


 


Glucose     (74-99)  mg/dL


 


POC Glucose (mg/dL)  238 H  214 H  212 H  (75-99)  mg/dL














  01/13/19 01/13/19 Range/Units





  06:41 07:17 


 


Chloride  95 L   ()  mmol/L


 


Carbon Dioxide  35 H   (22-30)  mmol/L


 


BUN  76 H   (7-17)  mg/dL


 


Creatinine  2.24 H   (0.52-1.04)  mg/dL


 


Glucose  150 H   (74-99)  mg/dL


 


POC Glucose (mg/dL)   140 H  (75-99)  mg/dL








 Microbiology - Last 24 Hours (Table)











 01/07/19 02:10 Blood Culture - Final





 Blood    No Growth after 144 hours














Assessment and Plan


Assessment: 





Acute hypoxic respiratory failure





Bilateral pneumonia, aspiration related/healthcare associated pneumonia





Acute on chronic renal failure, stage III





History of CVA





UTI, polymicrobial associated with the E. coli and enterococcus, E. coli 

appears to be ESBL, you broad-spectrum antibiotics





Elevated troponins occurred MI non-ST segment elevated cannot be excluded given 

that the BNP is elevated associated congestive heart failure remains an issue














Plan: 





Continue antibiotics as recommended by infectious disease services





Continue breathing treatments 





IV steroids





Reviewed chest x-ray





Other recommendations pending plan of care as per clinical response of the 

patient, overall long-term prognosis poor, once stable patient can be 

considerable placement in extended care facility


Time with Patient: Greater than 30

## 2019-01-13 NOTE — P.PN
Subjective


Progress Note Date: 01/12/19 (late entry note)


Principal diagnosis: 





Acute on chronic hypoxic respiratory failure, history of prior stroke, 

healthcare associated pneumonia, acute on chronic renal failure,








1/12/19, patient seen eval examined during the rounds clinically overall not 

much change patient has been intermittently using oxygen still have 

intermittent cough congestion severity is secretion slightly improved 

hemodynamic status stable care plan discussed with the staff at length given 

the current situation would recommend to continue current plan of care and 

follow clinical course closely 





01/11/2019, patient seen eval examined during the rounds she has refused the 

utilization of oxygen, he still have intermittent congestion and pulling of 

secretions into the throat,, patient has been swallowing pills fairly okay, 





January 10 2019, patient seen eval reexamined during the rounds clinically 

patient has been out much change, continued to have a respiratory issues and 

pulling of secretion back of the throat, patient has been a no code, patient 

does not keep the oxygen on, care plan discussed with the staff at length would 

continue supportive care overall long-term prognosis poor





01/09/2019, patient seen eval examined during the rounds still have ongoing 

intermittent cough congestion patient remains on broad-spectrum antibiotics 

patient has issues with keeping the oxygen on, labs reviewed medications 

reviewed care plan discussed with the staff at length, the chest x-ray continue 

show by basilar infiltrate and small effusion with interstitial edema





83-year-old female with history of prior CVA with significant aphasia patient 

has some residual weakness in the extremity a patient is a resident of [facility] [[email or web address]] she was noted to be more short of breath as well as started 

having cough with thick purulent sputum production with those problem patient 

was brought into emergency department for further evaluation has been admitted 

into the hospital, her admitted chest x-ray suggestive of patchy bilateral 

infiltrate, both lungs and wall more so on the right side compared to the left 

side, urine culture and blood culture have been negative, patient is currently 

being treated with Zosyn, her labs are reviewed, labs white cell count is 14,000

, BUN/creatinine 60 and 3.48 slight worsening from previous renal functions 

have been noted,








Objective





- Vital Signs


Vital signs: 


 Vital Signs











Temp  96.9 F L  01/13/19 07:20


 


Pulse  103 H  01/13/19 07:20


 


Resp  16   01/13/19 07:20


 


BP  194/97   01/13/19 07:20


 


Pulse Ox  95   01/13/19 07:20








 Intake & Output











 01/12/19 01/13/19 01/13/19





 18:59 06:59 18:59


 


Intake Total 255 200 


 


Output Total 575 875 


 


Balance -320 -675 


 


Intake:   


 


  IV 55  


 


    normal saline 55  


 


  Oral 200 200 


 


Output:   


 


  Urine 575 875 


 


Other:   


 


  Voiding Method Indwelling Catheter Indwelling Catheter Indwelling Catheter














- Exam





- Constitutional


General appearance: average body habitus, cooperative, disheveled, mild distress





- EENT


Eyes: anicteric sclerae, EOMI, PERRLA, poor dentition


ENT: hard of hearing, normal oropharynx


Ears: bilateral: normal





- Neck


Carotids: bilateral: upstroke normal


Thyroid: bilateral: normal size





- Respiratory


Respiratory: bilateral: CTA, wheezing, prolonged expiration, negative: dullness

, rales, rhonchi





- Cardiovascular


Rhythm: regular


Heart sounds: normal: S1, S2





- Gastrointestinal


General gastrointestinal: decreased bowel sounds, normal bowel sounds, soft





- Neurologic


Neurologic: CNII-XII intact





- Musculoskeletal


Musculoskeletal: gait normal, generalized weakness, strength equal bilaterally





- Psychiatric





Due to stroke significant speech impairment noted


Psychiatric: A&O x's one to 2, appropriate affect, 





- Labs


CBC & Chem 7: 


 01/12/19 06:53





 01/13/19 06:41


Labs: 


 Abnormal Lab Results - Last 24 Hours (Table)











  01/12/19 01/12/19 01/12/19 Range/Units





  12:13 16:47 19:58 


 


Chloride     ()  mmol/L


 


Carbon Dioxide     (22-30)  mmol/L


 


BUN     (7-17)  mg/dL


 


Creatinine     (0.52-1.04)  mg/dL


 


Glucose     (74-99)  mg/dL


 


POC Glucose (mg/dL)  238 H  214 H  212 H  (75-99)  mg/dL














  01/13/19 01/13/19 Range/Units





  06:41 07:17 


 


Chloride  95 L   ()  mmol/L


 


Carbon Dioxide  35 H   (22-30)  mmol/L


 


BUN  76 H   (7-17)  mg/dL


 


Creatinine  2.24 H   (0.52-1.04)  mg/dL


 


Glucose  150 H   (74-99)  mg/dL


 


POC Glucose (mg/dL)   140 H  (75-99)  mg/dL








 Microbiology - Last 24 Hours (Table)











 01/07/19 02:10 Blood Culture - Final





 Blood    No Growth after 144 hours














Assessment and Plan


Assessment: 





Acute hypoxic respiratory failure





Bilateral pneumonia, aspiration related/healthcare associated pneumonia





Acute on chronic renal failure, stage III





History of CVA





UTI, polymicrobial associated with the E. coli and enterococcus, E. coli 

appears to be ESBL, you broad-spectrum antibiotics





Elevated troponins occurred MI non-ST segment elevated cannot be excluded given 

that the BNP is elevated associated congestive heart failure remains an issue














Plan: 





Continue antibiotics as recommended by infectious disease services





Continue breathing treatments 





IV steroids





Reviewed chest x-ray





Other recommendations pending plan of care as per clinical response of the 

patient, overall long-term prognosis poor, once stable patient can be 

considerable placement in extended care facility


Time with Patient: Greater than 30

## 2019-01-13 NOTE — P.PN
Subjective





Patient is seen in follow-up for acute kidney injury on chronic kidney disease.

  Patient has chronic kidney disease stage IV with baseline creatinine near 2 

secondary to nephrosclerosis and cardiorenal syndrome.  Patient has history of 

diastolic CHF.  Currently maintained on Lasix 40 mg IV 3 times daily.  She is 

nonoliguric.  Weight trending down.  She is also being treated for UTI.  Urine 

cultures positive for E. coli and enterococcus.  Patient is not a reliable 

historian.


BP running high.





Vital signs are stable.


General: The patient appeared well nourished and normally developed. 


HEENT: Head exam is unremarkable. Neck is without jugular venous distension.


LUNGS: Breath sounds decreased.  Scattered rhonchi.


HEART: Rate and Rhythm are regular. First and second heart sounds normal. No 

murmurs, rubs or gallops. 


ABDOMEN: Abdominal exam reveals normal bowel sounds. Non-tender and non-

distended. No evidence of peritonitis.


EXTREMITITES: 1+ edema.





Objective





- Vital Signs


Vital signs: 


 Vital Signs











Temp  96.9 F L  01/13/19 07:20


 


Pulse  103 H  01/13/19 07:20


 


Resp  16   01/13/19 07:20


 


BP  194/97   01/13/19 07:20


 


Pulse Ox  95   01/13/19 07:20








 Intake & Output











 01/12/19 01/13/19 01/13/19





 18:59 06:59 18:59


 


Intake Total 255 200 


 


Output Total 575 875 


 


Balance -320 -675 


 


Intake:   


 


  IV 55  


 


    normal saline 55  


 


  Oral 200 200 


 


Output:   


 


  Urine 575 875 


 


Other:   


 


  Voiding Method Indwelling Catheter Indwelling Catheter Indwelling Catheter














- Labs


CBC & Chem 7: 


 01/12/19 06:53





 01/13/19 06:41


Labs: 


 Abnormal Lab Results - Last 24 Hours (Table)











  01/12/19 01/12/19 01/12/19 Range/Units





  12:13 16:47 19:58 


 


Chloride     ()  mmol/L


 


Carbon Dioxide     (22-30)  mmol/L


 


BUN     (7-17)  mg/dL


 


Creatinine     (0.52-1.04)  mg/dL


 


Glucose     (74-99)  mg/dL


 


POC Glucose (mg/dL)  238 H  214 H  212 H  (75-99)  mg/dL














  01/13/19 01/13/19 Range/Units





  06:41 07:17 


 


Chloride  95 L   ()  mmol/L


 


Carbon Dioxide  35 H   (22-30)  mmol/L


 


BUN  76 H   (7-17)  mg/dL


 


Creatinine  2.24 H   (0.52-1.04)  mg/dL


 


Glucose  150 H   (74-99)  mg/dL


 


POC Glucose (mg/dL)   140 H  (75-99)  mg/dL








 Microbiology - Last 24 Hours (Table)











 01/07/19 02:10 Blood Culture - Final





 Blood    No Growth after 144 hours














Assessment and Plan


Plan: 





Assessment:


1.  Nonoliguric acute kidney injury mostly prerenal secondary to cardiorenal 

syndrome.  Creatinine stable at 2.24 today.


2.  Chronic kidney disease stage IV with basic creatinine near 2 secondary to 

nephrosclerosis and cardiorenal syndrome.


3.  Diastolic CHF.


4.  Volume overload.


5.  UTI with urine culture positive for ESBL E. coli and enterococcus 

maintained on antibiotics.


6.  History of hypercalcemia maintained on Sensipar.


7.  Hypertension with chronic kidney disease.  Blood pressures high.  Partially 

due to IV steroids.





Plan:


Maintain Lasix 40 mg IV 3 times daily.


Discontinued oral bicarb.


Avoid nephrotoxins.


Add hydralazine 25 mg tid.


Repeat electrolytes in the morning.

## 2019-01-14 LAB
ANION GAP SERPL CALC-SCNC: 8 MMOL/L
BUN SERPL-SCNC: 76 MG/DL (ref 7–17)
CALCIUM SPEC-MCNC: 9.5 MG/DL (ref 8.4–10.2)
CHLORIDE SERPL-SCNC: 96 MMOL/L (ref 98–107)
CO2 SERPL-SCNC: 39 MMOL/L (ref 22–30)
GLUCOSE BLD-MCNC: 175 MG/DL (ref 75–99)
GLUCOSE BLD-MCNC: 179 MG/DL (ref 75–99)
GLUCOSE BLD-MCNC: 194 MG/DL (ref 75–99)
GLUCOSE BLD-MCNC: 209 MG/DL (ref 75–99)
GLUCOSE SERPL-MCNC: 154 MG/DL (ref 74–99)
MAGNESIUM SPEC-SCNC: 2.2 MG/DL (ref 1.6–2.3)
POTASSIUM SERPL-SCNC: 4.2 MMOL/L (ref 3.5–5.1)
SODIUM SERPL-SCNC: 143 MMOL/L (ref 137–145)

## 2019-01-14 RX ADMIN — METHYLPREDNISOLONE SODIUM SUCCINATE SCH MG: 40 INJECTION, POWDER, FOR SOLUTION INTRAMUSCULAR; INTRAVENOUS at 19:43

## 2019-01-14 RX ADMIN — IPRATROPIUM BROMIDE AND ALBUTEROL SULFATE SCH: .5; 3 SOLUTION RESPIRATORY (INHALATION) at 20:44

## 2019-01-14 RX ADMIN — APIXABAN SCH MG: 2.5 TABLET, FILM COATED ORAL at 10:51

## 2019-01-14 RX ADMIN — MIRTAZAPINE SCH MG: 15 TABLET, FILM COATED ORAL at 19:46

## 2019-01-14 RX ADMIN — IPRATROPIUM BROMIDE AND ALBUTEROL SULFATE SCH: .5; 3 SOLUTION RESPIRATORY (INHALATION) at 07:34

## 2019-01-14 RX ADMIN — FLUTICASONE PROPIONATE SCH SPRAY: 50 SPRAY, METERED NASAL at 10:52

## 2019-01-14 RX ADMIN — FUROSEMIDE SCH MG: 10 INJECTION, SOLUTION INTRAMUSCULAR; INTRAVENOUS at 10:51

## 2019-01-14 RX ADMIN — INSULIN ASPART SCH UNIT: 100 INJECTION, SOLUTION INTRAVENOUS; SUBCUTANEOUS at 17:14

## 2019-01-14 RX ADMIN — IPRATROPIUM BROMIDE AND ALBUTEROL SULFATE SCH: .5; 3 SOLUTION RESPIRATORY (INHALATION) at 11:29

## 2019-01-14 RX ADMIN — LORATADINE SCH MG: 10 TABLET ORAL at 10:51

## 2019-01-14 RX ADMIN — IPRATROPIUM BROMIDE AND ALBUTEROL SULFATE SCH ML: .5; 3 SOLUTION RESPIRATORY (INHALATION) at 07:28

## 2019-01-14 RX ADMIN — FUROSEMIDE SCH MG: 10 INJECTION, SOLUTION INTRAMUSCULAR; INTRAVENOUS at 19:43

## 2019-01-14 RX ADMIN — IPRATROPIUM BROMIDE AND ALBUTEROL SULFATE SCH ML: .5; 3 SOLUTION RESPIRATORY (INHALATION) at 16:09

## 2019-01-14 RX ADMIN — CINACALCET HYDROCHLORIDE SCH MG: 30 TABLET, COATED ORAL at 10:54

## 2019-01-14 RX ADMIN — DILTIAZEM HYDROCHLORIDE SCH MG: 240 CAPSULE, COATED, EXTENDED RELEASE ORAL at 10:51

## 2019-01-14 RX ADMIN — METHYLPREDNISOLONE SODIUM SUCCINATE SCH MG: 40 INJECTION, POWDER, FOR SOLUTION INTRAMUSCULAR; INTRAVENOUS at 10:51

## 2019-01-14 RX ADMIN — Medication SCH MG: at 10:51

## 2019-01-14 RX ADMIN — LATANOPROST SCH DROPS: 50 SOLUTION OPHTHALMIC at 19:43

## 2019-01-14 RX ADMIN — INSULIN ASPART SCH: 100 INJECTION, SOLUTION INTRAVENOUS; SUBCUTANEOUS at 21:07

## 2019-01-14 RX ADMIN — Medication SCH MG: at 17:13

## 2019-01-14 RX ADMIN — FLUTICASONE PROPIONATE SCH SPRAY: 50 SPRAY, METERED NASAL at 19:44

## 2019-01-14 RX ADMIN — FUROSEMIDE SCH MG: 10 INJECTION, SOLUTION INTRAMUSCULAR; INTRAVENOUS at 03:55

## 2019-01-14 RX ADMIN — LEVOTHYROXINE SODIUM SCH MCG: 25 TABLET ORAL at 10:54

## 2019-01-14 RX ADMIN — APIXABAN SCH: 2.5 TABLET, FILM COATED ORAL at 21:07

## 2019-01-14 RX ADMIN — METOPROLOL TARTRATE SCH MG: 50 TABLET, FILM COATED ORAL at 10:51

## 2019-01-14 RX ADMIN — METOPROLOL TARTRATE SCH MG: 50 TABLET, FILM COATED ORAL at 19:46

## 2019-01-14 RX ADMIN — ERTAPENEM SODIUM SCH MLS/HR: 1 INJECTION, POWDER, LYOPHILIZED, FOR SOLUTION INTRAMUSCULAR; INTRAVENOUS at 10:47

## 2019-01-14 RX ADMIN — PREDNISOLONE ACETATE SCH DROPS: 10 SUSPENSION/ DROPS OPHTHALMIC at 19:43

## 2019-01-14 NOTE — PN
PROGRESS NOTE



DATE OF SERVICE:

01/14/2019.



REASON FOR FOLLOWUP:

ESBL E coli urinary tract infection.



INTERVAL HISTORY:

The patient is currently afebrile.  She is breathing comfortably.  The patient remains

to be sleepy, lethargic and unable to provide any history.  No nausea, vomiting or

diarrhea reported by the nursing staff. Oral intake remains to be poor.



PHYSICAL EXAMINATION:

Blood pressure 150/91 with a pulse of 78, temperature 97.8.  She is 92% on 5 L nasal

cannula.

General description is an elderly female lying in bed in no distress.  Respiratory

system:  Unlabored breathing.  Clear to auscultation anteriorly.  Heart S1, S2.

Regular rate and rhythm.

ABDOMEN:  Soft, no tenderness.



LABS:

BUN of 76, creatinine 4.04.



DIAGNOSTIC IMPRESSION AND PLAN:

Patient with ESBL E coli tract infection.  Also concern for possible pneumonia and the

patient is currently covered with Invanz to continue for now to finish a 7 to 10 course

of therapy.





MMODL / IJN: [id number] / Job#: [id number]

## 2019-01-14 NOTE — PN
PROGRESS NOTE



SUBJECTIVE:

83-year-old white male remains on IV Lasix per Dr. Fisher,  renal physician.  Dr. Matthews

has her on IV Ancef for UTI.  ESBL E. coli urinary tract infection, clinically

improving.  Possible PICC line placement for discharge planning over the next 24 to 48

hours.



She appears to be maintaining, her mentation appears improved.  Her lungs are clear.

Cardiovascular S1, S2.



ASSESSMENT:

1. ESBL urinary tract infection.

2. Sepsis secondary to above.

3. Dehydration secondary to above.

4. Metabolic encephalopathy.

5. Acute tubular necrosis.

6. Dementia.

7. Congestive heart failure, diastolic.

8. Metabolic alkalosis.

Wean down the Lasix.  Continue with IV Ancef.  Await for plans on discharge this week,

back to the rehab.





RUSTY / ROCKY: [id number] / Job#: [id number]

## 2019-01-14 NOTE — PN
PROGRESS NOTE



DATE OF SERVICE:

01/13/2019.



REASON FOR FOLLOWUP VISIT:

ESBL E. Coli urinary tract infection.



INTERVAL HISTORY:

The patient is currently afebrile.  She is breathing comfortably.  No nausea, vomiting,

diarrhea reported by the nursing staff. She is unable to provide any reliable history

today.



EXAMINATION:

Blood pressure 151/87, pulse of 100.  Temperature 97.5.  She is 93% on 3 L nasal

cannula. General description is an elderly female lying in bed in no distress.

Respiratory system:  Unlabored breathing with decreased breath sounds in the bases.  No

wheeze.  Heart S1, S2.  Regular rate and rhythm.

ABDOMEN:  Soft, no tenderness.



LABS:

BUN of 27, creatinine is 2.24.



DIAGNOSTIC IMPRESSION AND PLAN:

Patient with an ESBL E coli urinary tract infection.  The patient slowly clinically

improving.  The patient to continue on InVance at this point while watching clinical

course closely.  Continue supportive care.





MMODL / IJN: [id number] / Job#: [id number]

## 2019-01-14 NOTE — P.PN
Subjective


Progress Note Date: 01/14/19


Principal diagnosis: 





Acute on chronic hypoxic respiratory failure, history of prior stroke, 

healthcare associated pneumonia, acute on chronic renal failure,


01/14/2019, patient seen eval examined during the rounds clinically patient has 

been not much change from baseline breathing comfortably he is still using 

oxygen intermittently no obvious distress present labs reviewed medications 

reviewed





01/13/2019, patient seen eval examined during the rounds clinically patient has 

been doing well increase in her responsiveness is lately noted, patient is 

scheduled for placement ECF in next 24 hours





1/12/19, patient seen eval examined during the rounds clinically overall not 

much change patient has been intermittently using oxygen still have 

intermittent cough congestion severity is secretion slightly improved 

hemodynamic status stable care plan discussed with the staff at length given 

the current situation would recommend to continue current plan of care and 

follow clinical course closely 





01/11/2019, patient seen eval examined during the rounds she has refused the 

utilization of oxygen, he still have intermittent congestion and pulling of 

secretions into the throat,, patient has been swallowing pills fairly okay, 





January 10 2019, patient seen eval reexamined during the rounds clinically 

patient has been out much change, continued to have a respiratory issues and 

pulling of secretion back of the throat, patient has been a no code, patient 

does not keep the oxygen on, care plan discussed with the staff at length would 

continue supportive care overall long-term prognosis poor





01/09/2019, patient seen eval examined during the rounds still have ongoing 

intermittent cough congestion patient remains on broad-spectrum antibiotics 

patient has issues with keeping the oxygen on, labs reviewed medications 

reviewed care plan discussed with the staff at length, the chest x-ray continue 

show by basilar infiltrate and small effusion with interstitial edema





83-year-old female with history of prior CVA with significant aphasia patient 

has some residual weakness in the extremity a patient is a resident of extended 

care [facility] she was noted to be more short of breath as well as started 

having cough with thick purulent sputum production with those problem patient 

was brought into emergency department for further evaluation has been admitted 

into the hospital, her admitted chest x-ray suggestive of patchy bilateral 

infiltrate, both lungs and wall more so on the right side compared to the left 

side, urine culture and blood culture have been negative, patient is currently 

being treated with Zosyn, her labs are reviewed, labs white cell count is 14,000

, BUN/creatinine 60 and 3.48 slight worsening from previous renal functions 

have been noted,








Objective





- Vital Signs


Vital signs: 


 Vital Signs











Temp  97.6 F   01/14/19 07:00


 


Pulse  84   01/14/19 07:00


 


Resp  18   01/14/19 07:00


 


BP  180/97   01/14/19 07:00


 


Pulse Ox  95   01/14/19 07:28








 Intake & Output











 01/13/19 01/14/19 01/14/19





 18:59 06:59 18:59


 


Intake Total 240 780 


 


Output Total 1100 1600 


 


Balance -860 -820 


 


Weight  65.5 kg 


 


Intake:   


 


  Oral 240 780 


 


Output:   


 


  Urine 1100 1600 


 


    Uretheral (Black) 1100  


 


Other:   


 


  Voiding Method Indwelling Catheter Indwelling Catheter 


 


  # Voids  1 














- Exam





- Constitutional


General appearance: average body habitus, cooperative, disheveled, mild distress





- EENT


Eyes: anicteric sclerae, EOMI, PERRLA, poor dentition


ENT: hard of hearing, normal oropharynx


Ears: bilateral: normal





- Neck


Carotids: bilateral: upstroke normal


Thyroid: bilateral: normal size





- Respiratory


Respiratory: bilateral: CTA, fine wheezing on forced expiration, prolonged 

expiration, negative: dullness, rales, rhonchi





- Cardiovascular


Rhythm: regular


Heart sounds: normal: S1, S2





- Gastrointestinal


General gastrointestinal: decreased bowel sounds, normal bowel sounds, soft





- Neurologic


Neurologic: CNII-XII intact





- Musculoskeletal


Musculoskeletal: gait normal, generalized weakness, strength equal bilaterally





- Psychiatric





Due to stroke significant speech impairment noted


Psychiatric: A&O x's one to 2, appropriate affect, 





- Labs


CBC & Chem 7: 


 01/12/19 06:53





 01/14/19 05:56


Labs: 


 Abnormal Lab Results - Last 24 Hours (Table)











  01/13/19 01/13/19 01/14/19 Range/Units





  11:55 19:58 05:56 


 


Chloride    96 L  ()  mmol/L


 


Carbon Dioxide    39 H  (22-30)  mmol/L


 


BUN    76 H  (7-17)  mg/dL


 


Creatinine    2.04 H  (0.52-1.04)  mg/dL


 


Glucose    154 H  (74-99)  mg/dL


 


POC Glucose (mg/dL)  160 H  218 H   (75-99)  mg/dL














  01/14/19 Range/Units





  07:25 


 


Chloride   ()  mmol/L


 


Carbon Dioxide   (22-30)  mmol/L


 


BUN   (7-17)  mg/dL


 


Creatinine   (0.52-1.04)  mg/dL


 


Glucose   (74-99)  mg/dL


 


POC Glucose (mg/dL)  175 H  (75-99)  mg/dL














Assessment and Plan


Assessment: 





Acute hypoxic respiratory failure





Bilateral pneumonia, aspiration related/healthcare associated pneumonia





Acute on chronic renal failure, stage III





History of CVA





UTI, polymicrobial associated with the E. coli and enterococcus, E. coli 

appears to be ESBL, you broad-spectrum antibiotics





Elevated troponins occurred MI non-ST segment elevated cannot be excluded given 

that the BNP is elevated associated congestive heart failure remains an issue














Plan: 





Continue antibiotics as recommended by infectious disease services





Continue breathing treatments 





IV steroids





Reviewed chest x-ray





Other recommendations pending plan of care as per clinical response of the 

patient, overall long-term prognosis poor, once stable patient can be 

considerable placement in extended care facility


Time with Patient: Greater than 30

## 2019-01-14 NOTE — PN
PROGRESS NOTE



Patient is seen for followup for acute kidney injury.  She is currently maintained on

IV Lasix for volume overload.  Renal function continues to improve.  Serum creatinine

is down to 2.0, currently from 3.4 on initial admission.



PHYSICAL EXAMINATION:

Blood pressure is 180/97, heart rate 84 per minute.  She is afebrile.  Examination of

the heart, S1, S2.  Examination of the lungs, bilateral breath sounds are heard.

Abdomen is soft, nontender.  Examination of the lower extremities shows chronic skin

changes.  Edema has improved significantly.  CNS exam shows patient moving all 4

extremities.  She remains confused.



LABS:

Show sodium 143, potassium 4.2, chloride 96, CO2 is 39, BUN 76, serum creatinine 2.04.



ASSESSMENT:

1. Acute kidney injury mainly cardiorenal, currently improving.

2. Congestive heart failure, volume overload mainly diastolic dysfunction.  Continue

    to improve.  Continue with the IV Lasix.

3. Hypertension remains uncontrolled.  I will increase the hydralazine.

4. Metabolic alkalosis secondary to diuresis.  I will decrease the Lasix to q.12

    hours.



PLAN:

Decrease Lasix to q.12 hours and repeat labs in a.m.





MMODL / IJN: [id number] / Job#: [id number]

## 2019-01-15 LAB
GLUCOSE BLD-MCNC: 132 MG/DL (ref 75–99)
GLUCOSE BLD-MCNC: 167 MG/DL (ref 75–99)
GLUCOSE BLD-MCNC: 237 MG/DL (ref 75–99)

## 2019-01-15 RX ADMIN — FUROSEMIDE SCH MG: 10 INJECTION, SOLUTION INTRAMUSCULAR; INTRAVENOUS at 09:15

## 2019-01-15 RX ADMIN — Medication SCH MG: at 18:03

## 2019-01-15 RX ADMIN — METOPROLOL TARTRATE SCH MG: 50 TABLET, FILM COATED ORAL at 09:11

## 2019-01-15 RX ADMIN — INSULIN ASPART SCH: 100 INJECTION, SOLUTION INTRAVENOUS; SUBCUTANEOUS at 18:03

## 2019-01-15 RX ADMIN — LEVOTHYROXINE SODIUM SCH: 25 TABLET ORAL at 09:14

## 2019-01-15 RX ADMIN — DILTIAZEM HYDROCHLORIDE SCH: 240 CAPSULE, COATED, EXTENDED RELEASE ORAL at 11:48

## 2019-01-15 RX ADMIN — METOPROLOL TARTRATE SCH MG: 50 TABLET, FILM COATED ORAL at 21:29

## 2019-01-15 RX ADMIN — IPRATROPIUM BROMIDE AND ALBUTEROL SULFATE SCH: .5; 3 SOLUTION RESPIRATORY (INHALATION) at 08:55

## 2019-01-15 RX ADMIN — IPRATROPIUM BROMIDE AND ALBUTEROL SULFATE SCH: .5; 3 SOLUTION RESPIRATORY (INHALATION) at 16:41

## 2019-01-15 RX ADMIN — IPRATROPIUM BROMIDE AND ALBUTEROL SULFATE SCH: .5; 3 SOLUTION RESPIRATORY (INHALATION) at 21:06

## 2019-01-15 RX ADMIN — INSULIN ASPART SCH: 100 INJECTION, SOLUTION INTRAVENOUS; SUBCUTANEOUS at 21:29

## 2019-01-15 RX ADMIN — CINACALCET HYDROCHLORIDE SCH: 30 TABLET, COATED ORAL at 09:13

## 2019-01-15 RX ADMIN — Medication SCH: at 09:14

## 2019-01-15 RX ADMIN — HYDRALAZINE HYDROCHLORIDE PRN MG: 20 INJECTION INTRAMUSCULAR; INTRAVENOUS at 00:32

## 2019-01-15 RX ADMIN — FLUTICASONE PROPIONATE SCH: 50 SPRAY, METERED NASAL at 09:15

## 2019-01-15 RX ADMIN — FUROSEMIDE SCH MG: 10 INJECTION, SOLUTION INTRAMUSCULAR; INTRAVENOUS at 21:24

## 2019-01-15 RX ADMIN — MIRTAZAPINE SCH MG: 15 TABLET, FILM COATED ORAL at 21:28

## 2019-01-15 RX ADMIN — INSULIN ASPART SCH: 100 INJECTION, SOLUTION INTRAVENOUS; SUBCUTANEOUS at 09:13

## 2019-01-15 RX ADMIN — LATANOPROST SCH: 50 SOLUTION OPHTHALMIC at 21:25

## 2019-01-15 RX ADMIN — LORATADINE SCH: 10 TABLET ORAL at 09:15

## 2019-01-15 RX ADMIN — APIXABAN SCH MG: 2.5 TABLET, FILM COATED ORAL at 09:11

## 2019-01-15 RX ADMIN — INSULIN ASPART SCH: 100 INJECTION, SOLUTION INTRAVENOUS; SUBCUTANEOUS at 15:55

## 2019-01-15 RX ADMIN — ASPIRIN SCH: 325 TABLET ORAL at 09:14

## 2019-01-15 RX ADMIN — METHYLPREDNISOLONE SODIUM SUCCINATE SCH MG: 40 INJECTION, POWDER, FOR SOLUTION INTRAMUSCULAR; INTRAVENOUS at 09:15

## 2019-01-15 RX ADMIN — PREDNISOLONE ACETATE SCH: 10 SUSPENSION/ DROPS OPHTHALMIC at 21:25

## 2019-01-15 RX ADMIN — APIXABAN SCH MG: 2.5 TABLET, FILM COATED ORAL at 21:24

## 2019-01-15 RX ADMIN — IPRATROPIUM BROMIDE AND ALBUTEROL SULFATE SCH: .5; 3 SOLUTION RESPIRATORY (INHALATION) at 14:09

## 2019-01-15 RX ADMIN — ERTAPENEM SODIUM SCH MLS/HR: 1 INJECTION, POWDER, LYOPHILIZED, FOR SOLUTION INTRAMUSCULAR; INTRAVENOUS at 11:47

## 2019-01-15 RX ADMIN — FLUTICASONE PROPIONATE SCH: 50 SPRAY, METERED NASAL at 21:25

## 2019-01-15 NOTE — P.PN
Subjective





Patient is seen in follow-up for acute kidney injury on chronic kidney disease.

  Patient has chronic kidney disease stage IV with baseline creatinine near 2 

secondary to nephrosclerosis and cardiorenal syndrome.  Patient has history of 

diastolic CHF.  Currently maintained on Lasix 40 mg IV twice daily.  She is 

nonoliguric.  Weight trending down.  She is also being treated for UTI.  Urine 

cultures positive for E. coli and enterococcus.  Currently having lunch.  Edema 

improved.








Vital signs are stable.


General: The patient appeared well nourished and normally developed. 


HEENT: Head exam is unremarkable. Neck is without jugular venous distension.


LUNGS: Breath sounds decreased.  Scattered rhonchi.


HEART: Rate and Rhythm are regular. First and second heart sounds normal. No 

murmurs, rubs or gallops. 


ABDOMEN: Abdominal exam reveals normal bowel sounds. Non-tender and non-

distended. No evidence of peritonitis.


EXTREMITITES: 1+ edema.





Objective





- Vital Signs


Vital signs: 


 Vital Signs











Temp  97.5 F L  01/15/19 09:07


 


Pulse  75   01/15/19 09:07


 


Resp  20   01/15/19 09:07


 


BP  163/87   01/15/19 09:07


 


Pulse Ox  90 L  01/15/19 09:07








 Intake & Output











 01/14/19 01/15/19 01/15/19





 18:59 06:59 18:59


 


Intake Total 50 500 296


 


Output Total  1775 


 


Balance 50 -1275 296


 


Intake:   


 


  Intake, IV Titration 50  





  Amount   


 


    Ertapenem 0.5 gm In 50  





    Sodium Chloride 0.9% 50   





    ml @ 100 mls/hr IVPB   





    DAILY [facility] Rx#:[id number]   


 


  Oral  500 296


 


Output:   


 


  Urine  1775 


 


Other:   


 


  Voiding Method Indwelling Catheter Indwelling Catheter 














- Labs


CBC & Chem 7: 


 01/12/19 06:53





 01/14/19 05:56


Labs: 


 Abnormal Lab Results - Last 24 Hours (Table)











  01/14/19 01/14/19 01/15/19 Range/Units





  17:02 20:39 07:16 


 


POC Glucose (mg/dL)  179 H  194 H  132 H  (75-99)  mg/dL














  01/15/19 Range/Units





  12:06 


 


POC Glucose (mg/dL)  167 H  (75-99)  mg/dL














Assessment and Plan


Plan: 





Assessment:


1.  Nonoliguric acute kidney injury mostly prerenal secondary to cardiorenal 

syndrome.  Creatinine improved to 2.04 as of yesterday.


2.  Chronic kidney disease stage IV with basic creatinine near 2 secondary to 

nephrosclerosis and cardiorenal syndrome.


3.  Diastolic CHF.


4.  Volume overload.  Improved.


5.  UTI with urine culture positive for ESBL E. coli and enterococcus 

maintained on antibiotics.


6.  History of hypercalcemia maintained on Sensipar.  Stable.


7.  Hypertension with chronic kidney disease.  Blood pressures high.  Partially 

due to IV steroids.





Plan:


Maintain Lasix 40 mg IV 2 times daily.


Avoid nephrotoxins.


Maintain current vent hypertensives.


Repeat electrolytes in the morning.

## 2019-01-15 NOTE — PN
PROGRESS NOTE



DATE OF SERVICE:

01/15/2019.



REASON FOR FOLLOWUP:

1. ESBL E coli urinary tract infection.

2. Possible pneumonia.



INTERVAL HISTORY:

The patient is afebrile.  The patient remains to be breathing comfortably on nasal

cannula oxygen.  No nausea, vomiting or diarrhea reported to the staff. She was unable

to provide any history.



EXAMINATION:

Blood pressure 135/89, pulse of 86, temperature 97.9.  She is 98% on room air.

GENERAL DESCRIPTION: An elderly female lying in bed in no distress.

RESPIRATORY SYSTEM: Unlabored breathing with decreased breath sounds in the bases. No

wheeze.

HEART: S1, S2.  Regular rate and rhythm.

ABDOMEN: Soft, no tenderness.



LABS:

Hemoglobin 13.4, white count 9.0. BUN of 76, creatinine 2.04.



DIAGNOSTIC IMPRESSION AND PLAN:

Patient with ESBL E coli urinary tract infection, also concern for possible pneumonia.

Patient is currently covered with Invanz.  She will get _______ and continue with

Invanz for another 5 to 7 days to finish a course of therapy.  Continue supportive

care.





MMODL / IJN: [id number] / Job#: [id number]

## 2019-01-15 NOTE — P.PN
Subjective


Progress Note Date: 01/15/19


Principal diagnosis: 





Acute on chronic hypoxic respiratory failure, history of prior stroke, 

healthcare associated pneumonia, acute on chronic renal failure,


01/15/2019, patient seen eval examined during the rounds clinically patient has 

been doing relatively better, care plan discussed with the staff, we'll start 

tapering down the steroids, we'll change to oral prednisone hopefully in the 

next 10 days she can come off of it would recommend 40 mg daily for 5 days 

followed by 20 mg daily for another 5 days and then to come off of it





01/14/2019, patient seen eval examined during the rounds clinically patient has 

been not much change from baseline breathing comfortably he is still using 

oxygen intermittently no obvious distress present labs reviewed medications 

reviewed





01/13/2019, patient seen eval examined during the rounds clinically patient has 

been doing well increase in her responsiveness is lately noted, patient is 

scheduled for placement ECF in next 24 hours





1/12/19, patient seen eval examined during the rounds clinically overall not 

much change patient has been intermittently using oxygen still have 

intermittent cough congestion severity is secretion slightly improved 

hemodynamic status stable care plan discussed with the staff at length given 

the current situation would recommend to continue current plan of care and 

follow clinical course closely 





01/11/2019, patient seen eval examined during the rounds she has refused the 

utilization of oxygen, he still have intermittent congestion and pulling of 

secretions into the throat,, patient has been swallowing pills fairly okay, 





January 10 2019, patient seen eval reexamined during the rounds clinically 

patient has been out much change, continued to have a respiratory issues and 

pulling of secretion back of the throat, patient has been a no code, patient 

does not keep the oxygen on, care plan discussed with the staff at length would 

continue supportive care overall long-term prognosis poor





01/09/2019, patient seen eval examined during the rounds still have ongoing 

intermittent cough congestion patient remains on broad-spectrum antibiotics 

patient has issues with keeping the oxygen on, labs reviewed medications 

reviewed care plan discussed with the staff at length, the chest x-ray continue 

show by basilar infiltrate and small effusion with interstitial edema





83-year-old female with history of prior CVA with significant aphasia patient 

has some residual weakness in the extremity a patient is a resident of extended 

care [facility] she was noted to be more short of breath as well as started 

having cough with thick purulent sputum production with those problem patient 

was brought into emergency department for further evaluation has been admitted 

into the hospital, her admitted chest x-ray suggestive of patchy bilateral 

infiltrate, both lungs and wall more so on the right side compared to the left 

side, urine culture and blood culture have been negative, patient is currently 

being treated with Zosyn, her labs are reviewed, labs white cell count is 14,000

, BUN/creatinine 60 and 3.48 slight worsening from previous renal functions 

have been noted,








Objective





- Vital Signs


Vital signs: 


 Vital Signs











Temp  97.5 F L  01/15/19 09:07


 


Pulse  75   01/15/19 09:07


 


Resp  20   01/15/19 09:07


 


BP  163/87   01/15/19 09:07


 


Pulse Ox  90 L  01/15/19 09:07








 Intake & Output











 01/14/19 01/15/19 01/15/19





 18:59 06:59 18:59


 


Intake Total 50 500 296


 


Output Total  1775 


 


Balance 50 -1275 296


 


Intake:   


 


  Intake, IV Titration 50  





  Amount   


 


    Ertapenem 0.5 gm In 50  





    Sodium Chloride 0.9% 50   





    ml @ 100 mls/hr IVPB   





    DAILY [facility] Rx#:[id number]   


 


  Oral  500 296


 


Output:   


 


  Urine  1775 


 


Other:   


 


  Voiding Method Indwelling Catheter Indwelling Catheter 














- Exam





- Constitutional


General appearance: average body habitus, cooperative, disheveled, mild distress





- EENT


Eyes: anicteric sclerae, EOMI, PERRLA, poor dentition


ENT: hard of hearing, normal oropharynx


Ears: bilateral: normal





- Neck


Carotids: bilateral: upstroke normal


Thyroid: bilateral: normal size





- Respiratory


Respiratory: bilateral: CTA, fine wheezing on forced expiration, prolonged 

expiration, negative: dullness, rales, rhonchi





- Cardiovascular


Rhythm: regular


Heart sounds: normal: S1, S2





- Gastrointestinal


General gastrointestinal: decreased bowel sounds, normal bowel sounds, soft





- Neurologic


Neurologic: CNII-XII intact





- Musculoskeletal


Musculoskeletal: gait normal, generalized weakness, strength equal bilaterally





- Psychiatric





Due to stroke significant speech impairment noted


Psychiatric: A&O x's one to 2, appropriate affect, 





- Labs


CBC & Chem 7: 


 01/12/19 06:53





 01/14/19 05:56


Labs: 


 Abnormal Lab Results - Last 24 Hours (Table)











  01/14/19 01/14/19 01/15/19 Range/Units





  17:02 20:39 07:16 


 


POC Glucose (mg/dL)  179 H  194 H  132 H  (75-99)  mg/dL














  01/15/19 Range/Units





  12:06 


 


POC Glucose (mg/dL)  167 H  (75-99)  mg/dL














Assessment and Plan


Assessment: 





Acute hypoxic respiratory failure





Bilateral pneumonia, aspiration related/healthcare associated pneumonia





Acute on chronic renal failure, stage III





History of CVA





UTI, polymicrobial associated with the E. coli and enterococcus, E. coli 

appears to be ESBL, you broad-spectrum antibiotics





Elevated troponins occurred MI non-ST segment elevated cannot be excluded given 

that the BNP is elevated associated congestive heart failure remains an issue














Plan: 





Continue antibiotics as recommended by infectious disease services





Continue breathing treatments 





IV steroids





Reviewed chest x-ray





Other recommendations pending plan of care as per clinical response of the 

patient, overall long-term prognosis poor, once stable patient can be 

considerable placement in extended care facility


Time with Patient: Greater than 30

## 2019-01-16 VITALS
TEMPERATURE: 98.1 F | DIASTOLIC BLOOD PRESSURE: 85 MMHG | RESPIRATION RATE: 16 BRPM | SYSTOLIC BLOOD PRESSURE: 159 MMHG | HEART RATE: 99 BPM

## 2019-01-16 LAB
ANION GAP SERPL CALC-SCNC: 7 MMOL/L
BUN SERPL-SCNC: 86 MG/DL (ref 7–17)
CALCIUM SPEC-MCNC: 9.9 MG/DL (ref 8.4–10.2)
CHLORIDE SERPL-SCNC: 95 MMOL/L (ref 98–107)
CO2 SERPL-SCNC: 39 MMOL/L (ref 22–30)
GLUCOSE BLD-MCNC: 120 MG/DL (ref 75–99)
GLUCOSE BLD-MCNC: 137 MG/DL (ref 75–99)
GLUCOSE SERPL-MCNC: 131 MG/DL (ref 74–99)
MAGNESIUM SPEC-SCNC: 2.3 MG/DL (ref 1.6–2.3)
POTASSIUM SERPL-SCNC: 4.4 MMOL/L (ref 3.5–5.1)
SODIUM SERPL-SCNC: 141 MMOL/L (ref 137–145)

## 2019-01-16 PROCEDURE — 05HC33Z INSERTION OF INFUSION DEVICE INTO LEFT BASILIC VEIN, PERCUTANEOUS APPROACH: ICD-10-PCS

## 2019-01-16 RX ADMIN — LORATADINE SCH MG: 10 TABLET ORAL at 08:44

## 2019-01-16 RX ADMIN — DILTIAZEM HYDROCHLORIDE SCH MG: 240 CAPSULE, COATED, EXTENDED RELEASE ORAL at 08:44

## 2019-01-16 RX ADMIN — ERTAPENEM SODIUM SCH MLS/HR: 1 INJECTION, POWDER, LYOPHILIZED, FOR SOLUTION INTRAMUSCULAR; INTRAVENOUS at 08:43

## 2019-01-16 RX ADMIN — LEVOTHYROXINE SODIUM SCH MCG: 25 TABLET ORAL at 08:45

## 2019-01-16 RX ADMIN — Medication SCH MG: at 08:43

## 2019-01-16 RX ADMIN — FLUTICASONE PROPIONATE SCH: 50 SPRAY, METERED NASAL at 09:19

## 2019-01-16 RX ADMIN — IPRATROPIUM BROMIDE AND ALBUTEROL SULFATE SCH: .5; 3 SOLUTION RESPIRATORY (INHALATION) at 07:45

## 2019-01-16 RX ADMIN — IPRATROPIUM BROMIDE AND ALBUTEROL SULFATE SCH: .5; 3 SOLUTION RESPIRATORY (INHALATION) at 11:14

## 2019-01-16 RX ADMIN — METOPROLOL TARTRATE SCH MG: 50 TABLET, FILM COATED ORAL at 08:44

## 2019-01-16 RX ADMIN — INSULIN ASPART SCH: 100 INJECTION, SOLUTION INTRAVENOUS; SUBCUTANEOUS at 12:15

## 2019-01-16 RX ADMIN — FUROSEMIDE SCH MG: 10 INJECTION, SOLUTION INTRAMUSCULAR; INTRAVENOUS at 08:43

## 2019-01-16 RX ADMIN — CINACALCET HYDROCHLORIDE SCH MG: 30 TABLET, COATED ORAL at 08:43

## 2019-01-16 RX ADMIN — IPRATROPIUM BROMIDE AND ALBUTEROL SULFATE SCH: .5; 3 SOLUTION RESPIRATORY (INHALATION) at 16:40

## 2019-01-16 RX ADMIN — APIXABAN SCH MG: 2.5 TABLET, FILM COATED ORAL at 08:43

## 2019-01-16 RX ADMIN — INSULIN ASPART SCH: 100 INJECTION, SOLUTION INTRAVENOUS; SUBCUTANEOUS at 08:45

## 2019-01-16 NOTE — P.PN
Subjective





Patient is seen in follow-up for acute kidney injury on chronic kidney disease.

  Patient has chronic kidney disease stage IV with baseline creatinine near 2 

secondary to nephrosclerosis and cardiorenal syndrome.  Patient has history of 

diastolic CHF.  Currently maintained on Lasix 40 mg IV twice daily.  She is 

nonoliguric. She is also being treated for UTI.  Urine cultures positive for E. 

coli and enterococcus.  Currently having a midline placed.








Vital signs are stable.


General: The patient appeared well nourished and normally developed. 


HEENT: Head exam is unremarkable. Neck is without jugular venous distension.


LUNGS: Breath sounds decreased.  Scattered rhonchi.


HEART: Rate and Rhythm are regular. First and second heart sounds normal. No 

murmurs, rubs or gallops. 


ABDOMEN: Abdominal exam reveals normal bowel sounds. Non-tender and non-

distended. No evidence of peritonitis.


EXTREMITITES: 1+ edema.





Objective





- Vital Signs


Vital signs: 


 Vital Signs











Temp  98 F   01/16/19 07:00


 


Pulse  108 H  01/16/19 08:00


 


Resp  14   01/16/19 07:00


 


BP  166/83   01/16/19 07:00


 


Pulse Ox  90 L  01/16/19 07:00








 Intake & Output











 01/15/19 01/16/19 01/16/19





 18:59 06:59 18:59


 


Intake Total 594 470 240


 


Output Total 700 850 


 


Balance -106 -380 240


 


Weight  65.5 kg 


 


Intake:   


 


  Oral 594 470 240


 


Output:   


 


  Urine 700 850 


 


Other:   


 


  Voiding Method Indwelling Catheter  Indwelling Catheter














- Labs


CBC & Chem 7: 


 01/12/19 06:53





 01/16/19 06:55


Labs: 


 Abnormal Lab Results - Last 24 Hours (Table)











  01/15/19 01/15/19 01/16/19 Range/Units





  12:06 20:25 06:55 


 


Chloride    95 L  ()  mmol/L


 


Carbon Dioxide    39 H  (22-30)  mmol/L


 


BUN    86 H  (7-17)  mg/dL


 


Creatinine    1.99 H  (0.52-1.04)  mg/dL


 


Glucose    131 H  (74-99)  mg/dL


 


POC Glucose (mg/dL)  167 H  237 H   (75-99)  mg/dL














  01/16/19 Range/Units





  07:19 


 


Chloride   ()  mmol/L


 


Carbon Dioxide   (22-30)  mmol/L


 


BUN   (7-17)  mg/dL


 


Creatinine   (0.52-1.04)  mg/dL


 


Glucose   (74-99)  mg/dL


 


POC Glucose (mg/dL)  120 H  (75-99)  mg/dL














Assessment and Plan


Plan: 





Assessment:


1.  Nonoliguric acute kidney injury mostly prerenal secondary to cardiorenal 

syndrome.  Renal function improving.  Creatinine 1.99 today.


2.  Chronic kidney disease stage IV with basic creatinine near 2 secondary to 

nephrosclerosis and cardiorenal syndrome.


3.  Diastolic CHF.


4.  Volume overload.  Improved.


5.  UTI with urine culture positive for ESBL E. coli and enterococcus 

maintained on antibiotics.


6.  History of hypercalcemia maintained on Sensipar.  Stable.


7.  Hypertension with chronic kidney disease.  Blood pressures high.  Partially 

due to steroids.





Plan:


Maintain Lasix 40 mg IV 2 times daily - will transition to oral tomorrow.


Avoid nephrotoxins.


Increase hydralazine to 75 mg 3 times daily.


Repeat electrolytes in the morning.

## 2019-01-16 NOTE — PN
PROGRESS NOTE



DATE OF SERVICE:

01/16/2019



REASON FOR FOLLOWUP:

1. ESBL E coli UTI.

2. Possible pneumonia.



INTERVAL HISTORY:

The patient is currently afebrile.  She is breathing comfortably.  Denies having any

chest pain.  No nausea, vomiting, or any diarrhea reported.



PHYSICAL EXAMINATION:

Blood pressure is 166/83, pulse of 108, temperature 98, she is 90% on room air.

General description is an elderly female, lying in bed in no distress.

RESPIRATORY SYSTEM:  Unlabored breathing, clear to auscultation anteriorly.

HEART: S1, S2.  Regular rate and rhythm.

ABDOMEN:  Soft, no tenderness.



LABS:

Creatinine 1.99.



DIAGNOSTIC IMPRESSION AND PLAN:

Patient with ESBL Escherichia coli urinary tract infection, also with possible

component of pneumonia.  Patient is currently covered with Invanz to continue for about

a week to finish a course of therapy.  She _____ to finish antibiotics at the nursing

home.  Continue supportive care.





MMODL / IJN: [id number] / Job#: [id number]

## 2019-01-22 ENCOUNTER — HOSPITAL ENCOUNTER (INPATIENT)
Dept: HOSPITAL 47 - EC | Age: 84
LOS: 6 days | Discharge: NURSING, REHAB OR LONG-TERM CARE | DRG: 100 | End: 2019-01-28
Payer: MEDICARE

## 2019-01-22 VITALS — BODY MASS INDEX: 30.6 KG/M2

## 2019-01-22 DIAGNOSIS — H91.92: ICD-10-CM

## 2019-01-22 DIAGNOSIS — Z87.39: ICD-10-CM

## 2019-01-22 DIAGNOSIS — L89.152: ICD-10-CM

## 2019-01-22 DIAGNOSIS — I21.4: ICD-10-CM

## 2019-01-22 DIAGNOSIS — K21.9: ICD-10-CM

## 2019-01-22 DIAGNOSIS — Z87.721: ICD-10-CM

## 2019-01-22 DIAGNOSIS — Z79.4: ICD-10-CM

## 2019-01-22 DIAGNOSIS — Z97.4: ICD-10-CM

## 2019-01-22 DIAGNOSIS — E03.9: ICD-10-CM

## 2019-01-22 DIAGNOSIS — Z87.01: ICD-10-CM

## 2019-01-22 DIAGNOSIS — E11.22: ICD-10-CM

## 2019-01-22 DIAGNOSIS — Z16.11: ICD-10-CM

## 2019-01-22 DIAGNOSIS — E78.5: ICD-10-CM

## 2019-01-22 DIAGNOSIS — G93.41: ICD-10-CM

## 2019-01-22 DIAGNOSIS — J84.10: ICD-10-CM

## 2019-01-22 DIAGNOSIS — N39.0: ICD-10-CM

## 2019-01-22 DIAGNOSIS — Z82.61: ICD-10-CM

## 2019-01-22 DIAGNOSIS — Z96.652: ICD-10-CM

## 2019-01-22 DIAGNOSIS — I48.1: ICD-10-CM

## 2019-01-22 DIAGNOSIS — Z87.820: ICD-10-CM

## 2019-01-22 DIAGNOSIS — Z80.0: ICD-10-CM

## 2019-01-22 DIAGNOSIS — I50.32: ICD-10-CM

## 2019-01-22 DIAGNOSIS — Z79.01: ICD-10-CM
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
## 2019-01-22 DIAGNOSIS — J44.9: ICD-10-CM

## 2019-01-22 DIAGNOSIS — H40.9: ICD-10-CM

## 2019-01-22 DIAGNOSIS — Z87.891: ICD-10-CM

## 2019-01-22 DIAGNOSIS — T36.1X5A: ICD-10-CM

## 2019-01-22 DIAGNOSIS — Z87.730: ICD-10-CM

## 2019-01-22 DIAGNOSIS — F03.90: ICD-10-CM

## 2019-01-22 DIAGNOSIS — Z84.1: ICD-10-CM

## 2019-01-22 DIAGNOSIS — Z66: ICD-10-CM

## 2019-01-22 DIAGNOSIS — Z87.81: ICD-10-CM

## 2019-01-22 DIAGNOSIS — Z79.899: ICD-10-CM

## 2019-01-22 DIAGNOSIS — Z79.890: ICD-10-CM

## 2019-01-22 DIAGNOSIS — G40.909: Primary | ICD-10-CM

## 2019-01-22 DIAGNOSIS — Z87.440: ICD-10-CM

## 2019-01-22 DIAGNOSIS — Z86.19: ICD-10-CM

## 2019-01-22 DIAGNOSIS — Z87.75: ICD-10-CM

## 2019-01-22 DIAGNOSIS — I13.0: ICD-10-CM

## 2019-01-22 DIAGNOSIS — N18.4: ICD-10-CM

## 2019-01-22 DIAGNOSIS — G47.00: ICD-10-CM

## 2019-01-22 DIAGNOSIS — Z90.710: ICD-10-CM

## 2019-01-22 DIAGNOSIS — B95.2: ICD-10-CM

## 2019-01-22 DIAGNOSIS — H54.7: ICD-10-CM

## 2019-01-22 LAB
ALBUMIN SERPL-MCNC: 3 G/DL (ref 3.5–5)
ALP SERPL-CCNC: 82 U/L (ref 38–126)
ALT SERPL-CCNC: 32 U/L (ref 9–52)
ANION GAP SERPL CALC-SCNC: 7 MMOL/L
APTT BLD: 20.7 SEC (ref 22–30)
AST SERPL-CCNC: 28 U/L (ref 14–36)
BASOPHILS # BLD AUTO: 0 K/UL (ref 0–0.2)
BASOPHILS NFR BLD AUTO: 0 %
BUN SERPL-SCNC: 53 MG/DL (ref 7–17)
CALCIUM SPEC-MCNC: 9.1 MG/DL (ref 8.4–10.2)
CHLORIDE SERPL-SCNC: 103 MMOL/L (ref 98–107)
CK SERPL-CCNC: 23 U/L (ref 30–135)
CO2 SERPL-SCNC: 30 MMOL/L (ref 22–30)
EOSINOPHIL # BLD AUTO: 0.1 K/UL (ref 0–0.7)
EOSINOPHIL NFR BLD AUTO: 1 %
ERYTHROCYTE [DISTWIDTH] IN BLOOD BY AUTOMATED COUNT: 5.16 M/UL (ref 3.8–5.4)
ERYTHROCYTE [DISTWIDTH] IN BLOOD: 16.9 % (ref 11.5–15.5)
GLUCOSE BLD-MCNC: 78 MG/DL (ref 75–99)
GLUCOSE SERPL-MCNC: 103 MG/DL (ref 74–99)
HCT VFR BLD AUTO: 43.7 % (ref 34–46)
HGB BLD-MCNC: 13.7 GM/DL (ref 11.4–16)
INR PPP: 0.9 (ref ?–1.2)
LYMPHOCYTES # SPEC AUTO: 0.6 K/UL (ref 1–4.8)
LYMPHOCYTES NFR SPEC AUTO: 6 %
MCH RBC QN AUTO: 26.5 PG (ref 25–35)
MCHC RBC AUTO-ENTMCNC: 31.3 G/DL (ref 31–37)
MCV RBC AUTO: 84.6 FL (ref 80–100)
MONOCYTES # BLD AUTO: 0.6 K/UL (ref 0–1)
MONOCYTES NFR BLD AUTO: 6 %
NEUTROPHILS # BLD AUTO: 8.7 K/UL (ref 1.3–7.7)
NEUTROPHILS NFR BLD AUTO: 85 %
PH UR: 5.5 [PH] (ref 5–8)
PLATELET # BLD AUTO: 260 K/UL (ref 150–450)
POTASSIUM SERPL-SCNC: 3.6 MMOL/L (ref 3.5–5.1)
PROT SERPL-MCNC: 5.6 G/DL (ref 6.3–8.2)
PROT UR QL: (no result)
PT BLD: 10.2 SEC (ref 9–12)
RBC UR QL: 34 /HPF (ref 0–5)
SODIUM SERPL-SCNC: 140 MMOL/L (ref 137–145)
SP GR UR: 1.01 (ref 1–1.03)
TROPONIN I SERPL-MCNC: 0.17 NG/ML (ref 0–0.03)
UROBILINOGEN UR QL STRIP: 2 MG/DL (ref ?–2)
WBC # BLD AUTO: 10.2 K/UL (ref 3.8–10.6)
WBC #/AREA URNS HPF: 61 /HPF (ref 0–5)

## 2019-01-22 PROCEDURE — 84484 ASSAY OF TROPONIN QUANT: CPT

## 2019-01-22 PROCEDURE — 80306 DRUG TEST PRSMV INSTRMNT: CPT

## 2019-01-22 PROCEDURE — 80053 COMPREHEN METABOLIC PANEL: CPT

## 2019-01-22 PROCEDURE — 96375 TX/PRO/DX INJ NEW DRUG ADDON: CPT

## 2019-01-22 PROCEDURE — 96361 HYDRATE IV INFUSION ADD-ON: CPT

## 2019-01-22 PROCEDURE — 81001 URINALYSIS AUTO W/SCOPE: CPT

## 2019-01-22 PROCEDURE — 87086 URINE CULTURE/COLONY COUNT: CPT

## 2019-01-22 PROCEDURE — 95819 EEG AWAKE AND ASLEEP: CPT

## 2019-01-22 PROCEDURE — 71046 X-RAY EXAM CHEST 2 VIEWS: CPT

## 2019-01-22 PROCEDURE — 82553 CREATINE MB FRACTION: CPT

## 2019-01-22 PROCEDURE — 36415 COLL VENOUS BLD VENIPUNCTURE: CPT

## 2019-01-22 PROCEDURE — 94640 AIRWAY INHALATION TREATMENT: CPT

## 2019-01-22 PROCEDURE — 96365 THER/PROPH/DIAG IV INF INIT: CPT

## 2019-01-22 PROCEDURE — 87186 SC STD MICRODIL/AGAR DIL: CPT

## 2019-01-22 PROCEDURE — 93005 ELECTROCARDIOGRAM TRACING: CPT

## 2019-01-22 PROCEDURE — 70450 CT HEAD/BRAIN W/O DYE: CPT

## 2019-01-22 PROCEDURE — 85730 THROMBOPLASTIN TIME PARTIAL: CPT

## 2019-01-22 PROCEDURE — 99285 EMERGENCY DEPT VISIT HI MDM: CPT

## 2019-01-22 PROCEDURE — 82550 ASSAY OF CK (CPK): CPT

## 2019-01-22 PROCEDURE — 80048 BASIC METABOLIC PNL TOTAL CA: CPT

## 2019-01-22 PROCEDURE — 87077 CULTURE AEROBIC IDENTIFY: CPT

## 2019-01-22 PROCEDURE — 85025 COMPLETE CBC W/AUTO DIFF WBC: CPT

## 2019-01-22 PROCEDURE — 85610 PROTHROMBIN TIME: CPT

## 2019-01-22 RX ADMIN — HYDRALAZINE HYDROCHLORIDE PRN MG: 20 INJECTION INTRAMUSCULAR; INTRAVENOUS at 22:28

## 2019-01-22 RX ADMIN — IPRATROPIUM BROMIDE AND ALBUTEROL SULFATE SCH ML: .5; 3 SOLUTION RESPIRATORY (INHALATION) at 21:11

## 2019-01-22 RX ADMIN — NITROGLYCERIN SCH INCH: 20 OINTMENT TOPICAL at 22:27

## 2019-01-22 NOTE — CT
EXAMINATION TYPE: CT brain wo con

 

DATE OF EXAM: 1/22/2019

 

HISTORY: Seizure, dementia

 

CT DLP: 1043.4 mGycm.  Automated Exposure Control for Dose Reduction was Utilized.

 

TECHNIQUE: CT scan of the head is performed without contrast.

 

COMPARISON: CT brain September 27, 2018 and older CTs

 

FINDINGS:   There is no acute intracranial hemorrhage or midline shift identified. There is diffuse v
entricular and sulcal prominence consistent with diffuse age-related cerebral atrophy.  There is low-
attenuation in the periventricular white matter consistent with chronic small vessel ischemic change.
 Involving subacute on chronic infarct left parietal lobe with superior temporal extension posterior 
watershed region is noted. The globes are intact and the visualized sinuses are clear.   

 

IMPRESSION:  No acute intracranial hemorrhage or midline shift.  There is mild diffuse age-related ce
rebral atrophy and moderate chronic small vessel ischemic change with further expected interval evolu
tion of subacute on chronic left posterior temporal infarct.

## 2019-01-22 NOTE — HP
HISTORY AND PHYSICAL



CHIEF COMPLAINT:

83-year-old white female comes to the ER for 2 seizures, 1 at the nursing home, 1 en

route.  She is given 5 mg of Versed for seizures.  Stopped the seizure, 45 seconds a

piece.  She is DNR per family.  Do not resuscitate.  At which time ER doctor admitted

her to the hospital.  CT scan of her head was negative for any mass or any bleed.  She

is admitted to the hospital, started on home medications, hypertension acceleration was

treated with home medications.



ALLERGIES:

Negative.



REVIEW OF SYMPTOMS:

14 point review of systems negative except for mentioned in HPI.  She does respond to

talk and does open her eyes and focus on you. She cannot hear, she has no hearing aid.



PAST MEDICAL HISTORY:

Atrial fibrillation, heart failure, COPD, dementia, GERD, dyslipidemia, hypertension,

pneumonia, renal disease, hypothyroidism, glaucoma, hearing aid, pulmonary fistula,

hypothyroidism, gout, insomnia, history of ESBL in the urine.



SURGICAL HISTORY:

Bladder surgery, hysterectomy, joint replacement, orthopedic surgery.



FAMILY HISTORY:

Father with esophageal cancer.  Mother with osteoarthritis, renal disease.



PHYSICAL EXAM:

Well nourished, well developed female, responding appropriately.

LUNGS:  Clear.  Cardiovascular slightly tachycardic.

ABDOMEN:  Soft.  Skin is with no rash, excoriations or bruising.

OPHTHALMOLOGIC:  Pupils equal, round, react to light and accommodation.

NEUROLOGIC:  She responds to loud talk as she cannot hear.

PSYCH: Unable to assess.

VITAL SIGNS: Temp 98.4, pulse 70s to 90s, blood pressure is 160s to 170s over 80s to

100s.  O2  percent on room air.



EKG shows atrial fibrillation.  BUN is 53, creatinine 1.47.  Troponin 0.175.  CPKs are

negative.



ASSESSMENT/PLAN:

1. New onset seizure.  CT scan of brain is negative.

2. Possible urinary tract infection, not treated.  Urine culture.

3. She will continue with IV medications that she was on at the nursing home and

    continue current treatment.

4. Await possible starts empiric seizure medicines like Depakote and monitor for

    seizures.  No clear etiology for seizures.

5. She has been on Invanz for a drug-resistant urinary tract infection for which we

    will consult Dr. Matthews.





MMODL / IJN: [id number] / Job#: [id number]

## 2019-01-22 NOTE — ED
General Adult HPI





- General


Stated complaint: SEIZURE, DEMENTIA


Time Seen by Provider: 19 08:47


Source: family, EMS, RN notes reviewed


Mode of arrival: EMS


Limitations: altered mental status, physical limitation





- History of Present Illness


Initial comments: 





This is a 83-year-old female who comes into the emergency department via EMS.  

EMS stated that the patient had 2 seizures at the nursing home and one in 

route.  EMS gave the patient 5 of Versed and the seizure stopped.  According to 

the nursing home the patient seizures lasted about 45 seconds apiece.  

According to the nursing home the patient had no history of seizures but she 

did have a stroke or possibly a mass in her brain they are not sure.  Patient 

is unable to give any history at this time since she is only responsive to 

pain.  Patient is a DO NOT RESUSCITATE.  There is no history of any recent 

fever no history of any apical to breathing no history of any nausea vomiting 

diarrhea.





- Related Data


 Home Medications











 Medication  Instructions  Recorded  Confirmed


 


Fluticasone Nasal Spray [Flonase 1 spray EA NOSTRIL BID@900,17





Nasal Spray]   


 


Levothyroxine Sodium [Synthroid] 25 mcg PO DAILY@17


 


ALPRAZolam [Xanax] 0.25 mg PO HS@2100 11/10/17 01/22/19


 


Loratadine [Claritin] 10 mg PO DAILY@0900 11/10/17 01/22/19


 


guaiFENesin [guaiFENesin Oral 200 mg PO Q4HR PRN 11/10/17 01/22/19





Solution]   


 


prednisoLONE ACETATE [Pred Forte 2 drop BOTH EYES HS@2100 11/10/17 01/22/19





1%]   


 


Ferrous Sulfate [Feosol] 325 mg PO BID@900,18


 


Latanoprost [Xalatan 0.005%] 1 drop LEFT EYE HS@18


 


acetaZOLAMIDE [Diamox] 250 mg PO TU@18


 


Apixaban [Eliquis] 2.5 mg PO BID@900,18


 


Cinacalcet [Sensipar] 30 mg PO DAILY@18


 


Diltiazem Cd [Cardizem CD] 240 mg PO DAILY@0900 19


 


Ensure Clear 1 can PO TID-W/MEALS 19


 


Mirtazapine 7.5 mg PO HS@2100 19


 


Anti-Fungal Powder 1 applic TOPICAL HS 19


 


Insulin Aspart [NovoLOG 2 unit SQ ACHS 19





(formulary)]   


 


Sodium Chloride 0.9% Irrigatio 10 ml IRRIGATION BID 19





[Saline 0.9% Irrigation]   


 


predniSONE See Taper PO DAILY 19








 Previous Rx's











 Medication  Instructions  Recorded


 


Acetaminophen Tab [Tylenol] 650 mg PO Q6HR PRN  tab 17


 


Magnesium Hydroxide [Milk of 2,400 mg PO DAILY PRN  ml 17





Magnesia Concentrate]  


 


Ipratropium-Albuterol Nebulize 3 ml INHALATION RT-QID  ampul.neb 10/01/18





[Duoneb 0.5 mg-3 mg/3 ml Soln]  


 


Ertapenem [INVanz] 0.5 gm IVPB Q24H 7 Days #7 bag 19


 


Furosemide [Lasix] 40 mg PO BID #1 tablet 19


 


Metoprolol Tartrate [Lopressor] 100 mg PO BID  tab 19


 


hydrALAZINE HCL [Apresoline] 75 mg PO TID  tab 19











 Allergies











Allergy/AdvReac Type Severity Reaction Status Date / Time


 


No Known Allergies Allergy   Verified 19 09:11














Review of Systems


ROS Statement: 


Those systems with pertinent positive or pertinent negative responses have been 

documented in the HPI.





ROS Other: All systems not noted in ROS Statement are negative.





Past Medical History


Past Medical History: Atrial Fibrillation, Heart Failure, COPD, Dementia, GERD/

Reflux, Hyperlipidemia, Hypertension, Pneumonia, Renal Disease, Respiratory 

Disorder, Thyroid Disorder


Additional Past Medical History / Comment(s): Pt was born with cleft palate and 

other physical problems with facial structures including nasal passages and ear 

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 

collapsing.  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 

chronic renal disease stage IV-has L arm fistula(PER DAUGHTER IT'S A FAILED 

FISTULA) but no hemodialysis, pulmonary fibrosis, frequent UTIs, wears O2 PRN, 

hypothyroid, insomnia, gout, past fall with concussion/L hip fracture.  Marielle 

states pt may have had a CVA or a brain mass but unable to have MRI d/t metal 

in ear.


Last Myocardial Infarction Date:: unknown


History of Any Multi-Drug Resistant Organisms: ESBL


Date of last positivie culture/infection: 19


MDRO Source:: ESBL URINE


Past Surgical History: Bladder Surgery, Hysterectomy, Joint Replacement, 

Orthopedic Surgery


Additional Past Surgical History / Comment(s): Pt has had multiple surgeries 

for birth defects affecting mouth, nose and ears, total L knee arthroplasty, 

bladder sling, fistula L arm.PARTIAL HYSTERECTOMY, PICC LINE-SINCE REMOVED, L 

hip cemented hemiarthroplasty.


Past Anesthesia/Blood Transfusion Reactions: No Reported Reaction


Past Psychological History: No Psychological Hx Reported


Smoking Status: Former smoker





- Past Family History


  ** Father


Family Medical History: Cancer


Additional Family Medical History / Comment(s): Father  prior to age 60 yrs 

with esophageal cancer.  He was a smoker and a drinker.





  ** Mother


Family Medical History: Osteoarthritis (OA), Renal Disease


Additional Family Medical History / Comment(s): kidney problems, specific type 

unknown





General Exam





- General Exam Comments


Initial Comments: 





GENERAL:


Patient is well-developed and well-nourished.  Patient is nontoxic and well-

hydrated and is in no acute distress.  patient is completely unresponsive 

except to painful stimuli





ENT:


Neck is soft and supple.  No significant lymphadenopathy is noted.  Oropharynx 

is clear.  Moist mucous membranes.  Neck has full range of motion without 

eliciting any pain. 





EYES:


The sclera were anicteric and conjunctiva were pink and moist.  Extraocular 

movements were intact and pupils were equal round and reactive to light.  

Eyelids were unremarkable.





PULMONARY:


Unlabored respirations.  Good breath sounds bilaterally.  No audible rales 

rhonchi or wheezing was noted.





CARDIOVASCULAR:


Patient is tachycardic





ABDOMEN:


Soft and nontender with normal bowel sounds.  No palpable organomegaly was 

noted.  There is no palpable pulsatile mass.





SKIN:


Skin is clear with no lesions or rashes and otherwise unremarkable.





NEUROLOGIC:


Patient is not alert she does move to painful stimuli.  According to EMS this 

is how they found her with a also since given her Versed.  Patient does 

withdraw to some painful stimuli





MUSCULOSKELETAL:


Unable to assess range of motion





PSYCHIATRIC:


Unable to assess


Limitations: altered mental status, physical limitation





Course


 Vital Signs











  19





  08:54 10:00 10:30


 


Temperature 98.4 F  


 


Pulse Rate 74 93 96


 


Respiratory 15  





Rate   


 


Blood Pressure 169/101 164/83 176/89


 


O2 Sat by Pulse 98 100 100





Oximetry   














  19





  11:00 11:30


 


Temperature  


 


Pulse Rate 115 H 108 H


 


Respiratory  





Rate  


 


Blood Pressure 198/106 157/111


 


O2 Sat by Pulse 100 100





Oximetry  














Medical Decision Making





- Medical Decision Making





EKG shows atrial fibrillation at a rate of 103 bpm QRS is 94 QT interval 392 

QTC is 513. 





Patient's CT of the brain shows no acute abnormality. 


 


I spoke with Dr. Street he was okay with accepting the patient.  Family 

agreed the patient was a DO NOT RESUSCITATE.  I will admit the patient and 

place her in a seizure per cautions.  I also consult cardiology for the 

elevated troponin.  Patient did receive a gram of Rocephin for urinary tract 

infection





- Lab Data


Result diagrams: 


 19 09:10





 19 09:10


 Lab Results











  19 Range/Units





  09:10 09:10 09:10 


 


WBC   10.2   (3.8-10.6)  k/uL


 


RBC   5.16   (3.80-5.40)  m/uL


 


Hgb   13.7   (11.4-16.0)  gm/dL


 


Hct   43.7   (34.0-46.0)  %


 


MCV   84.6   (80.0-100.0)  fL


 


MCH   26.5   (25.0-35.0)  pg


 


MCHC   31.3   (31.0-37.0)  g/dL


 


RDW   16.9 H   (11.5-15.5)  %


 


Plt Count   260   (150-450)  k/uL


 


Neutrophils %   85   %


 


Lymphocytes %   6   %


 


Monocytes %   6   %


 


Eosinophils %   1   %


 


Basophils %   0   %


 


Neutrophils #   8.7 H   (1.3-7.7)  k/uL


 


Lymphocytes #   0.6 L   (1.0-4.8)  k/uL


 


Monocytes #   0.6   (0-1.0)  k/uL


 


Eosinophils #   0.1   (0-0.7)  k/uL


 


Basophils #   0.0   (0-0.2)  k/uL


 


Hypochromasia   Slight   


 


Anisocytosis   Slight   


 


PT     (9.0-12.0)  sec


 


INR     (<1.2)  


 


APTT     (22.0-30.0)  sec


 


Sodium    140  (137-145)  mmol/L


 


Potassium    3.6  (3.5-5.1)  mmol/L


 


Chloride    103  ()  mmol/L


 


Carbon Dioxide    30  (22-30)  mmol/L


 


Anion Gap    7  mmol/L


 


BUN    53 H  (7-17)  mg/dL


 


Creatinine    1.47 H  (0.52-1.04)  mg/dL


 


Est GFR (CKD-EPI)AfAm    38  (>60 ml/min/1.73 sqM)  


 


Est GFR (CKD-EPI)NonAf    33  (>60 ml/min/1.73 sqM)  


 


Glucose    103 H  (74-99)  mg/dL


 


Calcium    9.1  (8.4-10.2)  mg/dL


 


Total Bilirubin    1.2  (0.2-1.3)  mg/dL


 


AST    28  (14-36)  U/L


 


ALT    32  (9-52)  U/L


 


Alkaline Phosphatase    82  ()  U/L


 


Total Creatine Kinase  23 L    ()  U/L


 


CK-MB (CK-2)  0.9    (0.0-2.4)  ng/mL


 


CK-MB (CK-2) Rel Index  3.9    


 


Troponin I  0.175 H*    (0.000-0.034)  ng/mL


 


Total Protein    5.6 L  (6.3-8.2)  g/dL


 


Albumin    3.0 L  (3.5-5.0)  g/dL


 


Urine Color     


 


Urine Appearance     (Clear)  


 


Urine pH     (5.0-8.0)  


 


Ur Specific Gravity     (1.001-1.035)  


 


Urine Protein     (Negative)  


 


Urine Glucose (UA)     (Negative)  


 


Urine Ketones     (Negative)  


 


Urine Blood     (Negative)  


 


Urine Nitrite     (Negative)  


 


Urine Bilirubin     (Negative)  


 


Urine Urobilinogen     (<2.0)  mg/dL


 


Ur Leukocyte Esterase     (Negative)  


 


Urine RBC     (0-5)  /hpf


 


Urine WBC     (0-5)  /hpf


 


Urine WBC Clumps     (None)  /hpf


 


Urine Bacteria     (None)  /hpf


 


Urine Mucus     (None)  /hpf


 


Urine Opiates Screen     (NotDetected)  


 


Ur Oxycodone Screen     (NotDetected)  


 


Urine Methadone Screen     (NotDetected)  


 


Ur Propoxyphene Screen     (NotDetected)  


 


Ur Barbiturates Screen     (NotDetected)  


 


U Tricyclic Antidepress     (NotDetected)  


 


Ur Phencyclidine Scrn     (NotDetected)  


 


Ur Amphetamines Screen     (NotDetected)  


 


U Methamphetamines Scrn     (NotDetected)  


 


U Benzodiazepines Scrn     (NotDetected)  


 


Urine Cocaine Screen     (NotDetected)  


 


U Marijuana (THC) Screen     (NotDetected)  














  19 Range/Units





  09:10 09:30 


 


WBC    (3.8-10.6)  k/uL


 


RBC    (3.80-5.40)  m/uL


 


Hgb    (11.4-16.0)  gm/dL


 


Hct    (34.0-46.0)  %


 


MCV    (80.0-100.0)  fL


 


MCH    (25.0-35.0)  pg


 


MCHC    (31.0-37.0)  g/dL


 


RDW    (11.5-15.5)  %


 


Plt Count    (150-450)  k/uL


 


Neutrophils %    %


 


Lymphocytes %    %


 


Monocytes %    %


 


Eosinophils %    %


 


Basophils %    %


 


Neutrophils #    (1.3-7.7)  k/uL


 


Lymphocytes #    (1.0-4.8)  k/uL


 


Monocytes #    (0-1.0)  k/uL


 


Eosinophils #    (0-0.7)  k/uL


 


Basophils #    (0-0.2)  k/uL


 


Hypochromasia    


 


Anisocytosis    


 


PT  10.2   (9.0-12.0)  sec


 


INR  0.9   (<1.2)  


 


APTT  20.7 L   (22.0-30.0)  sec


 


Sodium    (137-145)  mmol/L


 


Potassium    (3.5-5.1)  mmol/L


 


Chloride    ()  mmol/L


 


Carbon Dioxide    (22-30)  mmol/L


 


Anion Gap    mmol/L


 


BUN    (7-17)  mg/dL


 


Creatinine    (0.52-1.04)  mg/dL


 


Est GFR (CKD-EPI)AfAm    (>60 ml/min/1.73 sqM)  


 


Est GFR (CKD-EPI)NonAf    (>60 ml/min/1.73 sqM)  


 


Glucose    (74-99)  mg/dL


 


Calcium    (8.4-10.2)  mg/dL


 


Total Bilirubin    (0.2-1.3)  mg/dL


 


AST    (14-36)  U/L


 


ALT    (9-52)  U/L


 


Alkaline Phosphatase    ()  U/L


 


Total Creatine Kinase    ()  U/L


 


CK-MB (CK-2)    (0.0-2.4)  ng/mL


 


CK-MB (CK-2) Rel Index    


 


Troponin I    (0.000-0.034)  ng/mL


 


Total Protein    (6.3-8.2)  g/dL


 


Albumin    (3.5-5.0)  g/dL


 


Urine Color   Yellow  


 


Urine Appearance   Cloudy H  (Clear)  


 


Urine pH   5.5  (5.0-8.0)  


 


Ur Specific Gravity   1.013  (1.001-1.035)  


 


Urine Protein   2+ H  (Negative)  


 


Urine Glucose (UA)   Negative  (Negative)  


 


Urine Ketones   Negative  (Negative)  


 


Urine Blood   Small H  (Negative)  


 


Urine Nitrite   Negative  (Negative)  


 


Urine Bilirubin   Negative  (Negative)  


 


Urine Urobilinogen   2.0  (<2.0)  mg/dL


 


Ur Leukocyte Esterase   Large H  (Negative)  


 


Urine RBC   34 H  (0-5)  /hpf


 


Urine WBC   61 H  (0-5)  /hpf


 


Urine WBC Clumps   Few H  (None)  /hpf


 


Urine Bacteria   Moderate H  (None)  /hpf


 


Urine Mucus   Rare H  (None)  /hpf


 


Urine Opiates Screen   Not Detected  (NotDetected)  


 


Ur Oxycodone Screen   Not Detected  (NotDetected)  


 


Urine Methadone Screen   Not Detected  (NotDetected)  


 


Ur Propoxyphene Screen   Not Detected  (NotDetected)  


 


Ur Barbiturates Screen   Not Detected  (NotDetected)  


 


U Tricyclic Antidepress   Not Detected  (NotDetected)  


 


Ur Phencyclidine Scrn   Not Detected  (NotDetected)  


 


Ur Amphetamines Screen   Not Detected  (NotDetected)  


 


U Methamphetamines Scrn   Not Detected  (NotDetected)  


 


U Benzodiazepines Scrn   Not Detected  (NotDetected)  


 


Urine Cocaine Screen   Not Detected  (NotDetected)  


 


U Marijuana (THC) Screen   Not Detected  (NotDetected)  














Disposition


Clinical Impression: 


 Urinary tract infection, New onset seizure, Altered mental status, Non-STEMI (

non-ST elevated myocardial infarction)





Disposition: ADMITTED AS IP TO THIS HOSP


Referrals: 


Dilshad Street MD [Primary Care Provider] - 1-2 days


Time of Disposition: 12:07

## 2019-01-22 NOTE — XR
EXAMINATION TYPE: XR chest 2V

 

DATE OF EXAM: 1/22/2019

 

COMPARISON: Chest x-ray January 9, 2019

 

HISTORY: Altered mental status and weakness.

 

TECHNIQUE:  Frontal and lateral views of the chest are obtained.

 

FINDINGS:  The cardiac silhouette size is stable and mildly enlarged. There is persistent small right
 greater than left pleural effusions with associated right greater than left bibasilar atelectasis an
d/or infiltrate. There is background chronic reticular interstitial prominence redemonstrated. The os
seous structures remain demineralized. Degenerative change right shoulder is again seen.

 

IMPRESSION:  Cannot exclude CHF exacerbation as there is persistent mild cardiomegaly with small righ
t greater than left bilateral pleural effusions and associated bibasilar atelectasis and/or infiltrat
e all redemonstrated. Correlate clinically.

## 2019-01-23 LAB — GLUCOSE BLD-MCNC: 85 MG/DL (ref 75–99)

## 2019-01-23 RX ADMIN — LATANOPROST SCH: 50 SOLUTION OPHTHALMIC at 21:13

## 2019-01-23 RX ADMIN — FUROSEMIDE SCH MG: 40 TABLET ORAL at 09:34

## 2019-01-23 RX ADMIN — NITROGLYCERIN SCH: 20 OINTMENT TOPICAL at 23:52

## 2019-01-23 RX ADMIN — DIVALPROEX SODIUM SCH MG: 250 TABLET, DELAYED RELEASE ORAL at 09:34

## 2019-01-23 RX ADMIN — NYSTATIN SCH: 100000 POWDER TOPICAL at 21:13

## 2019-01-23 RX ADMIN — LEVOTHYROXINE SODIUM SCH: 25 TABLET ORAL at 07:23

## 2019-01-23 RX ADMIN — FUROSEMIDE SCH: 40 TABLET ORAL at 16:07

## 2019-01-23 RX ADMIN — INSULIN ASPART SCH: 100 INJECTION, SOLUTION INTRAVENOUS; SUBCUTANEOUS at 21:13

## 2019-01-23 RX ADMIN — NITROGLYCERIN SCH: 20 OINTMENT TOPICAL at 00:43

## 2019-01-23 RX ADMIN — Medication SCH MG: at 09:34

## 2019-01-23 RX ADMIN — FLUTICASONE PROPIONATE SCH: 50 SPRAY, METERED NASAL at 00:41

## 2019-01-23 RX ADMIN — APIXABAN SCH: 2.5 TABLET, FILM COATED ORAL at 09:52

## 2019-01-23 RX ADMIN — MIRTAZAPINE SCH: 15 TABLET, FILM COATED ORAL at 00:42

## 2019-01-23 RX ADMIN — INSULIN ASPART SCH: 100 INJECTION, SOLUTION INTRAVENOUS; SUBCUTANEOUS at 07:23

## 2019-01-23 RX ADMIN — APIXABAN SCH MG: 2.5 TABLET, FILM COATED ORAL at 09:34

## 2019-01-23 RX ADMIN — MIRTAZAPINE SCH: 15 TABLET, FILM COATED ORAL at 21:13

## 2019-01-23 RX ADMIN — PREDNISOLONE ACETATE SCH: 10 SUSPENSION/ DROPS OPHTHALMIC at 21:13

## 2019-01-23 RX ADMIN — DIVALPROEX SODIUM SCH: 250 TABLET, DELAYED RELEASE ORAL at 21:13

## 2019-01-23 RX ADMIN — APIXABAN SCH: 2.5 TABLET, FILM COATED ORAL at 21:13

## 2019-01-23 RX ADMIN — INSULIN ASPART SCH: 100 INJECTION, SOLUTION INTRAVENOUS; SUBCUTANEOUS at 11:21

## 2019-01-23 RX ADMIN — FUROSEMIDE SCH: 40 TABLET ORAL at 00:41

## 2019-01-23 RX ADMIN — NITROGLYCERIN SCH: 20 OINTMENT TOPICAL at 11:07

## 2019-01-23 RX ADMIN — METOPROLOL TARTRATE SCH MG: 50 TABLET, FILM COATED ORAL at 09:34

## 2019-01-23 RX ADMIN — CINACALCET HYDROCHLORIDE SCH: 30 TABLET, COATED ORAL at 07:23

## 2019-01-23 RX ADMIN — FUROSEMIDE SCH: 40 TABLET ORAL at 09:52

## 2019-01-23 RX ADMIN — DIVALPROEX SODIUM SCH: 250 TABLET, DELAYED RELEASE ORAL at 09:52

## 2019-01-23 RX ADMIN — NYSTATIN SCH: 100000 POWDER TOPICAL at 00:42

## 2019-01-23 RX ADMIN — LATANOPROST SCH: 50 SOLUTION OPHTHALMIC at 00:41

## 2019-01-23 RX ADMIN — IPRATROPIUM BROMIDE AND ALBUTEROL SULFATE SCH: .5; 3 SOLUTION RESPIRATORY (INHALATION) at 11:13

## 2019-01-23 RX ADMIN — APIXABAN SCH: 2.5 TABLET, FILM COATED ORAL at 00:41

## 2019-01-23 RX ADMIN — IPRATROPIUM BROMIDE AND ALBUTEROL SULFATE SCH: .5; 3 SOLUTION RESPIRATORY (INHALATION) at 21:12

## 2019-01-23 RX ADMIN — Medication SCH: at 09:52

## 2019-01-23 RX ADMIN — Medication SCH: at 21:13

## 2019-01-23 RX ADMIN — PREDNISOLONE ACETATE SCH: 10 SUSPENSION/ DROPS OPHTHALMIC at 00:42

## 2019-01-23 RX ADMIN — METOPROLOL TARTRATE SCH: 50 TABLET, FILM COATED ORAL at 21:13

## 2019-01-23 RX ADMIN — NITROGLYCERIN SCH: 20 OINTMENT TOPICAL at 16:08

## 2019-01-23 RX ADMIN — INSULIN ASPART SCH: 100 INJECTION, SOLUTION INTRAVENOUS; SUBCUTANEOUS at 16:07

## 2019-01-23 RX ADMIN — DILTIAZEM HYDROCHLORIDE SCH MLS/HR: 5 INJECTION INTRAVENOUS at 18:49

## 2019-01-23 RX ADMIN — INSULIN ASPART SCH: 100 INJECTION, SOLUTION INTRAVENOUS; SUBCUTANEOUS at 00:41

## 2019-01-23 RX ADMIN — NITROGLYCERIN SCH: 20 OINTMENT TOPICAL at 07:45

## 2019-01-23 RX ADMIN — FLUTICASONE PROPIONATE SCH: 50 SPRAY, METERED NASAL at 09:31

## 2019-01-23 RX ADMIN — LORATADINE SCH MG: 10 TABLET ORAL at 09:34

## 2019-01-23 RX ADMIN — FLUTICASONE PROPIONATE SCH: 50 SPRAY, METERED NASAL at 21:13

## 2019-01-23 RX ADMIN — IPRATROPIUM BROMIDE AND ALBUTEROL SULFATE SCH: .5; 3 SOLUTION RESPIRATORY (INHALATION) at 16:01

## 2019-01-23 RX ADMIN — METOPROLOL TARTRATE SCH: 50 TABLET, FILM COATED ORAL at 00:41

## 2019-01-23 RX ADMIN — IPRATROPIUM BROMIDE AND ALBUTEROL SULFATE SCH: .5; 3 SOLUTION RESPIRATORY (INHALATION) at 07:50

## 2019-01-23 NOTE — P.CRDCN
History of Present Illness


Consult date: 19


Chief complaint: Change in mental status


History of present illness: 





This is an 83-year-old  female patient who is known to our service 

before with a past medical history significant for chronic persistent atrial 

fibrillation on oral anticoagulation as well as history of congestive heart 

failure secondary to diastolic dysfunction was brought from an extended-care 

facility today emergency room after she had a seizure.





The patient did have to episodes of seizure.  The first episode was at the 

nursing home and the second one was in route to the hospital.  The patient is 

known to have seizure disorder and she is on medications for that.





When I came in to interview the patient, the patient was lethargic and 

confused.  She is overall very poor historian.  No indication of any chest pain 

or chest discomfort.  The patient was laying flat in bed.  She does not seems 

to be in any acute respiratory distress.





The EKG showed atrial fibrillation without any ischemic ST or T-wave 

abnormalities.  3 sets of enzymes came in to be slightly abnormal but are below 

1 and likely related to the tachycardia and the patient presented to the 

hospital.  The chest x-ray showed findings consistent with CHF but the patient 

does not seems to be in congestive heart failure exacerbation at this point.  

Patient underwent an echocardiogram in 2018 and that revealed normal LV 

function without any significant valvular abnormalities.





Currently the patient is on aspirin as well as she is on oral anticoagulation 

with Eliquis.





Past Medical History


Past Medical History: Atrial Fibrillation, Heart Failure, COPD, Dementia, GERD/

Reflux, Hyperlipidemia, Hypertension, Pneumonia, Renal Disease, Respiratory 

Disorder, Thyroid Disorder


Additional Past Medical History / Comment(s): Pt recently admitted to [facility] on 19 with acute tubular necresis/acute renal insufficiency/prerenal real failure

, WSBL-Ecoli UTI.     Other hx:  Pt was born with cleft palate and other 

physical problems with facial structures including nasal passages and ear canals

(has metal ) and marielle at bedside states that the ear canals are collapsing.  She 

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 

disease stage IV-has L arm fistula(PER DAUGHTER IT'S A FAILED FISTULA) but no 

hemodialysis, pulmonary fibrosis, frequent UTIs, wears O2 PRN, hypothyroid, 

insomnia, gout, past fall with concussion/L hip fracture.  Marielle states pt may 

have had a CVA or a brain mass but unable to have MRI d/t metal in ear, 

currently has skin tears L hand and forearm.


Last Myocardial Infarction Date:: unknown


History of Any Multi-Drug Resistant Organisms: ESBL


Date of last positivie culture/infection: 19


MDRO Source:: ESBL URINE


Past Surgical History: Bladder Surgery, Hysterectomy, Joint Replacement, 

Orthopedic Surgery


Additional Past Surgical History / Comment(s): Pt has had multiple surgeries 

for birth defects affecting mouth, nose and ears, total L knee arthroplasty, 

bladder sling, fistula L arm.PARTIAL HYSTERECTOMY, PICC LINE-SINCE REMOVED, L 

hip cemented hemiarthroplasty.


Past Anesthesia/Blood Transfusion Reactions: No Reported Reaction


Smoking Status: Former smoker





- Past Family History


  ** Father


Family Medical History: Cancer


Additional Family Medical History / Comment(s): Father  prior to age 60 yrs 

with esophageal cancer.  He was a smoker and a drinker.





  ** Mother


Family Medical History: Osteoarthritis (OA), Renal Disease


Additional Family Medical History / Comment(s): kidney problems, specific type 

unknown





Medications and Allergies


 Home Medications











 Medication  Instructions  Recorded  Confirmed  Type


 


Fluticasone Nasal Spray [Flonase 1 spray EA NOSTRIL BID@0900,17 History





Nasal Spray]    


 


Levothyroxine Sodium [Synthroid] 25 mcg PO DAILY@0600 17 History


 


Acetaminophen Tab [Tylenol] 650 mg PO Q6HR PRN  tab 17 Rx


 


ALPRAZolam [Xanax] 0.25 mg PO HS@2100 11/10/17 01/22/19 History


 


Loratadine [Claritin] 10 mg PO DAILY@0900 11/10/17 01/22/19 History


 


guaiFENesin [guaiFENesin Oral 200 mg PO Q4HR PRN 11/10/17 01/22/19 History





Solution]    


 


prednisoLONE ACETATE [Pred Forte 2 drop BOTH EYES HS@2100 11/10/17 01/22/19 

History





1%]    


 


Magnesium Hydroxide [Milk of 2,400 mg PO DAILY PRN  ml 17 Rx





Magnesia Concentrate]    


 


Ferrous Sulfate [Feosol] 325 mg PO BID@0900,18 History


 


Latanoprost [Xalatan 0.005%] 1 drop LEFT EYE HS@2100 18 History


 


acetaZOLAMIDE [Diamox] 250 mg PO TU@0900 18 History


 


Apixaban [Eliquis] 2.5 mg PO BID@0900,2100 18 History


 


Cinacalcet [Sensipar] 30 mg PO DAILY@0600 18 History


 


Ipratropium-Albuterol Nebulize 3 ml INHALATION RT-QID  ampul.neb 10/01/18 01/22/

19 Rx





[Duoneb 0.5 mg-3 mg/3 ml Soln]    


 


Diltiazem Cd [Cardizem CD] 240 mg PO DAILY@0900 19 History


 


Ensure Clear 1 can PO TID-W/MEALS 19 History


 


Mirtazapine 7.5 mg PO HS@19 History


 


Ertapenem [INVanz] 0.5 gm IVPB Q24H 7 Days #7 bag 19 Rx


 


Furosemide [Lasix] 40 mg PO BID #1 tablet 19 Rx


 


Metoprolol Tartrate [Lopressor] 100 mg PO BID  tab 19 Rx


 


hydrALAZINE HCL [Apresoline] 75 mg PO TID  tab 19 Rx


 


Anti-Fungal Powder 1 applic TOPICAL HS 19 History


 


Insulin Aspart [NovoLOG 2 unit SQ ACHS 19 History





(formulary)]    


 


Sodium Chloride 0.9% Irrigatio 10 ml IRRIGATION BID 19 History





[Saline 0.9% Irrigation]    


 


predniSONE See Taper PO DAILY 19 History











 Allergies











Allergy/AdvReac Type Severity Reaction Status Date / Time


 


No Known Allergies Allergy   Verified 19 09:11














Physical Exam


Vitals: 


 Vital Signs











  Temp Pulse Pulse Resp BP BP Pulse Ox


 


 19 04:00  97.7 F   120 H  20   135/59  91 L


 


 19 00:00  97.6 F   109 H  20   187/80  95


 


 19 22:40       187/80 


 


 19 22:35       165/85 


 


 19 22:30       191/102 


 


 19 21:20   96     


 


 19 21:12   100     


 


 19 20:00  97.4 F L   86  20   195/99  96


 


 19 17:47  98.4 F  99   18  152/98   93 L


 


 19 17:00   105 H   18  173/110   92 L


 


 19 16:30   96   18  172/116   100


 


 19 16:00  97.6 F  101 H  95  20  158/95  183/85  90 L


 


 19 15:30   104 H   18  200/112   100


 


 19 15:00   89    153/97   94 L


 


 19 14:00   98   16  157/90   96


 


 19 13:30   94   18  168/83   98


 


 19 13:00   98   16  157/102   96


 


 19 12:30   102 H   16  170/90   96


 


 19 11:30   108 H    157/111   100


 


 19 11:00   115 H    198/106   100


 


 19 10:30   96    176/89   100


 


 19 10:00   93    164/83   100








 Intake and Output











 19





 22:59 06:59 14:59


 


Intake Total  675 


 


Output Total 2000  


 


Balance - 675 


 


Intake:   


 


  IV  675 


 


    Sodium Chloride 0.9% 1,  675 





    000 ml @ 75 mls/hr IV .   





    [id number] ONE Rx#:[id number]   


 


Output:   


 


  Urine   


 


Other:   


 


  Voiding Method Indwelling Catheter Indwelling Catheter 


 


  Weight  70 kg 














- Constitutional


General appearance: no acute distress





- Respiratory


Respiratory: left: diminished





- Cardiovascular


Rhythm: irregularly irregular


Heart sounds: normal: S1, S2





Results





 19 09:10





 19 09:10


 Cardiac Enzymes











  19 Range/Units





  09:10 14:24 21:13 


 


CK-MB (CK-2)  0.9    (0.0-2.4)  ng/mL


 


Troponin I  0.175 H*  0.154 H*  0.127 H*  (0.000-0.034)  ng/mL








 Coagulation











  19 Range/Units





  09:10 


 


PT  10.2  (9.0-12.0)  sec


 


APTT  20.7 L  (22.0-30.0)  sec








 Current Medications











Generic Name Dose Route Start Last Admin





  Trade Name Freq  PRN Reason Stop Dose Admin


 


Acetaminophen  650 mg  19 19:05  





  Tylenol Tab  PO   





  Q6HR PRN   





  Mild Pain or Fever > 100.5   





     





     





     


 


Acetazolamide  250 mg  19 09:00  





  Diamox  PO   





  TU@0900 [facility]   





     





     





     





     


 


Albuterol/Ipratropium  3 ml  19 20:00  19 07:50





  Duoneb 0.5 Mg-3 Mg/3 Ml Soln  INHALATION   Not Given





  RT-QID [facility]   





     





     





     





     


 


Alprazolam  0.25 mg  19 21:00  19 00:41





  Xanax  PO   Not Given





  HS@2100 [facility]   





     





     





     





     


 


Apixaban  2.5 mg  19 21:00  19 09:34





  Eliquis  PO   2.5 mg





  BID@0900,2100 [facility]   Administration





     





     





     





     


 


Cinacalcet  30 mg  19 06:00  19 07:23





  Sensipar  PO   Not Given





  DAILY@0600 [facility]   





     





     





     





     


 


Diltiazem HCl  240 mg  19 09:00  19 09:34





  Cardizem Cd  PO   240 mg





  DAILY@0900 [facility]   Administration





     





     





     





     


 


Divalproex Sodium  250 mg  19 09:00  19 09:34





  Depakote  PO   250 mg





  BID [facility]   Administration





     





     





     





     


 


Ferrous Sulfate  325 mg  19 09:00  19 09:34





  Feosol  PO   325 mg





  BID@0900,2100 [facility]   Administration





     





     





     





     


 


Fluticasone Propionate  1 spray  19 21:00  19 09:31





  Flonase Nasal [place]  EA NOSTRIL   Not Given





  BID@0900,2100 [facility]   





     





     





     





     


 


Furosemide  40 mg  19 21:00  19 09:34





  Lasix  PO   40 mg





  0900,1700 [facility]   Administration





     





     





     





     


 


Hydralazine HCl  75 mg  19 22:00  19 09:34





  Apresoline  PO   75 mg





  TID [facility]   Administration





     





     





     





     


 


Hydralazine HCl  10 mg  19 22:19  19 22:28





  Apresoline  IVP   10 mg





  Q6HR PRN   Administration





  Blood Pressure - High   





     





     





     


 


Ceftriaxone Sodium 1,000 mg/  50 mls @ 100 mls/hr  19 09:00  19 09:

35





  Sodium Chloride  IVPB   100 mls/hr





  Q24HR PATR[facility]   Administration





     





     





     





     


 


Insulin Aspart  2 unit  19 21:00  19 07:23





  Novolog  SQ   Not Given





  ACHS [facility]   





     





     





     





     


 


Latanoprost  1 drops  19 21:00  19 00:41





  Xalatan 0.005%  LEFT EYE   Not Given





  HS@2100 [facility]   





     





     





     





     


 


Levothyroxine Sodium  25 mcg  19 06:00  19 07:23





  Synthroid  PO   Not Given





  DAILY@0600 [facility]   





     





     





     





     


 


Loratadine  10 mg  19 09:00  19 09:34





  Claritin  PO   10 mg





  DAILY@0900 [facility]   Administration





     





     





     





     


 


Magnesium Hydroxide  2,400 mg  19 19:05  





  Milk Of Magnesia  PO   





  DAILY PRN   





  Constipation   





     





     





     


 


Metoprolol Tartrate  2.5 mg  19 17:12  19 17:24





  Lopressor  IVP   2.5 mg





  Q6HR PRN   Administration





  Blood Pressure - High   





     





     





     


 


Metoprolol Tartrate  100 mg  19 21:00  19 09:34





  Lopressor  PO   100 mg





  BID [facility]   Administration





     





     





     





     


 


Mirtazapine  7.5 mg  19 21:00  19 00:42





  Remeron  PO   Not Given





  HS@2100 [facility]   





     





     





     





     


 


Nitroglycerin  1 inch  19 18:00  19 07:45





  Nitro-Bid Oint  TOPICAL   Not Given





  Q6HR [facility]   





     





     





     





     


 


Nystatin  1 applic  19 21:00  19 00:42





  Mycostatin Powder  TOPICAL   Not Given





  [facility]   





     





     





     





     


 


Prednisolone Acetate  2 drops  19 21:00  19 00:42





  Pred Forte 1%  BOTH EYES   Not Given





  HS@2100 [facility]   





     





     





     





     








 Intake and Output











 19





 22:59 06:59 14:59


 


Intake Total  675 


 


Output Total   


 


Balance -2000 


 


Intake:   


 


  IV  675 


 


    Sodium Chloride 0.9% 1,  675 





    000 ml @ 75 mls/hr IV .   





    [id number] ONE Rx#:[id number]   


 


Output:   


 


  Urine   


 


Other:   


 


  Voiding Method Indwelling Catheter Indwelling Catheter 


 


  Weight  70 kg 








 





 19 09:10 





 19 09:10 











Assessment and Plan


Assessment: 





Assessment


#1 recurrent seizure.  The patient is known to have seizure


#2 chronic persistent atrial fibrillation.  The heart rate seems to be not well-

controlled


#3 CHF secondary to diastolic dysfunction, chronic.





Plan


#1 conservative medical approach for the abnormal cardiac enzymes


#2 the patient does not seems to be in any overt congestive heart failure


#3 I would increase the dose of Cardizem CD for better heart rate control


#4 no need to repeat the echocardiogram in view of recent echo was performed a 

few months ago


#5 follow-up with the patient.





Thank you for allowing us participate in her care and we'll continue following 

up with

## 2019-01-24 LAB
GLUCOSE BLD-MCNC: 133 MG/DL (ref 75–99)
GLUCOSE BLD-MCNC: 89 MG/DL (ref 75–99)
HYALINE CASTS UR QL AUTO: 12 /LPF (ref 0–2)
KETONES UR QL STRIP.AUTO: (no result)
PH UR: 5.5 [PH] (ref 5–8)
PROT UR QL: (no result)
RBC UR QL: 9 /HPF (ref 0–5)
SP GR UR: 1.01 (ref 1–1.03)
SQUAMOUS UR QL AUTO: <1 /HPF (ref 0–4)
UROBILINOGEN UR QL STRIP: <2 MG/DL (ref ?–2)

## 2019-01-24 RX ADMIN — Medication SCH: at 10:43

## 2019-01-24 RX ADMIN — METOPROLOL TARTRATE SCH MG: 50 TABLET, FILM COATED ORAL at 20:46

## 2019-01-24 RX ADMIN — IPRATROPIUM BROMIDE AND ALBUTEROL SULFATE SCH: .5; 3 SOLUTION RESPIRATORY (INHALATION) at 10:46

## 2019-01-24 RX ADMIN — INSULIN ASPART SCH: 100 INJECTION, SOLUTION INTRAVENOUS; SUBCUTANEOUS at 12:05

## 2019-01-24 RX ADMIN — LEVOTHYROXINE SODIUM SCH: 25 TABLET ORAL at 06:40

## 2019-01-24 RX ADMIN — FUROSEMIDE SCH: 40 TABLET ORAL at 10:43

## 2019-01-24 RX ADMIN — NITROGLYCERIN SCH: 20 OINTMENT TOPICAL at 15:40

## 2019-01-24 RX ADMIN — DIVALPROEX SODIUM SCH: 250 TABLET, DELAYED RELEASE ORAL at 10:43

## 2019-01-24 RX ADMIN — NITROGLYCERIN SCH: 20 OINTMENT TOPICAL at 12:05

## 2019-01-24 RX ADMIN — HYDRALAZINE HYDROCHLORIDE PRN MG: 20 INJECTION INTRAMUSCULAR; INTRAVENOUS at 12:45

## 2019-01-24 RX ADMIN — LATANOPROST SCH DROPS: 50 SOLUTION OPHTHALMIC at 20:47

## 2019-01-24 RX ADMIN — APIXABAN SCH: 2.5 TABLET, FILM COATED ORAL at 10:42

## 2019-01-24 RX ADMIN — IPRATROPIUM BROMIDE AND ALBUTEROL SULFATE SCH: .5; 3 SOLUTION RESPIRATORY (INHALATION) at 19:25

## 2019-01-24 RX ADMIN — APIXABAN SCH MG: 2.5 TABLET, FILM COATED ORAL at 20:46

## 2019-01-24 RX ADMIN — NITROGLYCERIN SCH: 20 OINTMENT TOPICAL at 23:23

## 2019-01-24 RX ADMIN — DIVALPROEX SODIUM SCH MG: 250 TABLET, DELAYED RELEASE ORAL at 20:46

## 2019-01-24 RX ADMIN — IPRATROPIUM BROMIDE AND ALBUTEROL SULFATE SCH: .5; 3 SOLUTION RESPIRATORY (INHALATION) at 15:36

## 2019-01-24 RX ADMIN — DILTIAZEM HYDROCHLORIDE SCH: 5 INJECTION INTRAVENOUS at 20:26

## 2019-01-24 RX ADMIN — LORATADINE SCH: 10 TABLET ORAL at 10:43

## 2019-01-24 RX ADMIN — CINACALCET HYDROCHLORIDE SCH: 30 TABLET, COATED ORAL at 06:40

## 2019-01-24 RX ADMIN — Medication SCH MG: at 20:46

## 2019-01-24 RX ADMIN — INSULIN ASPART SCH: 100 INJECTION, SOLUTION INTRAVENOUS; SUBCUTANEOUS at 06:40

## 2019-01-24 RX ADMIN — DILTIAZEM HYDROCHLORIDE SCH MLS/HR: 5 INJECTION INTRAVENOUS at 06:39

## 2019-01-24 RX ADMIN — NITROGLYCERIN SCH: 20 OINTMENT TOPICAL at 06:40

## 2019-01-24 RX ADMIN — MIRTAZAPINE SCH MG: 15 TABLET, FILM COATED ORAL at 20:46

## 2019-01-24 RX ADMIN — NYSTATIN SCH APPLIC: 100000 POWDER TOPICAL at 20:47

## 2019-01-24 RX ADMIN — FLUTICASONE PROPIONATE SCH SPRAY: 50 SPRAY, METERED NASAL at 20:47

## 2019-01-24 RX ADMIN — INSULIN ASPART SCH: 100 INJECTION, SOLUTION INTRAVENOUS; SUBCUTANEOUS at 15:40

## 2019-01-24 RX ADMIN — PREDNISOLONE ACETATE SCH DROPS: 10 SUSPENSION/ DROPS OPHTHALMIC at 20:47

## 2019-01-24 RX ADMIN — FLUTICASONE PROPIONATE SCH: 50 SPRAY, METERED NASAL at 10:44

## 2019-01-24 RX ADMIN — METOPROLOL TARTRATE SCH: 50 TABLET, FILM COATED ORAL at 10:43

## 2019-01-24 RX ADMIN — FUROSEMIDE SCH: 40 TABLET ORAL at 15:40

## 2019-01-24 RX ADMIN — INSULIN ASPART SCH UNIT: 100 INJECTION, SOLUTION INTRAVENOUS; SUBCUTANEOUS at 20:56

## 2019-01-24 RX ADMIN — IPRATROPIUM BROMIDE AND ALBUTEROL SULFATE SCH: .5; 3 SOLUTION RESPIRATORY (INHALATION) at 07:41

## 2019-01-24 RX ADMIN — DILTIAZEM HYDROCHLORIDE SCH MLS/HR: 5 INJECTION INTRAVENOUS at 12:47

## 2019-01-24 NOTE — PN
PROGRESS NOTE



DATE OF SERVICE:

01/24/2019



REASON FOR FOLLOWUP:

1. Urinary tract infection.

2. Sacral wound.



INTERVAL HISTORY:

The patient was more awake this afternoon when she was seen on rounds. However, the

patient has been more angry and agitated and has been refusing nasal cannula oxygen.

No nausea, no vomiting or any further seizure activity has been noted by the nursing

staff or any diarrhea.



PHYSICAL EXAMINATION:

Blood pressure is 182/88 with a pulse of 97, temperature 97.7. She is 99% on room air.

General description is an elderly female up in the bed in no distress.  Respiratory

system:  Unlabored breathing. Clear to auscultation anteriorly.

HEART: S1, S2.  Regular rate and rhythm.

ABDOMEN: Soft. No tenderness.



LABS:

Repeat UA has been positive after changing the Black catheter.  Cultures are currently

pending.



DIAGNOSTIC IMPRESSION AND PLAN:

Patient admitted to hospital with seizure activity; could have been related to Invanz

that the patient has been on for extended-spectrum beta-lactamase Escherichia coli

urinary tract infection. The patient's Black catheter has been changed. Urinary _____

significantly positive.  Currently on Rocephin; to continue while waiting for the

culture to finalize. Continue with supportive care.





MMODL / IJN: [id number] / Job#: [id number]

## 2019-01-24 NOTE — P.PN
Subjective


Progress Note Date: 01/24/19


Principal diagnosis: 





Atrial fibrillation





This is an 83-year-old  female patient who is known to our service 

before with a past medical history significant for chronic persistent atrial 

fibrillation on oral anticoagulation as well as history of congestive heart 

failure secondary to diastolic dysfunction was brought from an extended-care 

facility today emergency room after she had a seizure.





The patient did have to episodes of seizure.  The first episode was at the 

nursing home and the second one was in route to the hospital.  The patient is 

known to have seizure disorder and she is on medications for that.





When I came in to interview the patient, the patient was lethargic and 

confused.  She is overall very poor historian.  No indication of any chest pain 

or chest discomfort.  The patient was laying flat in bed.  She does not seems 

to be in any acute respiratory distress.





The EKG showed atrial fibrillation without any ischemic ST or T-wave 

abnormalities.  3 sets of enzymes came in to be slightly abnormal but are below 

1 and likely related to the tachycardia and the patient presented to the 

hospital.  The chest x-ray showed findings consistent with CHF but the patient 

does not seems to be in congestive heart failure exacerbation at this point.  

Patient underwent an echocardiogram in October 2018 and that revealed normal LV 

function without any significant valvular abnormalities.





On follow-up with the patient today, January 24 of 2019, the patient is very 

confused.  She is combative as well.  She is refusing to take her by mouth 

medications.  Because of that last night she was started on Cardizem IV.  She 

continues to be in atrial fibrillation with uncontrolled heart rates.  I am 

going to increase the dose of Cardizem IV for better heart rate control.  I do 

feel that the patient's need to be assessed for possible hospice.





Objective





- Vital Signs


Vital signs: 


 Vital Signs











Temp  97.9 F   01/23/19 09:52


 


Pulse  110 H  01/24/19 04:00


 


Resp  18   01/24/19 04:00


 


BP  172/84   01/24/19 04:00


 


Pulse Ox  87 L  01/24/19 04:00








 Intake & Output











 01/23/19 01/24/19 01/24/19





 18:59 06:59 18:59


 


Intake Total 50 153.417 


 


Output Total  2000 


 


Balance 50 -5316.583 


 


Weight 70 kg 76 kg 


 


Intake:   


 


  Intake, IV Titration 50 33.417 





  Amount   


 


    Diltiazem 50 mg In Sodium  33.417 





    Chloride 0.9% 40 ml @ 5   





    MG/HR 5 mls/hr IV .Q10H   





    PATRICK Rx#:[id number]   


 


    cefTRIAXone 1,000 mg In 50  





    Sodium Chloride 0.9% 50   





    ml @ 100 mls/hr IVPB   





    Q24HR PATRICK Rx#:[id number]   


 


  Oral  120 


 


Output:   


 


  Urine  2000 


 


Other:   


 


  Voiding Method Indwelling Catheter Indwelling Catheter 


 


  # Voids  1 














- Constitutional


General appearance: Present: no acute distress





- Respiratory


Respiratory: bilateral: diminished





- Cardiovascular


Rhythm: irregularly irregular


Heart sounds: normal: S1, S2





- Labs


CBC & Chem 7: 


 01/22/19 09:10





 01/22/19 09:10





Assessment and Plan


Assessment: 





Assessment


#1 recurrent seizure.  The patient is known to have seizure


#2 chronic persistent atrial fibrillation.  The heart rate seems to be not well-

controlled


#3 CHF secondary to diastolic dysfunction, chronic.





Plan


#1 conservative medical approach for the abnormal cardiac enzymes


#2 the patient does not seems to be in any overt congestive heart failure


#3 increase the dose of Cardizem IV to control the heart rate


#4 no need to repeat the echocardiogram in view of recent echo was performed a 

few months ago


#5 the patient's need to be assessed for possible hospice





Thank you for allowing us participate in her care and we'll continue following 

up with

## 2019-01-24 NOTE — PN
PROGRESS NOTE



SUBJECTIVE:

83-year-old white female with new onset seizure and metabolic encephalopathy.  She is

refusing medications, refusing blood pressure checks. Shouting at all nurses and family

members.  Discussed case with her daughter who is power of .  We started

Depakote for seizures.  Family is in agreement with sending the patient back to the

nursing home with treatment for UTI and possible comfort care versus just letting her

do what she wants back in the nursing home.  She is DNR and she is not getting better

from a confusion standpoint which she has had for many weeks now.  Possible hospice

care at the nursing home.





MMODL / IJN: [id number] / Job#: [id number]

## 2019-01-24 NOTE — CONS
CONSULTATION



DATE OF SERVICE:

2019



REASON FOR CONSULTATION:

1. Urinary tract infection.

2. Sacral wound.



HISTORY OF PRESENT ILLNESS:

Patient is an 83-year-old  female, who was recently admitted to this

institution.  Patient did have a urinary tract infection and was positive for ESBL E

coli.  There was also concern for possible pneumonia.  Patient subsequently did get to

midline and was discharged to the nursing home on 1 week course of IV Invanz 500 mg

daily.  Patient has now been brought to the [facility]. The patient

did have 2 seizures at the nursing home and 1 on route to the ER.  Patient subsequently

has been evaluated by the ER physician.  She did have CT of the brain. It did not show

any infarct or structural lesion.  Patient's chest x-ray has been suggestive of

possible congestive heart failure with cardiomegaly and bilateral pleural effusion and

as well as bibasilar atelectasis.  She did have a positive UA.  Patient was started on

Rocephin and admitted to the hospital.  ID was consulted for further recommendation for

antibiotic therapy.  Patient not a very good historian and would not provide any

history.  She also noticed to have wound on the sacral area.  Patient refused to be

examined today.



REVIEW OF SYSTEMS:

Could not be reliably obtained.  Positive points have been mentioned in HPI.



PAST MEDICAL HISTORY:

Recurrent urinary tract infection with recent urine positive for ESBL E coli, COPD,

dementia, hypertension, hyperlipidemia, pneumonia, hypothyroidism.



PAST SURGICAL HISTORY:

Hysterectomy, bladder surgery, left knee arthroplasty.



SOCIAL HISTORY:

Remote history of smoking, no drug use.  Currently a nursing home resident.



FAMILY HISTORY:

Father  of esophageal cancer.  Mother with history of prior arthritis on

osteoarthritis.



ALLERGIES:

No known drug allergies.



MEDICATION:

Include the patient is currently on Tylenol, Diamox, Xanax, Eliquis, Rocephin 1 g

daily, Sensipar, diltiazem, iron sulfate, Flonase, Lasix, hydralazine, NovoLog,

Synthroid, Claritin, Lopressor, Remeron.



PHYSICAL EXAMINATION:

blood pressure is 150/70 with a pulse of 125, temperature of 98, she is 90% on room

air.

General description is an elderly female, lying in bed in no distress.  No tachypnea or

accessory muscle for respiration use.

HEENT:  Shows no pallor or scleral icterus.  Oral mucosa is dry.  No further erythema

or thrush.

NECK:  Trachea central, no thyromegaly.

LUNGS:  Unlabored breathing, decreased breath sounds in the bases, no wheeze.

HEART:  S1, S2.  Regular sound.

ABDOMEN:  Soft, no tenderness, no guarding or rigidity.

EXTREMITIES:  No edema of the feet.

SKIN EXAMINATION:  No rash or mass palpable.

NEUROLOGICAL: Patient is lethargic, though responds, but no _____.



LABS:

Hemoglobin is 13.7, white count 10.2, BUN of 53, creatinine 1.47.  Troponin were

positive.  Urine is positive with large _____.  Urine drug screen was negative.  Chest

x-ray possible CHF.



DIAGNOSTIC IMPRESSION/PLAN:

1. Patient admitted to the hospital with seizure activities. This patient currently

    who was undergoing treatment for an ESBL Escherichia coli urinary tract infection

    infection with Invanz and could be related to carbapenems that has been

    discontinued.

2. Patient who did have a positive UA that has been obtained from the Black catheter,

    question of possible folic colonization versus a urinary tract infection.

3. Patient with sacral pressure ulcer where the patient has refused to be examined.



PLAN:

1. Will change her Black catheter and obtain a urine culture from the new Black.

2. Rocephin to continue for now.

3. _____sacral wound and keep the area off the pressure.

4. Follow up on clinical condition and culture to further adjust medication if needed.

Thank you for this consultation. Will follow this patient along with you.





MMODL / IJN: [id number] / Job#: [id number]

## 2019-01-25 LAB
GLUCOSE BLD-MCNC: 113 MG/DL (ref 75–99)
GLUCOSE BLD-MCNC: 153 MG/DL (ref 75–99)
GLUCOSE BLD-MCNC: 94 MG/DL (ref 75–99)

## 2019-01-25 RX ADMIN — DILTIAZEM HYDROCHLORIDE SCH: 5 INJECTION INTRAVENOUS at 00:50

## 2019-01-25 RX ADMIN — Medication SCH MG: at 19:33

## 2019-01-25 RX ADMIN — NITROGLYCERIN SCH: 20 OINTMENT TOPICAL at 06:15

## 2019-01-25 RX ADMIN — CINACALCET HYDROCHLORIDE SCH: 30 TABLET, COATED ORAL at 06:15

## 2019-01-25 RX ADMIN — Medication SCH: at 10:12

## 2019-01-25 RX ADMIN — INSULIN ASPART SCH: 100 INJECTION, SOLUTION INTRAVENOUS; SUBCUTANEOUS at 20:57

## 2019-01-25 RX ADMIN — DILTIAZEM HYDROCHLORIDE SCH: 5 INJECTION INTRAVENOUS at 19:30

## 2019-01-25 RX ADMIN — DILTIAZEM HYDROCHLORIDE SCH: 5 INJECTION INTRAVENOUS at 19:29

## 2019-01-25 RX ADMIN — APIXABAN SCH MG: 2.5 TABLET, FILM COATED ORAL at 19:33

## 2019-01-25 RX ADMIN — INSULIN ASPART SCH: 100 INJECTION, SOLUTION INTRAVENOUS; SUBCUTANEOUS at 17:10

## 2019-01-25 RX ADMIN — METOPROLOL TARTRATE SCH MG: 50 TABLET, FILM COATED ORAL at 10:06

## 2019-01-25 RX ADMIN — INSULIN ASPART SCH: 100 INJECTION, SOLUTION INTRAVENOUS; SUBCUTANEOUS at 13:50

## 2019-01-25 RX ADMIN — APIXABAN SCH MG: 2.5 TABLET, FILM COATED ORAL at 10:06

## 2019-01-25 RX ADMIN — PREDNISOLONE ACETATE SCH DROPS: 10 SUSPENSION/ DROPS OPHTHALMIC at 19:33

## 2019-01-25 RX ADMIN — NYSTATIN SCH: 100000 POWDER TOPICAL at 19:34

## 2019-01-25 RX ADMIN — FUROSEMIDE SCH MG: 40 TABLET ORAL at 10:11

## 2019-01-25 RX ADMIN — IPRATROPIUM BROMIDE AND ALBUTEROL SULFATE SCH: .5; 3 SOLUTION RESPIRATORY (INHALATION) at 16:58

## 2019-01-25 RX ADMIN — FLUTICASONE PROPIONATE SCH: 50 SPRAY, METERED NASAL at 10:17

## 2019-01-25 RX ADMIN — DILTIAZEM HYDROCHLORIDE SCH: 5 INJECTION INTRAVENOUS at 20:57

## 2019-01-25 RX ADMIN — DIVALPROEX SODIUM SCH MG: 250 TABLET, DELAYED RELEASE ORAL at 10:06

## 2019-01-25 RX ADMIN — MIRTAZAPINE SCH MG: 15 TABLET, FILM COATED ORAL at 19:34

## 2019-01-25 RX ADMIN — FUROSEMIDE SCH MG: 40 TABLET ORAL at 15:52

## 2019-01-25 RX ADMIN — IPRATROPIUM BROMIDE AND ALBUTEROL SULFATE SCH: .5; 3 SOLUTION RESPIRATORY (INHALATION) at 07:33

## 2019-01-25 RX ADMIN — LEVOTHYROXINE SODIUM SCH: 25 TABLET ORAL at 06:15

## 2019-01-25 RX ADMIN — LORATADINE SCH MG: 10 TABLET ORAL at 10:13

## 2019-01-25 RX ADMIN — DIVALPROEX SODIUM SCH MG: 250 TABLET, DELAYED RELEASE ORAL at 19:33

## 2019-01-25 RX ADMIN — NITROGLYCERIN SCH: 20 OINTMENT TOPICAL at 23:28

## 2019-01-25 RX ADMIN — NITROGLYCERIN SCH: 20 OINTMENT TOPICAL at 17:04

## 2019-01-25 RX ADMIN — IPRATROPIUM BROMIDE AND ALBUTEROL SULFATE SCH: .5; 3 SOLUTION RESPIRATORY (INHALATION) at 19:50

## 2019-01-25 RX ADMIN — INSULIN ASPART SCH: 100 INJECTION, SOLUTION INTRAVENOUS; SUBCUTANEOUS at 06:15

## 2019-01-25 RX ADMIN — IPRATROPIUM BROMIDE AND ALBUTEROL SULFATE SCH: .5; 3 SOLUTION RESPIRATORY (INHALATION) at 11:04

## 2019-01-25 RX ADMIN — METOPROLOL TARTRATE SCH MG: 50 TABLET, FILM COATED ORAL at 19:33

## 2019-01-25 RX ADMIN — NITROGLYCERIN SCH: 20 OINTMENT TOPICAL at 12:21

## 2019-01-25 RX ADMIN — LATANOPROST SCH DROPS: 50 SOLUTION OPHTHALMIC at 19:33

## 2019-01-25 RX ADMIN — FLUTICASONE PROPIONATE SCH: 50 SPRAY, METERED NASAL at 19:34

## 2019-01-25 RX ADMIN — DILTIAZEM HYDROCHLORIDE SCH MLS/HR: 5 INJECTION INTRAVENOUS at 05:50

## 2019-01-25 NOTE — P.PN
Subjective


Progress Note Date: 01/25/19


Principal diagnosis: 





Atrial fibrillation





This is an 83-year-old  female patient who is known to our service 

before with a past medical history significant for chronic persistent atrial 

fibrillation on oral anticoagulation as well as history of congestive heart 

failure secondary to diastolic dysfunction was brought from an extended-care 

facility today emergency room after she had a seizure.





The patient did have to episodes of seizure.  The first episode was at the 

nursing home and the second one was in route to the hospital.  The patient is 

known to have seizure disorder and she is on medications for that.





When I came in to interview the patient, the patient was lethargic and 

confused.  She is overall very poor historian.  No indication of any chest pain 

or chest discomfort.  The patient was laying flat in bed.  She does not seems 

to be in any acute respiratory distress.





The EKG showed atrial fibrillation without any ischemic ST or T-wave 

abnormalities.  3 sets of enzymes came in to be slightly abnormal but are below 

1 and likely related to the tachycardia and the patient presented to the 

hospital.  The chest x-ray showed findings consistent with CHF but the patient 

does not seems to be in congestive heart failure exacerbation at this point.  

Patient underwent an echocardiogram in October 2018 and that revealed normal LV 

function without any significant valvular abnormalities.





On follow-up with the patient today, January 25 of 2019, the patient is very 

confused.  She is refusing to take her by mouth medications.  Because of that 

last night she was started on Cardizem IV.  She continues to be in atrial 

fibrillation with controlled heart rates.  I do feel that the patient's need to 

be assessed for possible hospice.





Objective





- Vital Signs


Vital signs: 


 Vital Signs











Temp  98.1 F   01/25/19 08:00


 


Pulse  93   01/25/19 08:00


 


Resp  20   01/25/19 08:00


 


BP  144/76   01/25/19 08:00


 


Pulse Ox  88 L  01/25/19 08:00








 Intake & Output











 01/24/19 01/25/19 01/25/19





 18:59 06:59 18:59


 


Intake Total 610.583 50 2.917


 


Output Total 20 350 


 


Balance 590.583 -300 2.917


 


Weight 76 kg 76 kg 


 


Intake:   


 


  Intake, IV Titration 30.583 50 2.917





  Amount   


 


    Diltiazem 50 mg In Sodium 30.583 50 2.917





    Chloride 0.9% 40 ml @ 10   





    MG/HR 10 mls/hr IV .Q5H   





    [facility] Rx#:[id number]   


 


  Oral 580  


 


Output:   


 


  Urine 20 350 


 


Other:   


 


  Voiding Method Indwelling Catheter Indwelling Catheter 














- Constitutional


General appearance: Present: no acute distress





- Respiratory


Respiratory: bilateral: diminished





- Cardiovascular


Rhythm: irregularly irregular


Heart sounds: normal: S1, S2





- Labs


CBC & Chem 7: 


 01/22/19 09:10





 01/22/19 09:10


Labs: 


 Abnormal Lab Results - Last 24 Hours (Table)











  01/24/19 01/24/19 Range/Units





  14:45 20:35 


 


POC Glucose (mg/dL)   133 H  (75-99)  mg/dL


 


Urine Appearance  Cloudy H   (Clear)  


 


Urine Protein  2+ H   (Negative)  


 


Urine Ketones  1+ H   (Negative)  


 


Urine Blood  Small H   (Negative)  


 


Ur Leukocyte Esterase  Large H   (Negative)  


 


Urine RBC  9 H   (0-5)  /hpf


 


Urine WBC  >182 H   (0-5)  /hpf


 


Urine WBC Clumps  Many H   (None)  /hpf


 


Amorphous Sediment  Occasional H   (None)  /hpf


 


Urine Bacteria  Rare H   (None)  /hpf


 


Hyaline Casts  12 H   (0-2)  /lpf


 


Urine Mucus  Few H   (None)  /hpf








 Microbiology - Last 24 Hours (Table)











 01/24/19 14:45 Urine Culture - Preliminary





 Urine,Catheterized 














Assessment and Plan


Assessment: 





Assessment


#1 recurrent seizure.  The patient is known to have seizure


#2 chronic persistent atrial fibrillation.  The heart rate seems to be not well-

controlled


#3 CHF secondary to diastolic dysfunction, chronic.





Plan


#1 continue the Cardizem IV in view of the patient refusing to take her 

medications by mouth


#2 the patient's need to be evaluated for possible hospice.





Thank you for allowing us participate in her care and we'll continue following 

up with

## 2019-01-25 NOTE — PN
PROGRESS NOTE



DATE OF SERVICE:

01/25/2019.



REASON FOR FOLLOWUP:

UTI and a sacral pressure ulcer.



INTERVAL HISTORY:

The patient is currently afebrile.  She is breathing comfortably.  The patient was not

as agitated today.  No nausea or vomiting has been reported or any diarrhea by the

nursing staff.



PHYSICAL EXAMINATION:

Blood pressure 166/76, pulse of 98, temperature 98.2. She is 93% on room air.

General description is an elderly female up in the bed in no distress.

RESPIRATORY SYSTEM: Unlabored breathing. Clear to auscultation anteriorly.

HEART: S1, S2.  Regular rate and rhythm.

ABDOMEN: Soft. No tenderness.



LABS:

No new labs today.  Urine culture currently pending.



DIAGNOSTIC IMPRESSION AND PLAN:

1. Patient admitted to hospital with a seizure in this patient who did have treatment

    with Invanz for extended-spectrum beta-lactamase Escherichia coli, questionably

    _____related. That has been discontinued.  Repeat urine was positive. Cultures

    pending after change of Black catheter.  Currently on Rocephin.  Continue for now.

2. Patient with a stage II sacral pressure ulcer, for which the patient has been

    refusing dressing changes _____ the wound today. Will attempt tomorrow. Keep the

    area off pressure and dry.  Continue supportive care.





MMODL / IJN: [id number] / Job#: [id number]

## 2019-01-25 NOTE — PN
PROGRESS NOTE



SUBJECTIVE:

An 83-year-old white female, status post atrial fibrillation, started taking medicine

now. Off Cardizem drip. Being treated for UTI with IV Rocephin.



OBJECTIVE:

Cardiovascular S1, S2. Lungs are clear.  Psych, she is responding. GI soft.



ASSESSMENT:

1. New onset seizure secondary to Invanz.

2. Chronic obstructive pulmonary disease.

3. Urinary tract infection.

4. Congestive heart failure.

5. Possible brain mass.

6. Chronic dementia.



PLAN:

Continue current treatments with Depakote and current medications.  Possible discharge

home within 24 to 48 hours. Will get her medicines switched to oral and give some

antibiotics for the UTI.





MMODL / IJN: [id number] / Job#: [id number]

## 2019-01-25 NOTE — PN
PROGRESS NOTE



SUBJECTIVE:

83-year-old white female with urinary tract infection, sacral wound, refusing oxygen.

No seizure activity.  No diarrhea.



Blood pressure 160 to 180s, over 88, temp 97, O2 99% on room air.  Cardiovascular:  S1,

S2.  Lungs:  Transmitted upper airway sounds.  GI: Soft.  Hematology:  Negative Homans.

Psych:  Fair mood and affect.



IV Ancef she was on at the nursing home could have been causing seizure disorder.  That

was stopped.  Depakote was given.  Atrial fibrillation medicines will be given.

COPD/CHF medicines given.  Concerning Rocephin, wait for the final cultures.  Possible

discharge home in the morning.





MMODL / IJN: [id number] / Job#: [id number]

## 2019-01-25 NOTE — EEG
ELECTROENCEPHALOGRAM REPORT



DATE OF PROCEDURE:

01/25/2019.



ELECTROENCEPHALOGRAM (EEG) REPORT:



TECHNIQUE:

A routine 18-channel EEG was performed with video using the 10-20  international

placement system.



HISTORY:

Patient presented from a nursing home with a seizure. Other medical history includes

atrial fibrillation, congestive heart failure.



STUDY DURATION:

27 minutes.



FINDINGS:

Please note that quality of the study was limited due to the presence of muscle

artifact.



BACKGROUND: The background activity consisted of unsustained 7-8 Hz rhythmic waveforms

with some intermixed theta range slowing.



ACTIVATION: Hyperventilation not performed.



PHOTIC STIMULATION: No driving seen.



SLEEP: Stages I and II sleep noted.



ABNORMALITIES:

Intermittent diffuse 4-6 Hz polymorphic theta range slowing was seen.



IMPRESSION:

Limited study, abnormal EEG. The intermittent polymorphic theta range slowing mentioned

above is not epileptiform in nature. In combination with the slow background, these

findings indicate mild diffuse cerebral dysfunction. No seizures were recorded. No

epileptiform activity was present. The ordering physician was contacted at 2:15 p.m. on

1/25/2019.





MMODL / IJN: [id number] / Job#: [id number]

## 2019-01-26 LAB
GLUCOSE BLD-MCNC: 115 MG/DL (ref 75–99)
GLUCOSE BLD-MCNC: 79 MG/DL (ref 75–99)
GLUCOSE BLD-MCNC: 85 MG/DL (ref 75–99)
GLUCOSE BLD-MCNC: 96 MG/DL (ref 75–99)

## 2019-01-26 RX ADMIN — DILTIAZEM HYDROCHLORIDE SCH: 5 INJECTION INTRAVENOUS at 06:18

## 2019-01-26 RX ADMIN — IPRATROPIUM BROMIDE AND ALBUTEROL SULFATE SCH: .5; 3 SOLUTION RESPIRATORY (INHALATION) at 21:14

## 2019-01-26 RX ADMIN — IPRATROPIUM BROMIDE AND ALBUTEROL SULFATE SCH: .5; 3 SOLUTION RESPIRATORY (INHALATION) at 08:32

## 2019-01-26 RX ADMIN — LORATADINE SCH MG: 10 TABLET ORAL at 09:06

## 2019-01-26 RX ADMIN — Medication SCH MG: at 09:06

## 2019-01-26 RX ADMIN — DILTIAZEM HYDROCHLORIDE SCH: 5 INJECTION INTRAVENOUS at 02:40

## 2019-01-26 RX ADMIN — APIXABAN SCH: 2.5 TABLET, FILM COATED ORAL at 21:15

## 2019-01-26 RX ADMIN — IPRATROPIUM BROMIDE AND ALBUTEROL SULFATE SCH: .5; 3 SOLUTION RESPIRATORY (INHALATION) at 21:41

## 2019-01-26 RX ADMIN — INSULIN ASPART SCH: 100 INJECTION, SOLUTION INTRAVENOUS; SUBCUTANEOUS at 21:17

## 2019-01-26 RX ADMIN — Medication SCH: at 21:15

## 2019-01-26 RX ADMIN — DIVALPROEX SODIUM SCH MG: 250 TABLET, DELAYED RELEASE ORAL at 09:07

## 2019-01-26 RX ADMIN — METOPROLOL TARTRATE SCH MG: 50 TABLET, FILM COATED ORAL at 09:06

## 2019-01-26 RX ADMIN — FUROSEMIDE SCH: 40 TABLET ORAL at 18:51

## 2019-01-26 RX ADMIN — FLUTICASONE PROPIONATE SCH SPRAY: 50 SPRAY, METERED NASAL at 09:07

## 2019-01-26 RX ADMIN — NITROGLYCERIN SCH: 20 OINTMENT TOPICAL at 23:36

## 2019-01-26 RX ADMIN — NYSTATIN SCH APPLIC: 100000 POWDER TOPICAL at 22:41

## 2019-01-26 RX ADMIN — FLUTICASONE PROPIONATE SCH: 50 SPRAY, METERED NASAL at 22:45

## 2019-01-26 RX ADMIN — DIVALPROEX SODIUM SCH: 250 TABLET, DELAYED RELEASE ORAL at 21:15

## 2019-01-26 RX ADMIN — NITROGLYCERIN SCH: 20 OINTMENT TOPICAL at 18:51

## 2019-01-26 RX ADMIN — LEVOTHYROXINE SODIUM SCH: 25 TABLET ORAL at 06:18

## 2019-01-26 RX ADMIN — DILTIAZEM HYDROCHLORIDE SCH: 5 INJECTION INTRAVENOUS at 21:14

## 2019-01-26 RX ADMIN — NITROGLYCERIN SCH: 20 OINTMENT TOPICAL at 21:15

## 2019-01-26 RX ADMIN — MIRTAZAPINE SCH: 15 TABLET, FILM COATED ORAL at 21:15

## 2019-01-26 RX ADMIN — IPRATROPIUM BROMIDE AND ALBUTEROL SULFATE SCH: .5; 3 SOLUTION RESPIRATORY (INHALATION) at 11:48

## 2019-01-26 RX ADMIN — DILTIAZEM HYDROCHLORIDE SCH: 5 INJECTION INTRAVENOUS at 23:42

## 2019-01-26 RX ADMIN — PREDNISOLONE ACETATE SCH DROPS: 10 SUSPENSION/ DROPS OPHTHALMIC at 22:42

## 2019-01-26 RX ADMIN — NITROGLYCERIN SCH: 20 OINTMENT TOPICAL at 06:18

## 2019-01-26 RX ADMIN — CINACALCET HYDROCHLORIDE SCH: 30 TABLET, COATED ORAL at 06:18

## 2019-01-26 RX ADMIN — INSULIN ASPART SCH: 100 INJECTION, SOLUTION INTRAVENOUS; SUBCUTANEOUS at 06:18

## 2019-01-26 RX ADMIN — LATANOPROST SCH DROPS: 50 SOLUTION OPHTHALMIC at 22:41

## 2019-01-26 RX ADMIN — INSULIN ASPART SCH: 100 INJECTION, SOLUTION INTRAVENOUS; SUBCUTANEOUS at 18:51

## 2019-01-26 RX ADMIN — AMPICILLIN SODIUM AND SULBACTAM SODIUM SCH MLS/HR: 1; .5 INJECTION, POWDER, FOR SOLUTION INTRAMUSCULAR; INTRAVENOUS at 23:35

## 2019-01-26 RX ADMIN — METOPROLOL TARTRATE SCH MG: 50 TABLET, FILM COATED ORAL at 22:41

## 2019-01-26 RX ADMIN — FUROSEMIDE SCH MG: 40 TABLET ORAL at 09:06

## 2019-01-26 RX ADMIN — APIXABAN SCH MG: 2.5 TABLET, FILM COATED ORAL at 09:06

## 2019-01-26 RX ADMIN — INSULIN ASPART SCH: 100 INJECTION, SOLUTION INTRAVENOUS; SUBCUTANEOUS at 21:15

## 2019-01-26 NOTE — PN
PROGRESS NOTE



DATE OF SERVICE:

January 26, 2019



INTERVAL HISTORY:

This is a pleasant 83-year-old  female patient with chronic atrial

fibrillation as well as change in mental status.



I did follow up on the patient today, unfortunately we do not have computer access

because the system is down.  The patient continues to be confused.  She continues to be

in atrial fibrillation with controlled heart rate.  She continues refusing all her p.o.

medication at this point.



There was some discussion that the patient might go into hospice. We will continue

following up with the patient unless she goes hospice.





RUSTY / IJN: [id number] / Job#: [id number]

## 2019-01-27 LAB
ANION GAP SERPL CALC-SCNC: 5 MMOL/L
BASOPHILS # BLD AUTO: 0 K/UL (ref 0–0.2)
BASOPHILS NFR BLD AUTO: 0 %
BUN SERPL-SCNC: 22 MG/DL (ref 7–17)
CALCIUM SPEC-MCNC: 9.3 MG/DL (ref 8.4–10.2)
CHLORIDE SERPL-SCNC: 111 MMOL/L (ref 98–107)
CO2 SERPL-SCNC: 25 MMOL/L (ref 22–30)
EOSINOPHIL # BLD AUTO: 0.1 K/UL (ref 0–0.7)
EOSINOPHIL NFR BLD AUTO: 1 %
ERYTHROCYTE [DISTWIDTH] IN BLOOD BY AUTOMATED COUNT: 4.59 M/UL (ref 3.8–5.4)
ERYTHROCYTE [DISTWIDTH] IN BLOOD: 17.4 % (ref 11.5–15.5)
GLUCOSE BLD-MCNC: 120 MG/DL (ref 75–99)
GLUCOSE BLD-MCNC: 212 MG/DL (ref 75–99)
GLUCOSE BLD-MCNC: 56 MG/DL (ref 75–99)
GLUCOSE BLD-MCNC: 72 MG/DL (ref 75–99)
GLUCOSE BLD-MCNC: 74 MG/DL (ref 75–99)
GLUCOSE SERPL-MCNC: 83 MG/DL (ref 74–99)
HCT VFR BLD AUTO: 40.1 % (ref 34–46)
HGB BLD-MCNC: 12.2 GM/DL (ref 11.4–16)
LYMPHOCYTES # SPEC AUTO: 0.5 K/UL (ref 1–4.8)
LYMPHOCYTES NFR SPEC AUTO: 4 %
MCH RBC QN AUTO: 26.5 PG (ref 25–35)
MCHC RBC AUTO-ENTMCNC: 30.4 G/DL (ref 31–37)
MCV RBC AUTO: 87.3 FL (ref 80–100)
MONOCYTES # BLD AUTO: 0.5 K/UL (ref 0–1)
MONOCYTES NFR BLD AUTO: 4 %
NEUTROPHILS # BLD AUTO: 10.9 K/UL (ref 1.3–7.7)
NEUTROPHILS NFR BLD AUTO: 89 %
PLATELET # BLD AUTO: 201 K/UL (ref 150–450)
POTASSIUM SERPL-SCNC: 3.8 MMOL/L (ref 3.5–5.1)
SODIUM SERPL-SCNC: 141 MMOL/L (ref 137–145)
WBC # BLD AUTO: 12.2 K/UL (ref 3.8–10.6)

## 2019-01-27 RX ADMIN — DILTIAZEM HYDROCHLORIDE SCH: 5 INJECTION INTRAVENOUS at 23:12

## 2019-01-27 RX ADMIN — NITROGLYCERIN SCH: 20 OINTMENT TOPICAL at 16:32

## 2019-01-27 RX ADMIN — AMPICILLIN SODIUM AND SULBACTAM SODIUM SCH MLS/HR: 1; .5 INJECTION, POWDER, FOR SOLUTION INTRAMUSCULAR; INTRAVENOUS at 16:36

## 2019-01-27 RX ADMIN — CINACALCET HYDROCHLORIDE SCH MG: 30 TABLET, COATED ORAL at 06:47

## 2019-01-27 RX ADMIN — IPRATROPIUM BROMIDE AND ALBUTEROL SULFATE SCH: .5; 3 SOLUTION RESPIRATORY (INHALATION) at 11:51

## 2019-01-27 RX ADMIN — FLUTICASONE PROPIONATE SCH SPRAY: 50 SPRAY, METERED NASAL at 10:04

## 2019-01-27 RX ADMIN — IPRATROPIUM BROMIDE AND ALBUTEROL SULFATE SCH: .5; 3 SOLUTION RESPIRATORY (INHALATION) at 08:43

## 2019-01-27 RX ADMIN — PREDNISOLONE ACETATE SCH DROPS: 10 SUSPENSION/ DROPS OPHTHALMIC at 21:17

## 2019-01-27 RX ADMIN — APIXABAN SCH MG: 2.5 TABLET, FILM COATED ORAL at 10:03

## 2019-01-27 RX ADMIN — FUROSEMIDE SCH MG: 40 TABLET ORAL at 16:36

## 2019-01-27 RX ADMIN — INSULIN ASPART SCH: 100 INJECTION, SOLUTION INTRAVENOUS; SUBCUTANEOUS at 16:26

## 2019-01-27 RX ADMIN — IPRATROPIUM BROMIDE AND ALBUTEROL SULFATE SCH: .5; 3 SOLUTION RESPIRATORY (INHALATION) at 19:33

## 2019-01-27 RX ADMIN — NITROGLYCERIN SCH INCH: 20 OINTMENT TOPICAL at 16:36

## 2019-01-27 RX ADMIN — AMPICILLIN SODIUM AND SULBACTAM SODIUM SCH MLS/HR: 1; .5 INJECTION, POWDER, FOR SOLUTION INTRAMUSCULAR; INTRAVENOUS at 10:02

## 2019-01-27 RX ADMIN — NITROGLYCERIN SCH INCH: 20 OINTMENT TOPICAL at 06:47

## 2019-01-27 RX ADMIN — DILTIAZEM HYDROCHLORIDE SCH: 5 INJECTION INTRAVENOUS at 06:04

## 2019-01-27 RX ADMIN — FUROSEMIDE SCH MG: 40 TABLET ORAL at 10:03

## 2019-01-27 RX ADMIN — DIVALPROEX SODIUM SCH MG: 250 TABLET, DELAYED RELEASE ORAL at 21:16

## 2019-01-27 RX ADMIN — INSULIN ASPART SCH UNIT: 100 INJECTION, SOLUTION INTRAVENOUS; SUBCUTANEOUS at 13:01

## 2019-01-27 RX ADMIN — DILTIAZEM HYDROCHLORIDE SCH: 5 INJECTION INTRAVENOUS at 20:07

## 2019-01-27 RX ADMIN — LEVOTHYROXINE SODIUM SCH MCG: 25 TABLET ORAL at 06:47

## 2019-01-27 RX ADMIN — DIVALPROEX SODIUM SCH MG: 250 TABLET, DELAYED RELEASE ORAL at 10:04

## 2019-01-27 RX ADMIN — LORATADINE SCH MG: 10 TABLET ORAL at 10:03

## 2019-01-27 RX ADMIN — ACETAMINOPHEN PRN MG: 325 TABLET, FILM COATED ORAL at 13:01

## 2019-01-27 RX ADMIN — METOPROLOL TARTRATE SCH MG: 50 TABLET, FILM COATED ORAL at 21:15

## 2019-01-27 RX ADMIN — FLUTICASONE PROPIONATE SCH SPRAY: 50 SPRAY, METERED NASAL at 21:16

## 2019-01-27 RX ADMIN — APIXABAN SCH MG: 2.5 TABLET, FILM COATED ORAL at 21:15

## 2019-01-27 RX ADMIN — IPRATROPIUM BROMIDE AND ALBUTEROL SULFATE SCH: .5; 3 SOLUTION RESPIRATORY (INHALATION) at 15:29

## 2019-01-27 RX ADMIN — NITROGLYCERIN SCH: 20 OINTMENT TOPICAL at 23:12

## 2019-01-27 RX ADMIN — Medication SCH MG: at 10:03

## 2019-01-27 RX ADMIN — INSULIN ASPART SCH: 100 INJECTION, SOLUTION INTRAVENOUS; SUBCUTANEOUS at 06:04

## 2019-01-27 RX ADMIN — LATANOPROST SCH DROPS: 50 SOLUTION OPHTHALMIC at 21:16

## 2019-01-27 RX ADMIN — INSULIN ASPART SCH: 100 INJECTION, SOLUTION INTRAVENOUS; SUBCUTANEOUS at 21:17

## 2019-01-27 RX ADMIN — METOPROLOL TARTRATE SCH MG: 50 TABLET, FILM COATED ORAL at 10:03

## 2019-01-27 RX ADMIN — ACETAMINOPHEN PRN MG: 325 TABLET, FILM COATED ORAL at 23:59

## 2019-01-27 RX ADMIN — Medication SCH MG: at 21:15

## 2019-01-27 RX ADMIN — MIRTAZAPINE SCH MG: 15 TABLET, FILM COATED ORAL at 21:15

## 2019-01-27 RX ADMIN — NYSTATIN SCH APPLIC: 100000 POWDER TOPICAL at 21:16

## 2019-01-27 RX ADMIN — DILTIAZEM HYDROCHLORIDE SCH: 5 INJECTION INTRAVENOUS at 21:19

## 2019-01-27 NOTE — P.PN
Subjective


Progress Note Date: 01/27/19


Principal diagnosis: 





Atrial fibrillation





This is an 83-year-old  female patient who is known to our service 

before with a past medical history significant for chronic persistent atrial 

fibrillation on oral anticoagulation as well as history of congestive heart 

failure secondary to diastolic dysfunction was brought from an extended-care 

facility today emergency room after she had a seizure.





The patient did have to episodes of seizure.  The first episode was at the 

nursing home and the second one was in route to the hospital.  The patient is 

known to have seizure disorder and she is on medications for that.





When I came in to interview the patient, the patient was lethargic and 

confused.  She is overall very poor historian.  No indication of any chest pain 

or chest discomfort.  The patient was laying flat in bed.  She does not seems 

to be in any acute respiratory distress.





The EKG showed atrial fibrillation without any ischemic ST or T-wave 

abnormalities.  3 sets of enzymes came in to be slightly abnormal but are below 

1 and likely related to the tachycardia and the patient presented to the 

hospital.  The chest x-ray showed findings consistent with CHF but the patient 

does not seems to be in congestive heart failure exacerbation at this point.  

Patient underwent an echocardiogram in October 2018 and that revealed normal LV 

function without any significant valvular abnormalities.  





On follow-up with the patient today, 01/27/2019, the patient continues to be 

confused as well as agitated and with difficulties giving her her by mouth 

medications.  She continues to be in atrial fibrillation was controlled heart 

rate.  She continues to be on oral anticoagulation as well.  I am quite 

concerned about oral anticoagulation giving her status of being confused as 

well as of high risk of falling and bleeding.

















Objective





- Vital Signs


Vital signs: 


 Vital Signs











Temp  98.2 F   01/27/19 04:00


 


Pulse  106 H  01/27/19 08:00


 


Resp  16   01/27/19 08:00


 


BP  144/69   01/27/19 08:00


 


Pulse Ox  93 L  01/27/19 08:00








 Intake & Output











 01/26/19 01/27/19 01/27/19





 18:59 06:59 18:59


 


Intake Total  10 


 


Output Total  600 


 


Balance  -590 


 


Weight  75.3 kg 


 


Intake:   


 


  IV  10 


 


    Invasive Line 3  10 


 


Output:   


 


  Urine  600 


 


Other:   


 


  Voiding Method  Indwelling Catheter 














- Constitutional


General appearance: Present: no acute distress





- Respiratory


Respiratory: bilateral: diminished





- Cardiovascular


Rhythm: irregularly irregular


Heart sounds: normal: S1, S2





- Labs


CBC & Chem 7: 


 01/27/19 05:46





 01/27/19 05:46


Labs: 


 Abnormal Lab Results - Last 24 Hours (Table)











  01/27/19 01/27/19 01/27/19 Range/Units





  05:46 05:46 05:50 


 


WBC  12.2 H    (3.8-10.6)  k/uL


 


MCHC  30.4 L    (31.0-37.0)  g/dL


 


RDW  17.4 H    (11.5-15.5)  %


 


Neutrophils #  10.9 H    (1.3-7.7)  k/uL


 


Lymphocytes #  0.5 L    (1.0-4.8)  k/uL


 


Chloride   111 H   ()  mmol/L


 


BUN   22 H   (7-17)  mg/dL


 


Creatinine   1.17 H   (0.52-1.04)  mg/dL


 


POC Glucose (mg/dL)    72 L  (75-99)  mg/dL








 Microbiology - Last 24 Hours (Table)











 01/24/19 14:45 Urine Culture - Final





 Urine,Catheterized    Enterococcus faecium














Assessment and Plan


Assessment: 





Assessment


#1 recurrent seizure.  The patient is known to have seizure


#2 chronic persistent atrial fibrillation.  The heart rate seems to be not well-

controlled


#3 CHF secondary to diastolic dysfunction, chronic.





Plan


#1 continue the current medical regimen


#2 address oral anticoagulation and possible DC them





Thank you for allowing us participate in her care and we'll continue following 

up with

## 2019-01-27 NOTE — PN
PROGRESS NOTE



DATE OF SERVICE:

01/26/2019



REASON FOR FOLLOW UP:

1. UTI.

2. Sacral pressure ulcer.



INTERVAL HISTORY:

The patient is currently afebrile.  Per nursing staff the patient has been refusing

treatment on oral pills and at times will not allow nasal oxygen.  The patient is very

hard of hearing and only says I'm okay when asked questions.  Did not provide any

history.  No nausea, vomiting or diarrhea per the nursing staff.



On examination, blood pressure is 174/76, pulse of 106, temperature 98.1, she is 94% on

room air.

GENERAL DESCRIPTION:  An elderly female up in the bed in no distress.

RESPIRATORY SYSTEM:  Unlabored breathing.  Decreased breath sounds in the bases.  No

wheeze.

HEART:  S1, S2.  Regular rate and rhythm.

ABDOMEN:  Soft, no tenderness.

EXTREMITIES:  No edema of the feet.



LABS:

No new labs have been obtained today.



DIAGNOSTIC IMPRESSION/PLAN:

1. Patient in hospital with seizures, could have been related to the Invanz that the

    patient was on.  Repeat urine culture did not show any Enterococcus.  We will

    switch her over to Unasyn 1.5 q8h.  Repeat CBC and BMP tomorrow as well.

2. Sacral wound infection.  The patient did not know anybody to look at that wound (

    ) treated.  Continue supportive care.





MMODL / IJN: [id number] / Job#: [id number]

## 2019-01-27 NOTE — P.PN
Subjective


Progress Note Date: 01/27/19





Interval history:





1/27/19- patient is being seen examined and evaluated today while covering for 

Dr. Dilshad Street.  Patient is being treated for a UTI is being treated with IV 

antibiotics, infectious disease is on consult as well.  Patient did have a 

seizure at home.  She did have A. fib and cardiology is following with her 

closely.  The patient intermittently is refusing her medications.  Discharge 

planning is underway the patient may potentially go to [facility] tomorrow.  Upon 

examination the patient's resting up in bed on room air.  She denies any 

shortness of breath cough or congestion.  She has been afebrile no further 

complaints.  Case is discussed with her bedside nurse.








Objective





- Vital Signs


Vital signs: 


 Vital Signs











Temp  98.2 F   01/27/19 04:00


 


Pulse  106 H  01/27/19 08:00


 


Resp  16   01/27/19 11:26


 


BP  144/69   01/27/19 08:00


 


Pulse Ox  93 L  01/27/19 08:00








 Intake & Output











 01/26/19 01/27/19 01/27/19





 18:59 06:59 18:59


 


Intake Total  10 20


 


Output Total  600 


 


Balance  -590 20


 


Weight  75.3 kg 


 


Intake:   


 


  IV  10 20


 


    Invasive Line 3  10 20


 


Output:   


 


  Urine  600 


 


Other:   


 


  Voiding Method  Indwelling Catheter Indwelling Catheter














- Exam





GENERAL EXAM: Alert, underlying dementia, comfortable in no apparent distress.


HEAD: Normocephalic.


EYES: Normal reaction of pupils, equal size.


NOSE: Clear with pink turbinates.


THROAT: No erythema or exudates.


NECK: No masses, no JVD.


CHEST: No chest wall deformity.


LUNGS: Equal air entry with no crackles, wheeze, rhonchi or dullness.


CVS: S1 and S2 normal with no audible mumurs, regular rhythm.


ABDOMEN: No hepatosplenomegaly, normal bowel sounds, no guarding or rigidity.


EXTREMITIES: No edema noted, pedal pulses palpable.


CENTRAL NERVOUS SYSTEM: No focal deficits, tone is normal in all 4 extremities.





- Labs


CBC & Chem 7: 


 01/27/19 05:46





 01/27/19 05:46


Labs: 


 Abnormal Lab Results - Last 24 Hours (Table)











  01/27/19 01/27/19 01/27/19 Range/Units





  05:46 05:46 05:50 


 


WBC  12.2 H    (3.8-10.6)  k/uL


 


MCHC  30.4 L    (31.0-37.0)  g/dL


 


RDW  17.4 H    (11.5-15.5)  %


 


Neutrophils #  10.9 H    (1.3-7.7)  k/uL


 


Lymphocytes #  0.5 L    (1.0-4.8)  k/uL


 


Chloride   111 H   ()  mmol/L


 


BUN   22 H   (7-17)  mg/dL


 


Creatinine   1.17 H   (0.52-1.04)  mg/dL


 


POC Glucose (mg/dL)    72 L  (75-99)  mg/dL














  01/27/19 Range/Units





  11:13 


 


WBC   (3.8-10.6)  k/uL


 


MCHC   (31.0-37.0)  g/dL


 


RDW   (11.5-15.5)  %


 


Neutrophils #   (1.3-7.7)  k/uL


 


Lymphocytes #   (1.0-4.8)  k/uL


 


Chloride   ()  mmol/L


 


BUN   (7-17)  mg/dL


 


Creatinine   (0.52-1.04)  mg/dL


 


POC Glucose (mg/dL)  212 H  (75-99)  mg/dL








 Microbiology - Last 24 Hours (Table)











 01/24/19 14:45 Urine Culture - Final





 Urine,Catheterized    Enterococcus faecium














Assessment and Plan


Assessment: 





Assessment





New-onset seizures


COPD


UTI


CHF


Possible brain mass


Chronic dementia





Plan





Agree with discharge planning to [facility]


Infectious disease on consult, antibiotics per ID 


Medications have been reviewed and will be continued as ordered. 


Continue with pulmonary hygiene, coughing and deep breathing exercises, and 

supportive care. 


Supplemental oxygen to maintain oxygen saturations of 92% or better. . 


GI and DVT prophylaxis. 


We will continue to monitor labs/results and adjust treatment as necessary. 


Further recommendations pending.





I, the signing physician performed an examination of the patient, discussed and 

directed their management with the nurse practitioner. I have reviewed the 

nurse practitioner's note and agree with the documented findings, orders and 

plan of care.  Nurse practitioner acting as a scribe for the signing physician





Please note we are covering for Dr. Dilshad Strete

## 2019-01-27 NOTE — PN
PROGRESS NOTE



DATE OF SERVICE:

01/26/2019.



SUBJECTIVE:

83-year-old white female, new onset seizure, having no chest pain or shortness of

breath.  She is awake.  She is eating and taking her pills.  She is off the Cardizem

drip.  Possibly discharge to nursing home when cleared by Cardiology and as long she

stays awake and we treat her for infections for the UTI.  Continue with current

treatment.  Follow up in next 24-48 hours for possible discharge.





MMFRANCINEL / IJN: [id number] / Job#: [id number]

## 2019-01-28 VITALS — SYSTOLIC BLOOD PRESSURE: 175 MMHG | HEART RATE: 88 BPM | RESPIRATION RATE: 20 BRPM | DIASTOLIC BLOOD PRESSURE: 84 MMHG

## 2019-01-28 VITALS — TEMPERATURE: 97.5 F

## 2019-01-28 LAB
GLUCOSE BLD-MCNC: 83 MG/DL (ref 75–99)
GLUCOSE BLD-MCNC: 97 MG/DL (ref 75–99)
GLUCOSE BLD-MCNC: 99 MG/DL (ref 75–99)

## 2019-01-28 RX ADMIN — METOPROLOL TARTRATE SCH MG: 50 TABLET, FILM COATED ORAL at 09:25

## 2019-01-28 RX ADMIN — APIXABAN SCH MG: 2.5 TABLET, FILM COATED ORAL at 09:26

## 2019-01-28 RX ADMIN — INSULIN ASPART SCH: 100 INJECTION, SOLUTION INTRAVENOUS; SUBCUTANEOUS at 11:57

## 2019-01-28 RX ADMIN — DILTIAZEM HYDROCHLORIDE SCH: 5 INJECTION INTRAVENOUS at 19:26

## 2019-01-28 RX ADMIN — LORATADINE SCH: 10 TABLET ORAL at 09:34

## 2019-01-28 RX ADMIN — Medication SCH: at 09:33

## 2019-01-28 RX ADMIN — FLUTICASONE PROPIONATE SCH: 50 SPRAY, METERED NASAL at 09:34

## 2019-01-28 RX ADMIN — NITROGLYCERIN SCH: 20 OINTMENT TOPICAL at 19:25

## 2019-01-28 RX ADMIN — DIVALPROEX SODIUM SCH MG: 250 TABLET, DELAYED RELEASE ORAL at 09:25

## 2019-01-28 RX ADMIN — IPRATROPIUM BROMIDE AND ALBUTEROL SULFATE SCH: .5; 3 SOLUTION RESPIRATORY (INHALATION) at 12:38

## 2019-01-28 RX ADMIN — INSULIN ASPART SCH: 100 INJECTION, SOLUTION INTRAVENOUS; SUBCUTANEOUS at 19:25

## 2019-01-28 RX ADMIN — IPRATROPIUM BROMIDE AND ALBUTEROL SULFATE SCH: .5; 3 SOLUTION RESPIRATORY (INHALATION) at 08:39

## 2019-01-28 RX ADMIN — NITROGLYCERIN SCH INCH: 20 OINTMENT TOPICAL at 06:06

## 2019-01-28 RX ADMIN — IPRATROPIUM BROMIDE AND ALBUTEROL SULFATE SCH: .5; 3 SOLUTION RESPIRATORY (INHALATION) at 16:31

## 2019-01-28 RX ADMIN — NITROGLYCERIN SCH: 20 OINTMENT TOPICAL at 11:58

## 2019-01-28 RX ADMIN — INSULIN ASPART SCH: 100 INJECTION, SOLUTION INTRAVENOUS; SUBCUTANEOUS at 06:44

## 2019-01-28 RX ADMIN — LEVOTHYROXINE SODIUM SCH MCG: 25 TABLET ORAL at 06:04

## 2019-01-28 RX ADMIN — CINACALCET HYDROCHLORIDE SCH MG: 30 TABLET, COATED ORAL at 06:04

## 2019-01-28 RX ADMIN — FUROSEMIDE SCH MG: 40 TABLET ORAL at 09:25

## 2019-01-28 NOTE — PN
PROGRESS NOTE



DATE OF SERVICE:

01/27/2019.



REASON FOR FOLLOWUP:

Urinary tract infection.



INTERVAL HISTORY:

The patient is currently afebrile.  The patient remains to be pleasantly confused.

Denies having any chest pain.  No cough.  No abdominal pain.  No diarrhea.



PHYSICAL EXAMINATION:

Blood pressure 123/60 with a pulse of 91, temperature 97.3.

GENERAL DESCRIPTION: An elderly female, lying in bed in no distress.

RESPIRATORY SYSTEM: Unlabored breathing. Clear to auscultation anteriorly.

HEART: S1, S2.  Regular rate and rhythm.

ABDOMEN: Soft, no tenderness.



LABS:

White count elevated 12.2, creatinine is 1.17.  Urine is showing Enterococcus faecium

that is resistant to ampicillin.



DIAGNOSTIC IMPRESSION AND PLAN:

Patient with Enterococcus faecium urinary tract infection. _____ has already been

changed.  He will discontinue Unasyn and give short course of vancomycin or ______

based on clinical course and culture.  He cannot use the Zyvox because of the patient

being on Remeron. Continue supportive care.





MMODL / IJN: [id number] / Job#: [id number]

## 2019-01-28 NOTE — PN
PROGRESS NOTE



DATE OF SERVICE:

01/28/2019



REASON FOR FOLLOWUP:

Enterococcus faecium UTI.



INTERVAL HISTORY:

The patient is currently afebrile.  She is breathing comfortably.  Denies having any

chest pain.  No cough.  No abdominal pain or diarrhea.



PHYSICAL EXAMINATION:

On examination, blood pressure 153/69 with a pulse of 80, temperature 97.5.

General description is an elderly female lying in bed in no distress.

RESPIRATORY SYSTEM:  Unlabored breathing.  Decreased breath sounds in the bases. No

wheeze.

HEART: S1, S2.  Regular rate and rhythm.

ABDOMEN: Soft, no tenderness.

Examination of the sacral area did have minimal excoriation, but no open wound.



DIAGNOSTIC IMPRESSION AND PLAN:

1. Patient with Enterococcus faecium urinary tract infection. She will finish therapy

    with oral nitrofurantoin. She is high risk of nephrotoxicity from vancomycin in

    outpatient setting.

2. Calmoseptine lotion to the sacral area.  Keep the area off the pressure and dry.





MMODL / IJN: [id number] / Job#: [id number]

## 2019-01-28 NOTE — P.PN
Subjective


Progress Note Date: 01/28/19


Principal diagnosis: 





Atrial fibrillation





This is an 83-year-old  female patient who is known to our service 

before with a past medical history significant for chronic persistent atrial 

fibrillation on oral anticoagulation as well as history of congestive heart 

failure secondary to diastolic dysfunction was brought from an extended-care 

facility today emergency room after she had a seizure.





The patient did have to episodes of seizure.  The first episode was at the 

nursing home and the second one was in route to the hospital.  The patient is 

known to have seizure disorder and she is on medications for that.





When I came in to interview the patient, the patient was lethargic and 

confused.  She is overall very poor historian.  No indication of any chest pain 

or chest discomfort.  The patient was laying flat in bed.  She does not seems 

to be in any acute respiratory distress.





The EKG showed atrial fibrillation without any ischemic ST or T-wave 

abnormalities.  3 sets of enzymes came in to be slightly abnormal but are below 

1 and likely related to the tachycardia and the patient presented to the 

hospital.  The chest x-ray showed findings consistent with CHF but the patient 

does not seems to be in congestive heart failure exacerbation at this point.  

Patient underwent an echocardiogram in October 2018 and that revealed normal LV 

function without any significant valvular abnormalities.  





On follow-up with the patient today, 01/28/2019, the patient continues to be 

confused as well as agitated and with difficulties giving her her by mouth 

medications.  She continues to be in atrial fibrillation was slightly 

uncontrolled heart rate.  We will follow-up with the patient on when necessary 

case.

















Objective





- Vital Signs


Vital signs: 


 Vital Signs











Temp  96.9 F L  01/28/19 08:00


 


Pulse  106 H  01/28/19 08:00


 


Resp  16   01/28/19 08:00


 


BP  141/67   01/28/19 08:00


 


Pulse Ox  91 L  01/28/19 08:00








 Intake & Output











 01/27/19 01/28/19 01/28/19





 18:59 06:59 18:59


 


Intake Total 70 400 


 


Output Total  1275 


 


Balance 70 -875 


 


Weight  65.5 kg 


 


Intake:   


 


  IV 30 30 


 


    Invasive Line 3 30 30 


 


  Intake, IV Titration  250 





  Amount   


 


    Vancomycin 1,250 mg In  250 





    Sodium Chloride 0.9% 250   





    ml @ 125 mls/hr IVPB Q24H   





    [facility] Rx#:[id number]   


 


  Oral 40 120 


 


Output:   


 


  Urine  1275 


 


    Uretheral (Black)  900 


 


Other:   


 


  Voiding Method Indwelling Catheter Indwelling Catheter 


 


  # Voids  1 


 


  # Bowel Movements   1














- Constitutional


General appearance: Present: no acute distress





- Labs


CBC & Chem 7: 


 01/27/19 05:46





 01/27/19 05:46


Labs: 


 Abnormal Lab Results - Last 24 Hours (Table)











  01/27/19 01/27/19 01/27/19 Range/Units





  11:13 16:18 16:55 


 


POC Glucose (mg/dL)  212 H  56 L  74 L  (75-99)  mg/dL














  01/27/19 Range/Units





  21:02 


 


POC Glucose (mg/dL)  120 H  (75-99)  mg/dL














Assessment and Plan


Assessment: 





Assessment


#1 recurrent seizure.  The patient is known to have seizure


#2 chronic persistent atrial fibrillation.  The heart rate seems to be not well-

controlled


#3 CHF secondary to diastolic dysfunction, chronic.





Plan


#1 continue the current medical regimen


#2 we'll follow-up with the patient on when necessary case per

## 2019-01-28 NOTE — DS
DISCHARGE SUMMARY



DATE OF DISCHARGE:

01/28/2019



DISCHARGE MEDICATIONS:

1. Depakote 250 b.i.d.

2. Macrobid 100 mg b.i.d. for 10 days.

3. Levothyroxine 25 mcg daily.

4. Fluticasone nasal spray 1 spray each nostril b.i.d.

5. Acetaminophen 650 q.6 hours p.r.n.

6. Alprazolam 0.25 at bedtime.

7. Claritin 10 mg daily.

8. Prednisolone acetate (Pred Forte 1%) 2 drops both eyes at bedtime.

9. Milk of Magnesia 2400 mg p.o. daily.

10.Ferrous sulfate 325 mg b.i.d.

11.Xalatan 1 drop left eye at bedtime.

12.Diamox 250 mg p.o. on Tuesdays.

13.Eliquis 2.5 mg b.i.d.

14.Sensipar 30 mg daily.

15.DuoNeb updraft q.i.d.

16.Diltiazem  mg p.o. daily.

17.Ensure 1 can t.i.d. with meals.

18.Mirtazapine 7.5 mg at bedtime.

19.Apresoline 75 mg t.i.d.

20.Lopressor 100 mg b.i.d.

21.Lasix 40 mg b.i.d.

22.NovoLog regular insulin 2 units subcutaneously before meals and at bedtime.

23.Prednisone taper: 40 mg daily for 3 days; 20 mg daily for 3 days; 10 mg daily for 3

    days; then 5 mg daily after that.

24.Mycostatin powder topically at bedtime.



CONDITION:

Stable.



PROGNOSIS:

Guarded.



Ambulate as tolerated.



DISCHARGE DIAGNOSES:

1. Metabolic encephalopathy secondary to urinary tract infection.

2. Atrial fibrillation with rapid ventricular response.

3. New-onset seizures secondary to Invanz or acute drug reaction.

4. Non-ST-segment-elevation myocardial infarction.

5. Hypothyroidism.

6. Diabetes mellitus.

7. Chronic renal disease, stage III.

This is a white male who came to the hospital with metabolic encephalopathy with

chronic kidney disease, stage III, and a seizure secondary to Invanz for a drug-

resistant UTI.  Repeat urine culture showed positive treatment with Macrobid for ESBL

after receiving different IV antibiotics in the hospital, including vancomycin.  The

patient's mentation cleared up and her atrial fibrillation with rapid ventricular

response was good with Cardizem drip and was okay as long as she takes her oral

medications. Oral medications _____ taken as patient clearly improved once her

mentation improved from treatment for the UTI.  Patient's mentation improved and her

atrial fibrillation was able to be better controlled with her oral medications.  She is

back to her baseline status.  She will be sent to the nursing home at this time.





MMFRANCINEL / IJN: [id number] / Job#: [id number]

## 2019-01-28 NOTE — DS
DISCHARGE SUMMARY



ADDENDUM TO DISCHARGE SUMMARY:

1. Macrobid 100 mg b.i.d. for 10 days.

2. Prednisone 10 mg 1 daily indefinitely.





MMODL / IJN: [id number] / Job#: [id number]

## 2020-10-28 NOTE — HP
HISTORY AND PHYSICAL



CHIEF COMPLAINT:

An 83-year-old white female brought from a nursing home, was found on the floor.  She

has some bruising over her left eye. She has some confusion, garbled speech. She was

found to be a poor historian.  Do not resuscitate.  She is admitted due to fall and

contusion and possible urosepsis.



MEDICATIONS:

Fluticasone, Synthroid, Xanax, Ventolin, Lipitor, Butrans patch, Zaroxolyn, Claritin,

Zantac, Pred Forte, Feosol, Xalatan, Diamox, Lotrisone cream, Norco, Cozaar, Maxitrol,

Coumadin.



ALLERGIES:

Negative.



REVIEW OF SYSTEMS:

Fourteen-point review of systems negative except for as mentioned in HPI.



PAST MEDICAL HISTORY:

Heart failure, COPD, dementia, GERD, dyslipidemia, hypertension, renal disease, chronic

respiratory disorder, hypothyroidism. 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

fractures, nasal passages, ear canals. 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

ear.  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 She has

glaucoma, chronic renal disease stage IV.  She had an appointment with a renal

physician, but has not seen her yet.  She has a left arm fistula with no hemodialysis.

Frequent UTI.  She wears oxygen at 3 L.  She has frequent falls. Hypothyroidism. Has

ESBL in the urine.



PAST SURGICAL HISTORY:

Bladder surgery, joint replacement, orthopedic surgery, multiple surgery with birth

defects affecting mouth, nose and ears. Left knee arthroplasty, bladder sling, fistula

left arm.



She lives at [facility]. She wears 3 L of oxygen at this time.



SOCIAL HISTORY:

She is a former smoker.  No alcohol.  No illicit drugs.



FAMILY HISTORY:

Father with esophageal cancer.  Mother with osteoarthritis, renal disease and

rheumatoid arthritis.



PHYSICAL EXAM:

A well-developed, well-nourished white female in no acute distress.

HEENT:  Normocephalic, atraumatic.

OPHTHALMOLOGIC: Pupils equal, round, and reactive on the left.

RESPIRATORY: Some wheezes, decreased breath sounds.

NECK:  Supple.  No lymphadenopathy.

CARDIOVASCULAR: S1, S2 without any murmurs, rubs or gallops.

ABDOMEN:  Soft, nontender.  Normal bowel sounds.

EXTREMITIES: Full range of motion.  Normal capillary refill.

BACK: Normal inspection.

NEUROLOGIC: Cranial nerves are intact.

SKIN:  Warm, dry.



Temp 97.2, pulse is 95 to 97, respiratory rate 20 to 22, blood pressure is 170s to 180s

over 70s to 80s, O2 of 97% on room air.



Hemoglobin is 11.2, white count 5.7. BUN 76, creatinine 2.99.  UA shows positive

nitrates.



EKG sinus rhythm, atrial fibrillation.



ASSESSMENT:

1. Chronic obstructive pulmonary disease exacerbation.

2. Urinary tract infection.

3. Renal failure syndrome.

4. Dehydration.

5. Frequent falls.

6. Facial contusion.



PLAN:

Urology consult.  Hematology and Infectious Disease consult.  Please see further

orders.  Continue with broad-spectrum antibiotics. Rocephin will be given until urine

culture is back.  Please see further orders.





MMODL / IJN: [id number] / Job#: [id number]
none

## 2023-11-16 NOTE — PN
PROGRESS NOTE



SUBJECTIVE:

83-year-old white female who has possibly a brain mass versus intracranial hemorrhage.

Has been placed on IV steroids.  CT scan of the abdomen has been ordered with contrast,

which she will not tolerate.  She is refusing medicines this morning.  She has ESBL

UTI.  She is on IV antibiotics for this.  She is swallowing okay.  She has some aphasia

and expressively but does say hi and is close to her baseline in my opinion.



VITAL SIGNS: Stable. Afebrile.

CARDIOVASCULAR: S1, S2.

LUNGS: Scattered rhonchi.

HEMATOLOGY: Negative Homans.

OPHTHALMOLOGIC: Pupils equal, round, reactive to light and accommodation.



ASSESSMENT:

1. Possible brain mass.

2. CT scan of the abdomen.

3. ESBL urinary tract infection.

4. Altered mental status.

5. Pseudobulbar aphasia.

6. Depression.

7. Diastolic congestive heart failure.

Please see further orders.





MMODL / IJN: [id number] / Job#: [id number] 16-Nov-2023 21:14